# Patient Record
Sex: MALE | Race: WHITE | NOT HISPANIC OR LATINO
[De-identification: names, ages, dates, MRNs, and addresses within clinical notes are randomized per-mention and may not be internally consistent; named-entity substitution may affect disease eponyms.]

---

## 2017-03-07 ENCOUNTER — APPOINTMENT (OUTPATIENT)
Dept: HEART AND VASCULAR | Facility: CLINIC | Age: 72
End: 2017-03-07

## 2017-03-07 VITALS
HEIGHT: 71 IN | WEIGHT: 270 LBS | SYSTOLIC BLOOD PRESSURE: 128 MMHG | HEART RATE: 66 BPM | DIASTOLIC BLOOD PRESSURE: 73 MMHG | BODY MASS INDEX: 37.8 KG/M2

## 2017-03-07 DIAGNOSIS — Z87.442 PERSONAL HISTORY OF URINARY CALCULI: ICD-10-CM

## 2017-03-07 DIAGNOSIS — Z84.1 FAMILY HISTORY OF DISORDERS OF KIDNEY AND URETER: ICD-10-CM

## 2017-04-18 ENCOUNTER — APPOINTMENT (OUTPATIENT)
Dept: HEART AND VASCULAR | Facility: CLINIC | Age: 72
End: 2017-04-18

## 2017-04-18 VITALS
BODY MASS INDEX: 37.8 KG/M2 | WEIGHT: 270 LBS | SYSTOLIC BLOOD PRESSURE: 110 MMHG | HEART RATE: 65 BPM | DIASTOLIC BLOOD PRESSURE: 65 MMHG | HEIGHT: 71 IN

## 2017-05-01 ENCOUNTER — APPOINTMENT (OUTPATIENT)
Dept: PULMONOLOGY | Facility: CLINIC | Age: 72
End: 2017-05-01

## 2017-05-01 VITALS
SYSTOLIC BLOOD PRESSURE: 115 MMHG | DIASTOLIC BLOOD PRESSURE: 69 MMHG | BODY MASS INDEX: 37.8 KG/M2 | WEIGHT: 270 LBS | HEIGHT: 71 IN | OXYGEN SATURATION: 96 % | HEART RATE: 65 BPM

## 2017-05-16 ENCOUNTER — OUTPATIENT (OUTPATIENT)
Dept: OUTPATIENT SERVICES | Facility: HOSPITAL | Age: 72
LOS: 1 days | End: 2017-05-16
Payer: MEDICARE

## 2017-05-16 ENCOUNTER — APPOINTMENT (OUTPATIENT)
Dept: SLEEP CENTER | Facility: HOSPITAL | Age: 72
End: 2017-05-16

## 2017-05-16 DIAGNOSIS — G47.33 OBSTRUCTIVE SLEEP APNEA (ADULT) (PEDIATRIC): ICD-10-CM

## 2017-05-16 PROCEDURE — 95810 POLYSOM 6/> YRS 4/> PARAM: CPT | Mod: 26

## 2017-05-16 PROCEDURE — 95811 POLYSOM 6/>YRS CPAP 4/> PARM: CPT

## 2017-05-18 ENCOUNTER — FORM ENCOUNTER (OUTPATIENT)
Age: 72
End: 2017-05-18

## 2017-05-25 ENCOUNTER — INPATIENT (INPATIENT)
Facility: HOSPITAL | Age: 72
LOS: 0 days | Discharge: HOME | DRG: 274 | End: 2017-05-26
Attending: INTERNAL MEDICINE | Admitting: INTERNAL MEDICINE
Payer: COMMERCIAL

## 2017-05-25 VITALS
HEART RATE: 82 BPM | OXYGEN SATURATION: 99 % | SYSTOLIC BLOOD PRESSURE: 115 MMHG | DIASTOLIC BLOOD PRESSURE: 70 MMHG | RESPIRATION RATE: 16 BRPM

## 2017-05-25 DIAGNOSIS — I47.2 VENTRICULAR TACHYCARDIA: ICD-10-CM

## 2017-05-25 DIAGNOSIS — Z95.810 PRESENCE OF AUTOMATIC (IMPLANTABLE) CARDIAC DEFIBRILLATOR: Chronic | ICD-10-CM

## 2017-05-25 LAB
ANION GAP SERPL CALC-SCNC: 9 MMOL/L — SIGNIFICANT CHANGE UP (ref 5–17)
APTT BLD: 31.8 SEC — SIGNIFICANT CHANGE UP (ref 27.5–37.4)
BUN SERPL-MCNC: 18 MG/DL — SIGNIFICANT CHANGE UP (ref 7–23)
CALCIUM SERPL-MCNC: 9.2 MG/DL — SIGNIFICANT CHANGE UP (ref 8.4–10.5)
CHLORIDE SERPL-SCNC: 102 MMOL/L — SIGNIFICANT CHANGE UP (ref 96–108)
CO2 SERPL-SCNC: 28 MMOL/L — SIGNIFICANT CHANGE UP (ref 22–31)
CREAT SERPL-MCNC: 1 MG/DL — SIGNIFICANT CHANGE UP (ref 0.5–1.3)
GLUCOSE SERPL-MCNC: 110 MG/DL — HIGH (ref 70–99)
HCT VFR BLD CALC: 38.9 % — LOW (ref 39–50)
HGB BLD-MCNC: 13.1 G/DL — SIGNIFICANT CHANGE UP (ref 13–17)
INR BLD: 1.27 — HIGH (ref 0.88–1.16)
MCHC RBC-ENTMCNC: 30.5 PG — SIGNIFICANT CHANGE UP (ref 27–34)
MCHC RBC-ENTMCNC: 33.7 G/DL — SIGNIFICANT CHANGE UP (ref 32–36)
MCV RBC AUTO: 90.5 FL — SIGNIFICANT CHANGE UP (ref 80–100)
PLATELET # BLD AUTO: 212 K/UL — SIGNIFICANT CHANGE UP (ref 150–400)
POTASSIUM SERPL-MCNC: 4.1 MMOL/L — SIGNIFICANT CHANGE UP (ref 3.5–5.3)
POTASSIUM SERPL-SCNC: 4.1 MMOL/L — SIGNIFICANT CHANGE UP (ref 3.5–5.3)
PROTHROM AB SERPL-ACNC: 14.2 SEC — HIGH (ref 9.8–12.7)
RBC # BLD: 4.3 M/UL — SIGNIFICANT CHANGE UP (ref 4.2–5.8)
RBC # FLD: 14.1 % — SIGNIFICANT CHANGE UP (ref 10.3–16.9)
SODIUM SERPL-SCNC: 139 MMOL/L — SIGNIFICANT CHANGE UP (ref 135–145)
WBC # BLD: 12.1 K/UL — HIGH (ref 3.8–10.5)
WBC # FLD AUTO: 12.1 K/UL — HIGH (ref 3.8–10.5)

## 2017-05-25 PROCEDURE — 93654 COMPRE EP EVAL TX VT: CPT

## 2017-05-25 PROCEDURE — 93662 INTRACARDIAC ECG (ICE): CPT | Mod: 26

## 2017-05-25 RX ORDER — LISINOPRIL 2.5 MG/1
2.5 TABLET ORAL EVERY 24 HOURS
Qty: 0 | Refills: 0 | Status: DISCONTINUED | OUTPATIENT
Start: 2017-05-25 | End: 2017-05-26

## 2017-05-25 RX ORDER — CLOPIDOGREL BISULFATE 75 MG/1
75 TABLET, FILM COATED ORAL EVERY 24 HOURS
Qty: 0 | Refills: 0 | Status: DISCONTINUED | OUTPATIENT
Start: 2017-05-25 | End: 2017-05-26

## 2017-05-25 RX ORDER — ATORVASTATIN CALCIUM 80 MG/1
20 TABLET, FILM COATED ORAL EVERY 24 HOURS
Qty: 0 | Refills: 0 | Status: DISCONTINUED | OUTPATIENT
Start: 2017-05-25 | End: 2017-05-26

## 2017-05-25 RX ORDER — FINASTERIDE 5 MG/1
5 TABLET, FILM COATED ORAL DAILY
Qty: 0 | Refills: 0 | Status: DISCONTINUED | OUTPATIENT
Start: 2017-05-25 | End: 2017-05-26

## 2017-05-25 RX ORDER — APIXABAN 2.5 MG/1
5 TABLET, FILM COATED ORAL EVERY 12 HOURS
Qty: 0 | Refills: 0 | Status: DISCONTINUED | OUTPATIENT
Start: 2017-05-25 | End: 2017-05-26

## 2017-05-25 RX ORDER — CARVEDILOL PHOSPHATE 80 MG/1
25 CAPSULE, EXTENDED RELEASE ORAL EVERY 12 HOURS
Qty: 0 | Refills: 0 | Status: DISCONTINUED | OUTPATIENT
Start: 2017-05-25 | End: 2017-05-26

## 2017-05-25 RX ORDER — AMIODARONE HYDROCHLORIDE 400 MG/1
200 TABLET ORAL EVERY 24 HOURS
Qty: 0 | Refills: 0 | Status: DISCONTINUED | OUTPATIENT
Start: 2017-05-25 | End: 2017-05-26

## 2017-05-25 RX ORDER — FUROSEMIDE 40 MG
40 TABLET ORAL EVERY 24 HOURS
Qty: 0 | Refills: 0 | Status: DISCONTINUED | OUTPATIENT
Start: 2017-05-25 | End: 2017-05-26

## 2017-05-25 RX ORDER — POTASSIUM CHLORIDE 20 MEQ
10 PACKET (EA) ORAL DAILY
Qty: 0 | Refills: 0 | Status: DISCONTINUED | OUTPATIENT
Start: 2017-05-25 | End: 2017-05-26

## 2017-05-25 RX ADMIN — ATORVASTATIN CALCIUM 20 MILLIGRAM(S): 80 TABLET, FILM COATED ORAL at 21:30

## 2017-05-25 RX ADMIN — CARVEDILOL PHOSPHATE 25 MILLIGRAM(S): 80 CAPSULE, EXTENDED RELEASE ORAL at 18:38

## 2017-05-25 RX ADMIN — APIXABAN 5 MILLIGRAM(S): 2.5 TABLET, FILM COATED ORAL at 18:38

## 2017-05-25 RX ADMIN — AMIODARONE HYDROCHLORIDE 200 MILLIGRAM(S): 400 TABLET ORAL at 18:37

## 2017-05-25 NOTE — H&P ADULT - ASSESSMENT
70 y/o M with h/o paroxysmal atrial fibrillation, CAD s/p stents and ischemic cardiomyopathy with  ventricular tachycardia s/p ICD and VT ablation (2016), who has recurrent VT/shock despite being on amiodarone and now presents for elective re-do ablation.

## 2017-05-25 NOTE — H&P ADULT - PROBLEM SELECTOR PLAN 1
NPO and consented for ablation  ICD check reveals normal function will need to be turned off when pacer pads on

## 2017-05-25 NOTE — H&P ADULT - PMH
Coronary artery disease involving native coronary artery of native heart without angina pectoris    Ischemic cardiomyopathy    VT (ventricular tachycardia)

## 2017-05-25 NOTE — H&P ADULT - HISTORY OF PRESENT ILLNESS
Mr. Sauceda is a pleasant [id number] 72 y/o M with h/o paroxysmal atrial fibrillation, CAD s/p stents and ischemic cardiomyopathy with  ventricular tachycardia s/p ICD and VT ablation (2016), who has recurrent VT/shock despite being on amiodarone and now presents for elective re-do ablation.      Mr. Sauceda states that he had an ICD placed 7/2014.  EF from outside reports 30%.  He states he was admitted with a CHF exacerbation 7/2015. He underwent cardiac catheterization at [facility] 6/2016 s/p MATTY to LAD. Reports he had VT and was shocked by his ICD. He then underwent a VT ablation 9/2016 at [facility].  Amiodarone was started post-procedure for arrhythmia suppression.  Then 11/2016 he was readmitted to [facility] with palpitations and his heart rate was up to 170 bpm. He was given IV Amiodarone and cardioverted.  Amiodarone was stopped due to concern for long term side effects and Sotalol was started. Then 1/2017 remote monitoring showed he had recurrent VT terminated by ATP.  He was switched from sotalol to amiodarone.  He states that 4/21 he had recurrent shock and device interrogation shows multiple runs of VT terminated by ATP and ultimately a shock was delivered.  He has had no recurrent shocks since then.  No syncope, chest pain, decreased exercise tolerance orthopnea.  He is on Eliquis which he is compliant with.  He recently saw Dr. Arceo in the office and now presents for an elective EP study and ablation of his ventricular tachycardia.     OF NOTE: Records from [facility] reflect h/o EPS/VT ablation 9/2016. Decreased voltage suggestive of scar noted in the anterior portion of the LV close to the septum and right beneath LVOT. During LV mapping in superior-lateral portion of this scar VT initiated spontaneous with mapping. Several lesions were delivered which initiated VT every time and at times accelerated to drop BP. VT did not completely eliminate but slowed down after few lesions. Post ablation EPS with 3 EST revealed no inducible VT but did not test w/ Isuprel as BP dropped significantly every time fast VT initiated.      OF NOTE: ICD interrogated in holding area – print out in chart.  No events or shocks since 4/21.  All measured data within normal limits/stable

## 2017-05-26 VITALS — TEMPERATURE: 99 F

## 2017-05-26 PROBLEM — I47.2 VENTRICULAR TACHYCARDIA: Chronic | Status: ACTIVE | Noted: 2017-05-25

## 2017-05-26 PROBLEM — I25.5 ISCHEMIC CARDIOMYOPATHY: Chronic | Status: ACTIVE | Noted: 2017-05-25

## 2017-05-26 PROBLEM — I25.10 ATHEROSCLEROTIC HEART DISEASE OF NATIVE CORONARY ARTERY WITHOUT ANGINA PECTORIS: Chronic | Status: ACTIVE | Noted: 2017-05-25

## 2017-05-26 RX ADMIN — APIXABAN 5 MILLIGRAM(S): 2.5 TABLET, FILM COATED ORAL at 07:24

## 2017-05-26 RX ADMIN — CLOPIDOGREL BISULFATE 75 MILLIGRAM(S): 75 TABLET, FILM COATED ORAL at 07:24

## 2017-05-26 RX ADMIN — LISINOPRIL 2.5 MILLIGRAM(S): 2.5 TABLET ORAL at 10:12

## 2017-05-26 RX ADMIN — CARVEDILOL PHOSPHATE 25 MILLIGRAM(S): 80 CAPSULE, EXTENDED RELEASE ORAL at 07:25

## 2017-05-26 RX ADMIN — Medication 10 MILLIEQUIVALENT(S): at 10:12

## 2017-05-26 RX ADMIN — FINASTERIDE 5 MILLIGRAM(S): 5 TABLET, FILM COATED ORAL at 10:12

## 2017-05-26 RX ADMIN — Medication 40 MILLIGRAM(S): at 07:25

## 2017-05-26 NOTE — DISCHARGE NOTE ADULT - CARE PLAN
Principal Discharge DX:	VT (ventricular tachycardia)  Instructions for follow-up, activity and diet:	You underwent a successful v-tach ablation on 5/25/17. Due to the procedure, please avoid any strenuous activity for at least one week. Continue to take all your cardiac medications and follow up with Dr Arceo. Principal Discharge DX:	VT (ventricular tachycardia)  Goal:	follow up with Dr. Arceo on July 11 at 11 AM  Instructions for follow-up, activity and diet:	You underwent a successful v-tach ablation on 5/25/17. Due to the procedure, please avoid any strenuous activity for at least one week. Ok to shower tonight but no bathing or swimming for 1 week. No driving for 5 days.  Continue to take all your cardiac medications and follow up with Dr Arceo.

## 2017-05-26 NOTE — DISCHARGE NOTE ADULT - PLAN OF CARE
You underwent a successful v-tach ablation on 5/25/17. Due to the procedure, please avoid any strenuous activity for at least one week. Continue to take all your cardiac medications and follow up with Dr Arceo. follow up with Dr. Arceo on July 11 at 11 AM You underwent a successful v-tach ablation on 5/25/17. Due to the procedure, please avoid any strenuous activity for at least one week. Ok to shower tonight but no bathing or swimming for 1 week. No driving for 5 days.  Continue to take all your cardiac medications and follow up with Dr Arceo.

## 2017-05-26 NOTE — PROGRESS NOTE ADULT - ASSESSMENT
70 y/o M with h/o pAF, CAD, stent, ischemic cardiomyopathy (LVEF 30%),  with dual chamber ICD (ST jazmyne) and prior VT ablation at [facility] in 9/2016. He continues to have VT with ICD shocks despite being on Amiodarone. He is now s/p EPS and VT ablation on 5/25/17. Restarted on Eliquis post procedure.   - WIll increase pacing rate to 70 bpm to suppress ectopies.   - Continue Amiodarone 200 mg daily and Coreg.   - Continue Plavix given recent PCI to LAD in 6/2016.   - F/U with Dr. Arceo on July 11 at 11AM.   - D/C home today

## 2017-05-26 NOTE — DISCHARGE NOTE ADULT - HOSPITAL COURSE
Mr. Sauceda is a pleasant 70 y/o M with h/o paroxysmal atrial fibrillation, CAD s/p stents and ischemic cardiomyopathy with  ventricular tachycardia s/p ICD and VT ablation (2016), who has recurrent VT/shock despite being on amiodarone and now presents for elective re-do ablation.      Patient underwent a successful V-tach ablation and was monitored overnight on telemetry.  NO events noted on tele, vitals stable, groins without bleeding or hematoma. Eliquis was restarted post procedure and patient was discharged home the next day hemodynamically stable. Mr. Sauceda is a pleasant 72 y/o M with h/o paroxysmal atrial fibrillation, CAD s/p stents and ischemic cardiomyopathy with  ventricular tachycardia s/p ICD and VT ablation (2016), who has recurrent VT/shock despite being on amiodarone and now presents for elective re-do ablation.      Patient underwent a successful V-tach ablation and was monitored overnight on telemetry.  NO events noted on tele, vitals stable, groins without bleeding or hematoma. Eliquis was restarted post procedure and patient was discharged home the next day hemodynamically stable. His lower pacing rate from his ICD ([employer]) is increased from 65 bpm to 70 bpm to suppress ectopies. F/U with Dr. Arceo outpatient.

## 2017-05-26 NOTE — DISCHARGE NOTE ADULT - CARE PROVIDER_API CALL
Cornelius Arceo), Cardiac Electrophysiology; Cardiovascular Disease  [address] [place] [address]  Phone: [phone]  Fax: [phone]

## 2017-05-26 NOTE — DISCHARGE NOTE ADULT - PATIENT PORTAL LINK FT
“You can access the [facility] Patient Portal, offered by [facility], by registering with the following website: [email or web address]”

## 2017-05-26 NOTE — DISCHARGE NOTE ADULT - MEDICATION SUMMARY - MEDICATIONS TO TAKE
I will START or STAY ON the medications listed below when I get home from the hospital:    finasteride 5 mg oral tablet  -- 1 tab(s) by mouth once a day  -- Indication: For BPH    lisinopril 2.5 mg oral tablet  -- 1 tab(s) by mouth once a day  -- Indication: For Cardiomyopathy    amiodarone 200 mg oral tablet  -- 1 tab(s) by mouth once a day  -- Indication: For VT    Eliquis 5 mg oral tablet  -- 1 tab(s) by mouth 2 times a day  -- Indication: For AFib (paroxysmal)    Lipitor 20 mg oral tablet  -- 1 tab(s) by mouth once a day  -- Indication: For Cholesterol    Plavix 75 mg oral tablet  -- 1 tab(s) by mouth once a day  -- Indication: For Coronary artery disease / stent    Coreg 25 mg oral tablet  -- 1 tab(s) by mouth 2 times a day  -- Indication: For Cardiomyopathy     Lasix 40 mg oral tablet  -- 1 tab(s) by mouth once a day  -- Indication: For water pill     potassium chloride 10 mEq oral capsule, extended release  -- 1 cap(s) by mouth once a day  -- Indication: For potassium supplement while on water pill     glucosamine 500 mg oral capsule  -- 1 cap(s) by mouth once a day  -- Indication: For Supplement

## 2017-05-26 NOTE — PROGRESS NOTE ADULT - SUBJECTIVE AND OBJECTIVE BOX
EPS Progress Note    S: No complaints today. s/p VT ablation yesterday. No chest pain, SOB, dizziness.    O: T(C): 36.8, Max: 36.8 (05-25 @ 16:00)  HR: 66 (64 - 80)  BP: 104/60 (92/56 - 125/88)  RR: 16 (16 - 20)  SpO2: 94% (92% - 97%)    TELE: sinus rhythm, PVCs.  HR 60    PHYSICAL  General:  NAD        Chest:  CTA B/L, no w/r/r  Cardiac:  RRR, + s1/s2. ICD on left side  Abdomen:  +BS , soft ND/NT  Extremities: Bilateral groin wound stable without hematoma    LABS:                        13.1   12.1  )-----------( 212      ( 25 May 2017 08:00 )             38.9     05-25    139  |  102  |  18  ----------------------------<  110<H>  4.1   |  28  |  1.00    Ca    9.2      25 May 2017 08:00      PT/INR - ( 25 May 2017 08:00 )   PT: 14.2 sec;   INR: 1.27          PTT - ( 25 May 2017 08:00 )  PTT:31.8 sec    MEDICATIONS:  finasteride 5milliGRAM(s) Oral daily  lisinopril 2.5milliGRAM(s) Oral every 24 hours  amiodarone    Tablet 200milliGRAM(s) Oral every 24 hours  apixaban 5milliGRAM(s) Oral every 12 hours  atorvastatin 20milliGRAM(s) Oral every 24 hours  clopidogrel Tablet 75milliGRAM(s) Oral every 24 hours  carvedilol 25milliGRAM(s) Oral every 12 hours  furosemide    Tablet 40milliGRAM(s) Oral every 24 hours  potassium chloride    Tablet ER 10milliEquivalent(s) Oral daily

## 2017-05-31 DIAGNOSIS — I47.2 VENTRICULAR TACHYCARDIA: ICD-10-CM

## 2017-05-31 DIAGNOSIS — I25.10 ATHEROSCLEROTIC HEART DISEASE OF NATIVE CORONARY ARTERY WITHOUT ANGINA PECTORIS: ICD-10-CM

## 2017-05-31 DIAGNOSIS — Z95.5 PRESENCE OF CORONARY ANGIOPLASTY IMPLANT AND GRAFT: ICD-10-CM

## 2017-05-31 DIAGNOSIS — Z79.01 LONG TERM (CURRENT) USE OF ANTICOAGULANTS: ICD-10-CM

## 2017-05-31 DIAGNOSIS — I25.5 ISCHEMIC CARDIOMYOPATHY: ICD-10-CM

## 2017-05-31 DIAGNOSIS — I48.0 PAROXYSMAL ATRIAL FIBRILLATION: ICD-10-CM

## 2017-05-31 DIAGNOSIS — Z95.810 PRESENCE OF AUTOMATIC (IMPLANTABLE) CARDIAC DEFIBRILLATOR: ICD-10-CM

## 2017-05-31 PROCEDURE — 85027 COMPLETE CBC AUTOMATED: CPT

## 2017-05-31 PROCEDURE — C1732: CPT

## 2017-05-31 PROCEDURE — C1894: CPT

## 2017-05-31 PROCEDURE — 36415 COLL VENOUS BLD VENIPUNCTURE: CPT

## 2017-05-31 PROCEDURE — 85730 THROMBOPLASTIN TIME PARTIAL: CPT

## 2017-05-31 PROCEDURE — 85610 PROTHROMBIN TIME: CPT

## 2017-05-31 PROCEDURE — 86900 BLOOD TYPING SEROLOGIC ABO: CPT

## 2017-05-31 PROCEDURE — C1759: CPT

## 2017-05-31 PROCEDURE — 86850 RBC ANTIBODY SCREEN: CPT

## 2017-05-31 PROCEDURE — C1730: CPT

## 2017-05-31 PROCEDURE — 86901 BLOOD TYPING SEROLOGIC RH(D): CPT

## 2017-05-31 PROCEDURE — 80048 BASIC METABOLIC PNL TOTAL CA: CPT

## 2017-06-05 DIAGNOSIS — I42.8 OTHER CARDIOMYOPATHIES: ICD-10-CM

## 2017-06-19 ENCOUNTER — APPOINTMENT (OUTPATIENT)
Dept: PULMONOLOGY | Facility: CLINIC | Age: 72
End: 2017-06-19

## 2017-06-19 VITALS
HEART RATE: 78 BPM | TEMPERATURE: 97.9 F | OXYGEN SATURATION: 96 % | BODY MASS INDEX: 37.52 KG/M2 | DIASTOLIC BLOOD PRESSURE: 60 MMHG | HEIGHT: 71 IN | SYSTOLIC BLOOD PRESSURE: 90 MMHG | WEIGHT: 268 LBS

## 2017-06-20 ENCOUNTER — APPOINTMENT (OUTPATIENT)
Dept: HEART AND VASCULAR | Facility: CLINIC | Age: 72
End: 2017-06-20

## 2017-07-11 ENCOUNTER — APPOINTMENT (OUTPATIENT)
Dept: HEART AND VASCULAR | Facility: CLINIC | Age: 72
End: 2017-07-11

## 2017-07-11 VITALS
HEART RATE: 73 BPM | HEIGHT: 71 IN | BODY MASS INDEX: 37.1 KG/M2 | WEIGHT: 265 LBS | DIASTOLIC BLOOD PRESSURE: 61 MMHG | SYSTOLIC BLOOD PRESSURE: 126 MMHG

## 2017-07-11 RX ORDER — CLOPIDOGREL 75 MG/1
75 TABLET, FILM COATED ORAL
Refills: 0 | Status: DISCONTINUED | COMMUNITY
End: 2017-07-11

## 2017-08-21 ENCOUNTER — APPOINTMENT (OUTPATIENT)
Dept: PULMONOLOGY | Facility: CLINIC | Age: 72
End: 2017-08-21
Payer: MEDICARE

## 2017-08-21 VITALS
BODY MASS INDEX: 36.26 KG/M2 | WEIGHT: 260 LBS | OXYGEN SATURATION: 98 % | SYSTOLIC BLOOD PRESSURE: 122 MMHG | DIASTOLIC BLOOD PRESSURE: 74 MMHG | HEART RATE: 75 BPM

## 2017-08-21 PROCEDURE — 99214 OFFICE O/P EST MOD 30 MIN: CPT

## 2017-10-03 ENCOUNTER — INPATIENT (INPATIENT)
Facility: HOSPITAL | Age: 72
LOS: 5 days | Discharge: HOME | DRG: 274 | End: 2017-10-09
Attending: INTERNAL MEDICINE | Admitting: INTERNAL MEDICINE
Payer: MEDICARE

## 2017-10-03 ENCOUNTER — APPOINTMENT (OUTPATIENT)
Dept: HEART AND VASCULAR | Facility: CLINIC | Age: 72
End: 2017-10-03
Payer: MEDICARE

## 2017-10-03 VITALS
HEART RATE: 85 BPM | SYSTOLIC BLOOD PRESSURE: 116 MMHG | TEMPERATURE: 98 F | DIASTOLIC BLOOD PRESSURE: 78 MMHG | RESPIRATION RATE: 17 BRPM | OXYGEN SATURATION: 100 %

## 2017-10-03 VITALS — DIASTOLIC BLOOD PRESSURE: 65 MMHG | SYSTOLIC BLOOD PRESSURE: 105 MMHG

## 2017-10-03 DIAGNOSIS — I82.409 ACUTE EMBOLISM AND THROMBOSIS OF UNSPECIFIED DEEP VEINS OF UNSPECIFIED LOWER EXTREMITY: ICD-10-CM

## 2017-10-03 DIAGNOSIS — Z95.810 PRESENCE OF AUTOMATIC (IMPLANTABLE) CARDIAC DEFIBRILLATOR: Chronic | ICD-10-CM

## 2017-10-03 DIAGNOSIS — I50.22 CHRONIC SYSTOLIC (CONGESTIVE) HEART FAILURE: ICD-10-CM

## 2017-10-03 DIAGNOSIS — I47.2 VENTRICULAR TACHYCARDIA: ICD-10-CM

## 2017-10-03 DIAGNOSIS — N40.0 BENIGN PROSTATIC HYPERPLASIA WITHOUT LOWER URINARY TRACT SYMPTOMS: ICD-10-CM

## 2017-10-03 DIAGNOSIS — I25.10 ATHEROSCLEROTIC HEART DISEASE OF NATIVE CORONARY ARTERY WITHOUT ANGINA PECTORIS: ICD-10-CM

## 2017-10-03 DIAGNOSIS — R63.8 OTHER SYMPTOMS AND SIGNS CONCERNING FOOD AND FLUID INTAKE: ICD-10-CM

## 2017-10-03 DIAGNOSIS — R73.03 PREDIABETES: ICD-10-CM

## 2017-10-03 DIAGNOSIS — G47.33 OBSTRUCTIVE SLEEP APNEA (ADULT) (PEDIATRIC): ICD-10-CM

## 2017-10-03 DIAGNOSIS — Z90.49 ACQUIRED ABSENCE OF OTHER SPECIFIED PARTS OF DIGESTIVE TRACT: Chronic | ICD-10-CM

## 2017-10-03 DIAGNOSIS — Z98.890 OTHER SPECIFIED POSTPROCEDURAL STATES: Chronic | ICD-10-CM

## 2017-10-03 LAB — MAGNESIUM SERPL-MCNC: 2 MG/DL — SIGNIFICANT CHANGE UP (ref 1.6–2.6)

## 2017-10-03 PROCEDURE — 99291 CRITICAL CARE FIRST HOUR: CPT | Mod: 25

## 2017-10-03 PROCEDURE — 71010: CPT | Mod: 26

## 2017-10-03 PROCEDURE — 93010 ELECTROCARDIOGRAM REPORT: CPT

## 2017-10-03 PROCEDURE — 99215 OFFICE O/P EST HI 40 MIN: CPT

## 2017-10-03 PROCEDURE — 93283 PRGRMG EVAL IMPLANTABLE DFB: CPT

## 2017-10-03 PROCEDURE — 99291 CRITICAL CARE FIRST HOUR: CPT

## 2017-10-03 RX ORDER — AMIODARONE HYDROCHLORIDE 400 MG/1
1 TABLET ORAL
Qty: 900 | Refills: 0 | Status: DISCONTINUED | OUTPATIENT
Start: 2017-10-03 | End: 2017-10-04

## 2017-10-03 RX ORDER — APIXABAN 2.5 MG/1
5 TABLET, FILM COATED ORAL EVERY 12 HOURS
Qty: 0 | Refills: 0 | Status: DISCONTINUED | OUTPATIENT
Start: 2017-10-04 | End: 2017-10-04

## 2017-10-03 RX ORDER — CLOPIDOGREL BISULFATE 75 MG/1
75 TABLET, FILM COATED ORAL DAILY
Qty: 0 | Refills: 0 | Status: DISCONTINUED | OUTPATIENT
Start: 2017-10-04 | End: 2017-10-04

## 2017-10-03 RX ORDER — ATORVASTATIN CALCIUM 80 MG/1
20 TABLET, FILM COATED ORAL AT BEDTIME
Qty: 0 | Refills: 0 | Status: DISCONTINUED | OUTPATIENT
Start: 2017-10-03 | End: 2017-10-05

## 2017-10-03 RX ORDER — CARVEDILOL PHOSPHATE 80 MG/1
25 CAPSULE, EXTENDED RELEASE ORAL EVERY 12 HOURS
Qty: 0 | Refills: 0 | Status: DISCONTINUED | OUTPATIENT
Start: 2017-10-04 | End: 2017-10-05

## 2017-10-03 RX ORDER — INFLUENZA VIRUS VACCINE 15; 15; 15; 15 UG/.5ML; UG/.5ML; UG/.5ML; UG/.5ML
0.5 SUSPENSION INTRAMUSCULAR ONCE
Qty: 0 | Refills: 0 | Status: COMPLETED | OUTPATIENT
Start: 2017-10-03 | End: 2017-10-03

## 2017-10-03 RX ORDER — AMIODARONE HYDROCHLORIDE 200 MG/1
200 TABLET ORAL
Qty: 90 | Refills: 2 | Status: DISCONTINUED | COMMUNITY
End: 2017-10-03

## 2017-10-03 RX ORDER — AMPICILLIN 500 MG/1
500 CAPSULE ORAL
Qty: 112 | Refills: 0 | Status: COMPLETED | COMMUNITY
Start: 2017-08-21

## 2017-10-03 RX ORDER — FINASTERIDE 5 MG/1
5 TABLET, FILM COATED ORAL AT BEDTIME
Qty: 0 | Refills: 0 | Status: DISCONTINUED | OUTPATIENT
Start: 2017-10-03 | End: 2017-10-09

## 2017-10-03 RX ORDER — AMIODARONE HYDROCHLORIDE 400 MG/1
150 TABLET ORAL ONCE
Qty: 0 | Refills: 0 | Status: COMPLETED | OUTPATIENT
Start: 2017-10-03 | End: 2017-10-03

## 2017-10-03 RX ORDER — APIXABAN 2.5 MG/1
5 TABLET, FILM COATED ORAL ONCE
Qty: 0 | Refills: 0 | Status: COMPLETED | OUTPATIENT
Start: 2017-10-03 | End: 2017-10-03

## 2017-10-03 RX ORDER — LISINOPRIL 2.5 MG/1
2.5 TABLET ORAL DAILY
Qty: 0 | Refills: 0 | Status: DISCONTINUED | OUTPATIENT
Start: 2017-10-04 | End: 2017-10-09

## 2017-10-03 RX ORDER — TAMSULOSIN HYDROCHLORIDE 0.4 MG/1
0.4 CAPSULE ORAL AT BEDTIME
Qty: 0 | Refills: 0 | Status: DISCONTINUED | OUTPATIENT
Start: 2017-10-03 | End: 2017-10-09

## 2017-10-03 RX ORDER — CARVEDILOL PHOSPHATE 80 MG/1
25 CAPSULE, EXTENDED RELEASE ORAL ONCE
Qty: 0 | Refills: 0 | Status: COMPLETED | OUTPATIENT
Start: 2017-10-03 | End: 2017-10-03

## 2017-10-03 RX ADMIN — AMIODARONE HYDROCHLORIDE 600 MILLIGRAM(S): 400 TABLET ORAL at 19:54

## 2017-10-03 RX ADMIN — CARVEDILOL PHOSPHATE 25 MILLIGRAM(S): 80 CAPSULE, EXTENDED RELEASE ORAL at 21:16

## 2017-10-03 RX ADMIN — APIXABAN 5 MILLIGRAM(S): 2.5 TABLET, FILM COATED ORAL at 21:16

## 2017-10-03 RX ADMIN — AMIODARONE HYDROCHLORIDE 600 MILLIGRAM(S): 400 TABLET ORAL at 17:00

## 2017-10-03 RX ADMIN — AMIODARONE HYDROCHLORIDE 33.33 MG/MIN: 400 TABLET ORAL at 21:08

## 2017-10-03 RX ADMIN — FINASTERIDE 5 MILLIGRAM(S): 5 TABLET, FILM COATED ORAL at 21:16

## 2017-10-03 RX ADMIN — TAMSULOSIN HYDROCHLORIDE 0.4 MILLIGRAM(S): 0.4 CAPSULE ORAL at 21:16

## 2017-10-03 RX ADMIN — ATORVASTATIN CALCIUM 20 MILLIGRAM(S): 80 TABLET, FILM COATED ORAL at 21:16

## 2017-10-03 NOTE — ED ADULT NURSE NOTE - OBJECTIVE STATEMENT
Patient to ED alert and orientedx3 complaining of sudden episode of lightheadedness today. As per patient, "I took the train and headed to my cardiologist office, , and he told me that my EKG was abnormal and he told me to come to the ER." EKG completed, patient placed on continuous cardiac monitoring with multiple rounds of vtach noted. Patient reports weakness during these episodes. Patient presents with pacemaker/defibrillator to left anterior chest wall, denies firing off of the device. Breath sounds clear, s1/s2 heart sounds heard.

## 2017-10-03 NOTE — ED ADULT NURSE NOTE - PMH
AICD (automatic cardioverter/defibrillator) present    Atrial fibrillation    Coronary artery disease involving native coronary artery of native heart without angina pectoris    Ischemic cardiomyopathy    VT (ventricular tachycardia)

## 2017-10-03 NOTE — H&P ADULT - ASSESSMENT
73yo M with pmh of Paroxysmal Afib, Recurrent VT,  ICD, DVT on Eliquis CHF with EF<35%, DANI on CPAP, Pre DM, BPH, OA, HTN, recurrent UTI presenting with lightheadedness and burning sensation in chest radiating to his neck and head for 3 days.

## 2017-10-03 NOTE — H&P ADULT - ATTENDING COMMENTS
See CCU resident admission note for additional details    Mr. Sauceda is a 73yo M with Paroxysmal Afib, Chronic Systolic Heart Failure with Reduced LVEF and Recurrent VT s/p ICD, DVT on Eliquis , DANI on CPAP, Pre Diabetes, and Essential HTN, who presents with recurrent VT below therapeutic threshold of ICD.     Currently, patient asx. No cardiopulmonary complaints.   Vitals reviewed 10/4  Exam notable for elderly WM sitting up in bed, flat neck veins, RRR, no MGR detected, bibasilar dry crackles, overly nourished abdomen, no fluid wave, bilateral compression hose in place, 1+ pretibial edema, A&Ox3  Labs reviewed 10/4  EKG 10/4:  80bpm  CXR 10/4: mild vasc congestion    -TTE ordered  -Cont amio gtt   -Potential VT ablation 10/5  -Cont cardiac meds: Coreg, Lisinopril, ASA, Statin  -Cont Apixaban for A/C    MD Carlito  CCU Attending

## 2017-10-03 NOTE — H&P ADULT - PROBLEM SELECTOR PLAN 6
- Pt on CPAP at home however refusing CPAP inpatient   -Will monitor respiratory status - Pt on CPAP at home however refusing CPAP inpatient. c/w Nasal Cannula  -Will monitor respiratory status

## 2017-10-03 NOTE — ED ADULT TRIAGE NOTE - CHIEF COMPLAINT QUOTE
Sent in by from cardiologist office (Dr. Pepe) for abnormal ekg.  c/o dizziness.  Denies chest pain, palpitation, sob, falls.

## 2017-10-03 NOTE — CHART NOTE - NSCHARTNOTEFT_GEN_A_CORE
PGY-2 EVENT NOTE:    Patient says he uses CPAP @ home nightly for DANI. Offered him CPAP qHS during his stay here in the hospital however patient refused. Will start supplemental O2 via NC for now at night however ideally he should be on the CPAP like he is at home. Continuing to monitor.

## 2017-10-03 NOTE — ED PROVIDER NOTE - OBJECTIVE STATEMENT
72 M hx of CAD AICD VT sent from Dr. Car for eval of VT intermittent x past 3 days.  Pt reports intermittent lightheadedness but no cp or shock.  Pt currently asymptomatic.  Was on amio in past but dc.  Dr. Rivera rec admit and restart IV amio, ep study.

## 2017-10-03 NOTE — ED PROVIDER NOTE - CRITICAL CARE PROVIDED
documentation/interpretation of diagnostic studies/additional history taking/direct patient care (not related to procedure)

## 2017-10-03 NOTE — H&P ADULT - HISTORY OF PRESENT ILLNESS
71yo M with pmh of Paroxysmal Afib, Recurrent VT,  ICD, DVT on Eliquis CHF with EF<35%, DANI on CPAP, Pre DM, BPH, OA, HTN, recurrent UTI presenting with lightheadedness and burning sensation in chest radiating to his neck and head for 3 days. No  chest pain, SOB, diaphoresis, n/v.    Past history- Pt  mentions history of Arrhythmia whole life however last year during hospitalization for bladder stone, he was noted to be have VT and was transferred [facility] for PPM placement. In July 2016, after an abnormal stress test, Pt was stented (unknown artery). In September 2016, pt underwent ablation. In April 2017, Pt noted getting shocked by ICD, was admitted where he was given amio drip and Cardioverted. In May 2017, Pt underwent second ablation.     In ER, Pt was given 150 mg Amio bolus 71yo M with pmh of Paroxysmal Afib, Recurrent VT,  ICD, DVT on Eliquis CHF with EF<35%, DANI on CPAP, Pre DM, BPH, OA, HTN, recurrent UTI presenting with lightheadedness and burning sensation in chest radiating to his neck and head for 3 days. No  chest pain, SOB, diaphoresis, n/v.    Past Cardiac  history- Pt  mentions history of Arrhythmia whole life however last year during hospitalization for bladder stone, he was noted to be have VT and was transferred [facility] for ICD placement. In July 2016, after an abnormal stress test, Pt was stented (unknown artery). In September 2016, pt underwent ablation. In April 2017, Pt noted getting shocked by ICD, was admitted where he was given amio drip and Cardioverted. In May 2017, Pt underwent second ablation.     In ER, Pt was given 150 mg Amiodarone bolus for non-sustained Vtach and transferred to CCU for Amiodarone drip and possible ablation. 71yo M with pmh of Paroxysmal Afib, Recurrent VT,  ICD, DVT on Eliquis CHF with EF<35%, DANI on CPAP, Pre DM, BPH, OA, HTN, recurrent UTI presenting with lightheadedness and burning sensation in chest radiating to his neck and head for 3 days. No  chest pain, SOB, diaphoresis, n/v.    Past Cardiac  history- Pt  mentions history of Arrhythmia whole life however last year during hospitalization for bladder stone, he was noted to be have VT and was transferred [facility] for ICD placement. In July 2016, after an abnormal stress test, Pt was stented (unknown artery). In September 2016, pt underwent ablation. In April 2017, Pt noted getting shocked by ICD, was admitted where he was given amio drip and Cardioverted. In May 2017, Pt underwent second ablation.     In ER, Pt was given 150 mg Amiodarone bolus for stable Vtach and transferred to CCU for Amiodarone drip and possible ablation.

## 2017-10-03 NOTE — H&P ADULT - PROBLEM SELECTOR PLAN 4
- Hx of CHF with reduced EF <35% per patient   - f/u Official echo   -Continue with home - Hx of CHF with reduced EF <35% per patient   - f/u Official echo   -Continue with home Carvedilol 25 BID and Lisinopril 2.5 qdaily

## 2017-10-03 NOTE — H&P ADULT - NSHPLABSRESULTS_GEN_ALL_CORE
.  LABS:                         13.2   8.9   )-----------( 235      ( 03 Oct 2017 16:42 )             41.3     10-03    139  |  101  |  13  ----------------------------<  92  4.0   |  25  |  0.88    Ca    9.0      03 Oct 2017 16:42  Mg     2.0     10-03    TPro  7.6  /  Alb  4.3  /  TBili  0.7  /  DBili  x   /  AST  30  /  ALT  26  /  AlkPhos  103  10-03    PT/INR - ( 03 Oct 2017 16:42 )   PT: 14.6 sec;   INR: 1.31          PTT - ( 03 Oct 2017 16:42 )  PTT:32.6 sec    CARDIAC MARKERS ( 03 Oct 2017 16:42 )  x     / <0.01 ng/mL / 148 U/L / x     / 3.4 ng/mL            RADIOLOGY, EKG & ADDITIONAL TESTS: Reviewed.

## 2017-10-03 NOTE — H&P ADULT - PROBLEM SELECTOR PLAN 1
- Likely scar induced from Previous MI, s/p 2 ablations last in April 2017 and ICD placement on opt Amiodarone  - Given Amio bolus 1250mg x2  - Started on Amio drip 1mg/min for 6 hours. Will switch to 0.5 mg/min for 18 hours.   - Will likely undego Ablation on Thursday   - Tele monitoring - Likely scar induced from Previous MI, s/p 2 ablations last in April 2017 and ICD placement on opt Amiodarone  - Given Amio bolus 150mg x2  - Started on Amio drip 1mg/min for 6 hours. Will switch to 0.5 mg/min for 18 hours.   - Will likely undego Ablation on Thursday   - Tele monitoring

## 2017-10-03 NOTE — H&P ADULT - NSHPPHYSICALEXAM_GEN_ALL_CORE
.  VITAL SIGNS:  T(C): 36.5 (10-03-17 @ 17:12), Max: 36.6 (10-03-17 @ 16:07)  T(F): 97.7 (10-03-17 @ 17:12), Max: 97.8 (10-03-17 @ 16:07)  HR: 80 (10-03-17 @ 20:00) (80 - 85)  BP: 152/88 (10-03-17 @ 20:00) (116/78 - 153/88)  BP(mean): 118 (10-03-17 @ 20:00) (108 - 118)  RR: 32 (10-03-17 @ 20:00) (17 - 179)  SpO2: 95% (10-03-17 @ 20:00) (94% - 100%)  Wt(kg): --    PHYSICAL EXAM:    Constitutional: WDWN resting comfortably in bed; NAD  Head: NC/AT  Eyes: PERRL, EOMI, clear conjunctiva  ENT: no nasal discharge; uvula midline, no oropharyngeal erythema or exudates;  Neck: supple; no JVD or thyromegaly  Respiratory: CTA B/L; no W/R/R, no retractions  Cardiac: +S1/S2; RRR; no M/R/G;  Gastrointestinal: Surgical scars. soft, NT/ND; no rebound or guarding; +BSx4  Extremities: Varicose veins b/l. Compression stockings on. No edema noted .  Vascular: 2+ radial, femoral, DP/PT pulses B/L  Lymphatic: no submandibular or cervical LAD  Neurologic: AAOx3; CNII-XII grossly intact; no focal deficits

## 2017-10-03 NOTE — H&P ADULT - PROBLEM SELECTOR PLAN 2
- Hx of CAD and Stent placement 2016 with reduced EF <35% per patient   - Continue Plavix 75 and Atorvastatin 20   - f/u Echo

## 2017-10-04 DIAGNOSIS — I47.2 VENTRICULAR TACHYCARDIA: ICD-10-CM

## 2017-10-04 LAB
ANION GAP SERPL CALC-SCNC: 13 MMOL/L — SIGNIFICANT CHANGE UP (ref 5–17)
BUN SERPL-MCNC: 14 MG/DL — SIGNIFICANT CHANGE UP (ref 7–23)
CALCIUM SERPL-MCNC: 9.1 MG/DL — SIGNIFICANT CHANGE UP (ref 8.4–10.5)
CHLORIDE SERPL-SCNC: 101 MMOL/L — SIGNIFICANT CHANGE UP (ref 96–108)
CHOLEST SERPL-MCNC: 131 MG/DL — SIGNIFICANT CHANGE UP (ref 10–199)
CO2 SERPL-SCNC: 25 MMOL/L — SIGNIFICANT CHANGE UP (ref 22–31)
CREAT SERPL-MCNC: 0.86 MG/DL — SIGNIFICANT CHANGE UP (ref 0.5–1.3)
GLUCOSE SERPL-MCNC: 99 MG/DL — SIGNIFICANT CHANGE UP (ref 70–99)
HBA1C BLD-MCNC: 5.7 % — HIGH (ref 4–5.6)
HCT VFR BLD CALC: 36.8 % — LOW (ref 39–50)
HDLC SERPL-MCNC: 36 MG/DL — LOW (ref 40–125)
HGB BLD-MCNC: 12.4 G/DL — LOW (ref 13–17)
LIPID PNL WITH DIRECT LDL SERPL: 55 MG/DL — SIGNIFICANT CHANGE UP
MAGNESIUM SERPL-MCNC: 2.2 MG/DL — SIGNIFICANT CHANGE UP (ref 1.6–2.6)
MCHC RBC-ENTMCNC: 30.2 PG — SIGNIFICANT CHANGE UP (ref 27–34)
MCHC RBC-ENTMCNC: 33.7 G/DL — SIGNIFICANT CHANGE UP (ref 32–36)
MCV RBC AUTO: 89.5 FL — SIGNIFICANT CHANGE UP (ref 80–100)
PLATELET # BLD AUTO: 181 K/UL — SIGNIFICANT CHANGE UP (ref 150–400)
POTASSIUM SERPL-MCNC: 3.4 MMOL/L — LOW (ref 3.5–5.3)
POTASSIUM SERPL-SCNC: 3.4 MMOL/L — LOW (ref 3.5–5.3)
RBC # BLD: 4.11 M/UL — LOW (ref 4.2–5.8)
RBC # FLD: 14.2 % — SIGNIFICANT CHANGE UP (ref 10.3–16.9)
SODIUM SERPL-SCNC: 139 MMOL/L — SIGNIFICANT CHANGE UP (ref 135–145)
TOTAL CHOLESTEROL/HDL RATIO MEASUREMENT: 3.6 RATIO — SIGNIFICANT CHANGE UP (ref 3.4–9.6)
TRIGL SERPL-MCNC: 202 MG/DL — HIGH (ref 10–149)
WBC # BLD: 7.2 K/UL — SIGNIFICANT CHANGE UP (ref 3.8–10.5)
WBC # FLD AUTO: 7.2 K/UL — SIGNIFICANT CHANGE UP (ref 3.8–10.5)

## 2017-10-04 PROCEDURE — 99223 1ST HOSP IP/OBS HIGH 75: CPT

## 2017-10-04 PROCEDURE — 93306 TTE W/DOPPLER COMPLETE: CPT | Mod: 26

## 2017-10-04 PROCEDURE — 71010: CPT | Mod: 26

## 2017-10-04 PROCEDURE — 99291 CRITICAL CARE FIRST HOUR: CPT

## 2017-10-04 PROCEDURE — 93010 ELECTROCARDIOGRAM REPORT: CPT

## 2017-10-04 RX ORDER — AMIODARONE HYDROCHLORIDE 400 MG/1
0.5 TABLET ORAL
Qty: 900 | Refills: 0 | Status: DISCONTINUED | OUTPATIENT
Start: 2017-10-04 | End: 2017-10-05

## 2017-10-04 RX ORDER — ASPIRIN/CALCIUM CARB/MAGNESIUM 324 MG
81 TABLET ORAL DAILY
Qty: 0 | Refills: 0 | Status: DISCONTINUED | OUTPATIENT
Start: 2017-10-04 | End: 2017-10-09

## 2017-10-04 RX ORDER — POTASSIUM CHLORIDE 20 MEQ
40 PACKET (EA) ORAL EVERY 4 HOURS
Qty: 0 | Refills: 0 | Status: COMPLETED | OUTPATIENT
Start: 2017-10-04 | End: 2017-10-04

## 2017-10-04 RX ADMIN — ATORVASTATIN CALCIUM 20 MILLIGRAM(S): 80 TABLET, FILM COATED ORAL at 21:50

## 2017-10-04 RX ADMIN — FINASTERIDE 5 MILLIGRAM(S): 5 TABLET, FILM COATED ORAL at 21:50

## 2017-10-04 RX ADMIN — AMIODARONE HYDROCHLORIDE 16.67 MG/MIN: 400 TABLET ORAL at 03:25

## 2017-10-04 RX ADMIN — Medication 81 MILLIGRAM(S): at 21:51

## 2017-10-04 RX ADMIN — CARVEDILOL PHOSPHATE 25 MILLIGRAM(S): 80 CAPSULE, EXTENDED RELEASE ORAL at 11:52

## 2017-10-04 RX ADMIN — TAMSULOSIN HYDROCHLORIDE 0.4 MILLIGRAM(S): 0.4 CAPSULE ORAL at 21:50

## 2017-10-04 RX ADMIN — AMIODARONE HYDROCHLORIDE 16.67 MG/MIN: 400 TABLET ORAL at 20:30

## 2017-10-04 RX ADMIN — APIXABAN 5 MILLIGRAM(S): 2.5 TABLET, FILM COATED ORAL at 11:53

## 2017-10-04 RX ADMIN — Medication 40 MILLIEQUIVALENT(S): at 06:35

## 2017-10-04 RX ADMIN — LISINOPRIL 2.5 MILLIGRAM(S): 2.5 TABLET ORAL at 11:54

## 2017-10-04 RX ADMIN — Medication 40 MILLIEQUIVALENT(S): at 11:53

## 2017-10-04 RX ADMIN — CARVEDILOL PHOSPHATE 25 MILLIGRAM(S): 80 CAPSULE, EXTENDED RELEASE ORAL at 21:50

## 2017-10-04 NOTE — PROGRESS NOTE ADULT - SUBJECTIVE AND OBJECTIVE BOX
INTERVAL HPI/OVERNIGHT EVENTS:    OVERNIGHT: No overnight events.  SUBJECTIVE: Patient seen and examined at bedside.     ROS:  CV: Denies chest pain  Resp: Denies SOB  GI: Denies abdominal pain, constipation, diarrhea, nausea, vomiting  : Denies dysuria  ID: Denies fevers, chills  MSK: Denies joint pain     OBJECTIVE:    VITAL SIGNS:  ICU Vital Signs Last 24 Hrs  T(C): 36.7 (04 Oct 2017 05:30), Max: 37.2 (03 Oct 2017 21:18)  T(F): 98.1 (04 Oct 2017 05:30), Max: 98.9 (03 Oct 2017 21:18)  HR: 80 (04 Oct 2017 05:00) (78 - 85)  BP: 147/90 (04 Oct 2017 05:00) (107/67 - 153/88)  BP(mean): 108 (04 Oct 2017 05:00) (80 - 118)  ABP: --  ABP(mean): --  RR: 15 (04 Oct 2017 05:00) (14 - 179)  SpO2: 95% (04 Oct 2017 05:00) (92% - 100%)        10-03 @ 07:01  -  10-04 @ 05:50  --------------------------------------------------------  IN: 813.2 mL / OUT: 0 mL / NET: 813.2 mL      CAPILLARY BLOOD GLUCOSE          PHYSICAL EXAM:    General: NAD, comfortable  HEENT: NCAT, PERRL, clear conjunctiva, no scleral icterus  Neck: supple, no JVD  Respiratory: CTA b/l, no wheezing, rhonchi, rales  Cardiovascular: RRR, normal S1S2, no M/R/G  Abdomen: soft, NT/ND, bowel sounds in all four quadrants, no palpable masses  Extremities: WWP, no clubbing, cyanosis, or edema  Neuro:     MEDICATIONS:  MEDICATIONS  (STANDING):  amiodarone Infusion 0.5 mG/Min (16.667 mL/Hr) IV Continuous <Continuous>  apixaban 5 milliGRAM(s) Oral every 12 hours  atorvastatin 20 milliGRAM(s) Oral at bedtime  carvedilol 25 milliGRAM(s) Oral every 12 hours  clopidogrel Tablet 75 milliGRAM(s) Oral daily  finasteride 5 milliGRAM(s) Oral at bedtime  influenza   Vaccine 0.5 milliLiter(s) IntraMuscular once  lisinopril 2.5 milliGRAM(s) Oral daily  tamsulosin 0.4 milliGRAM(s) Oral at bedtime    MEDICATIONS  (PRN):      ALLERGIES:  Allergies    No Known Allergies    Intolerances        LABS:                        13.2   8.9   )-----------( 235      ( 03 Oct 2017 16:42 )             41.3     10-03    139  |  101  |  13  ----------------------------<  92  4.0   |  25  |  0.88    Ca    9.0      03 Oct 2017 16:42  Mg     2.0     10-03    TPro  7.6  /  Alb  4.3  /  TBili  0.7  /  DBili  x   /  AST  30  /  ALT  26  /  AlkPhos  103  10-03    PT/INR - ( 03 Oct 2017 16:42 )   PT: 14.6 sec;   INR: 1.31          PTT - ( 03 Oct 2017 16:42 )  PTT:32.6 sec      RADIOLOGY & ADDITIONAL TESTS: Reviewed. INTERVAL HPI/OVERNIGHT EVENTS:    OVERNIGHT: NSVT, 30-37 beats at most, refused BiPAP, sating to 87% and put on NC, comes up to 95%  SUBJECTIVE: Patient seen and examined at bedside. No complaints. Has not experienced similar light headedness as before.     ROS:  CV: Denies chest pain  Resp: Denies SOB  GI: Denies abdominal pain, constipation, diarrhea, nausea, vomiting  : Denies dysuria  ID: Denies fevers, chills  MSK: Denies joint pain     OBJECTIVE:    VITAL SIGNS:  ICU Vital Signs Last 24 Hrs  T(C): 36.7 (04 Oct 2017 05:30), Max: 37.2 (03 Oct 2017 21:18)  T(F): 98.1 (04 Oct 2017 05:30), Max: 98.9 (03 Oct 2017 21:18)  HR: 80 (04 Oct 2017 05:00) (78 - 85)  BP: 147/90 (04 Oct 2017 05:00) (107/67 - 153/88)  BP(mean): 108 (04 Oct 2017 05:00) (80 - 118)  ABP: --  ABP(mean): --  RR: 15 (04 Oct 2017 05:00) (14 - 179)  SpO2: 95% (04 Oct 2017 05:00) (92% - 100%)        10-03 @ 07:01  -  10-04 @ 05:50  --------------------------------------------------------  IN: 813.2 mL / OUT: 0 mL / NET: 813.2 mL      CAPILLARY BLOOD GLUCOSE          PHYSICAL EXAM:    General: NAD, comfortable  HEENT: NCAT, PERRL, clear conjunctiva, no scleral icterus  Neck: supple, no JVD  Respiratory: trace crackles b/l  Cardiovascular: irregular, normal S1S2, no M/R/G  Abdomen: soft, NT/ND, bowel sounds in all four quadrants, no palpable masses  Extremities: WWP, no clubbing, cyanosis, 1+ non-pitting edema   Neuro: grossly intact, A&Ox3    MEDICATIONS:  MEDICATIONS  (STANDING):  amiodarone Infusion 0.5 mG/Min (16.667 mL/Hr) IV Continuous <Continuous>  apixaban 5 milliGRAM(s) Oral every 12 hours  atorvastatin 20 milliGRAM(s) Oral at bedtime  carvedilol 25 milliGRAM(s) Oral every 12 hours  clopidogrel Tablet 75 milliGRAM(s) Oral daily  finasteride 5 milliGRAM(s) Oral at bedtime  influenza   Vaccine 0.5 milliLiter(s) IntraMuscular once  lisinopril 2.5 milliGRAM(s) Oral daily  tamsulosin 0.4 milliGRAM(s) Oral at bedtime    MEDICATIONS  (PRN):      ALLERGIES:  Allergies    No Known Allergies    Intolerances        LABS:                        13.2   8.9   )-----------( 235      ( 03 Oct 2017 16:42 )             41.3     10-03    139  |  101  |  13  ----------------------------<  92  4.0   |  25  |  0.88    Ca    9.0      03 Oct 2017 16:42  Mg     2.0     10-03    TPro  7.6  /  Alb  4.3  /  TBili  0.7  /  DBili  x   /  AST  30  /  ALT  26  /  AlkPhos  103  10-03    PT/INR - ( 03 Oct 2017 16:42 )   PT: 14.6 sec;   INR: 1.31          PTT - ( 03 Oct 2017 16:42 )  PTT:32.6 sec      RADIOLOGY & ADDITIONAL TESTS: Reviewed.    cxr: Left chest wall cardiac conduction device again present. Linear   atelectasis left base. No pleural effusion. Cardiomediastinal silhouette   suboptimally evaluated in this projection.    ecg: ventricular paced at 80    echo: EF 30-35%, apical wall akinesis mile aortic and mitral valve regurg, trace tricuspid regurd

## 2017-10-04 NOTE — PROGRESS NOTE ADULT - ATTENDING COMMENTS
Mr. Sauceda is a 73yo M with Paroxysmal Afib, Chronic Systolic Heart Failure with Reduced LVEF and Recurrent VT s/p ICD, DVT on Eliquis , DANI on CPAP, Pre Diabetes, and Essential HTN, who presents with recurrent VT below therapeutic threshold of ICD.     Currently, patient asx. No cardiopulmonary complaints.   Vitals reviewed 10/4  Exam notable for elderly WM sitting up in bed, flat neck veins, RRR, no MGR detected, bibasilar dry crackles, overly nourished abdomen, no fluid wave, bilateral compression hose in place, 1+ pretibial edema, A&Ox3  Labs reviewed 10/4  EKG 10/4:  80bpm  CXR 10/4: mild vasc congestion    -TTE ordered  -Cont amio gtt   -Potential VT ablation 10/5  -Cont cardiac meds: Coreg, Lisinopril, ASA, Statin  -Cont Apixaban for A/C    MD Carlito  CCU Attending

## 2017-10-04 NOTE — PROGRESS NOTE ADULT - PROBLEM SELECTOR PLAN 2
- Hx of CAD and Stent placement 2016 with reduced EF <35% per patient   - Continue Plavix 75 and Atorvastatin 20   - f/u Echo - Hx of CAD and Stent placement 2016 with reduced EF <35% per patient   -echo 10/4 confirms EF of 30-35%, apical wall akenisis, mild mitral and aortic valve regurg  -d/c plavix, stopped 1 year after stent in July 2016, only taking baby aspirin  -continue home baby aspirin   - Continue Atorvastatin 20

## 2017-10-04 NOTE — CONSULT NOTE ADULT - PROBLEM SELECTOR RECOMMENDATION 9
Likely scar induced from Previous MI, s/p 2 ablations last in May 2017. Pt has an     The patient  LRL reprogrammed to 80 bpm for arrhythmia suppression. VT1 detection zone lowered to 105 bpm with ATP therapy, no shock.      - Given Amio bolus 150mg x2  - Started on Amio drip 1mg/min for 6 hours. Will switch to 0.5 mg/min for 18 hours.   - Will likely undego Ablation on Thursday   - Tele monitoring.   Agree with amiodarone load. - Likely scar induced from Previous MI, s/p 2 ablations last in May 2017. Pt is s/p SJM ICD in 7/2014.      - The patient LRL reprogrammed to 80 bpm for arrhythmia suppression. VT1 detection zone lowered to 105 bpm with ATP therapy, no shock.    - The patient will undergo RFA for VT with possible epicardial access tomorrow by Dr. Neal. The procedure along with risks, benefits and alternatives were explained to the patient. The patient agreed to the procedure and the consent is signed and in the chart.      - Agree with IV amiodarone. Please HOLD AMIODARONE STARTING AT 5 AM. This will allow better induction of VT during study.      - Please hold Eliquis dose tonight and tomorrow morning.     - The patient will be first case tomorrow am. Please call us with any further questions: [phone]. - Likely scar induced from Previous MI, s/p 2 ablations last in May 2017. Pt is s/p SJM ICD in 7/2014.      - The patient LRL reprogrammed to 80 bpm for arrhythmia suppression. VT1 detection zone lowered to 105 bpm with ATP therapy, no shock.    - The patient will undergo RFA for VT with possible epicardial access tomorrow by Dr. Neal. The procedure along with risks, benefits and alternatives were explained to the patient. The patient agreed to the procedure and the consent is signed and in the chart.      - Agree with IV amiodarone. Please HOLD AMIODARONE STARTING AT 4 AM. This will allow better induction of VT during study.      - Please hold Eliquis dose tonight and tomorrow morning.     - Please keep the patient NPO after midnight.  - The patient will be first case tomorrow am. Please call us with any further questions: [phone].

## 2017-10-04 NOTE — PROGRESS NOTE ADULT - PROBLEM SELECTOR PLAN 4
- Hx of CHF with reduced EF <35% per patient   - f/u Official echo   -Continue with home Carvedilol 25 BID and Lisinopril 2.5 qdaily - Hx of CHF with reduced EF 30-35%  -Continue with home Carvedilol 25 BID and Lisinopril 2.5 qdaily

## 2017-10-04 NOTE — CONSULT NOTE ADULT - ASSESSMENT
[id number] 72 y/o M with reported h/o paroxysmal atrial fibrillation, CAD s/p MATTY 6/2016, VT s/p ICD placement 7/2014, S/P VT ablation at [facility] 9/2016 and recurrent VT despite AAD therapy (Sotalol) 1/2017. Arrhythmia was well suppressed on Amiodarone. S/P VT ablation 5/25/17 at [facility]. Off AMIO since 9/11/17. Now with recurrent slow VT (~ 110 bpm) with LB morphology.    [id number] [id number] [id number]

## 2017-10-04 NOTE — PROGRESS NOTE ADULT - ASSESSMENT
71yo M with pmh of Paroxysmal Afib, Recurrent VT,  ICD, DVT on Eliquis CHF with EF<35%, DANI on CPAP, Pre DM, BPH, OA, HTN, recurrent UTI presenting with lightheadedness and burning sensation in chest radiating to his neck and head for 3 days. 71yo M with pmh of Paroxysmal Afib, Recurrent VT,  ICD, DVT on Eliquis CHF with EF<35%, DANI on CPAP, Pre DM, BPH, OA, HTN, recurrent UTI presenting with lightheadedness and burning sensation in chest radiating to his neck and head for 3 days, admitted for NSVT.

## 2017-10-04 NOTE — PROGRESS NOTE ADULT - PROBLEM SELECTOR PLAN 8
F: No IVF. Amio drip  E: Maintain K>4, Mg>2  N: Dash diet    Full code   CCU F: No IVF. Amio drip  E: Maintain K>4, Mg>2  N: Dash diet  DVT PPX: anticoagulated with eliquis   Full code   CCU

## 2017-10-04 NOTE — PROGRESS NOTE ADULT - PROBLEM SELECTOR PLAN 1
- Likely scar induced from Previous MI, s/p 2 ablations last in April 2017 and ICD placement on opt Amiodarone  - Given Amio bolus 150mg x2  - Started on Amio drip 1mg/min for 6 hours. Will switch to 0.5 mg/min for 18 hours.   - Will likely undego Ablation on Thursday   - Tele monitoring - Likely scar induced from Previous MI, s/p 2 ablations last in April 2017 and ICD placement on opt Amiodarone  - s/p Amio bolus 150mg x2  - Started on Amio drip 1mg/min for 6 hours. Will switch to 0.5 mg/min for 18 hours, will d/c at 3AM, and dose PO afterwards   - Will likely undego Ablation on Thursday 10/5  - Tele monitoring  -f/u EP recs

## 2017-10-04 NOTE — CONSULT NOTE ADULT - SUBJECTIVE AND OBJECTIVE BOX
HISTORY OF PRESENT ILLNESS: [id number] Mr. Sauceda is a 71 y/o M with h/o paroxysmal atrial fibrillation, on eliquis, recurrent ventricular tachycardia, DVT, CHF with EF<35%, CAD s/p MATTY to LAD, DANI on CPAP, Pre-DM, BPH, OA, HTN, recurrent UTI  who presented to the office yesterday for an acute visit with complaint of intermittent dizziness over the last few days. Amiodarone was titrated down starting 7/2017, now off completely since 9/11/17. He denies change in exertional tolerance, SOB, presyncope/syncope.    His history is not entirely clear, but is as follows by his report and patient discharge information from multiple hospitalizations. He underwent surgery for bladder stones 7/2014; post op course significant for NSVT. Work-up thereafter included a stress test followed by an EP study at [facility]. He ultimately had an ICD placed 7/2014. Reports he was admitted with a CHF exacerbation 7/2015. He underwent cardiac catheterization at [facility] 6/2016 s/p MATTY to LAD. Reports he had VT and had ICD shock, followed by VT ablation 9/2016 at [facility]. Amiodarone was started post-procedure for arrhythmia suppression. He had palpitations and was re-admitted to [facility] on 11/2016 with heart rate up to 170 bpm. He was given IV Amiodarone followed by cardioversion. Amiodarone was stopped due to concern for long term side effects and Sotalol was started. VT treated with ATP was recognized on remote monitoring 1/2017; Sotalol was discontinued and Amiodarone with load restarted. He was told he would likely need another ablation. Case was discussed with Dr. Diaz at [place] and the decision was made to D/C ASA, continue Plavix and Eliquis until one year post stent.     Records from [facility] reflect h/o EPS/VT ablation 9/2016. Decreased voltage suggestive of scar noted in the anterior portion of the LV close to the septum and right beneath LVOT. During LV mapping in superior-lateral portion of this scar VT initiated spontaneous with mapping. Several lesions were delivered which initiated VT every time and at times accelerated to drop BP. VT did not completely eliminate but slowed down after few lesions. Post ablation EPS with 3 EST revealed no inducible VT but did not test w/ Isuprel as BP dropped significantly every time fast VT initiated.    S/P VT ablation 5/25/17 @ [facility]; clinical VT originating from the left aortic valve at the commissure with the right cusp. No ventricular arrhythmias inducible at the end of the case. Absence of endocardial scar suggesting nonischemic origin of his cardiomyopathy.     The patient was referred from the office for admission for further monitoring, improved medical management and possible repeat repeat RFA. He is now in sinus rhythm               PAST MEDICAL & SURGICAL HISTORY:  Atrial fibrillation  AICD (automatic cardioverter/defibrillator) present  Ischemic cardiomyopathy  VT (ventricular tachycardia)  Coronary artery disease involving native coronary artery of native heart without angina pectoris  H/O ureter repair  S/P cholecystectomy  S/P ICD (internal cardiac defibrillator) procedure      Allergies  No Known Allergies      MEDICATIONS:  amiodarone Infusion 0.5 mG/Min IV Continuous <Continuous>  carvedilol 25 milliGRAM(s) Oral every 12 hours  lisinopril 2.5 milliGRAM(s) Oral daily  tamsulosin 0.4 milliGRAM(s) Oral at bedtime  atorvastatin 20 milliGRAM(s) Oral at bedtime  finasteride 5 milliGRAM(s) Oral at bedtime  apixaban 5 milliGRAM(s) Oral every 12 hours  aspirin  chewable 81 milliGRAM(s) Oral daily  influenza   Vaccine 0.5 milliLiter(s) IntraMuscular once    FAMILY HISTORY:  No pertinent family history      SOCIAL HISTORY:    [X] Non-smoker  [X] No Alcohol use    REVIEW OF SYSTEMS:    CONSTITUTIONAL: No fever, weight loss, or fatigue  EYES: No eye pain, visual disturbances, or discharge  ENMT:  No difficulty hearing, tinnitus, vertigo; No sinus or throat pain  NECK: No pain or stiffness  BREASTS: No pain, masses, or nipple discharge  RESPIRATORY: No cough, wheezing, chills or hemoptysis; No Shortness of Breath  CARDIOVASCULAR: No chest pain, palpitations, dizziness, or leg swelling  GASTROINTESTINAL: No abdominal or epigastric pain. No nausea, vomiting, or hematemesis; No diarrhea or constipation. No melena or hematochezia.  GENITOURINARY: No dysuria, frequency, hematuria, or incontinence  NEUROLOGICAL: No headaches, memory loss, loss of strength, numbness, or tremors  SKIN: No itching, burning, rashes, or lesions   LYMPH Nodes: No enlarged glands  ENDOCRINE: No heat or cold intolerance; No hair loss  MUSCULOSKELETAL: No joint pain or swelling; No muscle, back, or extremity pain  PSYCHIATRIC: No depression, anxiety, mood swings, or difficulty sleeping  HEME/LYMPH: No easy bruising, or bleeding gums  ALLERY AND IMMUNOLOGIC: No hives or eczema	    [ ] All others negative	  [ ] Unable to obtain    PHYSICAL EXAM:  T(C): 37.2 (10-04-17 @ 17:00), Max: 37.3 (10-04-17 @ 12:57)  HR: 80 (10-04-17 @ 17:00) (78 - 82)  BP: 140/78 (10-04-17 @ 17:00) (107/67 - 153/91)  RR: 38 (10-04-17 @ 17:00) (14 - 179)  SpO2: 93% (10-04-17 @ 17:00) (92% - 100%)  Wt(kg): --  I&O's Summary    03 Oct 2017 07:01  -  04 Oct 2017 07:00  --------------------------------------------------------  IN: 1326.5 mL / OUT: 300 mL / NET: 1026.5 mL    04 Oct 2017 07:01  -  04 Oct 2017 17:39  --------------------------------------------------------  IN: 1501.9 mL / OUT: 350 mL / NET: 1151.9 mL        TELEMETRY: 	    ECG:     Appearance: Normal	  HEENT:   Normal oral mucosa, PERRL, EOMI	  Cardiovascular: Normal S1 S2, No JVD, No murmurs, No edema  Respiratory: Lungs clear to auscultation	  Gastrointestinal:  Soft, Non-tender, + BS	  Neurologic: A&O x 3, Non-focal  Extremities: Normal range of motion, No clubbing, cyanosis or edema  Vascular: Peripheral pulses palpable 2+ bilaterally    	  LABS:	 	    CARDIAC MARKERS:                                  12.4   7.2   )-----------( 181      ( 04 Oct 2017 05:09 )             36.8     10-04    139  |  101  |  14  ----------------------------<  99  3.4<L>   |  25  |  0.86    Ca    9.1      04 Oct 2017 05:11  Mg     2.2     10-04    TPro  7.6  /  Alb  4.3  /  TBili  0.7  /  DBili  x   /  AST  30  /  ALT  26  /  AlkPhos  103  10-03    proBNP:   Lipid Profile:   HgA1c: Hemoglobin A1C, Whole Blood: 5.7 % (10-04 @ 05:09)    TSH:     ASSESSMENT/PLAN: HISTORY OF PRESENT ILLNESS: [id number] Mr. Sauceda is a 71 y/o M with h/o paroxysmal atrial fibrillation, on eliquis, recurrent ventricular tachycardia, DVT, CHF with EF 30-35%, CAD s/p MATTY to LAD, DANI on CPAP, Pre-DM, BPH, OA, HTN, recurrent UTI  who presented to the office yesterday for an acute visit with complaint of intermittent dizziness over the last few days. Amiodarone was titrated down starting 7/2017, now off completely since 9/11/17. He denies change in exertional tolerance, SOB, presyncope/syncope.    His history is not entirely clear, but is as follows by his report and patient discharge information from multiple hospitalizations. He underwent surgery for bladder stones 7/2014; post op course significant for NSVT. Work-up thereafter included a stress test followed by an EP study at [facility]. He ultimately had an ICD placed 7/2014. Reports he was admitted with a CHF exacerbation 7/2015. He underwent cardiac catheterization at [facility] 6/2016 s/p MATTY to LAD. Reports he had VT and had ICD shock, followed by VT ablation 9/2016 at [facility]. Amiodarone was started post-procedure for arrhythmia suppression. He had palpitations and was re-admitted to [facility] on 11/2016 with heart rate up to 170 bpm. He was given IV Amiodarone followed by cardioversion. Amiodarone was stopped due to concern for long term side effects and Sotalol was started. VT treated with ATP was recognized on remote monitoring 1/2017; Sotalol was discontinued and Amiodarone with load restarted. He was told he would likely need another ablation. Case was discussed with Dr. Diaz at [place] and the decision was made to D/C ASA, continue Plavix and Eliquis until one year post stent.     Records from [facility] reflect h/o EPS/VT ablation 9/2016. Decreased voltage suggestive of scar noted in the anterior portion of the LV close to the septum and right beneath LVOT. During LV mapping in superior-lateral portion of this scar VT initiated spontaneous with mapping. Several lesions were delivered which initiated VT every time and at times accelerated to drop BP. VT did not completely eliminate but slowed down after few lesions. Post ablation EPS with 3 EST revealed no inducible VT but did not test w/ Isuprel as BP dropped significantly every time fast VT initiated.    S/P VT ablation 5/25/17 @ [facility]; clinical VT originating from the left aortic valve at the commissure with the right cusp. No ventricular arrhythmias inducible at the end of the case. Absence of endocardial scar suggesting nonischemic origin of his cardiomyopathy.     The patient was referred from the office for admission for further monitoring, improved medical management and possible repeat repeat RFA.               PAST MEDICAL & SURGICAL HISTORY:  Atrial fibrillation  AICD (automatic cardioverter/defibrillator) present  Ischemic cardiomyopathy  VT (ventricular tachycardia)  Coronary artery disease involving native coronary artery of native heart without angina pectoris  H/O ureter repair  S/P cholecystectomy  S/P ICD (internal cardiac defibrillator) procedure      Allergies  No Known Allergies      MEDICATIONS:  amiodarone Infusion 0.5 mG/Min IV Continuous <Continuous>  carvedilol 25 milliGRAM(s) Oral every 12 hours  lisinopril 2.5 milliGRAM(s) Oral daily  tamsulosin 0.4 milliGRAM(s) Oral at bedtime  atorvastatin 20 milliGRAM(s) Oral at bedtime  finasteride 5 milliGRAM(s) Oral at bedtime  apixaban 5 milliGRAM(s) Oral every 12 hours  aspirin  chewable 81 milliGRAM(s) Oral daily  influenza   Vaccine 0.5 milliLiter(s) IntraMuscular once    FAMILY HISTORY:  No pertinent family history      SOCIAL HISTORY:    [X] Non-smoker  [X] No Alcohol use    REVIEW OF SYSTEMS:    CONSTITUTIONAL: No fever, weight loss, or fatigue  EYES: No eye pain, visual disturbances, or discharge  ENMT:  No difficulty hearing, tinnitus, vertigo; No sinus or throat pain  NECK: No pain or stiffness  BREASTS: No pain, masses, or nipple discharge  RESPIRATORY: No cough, wheezing, chills or hemoptysis; No Shortness of Breath  CARDIOVASCULAR: No chest pain, palpitations, dizziness, or leg swelling  GASTROINTESTINAL: No abdominal or epigastric pain. No nausea, vomiting, or hematemesis; No diarrhea or constipation. No melena or hematochezia.  GENITOURINARY: No dysuria, frequency, hematuria, or incontinence  NEUROLOGICAL: No headaches, memory loss, loss of strength, numbness, or tremors  SKIN: No itching, burning, rashes, or lesions   LYMPH Nodes: No enlarged glands  ENDOCRINE: No heat or cold intolerance; No hair loss  MUSCULOSKELETAL: No joint pain or swelling; No muscle, back, or extremity pain  PSYCHIATRIC: No depression, anxiety, mood swings, or difficulty sleeping  HEME/LYMPH: No easy bruising, or bleeding gums  ALLERY AND IMMUNOLOGIC: No hives or eczema	    [X] All others negative	  [ ] Unable to obtain    PHYSICAL EXAM:  T(C): 37.2 (10-04-17 @ 17:00), Max: 37.3 (10-04-17 @ 12:57)  HR: 80 (10-04-17 @ 17:00) (78 - 82)  BP: 140/78 (10-04-17 @ 17:00) (107/67 - 153/91)  RR: 38 (10-04-17 @ 17:00) (14 - 179)  SpO2: 93% (10-04-17 @ 17:00) (92% - 100%)    I&O's Summary    03 Oct 2017 07:01  -  04 Oct 2017 07:00  --------------------------------------------------------  IN: 1326.5 mL / OUT: 300 mL / NET: 1026.5 mL    04 Oct 2017 07:01  -  04 Oct 2017 17:39  --------------------------------------------------------  IN: 1501.9 mL / OUT: 350 mL / NET: 1151.9 mL        TELEMETRY: SR in 80s, with intermittent V pacing at 80 and intermittent accelerated ventricular escape in 90s/100s	    ECG: Sinus rhythm at 80 with 1st degree AVB (AJ ~ 300 ms)     Appearance: Normal	  HEENT:   Normal oral mucosa, PERRL, EOMI	  Cardiovascular: Normal S1 S2, No JVD, No murmurs, No edema  Respiratory: Lungs clear to auscultation	  Gastrointestinal:  Soft, Non-tender, + BS	  Neurologic: A&O x 3, Non-focal  Extremities: Normal range of motion, No clubbing, cyanosis or edema  Vascular: Peripheral pulses palpable 2+ bilaterally    LABS:	 	                          12.4   7.2   )-----------( 181      ( 04 Oct 2017 05:09 )             36.8     10-04    139  |  101  |  14  ----------------------------<  99  3.4<L>   |  25  |  0.86    Ca    9.1      04 Oct 2017 05:11  Mg     2.2     10-04    TPro  7.6  /  Alb  4.3  /  TBili  0.7  /  DBili  x   /  AST  30  /  ALT  26  /  AlkPhos  103  10-03    HgA1c: Hemoglobin A1C, Whole Blood: 5.7 % (10-04 @ 05:09)    CARDIAC MARKERS ( 03 Oct 2017 16:42 )  x     / <0.01 ng/mL / 148 U/L / x     / 3.4 ng/mL      < from: Echocardiogram (10.04.17 @ 11:58) >  There is mild concentric left ventricular hypertrophy.There is apical akinesis. There is apical septal wall akinesis. There is apical inferior   wall akinesis. The left ventricular ejection fraction is estimated to be 30-35%. The mitral inflow pattern is consistent with impaired left ventricular   relaxation with mildly elevated left atrial pressure (8-14mmHg).  The left atrial volume index is 30 cc/m2 (normal <34cc/m2)The left atrial size is normal. Right   atrial size is normal. There is a pacemaker wire in the right heart. The right ventricle is normal in size and function. Structurally normal aortic  valve. There is mild aortic regurgitation. Structurally normal mitral valve. There is mild mitral regurgitation. Structurally normal tricuspid  valve. There is trace tricuspid regurgitation. The pulmonary artery systolic pressure is estimated to be 18 mmHg. Structurally normal pulmonic   valve. There is moderate to severe pulmonic regurgitation. Mild aortic root dilatation. The aortic root measures 4.1 cm at sinuses (normal less than 4 cm for men, less than 3.6 cm for women).  The inferior vena cava was not well visualized. There is no pericardial effusion. HISTORY OF PRESENT ILLNESS: [id number] Mr. Sauceda is a 73 y/o M with h/o paroxysmal atrial fibrillation, on eliquis, recurrent ventricular tachycardia, DVT, CHF with EF 30-35%, CAD s/p MATTY to LAD, DANI on CPAP, Pre-DM, BPH, OA, HTN, recurrent UTI  who presented to the office yesterday for an acute visit with complaint of intermittent dizziness over the last few days. Amiodarone was titrated down starting 7/2017, now off completely since 9/11/17. He denies change in exertional tolerance, SOB, presyncope/syncope.    His history is not entirely clear, but is as follows by his report and patient discharge information from multiple hospitalizations. He underwent surgery for bladder stones 7/2014; post op course significant for NSVT. Work-up thereafter included a stress test followed by an EP study at [facility]. He ultimately had an ICD placed 7/2014. Reports he was admitted with a CHF exacerbation 7/2015. He underwent cardiac catheterization at [facility] 6/2016 s/p MATTY to LAD. Reports he had VT and had ICD shock, followed by VT ablation 9/2016 at [facility]. Amiodarone was started post-procedure for arrhythmia suppression. He had palpitations and was re-admitted to [facility] on 11/2016 with heart rate up to 170 bpm. He was given IV Amiodarone followed by cardioversion. Amiodarone was stopped due to concern for long term side effects and Sotalol was started. VT treated with ATP was recognized on remote monitoring 1/2017; Sotalol was discontinued and Amiodarone with load restarted. He was told he would likely need another ablation. Case was discussed with Dr. Diaz at [place] and the decision was made to D/C ASA, continue Plavix and Eliquis until one year post stent.     Records from [facility] reflect h/o EPS/VT ablation 9/2016. Decreased voltage suggestive of scar noted in the anterior portion of the LV close to the septum and right beneath LVOT. During LV mapping in superior-lateral portion of this scar VT initiated spontaneous with mapping. Several lesions were delivered which initiated VT every time and at times accelerated to drop BP. VT did not completely eliminate but slowed down after few lesions. Post ablation EPS with 3 EST revealed no inducible VT but did not test w/ Isuprel as BP dropped significantly every time fast VT initiated.    S/P VT ablation 5/25/17 @ [facility]; clinical VT originating from the left aortic valve at the commissure with the right cusp. No ventricular arrhythmias inducible at the end of the case. Absence of endocardial scar suggesting nonischemic origin of his cardiomyopathy.     The patient was referred from the office for admission for further monitoring, improved medical management and possible repeat RFA.               PAST MEDICAL & SURGICAL HISTORY:  Atrial fibrillation  AICD (automatic cardioverter/defibrillator) present  Ischemic cardiomyopathy  VT (ventricular tachycardia)  Coronary artery disease involving native coronary artery of native heart without angina pectoris  H/O ureter repair  S/P cholecystectomy  S/P ICD (internal cardiac defibrillator) procedure      Allergies  No Known Allergies      MEDICATIONS:  amiodarone Infusion 0.5 mG/Min IV Continuous <Continuous>  carvedilol 25 milliGRAM(s) Oral every 12 hours  lisinopril 2.5 milliGRAM(s) Oral daily  tamsulosin 0.4 milliGRAM(s) Oral at bedtime  atorvastatin 20 milliGRAM(s) Oral at bedtime  finasteride 5 milliGRAM(s) Oral at bedtime  apixaban 5 milliGRAM(s) Oral every 12 hours  aspirin  chewable 81 milliGRAM(s) Oral daily  influenza   Vaccine 0.5 milliLiter(s) IntraMuscular once    FAMILY HISTORY:  No pertinent family history      SOCIAL HISTORY:    [X] Non-smoker  [X] No Alcohol use    REVIEW OF SYSTEMS:    CONSTITUTIONAL: No fever, weight loss, or fatigue  EYES: No eye pain, visual disturbances, or discharge  ENMT:  No difficulty hearing, tinnitus, vertigo; No sinus or throat pain  NECK: No pain or stiffness  BREASTS: No pain, masses, or nipple discharge  RESPIRATORY: No cough, wheezing, chills or hemoptysis; No Shortness of Breath  CARDIOVASCULAR: No chest pain, palpitations, dizziness, or leg swelling  GASTROINTESTINAL: No abdominal or epigastric pain. No nausea, vomiting, or hematemesis; No diarrhea or constipation. No melena or hematochezia.  GENITOURINARY: No dysuria, frequency, hematuria, or incontinence  NEUROLOGICAL: No headaches, memory loss, loss of strength, numbness, or tremors  SKIN: No itching, burning, rashes, or lesions   LYMPH Nodes: No enlarged glands  ENDOCRINE: No heat or cold intolerance; No hair loss  MUSCULOSKELETAL: No joint pain or swelling; No muscle, back, or extremity pain  PSYCHIATRIC: No depression, anxiety, mood swings, or difficulty sleeping  HEME/LYMPH: No easy bruising, or bleeding gums  ALLERY AND IMMUNOLOGIC: No hives or eczema	    [X] All others negative	  [ ] Unable to obtain    PHYSICAL EXAM:  T(C): 37.2 (10-04-17 @ 17:00), Max: 37.3 (10-04-17 @ 12:57)  HR: 80 (10-04-17 @ 17:00) (78 - 82)  BP: 140/78 (10-04-17 @ 17:00) (107/67 - 153/91)  RR: 38 (10-04-17 @ 17:00) (14 - 179)  SpO2: 93% (10-04-17 @ 17:00) (92% - 100%)    I&O's Summary    03 Oct 2017 07:01  -  04 Oct 2017 07:00  --------------------------------------------------------  IN: 1326.5 mL / OUT: 300 mL / NET: 1026.5 mL    04 Oct 2017 07:01  -  04 Oct 2017 17:39  --------------------------------------------------------  IN: 1501.9 mL / OUT: 350 mL / NET: 1151.9 mL        TELEMETRY: SR in 80s, with intermittent V pacing at 80 and intermittent accelerated ventricular escape in 90s/100s	    ECG: Sinus rhythm at 80 with 1st degree AVB (AJ ~ 300 ms)     Appearance: Normal	  HEENT:   Normal oral mucosa, PERRL, EOMI	  Cardiovascular: Normal S1 S2, No JVD, No murmurs, No edema  Respiratory: Lungs clear to auscultation	  Gastrointestinal:  Soft, Non-tender, + BS	  Neurologic: A&O x 3, Non-focal  Extremities: Normal range of motion, No clubbing, cyanosis or edema  Vascular: Peripheral pulses palpable 2+ bilaterally    LABS:	 	                          12.4   7.2   )-----------( 181      ( 04 Oct 2017 05:09 )             36.8     10-04    139  |  101  |  14  ----------------------------<  99  3.4<L>   |  25  |  0.86    Ca    9.1      04 Oct 2017 05:11  Mg     2.2     10-04    TPro  7.6  /  Alb  4.3  /  TBili  0.7  /  DBili  x   /  AST  30  /  ALT  26  /  AlkPhos  103  10-03    HgA1c: Hemoglobin A1C, Whole Blood: 5.7 % (10-04 @ 05:09)    CARDIAC MARKERS ( 03 Oct 2017 16:42 )  x     / <0.01 ng/mL / 148 U/L / x     / 3.4 ng/mL      < from: Echocardiogram (10.04.17 @ 11:58) >  There is mild concentric left ventricular hypertrophy.There is apical akinesis. There is apical septal wall akinesis. There is apical inferior   wall akinesis. The left ventricular ejection fraction is estimated to be 30-35%. The mitral inflow pattern is consistent with impaired left ventricular   relaxation with mildly elevated left atrial pressure (8-14mmHg).  The left atrial volume index is 30 cc/m2 (normal <34cc/m2)The left atrial size is normal. Right   atrial size is normal. There is a pacemaker wire in the right heart. The right ventricle is normal in size and function. Structurally normal aortic  valve. There is mild aortic regurgitation. Structurally normal mitral valve. There is mild mitral regurgitation. Structurally normal tricuspid  valve. There is trace tricuspid regurgitation. The pulmonary artery systolic pressure is estimated to be 18 mmHg. Structurally normal pulmonic   valve. There is moderate to severe pulmonic regurgitation. Mild aortic root dilatation. The aortic root measures 4.1 cm at sinuses (normal less than 4 cm for men, less than 3.6 cm for women).  The inferior vena cava was not well visualized. There is no pericardial effusion.

## 2017-10-05 LAB
ALBUMIN SERPL ELPH-MCNC: 3.5 G/DL — SIGNIFICANT CHANGE UP (ref 3.3–5)
ALP SERPL-CCNC: 90 U/L — SIGNIFICANT CHANGE UP (ref 40–120)
ALT FLD-CCNC: 20 U/L — SIGNIFICANT CHANGE UP (ref 10–45)
ANION GAP SERPL CALC-SCNC: 12 MMOL/L — SIGNIFICANT CHANGE UP (ref 5–17)
AST SERPL-CCNC: 21 U/L — SIGNIFICANT CHANGE UP (ref 10–40)
BILIRUB SERPL-MCNC: 0.6 MG/DL — SIGNIFICANT CHANGE UP (ref 0.2–1.2)
BUN SERPL-MCNC: 13 MG/DL — SIGNIFICANT CHANGE UP (ref 7–23)
CALCIUM SERPL-MCNC: 8.8 MG/DL — SIGNIFICANT CHANGE UP (ref 8.4–10.5)
CHLORIDE SERPL-SCNC: 102 MMOL/L — SIGNIFICANT CHANGE UP (ref 96–108)
CO2 SERPL-SCNC: 24 MMOL/L — SIGNIFICANT CHANGE UP (ref 22–31)
CREAT SERPL-MCNC: 0.82 MG/DL — SIGNIFICANT CHANGE UP (ref 0.5–1.3)
GLUCOSE SERPL-MCNC: 98 MG/DL — SIGNIFICANT CHANGE UP (ref 70–99)
HCT VFR BLD CALC: 37.7 % — LOW (ref 39–50)
HGB BLD-MCNC: 12.8 G/DL — LOW (ref 13–17)
MAGNESIUM SERPL-MCNC: 1.8 MG/DL — SIGNIFICANT CHANGE UP (ref 1.6–2.6)
MCHC RBC-ENTMCNC: 30 PG — SIGNIFICANT CHANGE UP (ref 27–34)
MCHC RBC-ENTMCNC: 34 G/DL — SIGNIFICANT CHANGE UP (ref 32–36)
MCV RBC AUTO: 88.5 FL — SIGNIFICANT CHANGE UP (ref 80–100)
PLATELET # BLD AUTO: 179 K/UL — SIGNIFICANT CHANGE UP (ref 150–400)
POTASSIUM SERPL-MCNC: 3.8 MMOL/L — SIGNIFICANT CHANGE UP (ref 3.5–5.3)
POTASSIUM SERPL-SCNC: 3.8 MMOL/L — SIGNIFICANT CHANGE UP (ref 3.5–5.3)
PROT SERPL-MCNC: 6.4 G/DL — SIGNIFICANT CHANGE UP (ref 6–8.3)
RBC # BLD: 4.26 M/UL — SIGNIFICANT CHANGE UP (ref 4.2–5.8)
RBC # FLD: 14 % — SIGNIFICANT CHANGE UP (ref 10.3–16.9)
SODIUM SERPL-SCNC: 138 MMOL/L — SIGNIFICANT CHANGE UP (ref 135–145)
WBC # BLD: 9.8 K/UL — SIGNIFICANT CHANGE UP (ref 3.8–10.5)
WBC # FLD AUTO: 9.8 K/UL — SIGNIFICANT CHANGE UP (ref 3.8–10.5)

## 2017-10-05 PROCEDURE — 93654 COMPRE EP EVAL TX VT: CPT

## 2017-10-05 PROCEDURE — 93662 INTRACARDIAC ECG (ICE): CPT | Mod: 26

## 2017-10-05 PROCEDURE — 93623 PRGRMD STIMJ&PACG IV RX NFS: CPT | Mod: 26

## 2017-10-05 PROCEDURE — 93621 COMP EP EVL L PAC&REC C SINS: CPT | Mod: 26

## 2017-10-05 PROCEDURE — 99291 CRITICAL CARE FIRST HOUR: CPT

## 2017-10-05 PROCEDURE — 93010 ELECTROCARDIOGRAM REPORT: CPT

## 2017-10-05 RX ORDER — METOPROLOL TARTRATE 50 MG
50 TABLET ORAL
Qty: 0 | Refills: 0 | Status: DISCONTINUED | OUTPATIENT
Start: 2017-10-05 | End: 2017-10-09

## 2017-10-05 RX ORDER — ATORVASTATIN CALCIUM 80 MG/1
40 TABLET, FILM COATED ORAL AT BEDTIME
Qty: 0 | Refills: 0 | Status: DISCONTINUED | OUTPATIENT
Start: 2017-10-05 | End: 2017-10-09

## 2017-10-05 RX ORDER — MAGNESIUM SULFATE 500 MG/ML
1 VIAL (ML) INJECTION ONCE
Qty: 0 | Refills: 0 | Status: COMPLETED | OUTPATIENT
Start: 2017-10-05 | End: 2017-10-05

## 2017-10-05 RX ORDER — SOTALOL HCL 120 MG
120 TABLET ORAL
Qty: 0 | Refills: 0 | Status: DISCONTINUED | OUTPATIENT
Start: 2017-10-05 | End: 2017-10-09

## 2017-10-05 RX ORDER — APIXABAN 2.5 MG/1
5 TABLET, FILM COATED ORAL EVERY 12 HOURS
Qty: 0 | Refills: 0 | Status: DISCONTINUED | OUTPATIENT
Start: 2017-10-05 | End: 2017-10-09

## 2017-10-05 RX ORDER — POTASSIUM CHLORIDE 20 MEQ
20 PACKET (EA) ORAL ONCE
Qty: 0 | Refills: 0 | Status: COMPLETED | OUTPATIENT
Start: 2017-10-05 | End: 2017-10-05

## 2017-10-05 RX ADMIN — TAMSULOSIN HYDROCHLORIDE 0.4 MILLIGRAM(S): 0.4 CAPSULE ORAL at 22:43

## 2017-10-05 RX ADMIN — APIXABAN 5 MILLIGRAM(S): 2.5 TABLET, FILM COATED ORAL at 18:37

## 2017-10-05 RX ADMIN — ATORVASTATIN CALCIUM 40 MILLIGRAM(S): 80 TABLET, FILM COATED ORAL at 22:43

## 2017-10-05 RX ADMIN — FINASTERIDE 5 MILLIGRAM(S): 5 TABLET, FILM COATED ORAL at 22:43

## 2017-10-05 RX ADMIN — Medication 100 GRAM(S): at 06:31

## 2017-10-05 RX ADMIN — Medication 50 MILLIGRAM(S): at 18:37

## 2017-10-05 RX ADMIN — Medication 120 MILLIGRAM(S): at 17:21

## 2017-10-05 RX ADMIN — Medication 20 MILLIEQUIVALENT(S): at 06:31

## 2017-10-05 NOTE — PROGRESS NOTE ADULT - PROBLEM SELECTOR PLAN 7
- Per patient, A1c of 6.2 as in November 2016  -F/u A1c  -Dash diet - Per patient, A1c of 6.2 as in November 2016  -A1C 5.7 this admission  -Dash diet

## 2017-10-05 NOTE — PROGRESS NOTE ADULT - PROBLEM SELECTOR PLAN 2
- Hx of CAD and Stent placement 2016 with reduced EF <35% per patient   -echo 10/4 confirms EF of 30-35%, apical wall akenisis, mild mitral and aortic valve regurg  -d/c plavix, stopped 1 year after stent in July 2016, only taking baby aspirin  -continue home baby aspirin   - Continue Atorvastatin 20 - Hx of CAD and Stent placement 2016 with reduced EF <35% per patient   -echo 10/4 confirms EF of 30-35%, apical wall akenisis, mild mitral and aortic valve regurg  -d/c plavix, stopped 1 year after stent in July 2016, only taking baby aspirin  -continue home baby aspirin   - Continue Atorvastatin 40

## 2017-10-05 NOTE — PROGRESS NOTE ADULT - ASSESSMENT
71yo M with pmh of Paroxysmal Afib, Recurrent VT,  ICD, DVT on Eliquis CHF with EF<35%, DANI on CPAP, Pre DM, BPH, OA, HTN, recurrent UTI presenting with lightheadedness and burning sensation in chest radiating to his neck and head for 3 days, admitted for NSVT.

## 2017-10-05 NOTE — PROGRESS NOTE ADULT - PROBLEM SELECTOR PLAN 1
- Likely scar induced from Previous MI, s/p 2 ablations last in April 2017 and ICD placement on opt Amiodarone  - s/p Amio bolus 150mg x2, amio drip that stopped at 4AM per electrophysiology so easier to induce arrythmia during ablation this morning   - Will undergo RFA ablation this morning   -The patient Lower rate limit reprogrammed to 80 bpm for arrhythmia suppression. VT1 detection zone lowered to 105 bpm with ATP therapy, no shock.    - Tele monitoring  -f/u EP recs

## 2017-10-05 NOTE — PROGRESS NOTE ADULT - PROBLEM SELECTOR PLAN 3
-Hx of DVT in R leg 4 years ago on home Eliquis  - hold Eliquis 5mg BID for procedure, restart after procedure

## 2017-10-05 NOTE — PROGRESS NOTE ADULT - ATTENDING COMMENTS
73yo M with Paroxysmal Afib, Chronic Systolic Heart Failure with Reduced LVEF and Recurrent VT s/p ICD, DVT on Eliquis , DANI on CPAP, Pre Diabetes, and Essential HTN, who presents with recurrent VT below therapeutic threshold of ICD.     Overnight, pt with recurrent NSVT. Amiodarone gtt held at 04:00 in anticipation for EPS and ablation today.   Vitals reviewed 10/5  Labs reviewed 10/5  TTE 10/4: LVEF 30-35%, Septal, inferior and apical akinesis, 1+ AI, 1+ MR    -EPS and ablation planned for today   -Cont cardiac meds: Coreg, Lisinopril, ASA, Atorva 40  -Holding A/C in anticipation for procedure    MD Carlito  CCU Attending.

## 2017-10-06 LAB
ALBUMIN SERPL ELPH-MCNC: 3.2 G/DL — LOW (ref 3.3–5)
ALP SERPL-CCNC: 89 U/L — SIGNIFICANT CHANGE UP (ref 40–120)
ALT FLD-CCNC: 19 U/L — SIGNIFICANT CHANGE UP (ref 10–45)
ANION GAP SERPL CALC-SCNC: 12 MMOL/L — SIGNIFICANT CHANGE UP (ref 5–17)
AST SERPL-CCNC: 25 U/L — SIGNIFICANT CHANGE UP (ref 10–40)
BILIRUB SERPL-MCNC: 0.5 MG/DL — SIGNIFICANT CHANGE UP (ref 0.2–1.2)
BUN SERPL-MCNC: 12 MG/DL — SIGNIFICANT CHANGE UP (ref 7–23)
CALCIUM SERPL-MCNC: 8.5 MG/DL — SIGNIFICANT CHANGE UP (ref 8.4–10.5)
CHLORIDE SERPL-SCNC: 101 MMOL/L — SIGNIFICANT CHANGE UP (ref 96–108)
CO2 SERPL-SCNC: 24 MMOL/L — SIGNIFICANT CHANGE UP (ref 22–31)
CREAT SERPL-MCNC: 0.9 MG/DL — SIGNIFICANT CHANGE UP (ref 0.5–1.3)
GLUCOSE SERPL-MCNC: 94 MG/DL — SIGNIFICANT CHANGE UP (ref 70–99)
HCT VFR BLD CALC: 37.9 % — LOW (ref 39–50)
HGB BLD-MCNC: 12.5 G/DL — LOW (ref 13–17)
MAGNESIUM SERPL-MCNC: 2 MG/DL — SIGNIFICANT CHANGE UP (ref 1.6–2.6)
MCHC RBC-ENTMCNC: 29.7 PG — SIGNIFICANT CHANGE UP (ref 27–34)
MCHC RBC-ENTMCNC: 33 G/DL — SIGNIFICANT CHANGE UP (ref 32–36)
MCV RBC AUTO: 90 FL — SIGNIFICANT CHANGE UP (ref 80–100)
PLATELET # BLD AUTO: 179 K/UL — SIGNIFICANT CHANGE UP (ref 150–400)
POTASSIUM SERPL-MCNC: 3.9 MMOL/L — SIGNIFICANT CHANGE UP (ref 3.5–5.3)
POTASSIUM SERPL-SCNC: 3.9 MMOL/L — SIGNIFICANT CHANGE UP (ref 3.5–5.3)
PROT SERPL-MCNC: 6.4 G/DL — SIGNIFICANT CHANGE UP (ref 6–8.3)
RBC # BLD: 4.21 M/UL — SIGNIFICANT CHANGE UP (ref 4.2–5.8)
RBC # FLD: 14.2 % — SIGNIFICANT CHANGE UP (ref 10.3–16.9)
SODIUM SERPL-SCNC: 137 MMOL/L — SIGNIFICANT CHANGE UP (ref 135–145)
WBC # BLD: 10.7 K/UL — HIGH (ref 3.8–10.5)
WBC # FLD AUTO: 10.7 K/UL — HIGH (ref 3.8–10.5)

## 2017-10-06 PROCEDURE — 99232 SBSQ HOSP IP/OBS MODERATE 35: CPT

## 2017-10-06 PROCEDURE — 99291 CRITICAL CARE FIRST HOUR: CPT

## 2017-10-06 PROCEDURE — 93010 ELECTROCARDIOGRAM REPORT: CPT

## 2017-10-06 PROCEDURE — 71010: CPT | Mod: 26

## 2017-10-06 RX ORDER — FUROSEMIDE 40 MG
40 TABLET ORAL DAILY
Qty: 0 | Refills: 0 | Status: DISCONTINUED | OUTPATIENT
Start: 2017-10-06 | End: 2017-10-06

## 2017-10-06 RX ORDER — FUROSEMIDE 40 MG
20 TABLET ORAL DAILY
Qty: 0 | Refills: 0 | Status: DISCONTINUED | OUTPATIENT
Start: 2017-10-06 | End: 2017-10-08

## 2017-10-06 RX ORDER — POTASSIUM CHLORIDE 20 MEQ
20 PACKET (EA) ORAL ONCE
Qty: 0 | Refills: 0 | Status: COMPLETED | OUTPATIENT
Start: 2017-10-06 | End: 2017-10-06

## 2017-10-06 RX ADMIN — Medication 50 MILLIGRAM(S): at 05:24

## 2017-10-06 RX ADMIN — APIXABAN 5 MILLIGRAM(S): 2.5 TABLET, FILM COATED ORAL at 18:47

## 2017-10-06 RX ADMIN — Medication 120 MILLIGRAM(S): at 18:49

## 2017-10-06 RX ADMIN — FINASTERIDE 5 MILLIGRAM(S): 5 TABLET, FILM COATED ORAL at 21:08

## 2017-10-06 RX ADMIN — LISINOPRIL 2.5 MILLIGRAM(S): 2.5 TABLET ORAL at 10:41

## 2017-10-06 RX ADMIN — APIXABAN 5 MILLIGRAM(S): 2.5 TABLET, FILM COATED ORAL at 05:24

## 2017-10-06 RX ADMIN — Medication 50 MILLIGRAM(S): at 18:47

## 2017-10-06 RX ADMIN — Medication 20 MILLIGRAM(S): at 10:41

## 2017-10-06 RX ADMIN — Medication 120 MILLIGRAM(S): at 05:24

## 2017-10-06 RX ADMIN — Medication 20 MILLIEQUIVALENT(S): at 10:41

## 2017-10-06 RX ADMIN — TAMSULOSIN HYDROCHLORIDE 0.4 MILLIGRAM(S): 0.4 CAPSULE ORAL at 21:08

## 2017-10-06 RX ADMIN — Medication 81 MILLIGRAM(S): at 12:24

## 2017-10-06 RX ADMIN — ATORVASTATIN CALCIUM 40 MILLIGRAM(S): 80 TABLET, FILM COATED ORAL at 21:08

## 2017-10-06 NOTE — PROGRESS NOTE ADULT - PROBLEM SELECTOR PLAN 2
- Hx of CAD and Stent placement 2016 with reduced EF <35% per patient   -echo 10/4 confirms EF of 30-35%, apical wall akenisis, mild mitral and aortic valve regurg  -d/c plavix, stopped 1 year after stent in July 2016, only taking baby aspirin  -continue home baby aspirin   - Continue Atorvastatin 40 - Hx of CAD and Stent placement 2016 with reduced EF <35% per patient   -echo 10/4 confirms EF of 30-35%, apical wall akenisis, mild mitral and aortic valve regurg  -d/c plavix, stopped 1 year after stent in July 2016, only taking baby aspirin, home cardiologists office called (Dr. Ordoñez) to inform of changes  -continue home baby aspirin   - Continue Atorvastatin 40

## 2017-10-06 NOTE — PROGRESS NOTE ADULT - SUBJECTIVE AND OBJECTIVE BOX
EPS Progress Note    S:     T(C): 36.9 (10-06-17 @ 13:21), Max: 37.7 (10-06-17 @ 05:31)  HR: 80 (10-06-17 @ 13:00) (80 - 90)  BP: 118/76 (10-06-17 @ 13:00) (106/64 - 141/89)  RR: 28 (10-06-17 @ 13:00) (7 - 44)  SpO2: 93% (10-06-17 @ 13:00) (91% - 97%)     Telemetry: Sinus rhythm            General:  No acute distress      Chest:  Chest is clear to auscultation bilaterally without wheezes, crackles, or rhonchi  Cardiac:  Regular rate and rhythm.  No murmur, rubs, or gallops heard.  Abdomen:  Soft without rebound or guarding.  Bowel sounds are presnt in all 4 quadrants.  No hepatosplenomegaly  Extremities:  No lower extremity edema is present.  No cyanosis or clubbing       MEDICATIONS  (STANDING):  apixaban 5 milliGRAM(s) Oral every 12 hours  aspirin  chewable 81 milliGRAM(s) Oral daily  atorvastatin 40 milliGRAM(s) Oral at bedtime  finasteride 5 milliGRAM(s) Oral at bedtime  furosemide    Tablet 20 milliGRAM(s) Oral daily  influenza   Vaccine 0.5 milliLiter(s) IntraMuscular once  lisinopril 2.5 milliGRAM(s) Oral daily  metoprolol succinate ER 50 milliGRAM(s) Oral two times a day  sotalol 120 milliGRAM(s) Oral two times a day  tamsulosin 0.4 milliGRAM(s) Oral at bedtime    LABS:                        12.5   10.7  )-----------( 179      ( 06 Oct 2017 05:40 )             37.9     10-06    137  |  101  |  12  ----------------------------<  94  3.9   |  24  |  0.90    Ca    8.5      06 Oct 2017 05:54  Mg     2.0     10-06    TPro  6.4  /  Alb  3.2<L>  /  TBili  0.5  /  DBili  x   /  AST  25  /  ALT  19  /  AlkPhos  89  10-06        Assessment/Plan: EPS Progress Note    S: The patient was examined at the bedside. The results of his VT ablation were explained. He is resting comfortably in bed. He denies c/p, groin pain, cough, sore throat, dyspnea, palpitations and syncope. He c/o minor pain to the left hand. It was explained to the patient that the pain is due to his positioning on the table during the procedure. He otherwise feels well.     T(C): 36.9 (10-06-17 @ 13:21), Max: 37.7 (10-06-17 @ 05:31)  HR: 80 (10-06-17 @ 13:00) (80 - 90)  BP: 118/76 (10-06-17 @ 13:00) (106/64 - 141/89)  RR: 28 (10-06-17 @ 13:00) (7 - 44)  SpO2: 93% (10-06-17 @ 13:00) (91% - 97%)     Telemetry: Sinus rhythm in 90s with intermittent V-pacing, some tachycardia, 8 beats of NSVT           General:  No acute distress      Chest:  Chest is clear to auscultation bilaterally without wheezes, crackles, or rhonchi  Cardiac:  Regular rate and rhythm.  No murmur, rubs, or gallops heard.  Abdomen:  Soft without rebound or guarding.  Bowel sounds are presnt in all 4 quadrants.  No hepatosplenomegaly  Extremities:  No lower extremity edema is present.  No cyanosis or clubbing   Skin: Femoral venous access sites healing well, no bleeding, or hematoma, femoral and pedal pulses 3+ b/l    MEDICATIONS  (STANDING):  apixaban 5 milliGRAM(s) Oral every 12 hours  aspirin  chewable 81 milliGRAM(s) Oral daily  atorvastatin 40 milliGRAM(s) Oral at bedtime  finasteride 5 milliGRAM(s) Oral at bedtime  furosemide    Tablet 20 milliGRAM(s) Oral daily  influenza   Vaccine 0.5 milliLiter(s) IntraMuscular once  lisinopril 2.5 milliGRAM(s) Oral daily  metoprolol succinate ER 50 milliGRAM(s) Oral two times a day  sotalol 120 milliGRAM(s) Oral two times a day  tamsulosin 0.4 milliGRAM(s) Oral at bedtime    LABS:                        12.5   10.7  )-----------( 179      ( 06 Oct 2017 05:40 )             37.9     10-06    137  |  101  |  12  ----------------------------<  94  3.9   |  24  |  0.90    Ca    8.5      06 Oct 2017 05:54  Mg     2.0     10-06    TPro  6.4  /  Alb  3.2<L>  /  TBili  0.5  /  DBili  x   /  AST  25  /  ALT  19  /  AlkPhos  89  10-06

## 2017-10-06 NOTE — PROGRESS NOTE ADULT - PROBLEM SELECTOR PLAN 8
Access: peripheral only   F: No IVF.   E: Maintain K>4, Mg>2  N: Dash diet  DVT PPX: restart eliquis after procedure   Full code   CCU Access: peripheral only   F: No IVF.   E: Maintain K>4, Mg>2  N: Dash diet  DVT PPX: restart eliquis after procedure   Full code   Dispo: 5 lach or 5 uris

## 2017-10-06 NOTE — PROGRESS NOTE ADULT - PROBLEM SELECTOR PLAN 3
-Hx of DVT in R leg 4 years ago on home Eliquis  - hold Eliquis 5mg BID for procedure, restart after procedure -Hx of DVT in R leg 4 years ago on home Eliquis  -Eliquis 5mg BID

## 2017-10-06 NOTE — PROGRESS NOTE ADULT - ATTENDING COMMENTS
See CCU resident note for additional details  73yo M with Paroxysmal Afib, Chronic Systolic Heart Failure with Reduced LVEF and Recurrent VT s/p ICD, DVT on Eliquis , DANI on CPAP, Pre Diabetes, and Essential HTN, who presents with recurrent VT below therapeutic threshold of ICD.     Interval events: pt underwent EPS with VT ablation 10/5 with multiple lesions delivered. Transitioned from amiodarone to sotalol and from coreg to metoprolol. This AM, pt reports back discomfort and hand discomfort.   Vitals reviewed 10/6  Exam notable for elderly male sitting up in bed in NAD, flat neck veins, RRR, no LUIS MIGUEL, A&Ox3, no neurologic deficits  Labs reviewed 10/6  CXR 10/6: decreased vascular congestion, PM/ICD in place  TTE 10/4: LVEF 30-35%, Septal, inferior and apical akinesis, 1+ AI, 1+ MR    -Lasix 20mg po (half home dose)  -Sotolol 120 BID with Metop 50 BID  -Cont ASA and Atorva 40 for CAD  -Resume hold Apixaban  -Lisinopril 2.5 daily  -Plan for non invasive EPS study Monday 10/9 at bedside via AICD  Patient medically appropriate for transfer to stepdown    MD Carlito  CCU Attending See CCU resident note for additional details  73yo M with Paroxysmal Afib, Chronic Systolic Heart Failure with Reduced LVEF and Recurrent VT s/p ICD, DVT on Eliquis , DANI on CPAP, Pre Diabetes, and Essential HTN, who presents with recurrent VT below therapeutic threshold of ICD.     Interval events: pt underwent EPS with VT ablation 10/5 with multiple lesions delivered. Transitioned from amiodarone to sotalol and from coreg to metoprolol. This AM, pt reports back discomfort and hand discomfort.   Vitals reviewed 10/6  Exam notable for elderly male sitting up in bed in NAD, flat neck veins, RRR, no LUIS MIGUEL, A&Ox3, no neurologic deficits  Labs reviewed 10/6  CXR 10/6: decreased vascular congestion, PM/ICD in place  TTE 10/4: LVEF 30-35%, Septal, inferior and apical akinesis, 1+ AI, 1+ MR    -Lasix 20mg po (half home dose)  -Sotolol 120 BID with Metop 50 BID  -Cont ASA and Atorva 40 for CAD  -Resume Apixaban  -Lisinopril 2.5 daily  -Plan for non invasive EPS study Monday 10/9 at bedside via AICD  Patient medically appropriate for transfer to stepdown    MD Carlito  CCU Attending

## 2017-10-06 NOTE — PROVIDER CONTACT NOTE (OTHER) - ACTION/TREATMENT ORDERED:
No new orders at this time. At bedside evaluating patient
No new orders at this time. Continue Amiodarone drip.
No new orders at this time. Continue on Amiodarone 1mg IV
No new orders at this time. Continue on Amiodarone 1mg IV.
No new orders at this time. Continue on Amiodarone drip
No new orders at this time. Continue to monitor.
No new orders at this time. Continue to monitor.
dayo patient.

## 2017-10-06 NOTE — PROVIDER CONTACT NOTE (OTHER) - ASSESSMENT
Pt c/o of pain in left pinky and wrist. left posterior hip pain. Dr. Suero asessed pt. neuro exam done. Pt denies numbness and tingling. No facial droop, RICHARDSON. hand grasp  left side slightly weaker.

## 2017-10-06 NOTE — PROGRESS NOTE ADULT - ASSESSMENT
[id number] 72 y/o M with reported h/o paroxysmal atrial fibrillation, CAD s/p MATTY 6/2016, VT s/p ICD placement 7/2014, S/P VT ablation at [facility], [place] 9/2016 and recurrent VT despite AAD therapy (Sotalol) 1/2017. Arrhythmia was well suppressed on Amiodarone. S/P VT ablation 5/25/17 at [facility]. Off AMIO since 9/11/17. Now with recurrent slow VT (~ 110 bpm) with LB morphology. The patient is now s/p VT ablation. He is recovering well. He had 8 beats of NSVT and some tachy episodes, possibly from PMT, but is otherwise in SR. His groin incision sites are healing well with no bleeding or pain. He denies c/p and chest discomfort.

## 2017-10-06 NOTE — PROGRESS NOTE ADULT - PROBLEM SELECTOR PLAN 4
- Hx of CHF with reduced EF 30-35%  -Continue with home Carvedilol 25 BID and Lisinopril 2.5 qdaily - Hx of CHF with reduced EF 30-35%  -Continue with home Carvedilol 25 BID and Lisinopril 2.5 qdaily  -home lasix is 40mg, continue 20mg PO lasix because NPO yesterday

## 2017-10-06 NOTE — PROGRESS NOTE ADULT - PROBLEM SELECTOR PLAN 1
NIPS on Monday. We would like the patient to stay admitted over the weekend for telemetry monitoring. We will do a NIPS on Monday.      As previously discussed, we would like to re-trial sotalol 120 mg BID now that he is post-ablation as amiodarone is not an ideal long term solution.     Please continue Eliquis twice daily.     Please continue Toprolol-XL 50 mg twice daily instead of Coreg.

## 2017-10-06 NOTE — PROGRESS NOTE ADULT - ASSESSMENT
71yo M with pmh of Paroxysmal Afib, Recurrent VT,  ICD, DVT on Eliquis CHF with EF<35%, DANI on CPAP, Pre DM, BPH, OA, HTN, recurrent UTI presenting with lightheadedness and burning sensation in chest radiating to his neck and head for 3 days, admitted for NSVT. 71yo M with pmh of Paroxysmal Afib, Recurrent VT,  ICD, DVT on Eliquis CHF with EF<35%, DANI on CPAP, Pre DM, BPH, OA, HTN, recurrent UTI presenting with lightheadedness and burning sensation in chest radiating to his neck and head for 3 days, admitted for NSVT s/p 3rd ablation.

## 2017-10-07 LAB
ANION GAP SERPL CALC-SCNC: 12 MMOL/L — SIGNIFICANT CHANGE UP (ref 5–17)
BUN SERPL-MCNC: 13 MG/DL — SIGNIFICANT CHANGE UP (ref 7–23)
CALCIUM SERPL-MCNC: 8.8 MG/DL — SIGNIFICANT CHANGE UP (ref 8.4–10.5)
CHLORIDE SERPL-SCNC: 98 MMOL/L — SIGNIFICANT CHANGE UP (ref 96–108)
CO2 SERPL-SCNC: 26 MMOL/L — SIGNIFICANT CHANGE UP (ref 22–31)
CREAT SERPL-MCNC: 0.92 MG/DL — SIGNIFICANT CHANGE UP (ref 0.5–1.3)
GLUCOSE SERPL-MCNC: 93 MG/DL — SIGNIFICANT CHANGE UP (ref 70–99)
HCT VFR BLD CALC: 36.1 % — LOW (ref 39–50)
HGB BLD-MCNC: 11.7 G/DL — LOW (ref 13–17)
MAGNESIUM SERPL-MCNC: 2 MG/DL — SIGNIFICANT CHANGE UP (ref 1.6–2.6)
MCHC RBC-ENTMCNC: 29 PG — SIGNIFICANT CHANGE UP (ref 27–34)
MCHC RBC-ENTMCNC: 32.4 G/DL — SIGNIFICANT CHANGE UP (ref 32–36)
MCV RBC AUTO: 89.6 FL — SIGNIFICANT CHANGE UP (ref 80–100)
PLATELET # BLD AUTO: 169 K/UL — SIGNIFICANT CHANGE UP (ref 150–400)
POTASSIUM SERPL-MCNC: 3.8 MMOL/L — SIGNIFICANT CHANGE UP (ref 3.5–5.3)
POTASSIUM SERPL-SCNC: 3.8 MMOL/L — SIGNIFICANT CHANGE UP (ref 3.5–5.3)
RBC # BLD: 4.03 M/UL — LOW (ref 4.2–5.8)
RBC # FLD: 14.1 % — SIGNIFICANT CHANGE UP (ref 10.3–16.9)
SODIUM SERPL-SCNC: 136 MMOL/L — SIGNIFICANT CHANGE UP (ref 135–145)
WBC # BLD: 11.2 K/UL — HIGH (ref 3.8–10.5)
WBC # FLD AUTO: 11.2 K/UL — HIGH (ref 3.8–10.5)

## 2017-10-07 RX ORDER — DOCUSATE SODIUM 100 MG
100 CAPSULE ORAL
Qty: 0 | Refills: 0 | Status: DISCONTINUED | OUTPATIENT
Start: 2017-10-07 | End: 2017-10-09

## 2017-10-07 RX ORDER — SENNA PLUS 8.6 MG/1
2 TABLET ORAL AT BEDTIME
Qty: 0 | Refills: 0 | Status: DISCONTINUED | OUTPATIENT
Start: 2017-10-07 | End: 2017-10-09

## 2017-10-07 RX ADMIN — Medication 50 MILLIGRAM(S): at 06:05

## 2017-10-07 RX ADMIN — Medication 20 MILLIGRAM(S): at 06:05

## 2017-10-07 RX ADMIN — LISINOPRIL 2.5 MILLIGRAM(S): 2.5 TABLET ORAL at 11:39

## 2017-10-07 RX ADMIN — Medication 120 MILLIGRAM(S): at 06:05

## 2017-10-07 RX ADMIN — APIXABAN 5 MILLIGRAM(S): 2.5 TABLET, FILM COATED ORAL at 17:48

## 2017-10-07 RX ADMIN — TAMSULOSIN HYDROCHLORIDE 0.4 MILLIGRAM(S): 0.4 CAPSULE ORAL at 21:31

## 2017-10-07 RX ADMIN — Medication 81 MILLIGRAM(S): at 11:39

## 2017-10-07 RX ADMIN — APIXABAN 5 MILLIGRAM(S): 2.5 TABLET, FILM COATED ORAL at 06:06

## 2017-10-07 RX ADMIN — ATORVASTATIN CALCIUM 40 MILLIGRAM(S): 80 TABLET, FILM COATED ORAL at 21:31

## 2017-10-07 RX ADMIN — Medication 50 MILLIGRAM(S): at 17:48

## 2017-10-07 RX ADMIN — FINASTERIDE 5 MILLIGRAM(S): 5 TABLET, FILM COATED ORAL at 21:31

## 2017-10-07 RX ADMIN — Medication 120 MILLIGRAM(S): at 17:48

## 2017-10-07 NOTE — PROGRESS NOTE ADULT - ASSESSMENT
72M PMH pAfib (on Eliquis), Recurrent VT s/p ICD and previous ablation, DVT, CHF EF<35%, DANI on CPAP, Pre DM, BPH, OA, HTN, recurrent UTI presenting with lightheadedness and burning sensation in chest radiating to his neck and head for 3 days, admitted for NSVT s/p 3rd ablation now stepped down to 5LA for continued monitoring pending repeat EP study

## 2017-10-07 NOTE — PROGRESS NOTE ADULT - PROBLEM SELECTOR PLAN 2
- Hx of CAD and Stent placement 2016 with reduced EF <35% per patient   -echo 10/4 confirms EF of 30-35%, apical wall akenisis, mild mitral and aortic valve regurg  -d/c plavix, stopped 1 year after stent in July 2016, only taking baby aspirin, home cardiologists office called (Dr. Ordoñez) to inform of changes  -continue home baby aspirin   - Continue Atorvastatin 40

## 2017-10-07 NOTE — PROGRESS NOTE ADULT - PROBLEM SELECTOR PLAN 1
- Likely scar induced from Previous MI, s/p 2 ablations last in April 2017 and ICD placement on opt Amiodarone  s/p 3rd ablation (RFA) 10/5.   -Lower rate limit reprogrammed to 80 bpm for arrhythmia suppression. VT1 detection zone lowered to 105 bpm with ATP therapy, no shock.    -Tele monitoring  -metoprolol 50 ER BID  -Sotalol 120mg BID  -EP testing of pacemaker planned for Monday  -f/u EP recs

## 2017-10-07 NOTE — PROGRESS NOTE ADULT - SUBJECTIVE AND OBJECTIVE BOX
TRANSFER ACCEPTANCE NOTE:  Hospital Course: TRANSFER ACCEPTANCE NOTE:  Hospital Course:  72M PMH pAfib (on eliquis), recurrent VTach s/p ICD placement 7/2014, S/P VT ablation at [facility] 9/2016 and recurrent VT despite AAD therapy (Sotalol) 1/2017, DVT, CHF with EF 30-35%, CAD s/p MATTY to LAD, DANI on CPAP, Pre-DM, BPH, OA, HTN, recurrent UTI  who was sent to ED after OP visit for light headedness and burning chest pain found to have stable V-tach. Bolused 150mg Amio for stable Vtach, then another 150mg bolus as it was uncertain if pt got full 1st bolus. Admitted to CCU for VTach at which time an Amio gtt was started for loading, completed on 10/5 early AM. Amio d/c-ed as per EP in order to induce arrhythmia for ablation. Ablation on 10/6 (now s/p 3 ablations). Lesion is near LAD on pericardium and difficult to access. Started on Sotolol 120mg BID and metoprolol 50mg BID (2/2 adrenaline stimulation induced arrythmia). Patient stable for step down to 5LA and now pending EP testing on Monday.    INTERVAL HPI/OVERNIGHT EVENTS:  pt w/ no complaints at bedside    VITAL SIGNS:  T(F): 99 (10-06-17 @ 22:04)  HR: 80 (10-07-17 @ 01:20)  BP: 128/84 (10-07-17 @ 01:20)  RR: 22 (10-07-17 @ 01:20)  SpO2: 94% (10-07-17 @ 01:20)  Wt(kg): --    PHYSICAL EXAM:    Constitutional: WDWN, NAD, woken from sleep  ENMT: MMM, no JVD  Respiratory: CTA b/l; desatting on 2L NC to 90 while sleeping  Cardiovascular: RRR, S1/S2, no M/R/G  Gastrointestinal: BS present, soft, NTND  Extremities: WWP, no LE edema b/l    MEDICATIONS  (STANDING):  apixaban 5 milliGRAM(s) Oral every 12 hours  aspirin  chewable 81 milliGRAM(s) Oral daily  atorvastatin 40 milliGRAM(s) Oral at bedtime  finasteride 5 milliGRAM(s) Oral at bedtime  furosemide    Tablet 20 milliGRAM(s) Oral daily  influenza   Vaccine 0.5 milliLiter(s) IntraMuscular once  lisinopril 2.5 milliGRAM(s) Oral daily  metoprolol succinate ER 50 milliGRAM(s) Oral two times a day  sotalol 120 milliGRAM(s) Oral two times a day  tamsulosin 0.4 milliGRAM(s) Oral at bedtime    MEDICATIONS  (PRN):      Allergies    No Known Allergies    Intolerances        LABS:                        12.5   10.7  )-----------( 179      ( 06 Oct 2017 05:40 )             37.9     10-06    137  |  101  |  12  ----------------------------<  94  3.9   |  24  |  0.90    Ca    8.5      06 Oct 2017 05:54  Mg     2.0     10-06    TPro  6.4  /  Alb  3.2<L>  /  TBili  0.5  /  DBili  x   /  AST  25  /  ALT  19  /  AlkPhos  89  10-06          RADIOLOGY & ADDITIONAL TESTS:

## 2017-10-07 NOTE — PROGRESS NOTE ADULT - PROBLEM SELECTOR PLAN 8
Access: peripheral only   F: No IVF.   E: Maintain K>4, Mg>2  N: Dash diet  DVT PPX: restart eliquis after procedure   Full code   Dispo: 5 lach pending EP study

## 2017-10-07 NOTE — PROGRESS NOTE ADULT - PROBLEM SELECTOR PLAN 4
- Hx of CHF with reduced EF 30-35%  -Continue with home Carvedilol 25 BID and Lisinopril 2.5 qdaily  -home lasix is 40mg, continue 20mg PO lasix because NPO yesterday

## 2017-10-07 NOTE — PROGRESS NOTE ADULT - PROBLEM SELECTOR PLAN 2
- Hx of CAD and Stent placement 2016 with reduced EF <35% per patient   -echo 10/4 confirms EF of 30-35%, apical wall akenisis, mild mitral and aortic valve regurg  -d/c plavix, stopped 1 year after stent in July 2016, only taking baby aspirin, home cardiologists office called (Dr. Ordoñez) to inform of changes  -continue home baby aspirin   - Continue Atorvastatin 40 -Hx of CAD and Stent placement 2016 with reduced EF <35% per patient   -echo 10/4 confirms EF of 30-35%, apical wall akenisis, mild mitral and aortic valve regurg  -d/c plavix, stopped 1 year after stent in July 2016, only taking baby aspirin, home cardiologists office called (Dr. Ordoñez) to inform of changes  -continue home baby aspirin   - Continue Atorvastatin 40

## 2017-10-07 NOTE — PROGRESS NOTE ADULT - SUBJECTIVE AND OBJECTIVE BOX
INTERVAL HPI/OVERNIGHT EVENTS:    ROS  CV: Denies chest pain, palpitations  RESP: Denies SOB  GI: Denies abdominal pain, constipation, diarrhea, nausea, vomiting  : Denies dysuria, hematuria, flank or back pain  ID: Denies fevers, chills  MSK: Denies joint pain   DERM: Denies any rashes, bruising, pruritis  ENDO: Denies heat or cold intolerances, changes in weight, thinning of hair  PSYCH: Denies any mood changes    VITAL SIGNS:  ICU Vital Signs Last 24 Hrs  T(C): 37.3 (07 Oct 2017 05:15), Max: 37.3 (06 Oct 2017 09:50)  T(F): 99.2 (07 Oct 2017 05:15), Max: 99.2 (06 Oct 2017 09:50)  HR: 80 (07 Oct 2017 05:35) (80 - 90)  BP: 126/68 (07 Oct 2017 05:35) (109/68 - 144/88)  BP(mean): 90 (07 Oct 2017 05:35) (79 - 116)  ABP: --  ABP(mean): --  RR: 29 (07 Oct 2017 05:35) (22 - 44)  SpO2: 97% (07 Oct 2017 05:35) (91% - 97%)    PHYSICAL EXAM:    Constitutional: WDWN, NAD, resting comfortably   Head: NC/AT  Eyes: PERRL, EOMI, anicteric sclera, no mucosal pallor  ENT: no nasal discharge; uvula midline, no oropharyngeal erythema or exudates; MMM  Neck: supple; no JVD or thyromegaly, no lymphadenopathy  Respiratory: CTA B/L; no W/R/R, no retractions  Cardiac: +S1/S2; RRR; no M/R/G; PMI non-displaced  Gastrointestinal: abdomen soft, NT/ND; no rebound or guarding; +BSx4  Back: spine midline, no bony tenderness or step-offs; no CVAT B/L  Extremities: warm; no calf tenderness, no pedal edema, no cyanosis, no clubbing  Musculoskeletal: NROM x4; no joint swelling, tenderness or erythema  Vascular: 2+ radial, femoral; DP/PT pulses B/L  Dermatologic: no rash, no petechia   Lymphatic: no submandibular or cervical LAD  Neurologic: AAOx3; CNII-XII grossly intact; sensory symmetrically intact in B/L LE   Psychiatric: pleasant and conversive; affect and characteristics of appearance, verbalizations, behaviors are appropriate      MEDICATIONS  (STANDING):  apixaban 5 milliGRAM(s) Oral every 12 hours  aspirin  chewable 81 milliGRAM(s) Oral daily  atorvastatin 40 milliGRAM(s) Oral at bedtime  finasteride 5 milliGRAM(s) Oral at bedtime  furosemide    Tablet 20 milliGRAM(s) Oral daily  influenza   Vaccine 0.5 milliLiter(s) IntraMuscular once  lisinopril 2.5 milliGRAM(s) Oral daily  metoprolol succinate ER 50 milliGRAM(s) Oral two times a day  sotalol 120 milliGRAM(s) Oral two times a day  tamsulosin 0.4 milliGRAM(s) Oral at bedtime    MEDICATIONS  (PRN):      Allergies    No Known Allergies    Intolerances        LABS:                        12.5   10.7  )-----------( 179      ( 06 Oct 2017 05:40 )             37.9     10-06    137  |  101  |  12  ----------------------------<  94  3.9   |  24  |  0.90    Ca    8.5      06 Oct 2017 05:54  Mg     2.0     10-06    TPro  6.4  /  Alb  3.2<L>  /  TBili  0.5  /  DBili  x   /  AST  25  /  ALT  19  /  AlkPhos  89  10-06          RADIOLOGY & ADDITIONAL TESTS: INTERVAL HPI/OVERNIGHT EVENTS: Pt dessated to 90% on NC     ROS  CV: Denies chest pain, palpitations  RESP: Denies SOB  GI: Denies abdominal pain, constipation, diarrhea, nausea, vomiting  : Denies dysuria, hematuria, flank or back pain  ID: Denies fevers, chills  MSK: Denies joint pain   DERM: Denies any rashes, bruising, pruritis  ENDO: Denies heat or cold intolerances, changes in weight, thinning of hair  PSYCH: Denies any mood changes    VITAL SIGNS:  ICU Vital Signs Last 24 Hrs  T(C): 37.3 (07 Oct 2017 05:15), Max: 37.3 (06 Oct 2017 09:50)  T(F): 99.2 (07 Oct 2017 05:15), Max: 99.2 (06 Oct 2017 09:50)  HR: 80 (07 Oct 2017 05:35) (80 - 90)  BP: 126/68 (07 Oct 2017 05:35) (109/68 - 144/88)  BP(mean): 90 (07 Oct 2017 05:35) (79 - 116)  ABP: --  ABP(mean): --  RR: 29 (07 Oct 2017 05:35) (22 - 44)  SpO2: 97% (07 Oct 2017 05:35) (91% - 97%)    PHYSICAL EXAM:    Constitutional: WDWN, NAD, resting comfortably   Head: NC/AT  Eyes: PERRL, EOMI, anicteric sclera, no mucosal pallor  ENT: no nasal discharge; uvula midline, no oropharyngeal erythema or exudates; MMM  Neck: supple; no JVD or thyromegaly, no lymphadenopathy  Respiratory: CTA B/L; no W/R/R, no retractions  Cardiac: +S1/S2; RRR; no M/R/G; PMI non-displaced  Gastrointestinal: abdomen soft, NT/ND; no rebound or guarding; +BSx4  Back: spine midline, no bony tenderness or step-offs; no CVAT B/L  Extremities: warm; no calf tenderness, no pedal edema, no cyanosis, no clubbing  Musculoskeletal: NROM x4; no joint swelling, tenderness or erythema  Vascular: 2+ radial, femoral; DP/PT pulses B/L  Dermatologic: no rash, no petechia   Lymphatic: no submandibular or cervical LAD  Neurologic: AAOx3; CNII-XII grossly intact; sensory symmetrically intact in B/L LE   Psychiatric: pleasant and conversive; affect and characteristics of appearance, verbalizations, behaviors are appropriate      MEDICATIONS  (STANDING):  apixaban 5 milliGRAM(s) Oral every 12 hours  aspirin  chewable 81 milliGRAM(s) Oral daily  atorvastatin 40 milliGRAM(s) Oral at bedtime  finasteride 5 milliGRAM(s) Oral at bedtime  furosemide    Tablet 20 milliGRAM(s) Oral daily  influenza   Vaccine 0.5 milliLiter(s) IntraMuscular once  lisinopril 2.5 milliGRAM(s) Oral daily  metoprolol succinate ER 50 milliGRAM(s) Oral two times a day  sotalol 120 milliGRAM(s) Oral two times a day  tamsulosin 0.4 milliGRAM(s) Oral at bedtime    MEDICATIONS  (PRN):      Allergies    No Known Allergies    Intolerances        LABS:                        12.5   10.7  )-----------( 179      ( 06 Oct 2017 05:40 )             37.9     10-06    137  |  101  |  12  ----------------------------<  94  3.9   |  24  |  0.90    Ca    8.5      06 Oct 2017 05:54  Mg     2.0     10-06    TPro  6.4  /  Alb  3.2<L>  /  TBili  0.5  /  DBili  x   /  AST  25  /  ALT  19  /  AlkPhos  89  10-06          RADIOLOGY & ADDITIONAL TESTS: INTERVAL HPI/OVERNIGHT EVENTS: Pt dessated to 90% on 2L NC, improved to 95% on NC. Pt refusing BIPAP and stats that he has a more comfortable machine at home.     ROS  CV: Denies chest pain, palpitations  RESP: Denies SOB  GI: Denies abdominal pain, constipation, diarrhea, nausea, vomiting  : Denies dysuria, hematuria, flank or back pain  ID: Denies fevers, chills  MSK: Denies joint pain   DERM: Denies any rashes, bruising, pruritis  ENDO: Denies heat or cold intolerances, changes in weight, thinning of hair  PSYCH: Denies any mood changes    VITAL SIGNS:  ICU Vital Signs Last 24 Hrs  T(C): 37.3 (07 Oct 2017 05:15), Max: 37.3 (06 Oct 2017 09:50)  T(F): 99.2 (07 Oct 2017 05:15), Max: 99.2 (06 Oct 2017 09:50)  HR: 80 (07 Oct 2017 05:35) (80 - 90)  BP: 126/68 (07 Oct 2017 05:35) (109/68 - 144/88)  BP(mean): 90 (07 Oct 2017 05:35) (79 - 116)  ABP: --  ABP(mean): --  RR: 29 (07 Oct 2017 05:35) (22 - 44)  SpO2: 97% (07 Oct 2017 05:35) (91% - 97%)    PHYSICAL EXAM:    Constitutional: WDWN, NAD, resting comfortably   Head: NC/AT  Eyes: PERRL, EOMI, anicteric sclera, no mucosal pallor  ENT: no nasal discharge; uvula midline, no oropharyngeal erythema or exudates; MMM  Neck: supple; no JVD or thyromegaly, no lymphadenopathy  Respiratory: CTA B/L; no W/R/R, no retractions  Cardiac: +S1/S2; RRR; no M/R/G; PMI non-displaced  Gastrointestinal: abdomen soft, NT/ND; no rebound or guarding; +BSx4  Back: spine midline, no bony tenderness or step-offs; no CVAT B/L  Extremities: warm; no calf tenderness, no pedal edema, no cyanosis, no clubbing  Musculoskeletal: NROM x4; no joint swelling, tenderness or erythema  Vascular: 2+ radial, femoral; DP/PT pulses B/L  Dermatologic: no rash, no petechia   Lymphatic: no submandibular or cervical LAD  Neurologic: AAOx3; CNII-XII grossly intact; sensory symmetrically intact in B/L LE   Psychiatric: pleasant and conversive; affect and characteristics of appearance, verbalizations, behaviors are appropriate      MEDICATIONS  (STANDING):  apixaban 5 milliGRAM(s) Oral every 12 hours  aspirin  chewable 81 milliGRAM(s) Oral daily  atorvastatin 40 milliGRAM(s) Oral at bedtime  finasteride 5 milliGRAM(s) Oral at bedtime  furosemide    Tablet 20 milliGRAM(s) Oral daily  influenza   Vaccine 0.5 milliLiter(s) IntraMuscular once  lisinopril 2.5 milliGRAM(s) Oral daily  metoprolol succinate ER 50 milliGRAM(s) Oral two times a day  sotalol 120 milliGRAM(s) Oral two times a day  tamsulosin 0.4 milliGRAM(s) Oral at bedtime    MEDICATIONS  (PRN):      Allergies    No Known Allergies    Intolerances        LABS:                                   11.7   11.2  )-----------( 169      ( 07 Oct 2017 06:37 )             36.1                12.5   10.7  )-----------( 179      ( 06 Oct 2017 05:40 )             37.9     10-07    136  |  98  |  13  ----------------------------<  93  3.8   |  26  |  0.92    Ca    8.8      07 Oct 2017 06:37  Mg     2.0     10-07    TPro  6.4  /  Alb  3.2<L>  /  TBili  0.5  /  DBili  x   /  AST  25  /  ALT  19  /  AlkPhos  89  10-06    10-06    137  |  101  |  12  ----------------------------<  94  3.9   |  24  |  0.90    Ca    8.5      06 Oct 2017 05:54  Mg     2.0     10-06    TPro  6.4  /  Alb  3.2<L>  /  TBili  0.5  /  DBili  x   /  AST  25  /  ALT  19  /  AlkPhos  89  10-06      RADIOLOGY & ADDITIONAL TESTS:

## 2017-10-07 NOTE — PROGRESS NOTE ADULT - ASSESSMENT
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 Bolused Amio for stable Vtach. Admitted to CCU for VTach, started on  Amio gtt w later d/c-ed per EP in order to induce arrhythmia for ablation. Now s/p Ablation on 10/6. Lesion is near LAD on pericardium and difficult to access. Started on Sotolol 120mg BID and metoprolol 50mg BID (2/2 adrenaline stimulation induced arrythmia). Patient was step down to 5LA and now pending EP testing on Monday.

## 2017-10-07 NOTE — PROGRESS NOTE ADULT - PROBLEM SELECTOR PLAN 4
- Hx of CHF with reduced EF 30-35%  -Continue with home Carvedilol 25 BID and Lisinopril 2.5 qdaily  -home lasix is 40mg, continue 20mg PO lasix because NPO yesterday - Hx of CHF with reduced EF 30-35%  -Continue with home Carvedilol 25 BID and Lisinopril 2.5 qdaily  -continue 20mg PO lasix (home lasix is 40mg), can titrate as needed. I/Os -360 - Hx of CHF with reduced EF 30-35%  -Pt looks euvolemic on exam, lungs clear, no pedal edema, adequate urine output   -Continue with home Carvedilol 25 BID and Lisinopril 2.5 qdaily  -continue 20mg PO lasix (home lasix is 40mg), can titrate as needed. I/Os -360

## 2017-10-08 DIAGNOSIS — Z29.9 ENCOUNTER FOR PROPHYLACTIC MEASURES, UNSPECIFIED: ICD-10-CM

## 2017-10-08 LAB
ANION GAP SERPL CALC-SCNC: 12 MMOL/L — SIGNIFICANT CHANGE UP (ref 5–17)
BUN SERPL-MCNC: 11 MG/DL — SIGNIFICANT CHANGE UP (ref 7–23)
CALCIUM SERPL-MCNC: 8.9 MG/DL — SIGNIFICANT CHANGE UP (ref 8.4–10.5)
CHLORIDE SERPL-SCNC: 101 MMOL/L — SIGNIFICANT CHANGE UP (ref 96–108)
CO2 SERPL-SCNC: 26 MMOL/L — SIGNIFICANT CHANGE UP (ref 22–31)
CREAT SERPL-MCNC: 0.86 MG/DL — SIGNIFICANT CHANGE UP (ref 0.5–1.3)
GLUCOSE SERPL-MCNC: 93 MG/DL — SIGNIFICANT CHANGE UP (ref 70–99)
HCT VFR BLD CALC: 36.1 % — LOW (ref 39–50)
HGB BLD-MCNC: 11.7 G/DL — LOW (ref 13–17)
MAGNESIUM SERPL-MCNC: 2 MG/DL — SIGNIFICANT CHANGE UP (ref 1.6–2.6)
MCHC RBC-ENTMCNC: 29.3 PG — SIGNIFICANT CHANGE UP (ref 27–34)
MCHC RBC-ENTMCNC: 32.4 G/DL — SIGNIFICANT CHANGE UP (ref 32–36)
MCV RBC AUTO: 90.5 FL — SIGNIFICANT CHANGE UP (ref 80–100)
PHOSPHATE SERPL-MCNC: 3.1 MG/DL — SIGNIFICANT CHANGE UP (ref 2.5–4.5)
PLATELET # BLD AUTO: 176 K/UL — SIGNIFICANT CHANGE UP (ref 150–400)
POTASSIUM SERPL-MCNC: 3.9 MMOL/L — SIGNIFICANT CHANGE UP (ref 3.5–5.3)
POTASSIUM SERPL-SCNC: 3.9 MMOL/L — SIGNIFICANT CHANGE UP (ref 3.5–5.3)
RBC # BLD: 3.99 M/UL — LOW (ref 4.2–5.8)
RBC # FLD: 13.9 % — SIGNIFICANT CHANGE UP (ref 10.3–16.9)
SODIUM SERPL-SCNC: 139 MMOL/L — SIGNIFICANT CHANGE UP (ref 135–145)
WBC # BLD: 10.2 K/UL — SIGNIFICANT CHANGE UP (ref 3.8–10.5)
WBC # FLD AUTO: 10.2 K/UL — SIGNIFICANT CHANGE UP (ref 3.8–10.5)

## 2017-10-08 PROCEDURE — 93010 ELECTROCARDIOGRAM REPORT: CPT

## 2017-10-08 PROCEDURE — 99233 SBSQ HOSP IP/OBS HIGH 50: CPT

## 2017-10-08 RX ORDER — FUROSEMIDE 40 MG
40 TABLET ORAL DAILY
Qty: 0 | Refills: 0 | Status: DISCONTINUED | OUTPATIENT
Start: 2017-10-08 | End: 2017-10-09

## 2017-10-08 RX ORDER — ACETAMINOPHEN 500 MG
650 TABLET ORAL EVERY 6 HOURS
Qty: 0 | Refills: 0 | Status: DISCONTINUED | OUTPATIENT
Start: 2017-10-08 | End: 2017-10-09

## 2017-10-08 RX ORDER — COLCHICINE 0.6 MG
1.2 TABLET ORAL ONCE
Qty: 0 | Refills: 0 | Status: COMPLETED | OUTPATIENT
Start: 2017-10-08 | End: 2017-10-08

## 2017-10-08 RX ADMIN — FINASTERIDE 5 MILLIGRAM(S): 5 TABLET, FILM COATED ORAL at 21:14

## 2017-10-08 RX ADMIN — Medication 120 MILLIGRAM(S): at 17:27

## 2017-10-08 RX ADMIN — APIXABAN 5 MILLIGRAM(S): 2.5 TABLET, FILM COATED ORAL at 06:19

## 2017-10-08 RX ADMIN — ATORVASTATIN CALCIUM 40 MILLIGRAM(S): 80 TABLET, FILM COATED ORAL at 21:14

## 2017-10-08 RX ADMIN — Medication 81 MILLIGRAM(S): at 10:15

## 2017-10-08 RX ADMIN — Medication 50 MILLIGRAM(S): at 06:19

## 2017-10-08 RX ADMIN — Medication 650 MILLIGRAM(S): at 17:28

## 2017-10-08 RX ADMIN — Medication 120 MILLIGRAM(S): at 06:18

## 2017-10-08 RX ADMIN — Medication 50 MILLIGRAM(S): at 17:28

## 2017-10-08 RX ADMIN — Medication 20 MILLIGRAM(S): at 06:20

## 2017-10-08 RX ADMIN — Medication 1.2 MILLIGRAM(S): at 21:14

## 2017-10-08 RX ADMIN — LISINOPRIL 2.5 MILLIGRAM(S): 2.5 TABLET ORAL at 10:15

## 2017-10-08 RX ADMIN — APIXABAN 5 MILLIGRAM(S): 2.5 TABLET, FILM COATED ORAL at 17:28

## 2017-10-08 RX ADMIN — Medication 100 MILLIGRAM(S): at 06:19

## 2017-10-08 RX ADMIN — Medication 40 MILLIGRAM(S): at 13:13

## 2017-10-08 RX ADMIN — Medication 650 MILLIGRAM(S): at 18:00

## 2017-10-08 RX ADMIN — TAMSULOSIN HYDROCHLORIDE 0.4 MILLIGRAM(S): 0.4 CAPSULE ORAL at 21:14

## 2017-10-08 NOTE — DIETITIAN INITIAL EVALUATION ADULT. - OTHER INFO
73 y/o male admitted for noted work-up of cardiac issues.Noted CHF.Does not follow cardiac diet consistently.

## 2017-10-08 NOTE — PROGRESS NOTE ADULT - SUBJECTIVE AND OBJECTIVE BOX
INTERVAL HISTORY/OVERNIGHT EVENTS:   No acute event overnight.       Review of system:  General: No malaise, fever, chills.  Eyes: No eye pain, visual disturbances, or discharge  ENMT:  No difficulty hearing, tinnitus, vertigo; No sinus or throat pain  Neck: No pain or stiffness  Respiratory: No cough, wheezing, chills or hemoptysis  Cardiovascular: No chest pain, palpitations, shortness of breath, dizziness or leg swelling  Gastrointestinal: No abdominal or epigastric pain. no nausea, vomiting or hematemesis;  no diarrhea or constipation. No melena or hematochezia.  Genitourinary: No dysuria, frequency, hematuria or incontinence    Current Medications:  MEDICATIONS  (STANDING):  apixaban 5 milliGRAM(s) Oral every 12 hours  aspirin  chewable 81 milliGRAM(s) Oral daily  atorvastatin 40 milliGRAM(s) Oral at bedtime  docusate sodium 100 milliGRAM(s) Oral two times a day  finasteride 5 milliGRAM(s) Oral at bedtime  furosemide    Tablet 20 milliGRAM(s) Oral daily  influenza   Vaccine 0.5 milliLiter(s) IntraMuscular once  lisinopril 2.5 milliGRAM(s) Oral daily  metoprolol succinate ER 50 milliGRAM(s) Oral two times a day  senna 2 Tablet(s) Oral at bedtime  sotalol 120 milliGRAM(s) Oral two times a day  tamsulosin 0.4 milliGRAM(s) Oral at bedtime    MEDICATIONS  (PRN):      Allergies:  Allergies    No Known Allergies    Intolerances          VITAL SIGNS:  ICU Vital Signs Last 24 Hrs  T(C): 36.7 (08 Oct 2017 05:27), Max: 36.8 (07 Oct 2017 21:25)  T(F): 98.1 (08 Oct 2017 05:27), Max: 98.3 (07 Oct 2017 21:25)  HR: 80 (08 Oct 2017 05:25) (80 - 82)  BP: 118/71 (08 Oct 2017 05:25) (100/69 - 126/76)  BP(mean): 84 (08 Oct 2017 05:25) (78 - 88)  ABP: --  ABP(mean): --  RR: 29 (08 Oct 2017 05:25) (16 - 29)  SpO2: 95% (08 Oct 2017 05:25) (93% - 97%)      10-07-17 @ 07:01  -  10-08-17 @ 07:00  --------------------------------------------------------  IN: 920 mL / OUT: 900 mL / NET: 20 mL      CAPILLARY BLOOD GLUCOSE          PHYSICAL EXAM   General: NAD, comfortable, AOx3  HEENT: NCAT, PERRL, clear conjunctiva, no scleral icterus  Neck: supple, no JVD  Respiratory: CTA b/l, no wheezing, rhonchi, rales  Cardiovascular: RRR, normal S1S2, no M/R/G  Abdomen: soft, NT/ND, bowel sounds normoactive throughout, no palpable masses  Extremities: WWP, no clubbing, cyanosis, or edema  Neuro -  - Mental Status:  Alert, awake, oriented to person, place, and time; Speech is fluent with intact naming, repetition, and comprehension  - Cranial Nerves II-XII:    II:  Visual fields are full to confrontation;  Pupils are equal, round, and reactive to light.  III, IV, VI:  Extraocular movements are intact without nystagmus.  V:  Facial sensation is intact in the V1-V3 distribution bilaterally.  VII:  Face is symmetric with normal eye closure and smile  VIII:  Hearing is intact to finger rub.  IX, X:  Uvula is midline and soft palate rises symmetrically  XI:  Head turning and shoulder shrug are intact.  XII:  Tongue protrudes in the midline.  - Motor:  Strength is 5/5 throughout.  There is no pronator drift.  Normal muscle bulk and tone throughout.  - Reflexes:  2+ and symmetric at the biceps, triceps, brachioradialis, knees, and ankles.  Plantar responses flexor.  - Sensory:  Intact to light touch and pin prick  - Coordination:  Finger-nose-finger and heel-knee-shin intact without dysmetria.  Rapid alternating hand movements intact.  - Gait:   Normal steps, base, arm swing, and turning.  Heel and toe walking are normal.  Tandem gait is normal.  Romberg testing is negative.    LABS                        11.7   10.2  )-----------( 176      ( 08 Oct 2017 0	6:46 )             36.1     10-08    139  |  101  |  11  ----------------------------<  93  3.9   |  26  |  0.86    Ca    8.9      08 Oct 2017 06:46  Phos  3.1     10-08  Mg     2.0     10-08              IMAGING  CXR -   EKG -       Case discussed with     and updated primary team. INTERVAL HISTORY/OVERNIGHT EVENTS:   No acute event overnight. Seen and examined at bedside. He reports feeling dizzy when walking to the bathroom and back. He also reported L ankle pain, has history of fluoroquinolone induced tendinitis      Review of system:  General: No malaise, fever, chills.  Eyes: No eye pain, visual disturbances, or discharge  ENMT:  No difficulty hearing, tinnitus, vertigo; No sinus or throat pain  Neck: No pain or stiffness  Respiratory: No cough, wheezing, chills or hemoptysis  Cardiovascular: (+) dizziness. No chest pain, palpitations, shortness of breath, or leg swelling  Gastrointestinal: No abdominal or epigastric pain. no nausea, vomiting or hematemesis;  no diarrhea or constipation. No melena or hematochezia.  Genitourinary: No dysuria, frequency, hematuria or incontinence  MSK:  No joint swelling or redness    Current Medications:  MEDICATIONS  (STANDING):  apixaban 5 milliGRAM(s) Oral every 12 hours  aspirin  chewable 81 milliGRAM(s) Oral daily  atorvastatin 40 milliGRAM(s) Oral at bedtime  docusate sodium 100 milliGRAM(s) Oral two times a day  finasteride 5 milliGRAM(s) Oral at bedtime  furosemide    Tablet 20 milliGRAM(s) Oral daily  influenza   Vaccine 0.5 milliLiter(s) IntraMuscular once  lisinopril 2.5 milliGRAM(s) Oral daily  metoprolol succinate ER 50 milliGRAM(s) Oral two times a day  senna 2 Tablet(s) Oral at bedtime  sotalol 120 milliGRAM(s) Oral two times a day  tamsulosin 0.4 milliGRAM(s) Oral at bedtime        Allergies:  No Known Allergies    VITAL SIGNS:  T(C): 36.7 (08 Oct 2017 05:27), Max: 36.8 (07 Oct 2017 21:25)  T(F): 98.1 (08 Oct 2017 05:27), Max: 98.3 (07 Oct 2017 21:25)  HR: 80 (08 Oct 2017 05:25) (80 - 82)  BP: 118/71 (08 Oct 2017 05:25) (100/69 - 126/76). BP at bedside while laying in bed: 113/71  BP(mean): 84 (08 Oct 2017 05:25) (78 - 88)  RR: 29 (08 Oct 2017 05:25) (16 - 29)  SpO2: 95% (08 Oct 2017 05:25) (93% - 97%) [facility]      10-07-17 @ 07:01  -  10-08-17 @ 07:00  --------------------------------------------------------  IN: 920 mL / OUT: 900 mL / NET: 20 mL      PHYSICAL EXAM   General: NAD, comfortable, AOx3  HEENT: NCAT, PERRL, clear conjunctiva, no scleral icterus  Neck: supple, no JVD  Respiratory: CTA b/l, no wheezing, rhonchi, rales  Cardiovascular: RRR with occasional PVCs, normal S1S2,. (+) diastolic murmur at left sternal border  Abdomen: soft, NT/ND, bowel sounds normoactive throughout, no palpable masses  Extremities: WWP, no clubbing, cyanosis, or edema. Tender at bilateral proximal achilles tendon. No joint swelling, erythema  Neuro -  - Mental Status:  Alert, awake, oriented to person, place, and time; Speech is fluent.  - Cranial Nerves II-XII grossly intact      LABS                        11.7   10.2  )-----------( 176      ( 08 Oct 2017 0	6:46 )             36.1     10-08    139  |  101  |  11  ----------------------------<  93  3.9   |  26  |  0.86    Ca    8.9      08 Oct 2017 06:46  Phos  3.1     10-08  Mg     2.0     10-08              IMAGING  EKG - Sinus rhythm at 83bpm. Left axis deviation

## 2017-10-08 NOTE — PROGRESS NOTE ADULT - PROBLEM SELECTOR PLAN 2
Ischemic cardiomyopathy with LVEF 30-35% with apical and apical septal akinesis, with mild concentric LVH. Currently, he is euvolemic on exam, lungs clear, no pedal edema, adequate urine output   - Continue with home Carvedilol 25 BID and Lisinopril 2.5 qdaily  - Increased Lasix to 40mg today which is home dose

## 2017-10-08 NOTE — DIETITIAN INITIAL EVALUATION ADULT. - ENERGY NEEDS
BMI:38.0,IBW:166lbs+/-10%,158% of IBW.Skin intact.No N/V/D .Complains of ankle pain.fluids per MD due to CHF.Eating 80% or better from trays.

## 2017-10-08 NOTE — PROGRESS NOTE ADULT - ASSESSMENT
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

## 2017-10-08 NOTE — PROGRESS NOTE ADULT - PROBLEM SELECTOR PLAN 1
Elderly man with ICM, h/o VTach s/p 2 ablations last in April 2017 and ICD placement on Amiodarone found to have stable VTach s/p 3rd ablation (RFA) 10/5. He is on Metoprolol and Sotalol, causing him dizziness from low BP  -Continue Toprol 50mg BID and Sotalol 120mg BID as per EP rec  - Will continue Eliquis 5mg q12  - Tele monitoring  - NPO after midnight for EP testing Monday

## 2017-10-08 NOTE — PROGRESS NOTE ADULT - PROBLEM SELECTOR PLAN 3
Hx of CAD and Stent placement 2016 with reduced EF <35% per patient. Echo 10/4 confirms EF of 30-35%, apical wall akinesis, mild mitral and aortic valve regurg. Plavix stopped 1 year after stent in July 2016, only taking baby aspirin, home cardiologists office called (Dr. Ordoñez) to inform of changes  - Continue ASA 81, Atorvastatin 40

## 2017-10-08 NOTE — DIETITIAN INITIAL EVALUATION ADULT. - NS AS NUTRI INTERV ED CONTENT
Purpose of the nutrition education/Nutrition relationship to health/disease/needs CHO control and fluid restriction added to diet order/Recommended modifications

## 2017-10-09 ENCOUNTER — TRANSCRIPTION ENCOUNTER (OUTPATIENT)
Age: 72
End: 2017-10-09

## 2017-10-09 VITALS — TEMPERATURE: 98 F

## 2017-10-09 LAB
ANION GAP SERPL CALC-SCNC: 14 MMOL/L — SIGNIFICANT CHANGE UP (ref 5–17)
APTT BLD: 30.1 SEC — SIGNIFICANT CHANGE UP (ref 27.5–37.4)
BASOPHILS NFR BLD AUTO: 0.2 % — SIGNIFICANT CHANGE UP (ref 0–2)
BLD GP AB SCN SERPL QL: NEGATIVE — SIGNIFICANT CHANGE UP
BUN SERPL-MCNC: 15 MG/DL — SIGNIFICANT CHANGE UP (ref 7–23)
CALCIUM SERPL-MCNC: 9.2 MG/DL — SIGNIFICANT CHANGE UP (ref 8.4–10.5)
CHLORIDE SERPL-SCNC: 99 MMOL/L — SIGNIFICANT CHANGE UP (ref 96–108)
CO2 SERPL-SCNC: 24 MMOL/L — SIGNIFICANT CHANGE UP (ref 22–31)
CREAT SERPL-MCNC: 0.84 MG/DL — SIGNIFICANT CHANGE UP (ref 0.5–1.3)
EOSINOPHIL NFR BLD AUTO: 1.1 % — SIGNIFICANT CHANGE UP (ref 0–6)
GLUCOSE SERPL-MCNC: 102 MG/DL — HIGH (ref 70–99)
HCT VFR BLD CALC: 37.8 % — LOW (ref 39–50)
HGB BLD-MCNC: 12.5 G/DL — LOW (ref 13–17)
INR BLD: 1.22 — HIGH (ref 0.88–1.16)
LYMPHOCYTES # BLD AUTO: 12.8 % — LOW (ref 13–44)
MAGNESIUM SERPL-MCNC: 2 MG/DL — SIGNIFICANT CHANGE UP (ref 1.6–2.6)
MCHC RBC-ENTMCNC: 29.8 PG — SIGNIFICANT CHANGE UP (ref 27–34)
MCHC RBC-ENTMCNC: 33.1 G/DL — SIGNIFICANT CHANGE UP (ref 32–36)
MCV RBC AUTO: 90 FL — SIGNIFICANT CHANGE UP (ref 80–100)
MONOCYTES NFR BLD AUTO: 11.9 % — SIGNIFICANT CHANGE UP (ref 2–14)
NEUTROPHILS NFR BLD AUTO: 74 % — SIGNIFICANT CHANGE UP (ref 43–77)
PLATELET # BLD AUTO: 223 K/UL — SIGNIFICANT CHANGE UP (ref 150–400)
POTASSIUM SERPL-MCNC: 3.7 MMOL/L — SIGNIFICANT CHANGE UP (ref 3.5–5.3)
POTASSIUM SERPL-SCNC: 3.7 MMOL/L — SIGNIFICANT CHANGE UP (ref 3.5–5.3)
PROTHROM AB SERPL-ACNC: 13.6 SEC — HIGH (ref 9.8–12.7)
RBC # BLD: 4.2 M/UL — SIGNIFICANT CHANGE UP (ref 4.2–5.8)
RBC # FLD: 14.1 % — SIGNIFICANT CHANGE UP (ref 10.3–16.9)
RH IG SCN BLD-IMP: POSITIVE — SIGNIFICANT CHANGE UP
SODIUM SERPL-SCNC: 137 MMOL/L — SIGNIFICANT CHANGE UP (ref 135–145)
WBC # BLD: 12.5 K/UL — HIGH (ref 3.8–10.5)
WBC # FLD AUTO: 12.5 K/UL — HIGH (ref 3.8–10.5)

## 2017-10-09 PROCEDURE — 99239 HOSP IP/OBS DSCHRG MGMT >30: CPT

## 2017-10-09 RX ORDER — CLOPIDOGREL BISULFATE 75 MG/1
1 TABLET, FILM COATED ORAL
Qty: 0 | Refills: 0 | COMMUNITY

## 2017-10-09 RX ORDER — SOTALOL HCL 120 MG
1 TABLET ORAL
Qty: 180 | Refills: 0 | OUTPATIENT
Start: 2017-10-09 | End: 2018-01-07

## 2017-10-09 RX ORDER — COLCHICINE 0.6 MG
1 TABLET ORAL
Qty: 3 | Refills: 0 | OUTPATIENT
Start: 2017-10-09 | End: 2017-10-12

## 2017-10-09 RX ORDER — COLCHICINE 0.6 MG
0.6 TABLET ORAL ONCE
Qty: 0 | Refills: 0 | Status: COMPLETED | OUTPATIENT
Start: 2017-10-09 | End: 2017-10-09

## 2017-10-09 RX ORDER — AMIODARONE HYDROCHLORIDE 400 MG/1
1 TABLET ORAL
Qty: 0 | Refills: 0 | COMMUNITY

## 2017-10-09 RX ORDER — FUROSEMIDE 40 MG
1 TABLET ORAL
Qty: 0 | Refills: 0 | COMMUNITY

## 2017-10-09 RX ORDER — METOPROLOL TARTRATE 50 MG
1 TABLET ORAL
Qty: 90 | Refills: 0 | OUTPATIENT
Start: 2017-10-09 | End: 2018-01-07

## 2017-10-09 RX ORDER — CARVEDILOL PHOSPHATE 80 MG/1
1 CAPSULE, EXTENDED RELEASE ORAL
Qty: 0 | Refills: 0 | COMMUNITY

## 2017-10-09 RX ORDER — ASPIRIN/CALCIUM CARB/MAGNESIUM 324 MG
1 TABLET ORAL
Qty: 0 | Refills: 0 | DISCHARGE
Start: 2017-10-09

## 2017-10-09 RX ADMIN — Medication 81 MILLIGRAM(S): at 10:20

## 2017-10-09 RX ADMIN — Medication 120 MILLIGRAM(S): at 05:57

## 2017-10-09 RX ADMIN — Medication 0.6 MILLIGRAM(S): at 14:12

## 2017-10-09 RX ADMIN — APIXABAN 5 MILLIGRAM(S): 2.5 TABLET, FILM COATED ORAL at 05:57

## 2017-10-09 RX ADMIN — LISINOPRIL 2.5 MILLIGRAM(S): 2.5 TABLET ORAL at 10:20

## 2017-10-09 RX ADMIN — Medication 40 MILLIGRAM(S): at 05:57

## 2017-10-09 RX ADMIN — Medication 0.6 MILLIGRAM(S): at 06:52

## 2017-10-09 RX ADMIN — Medication 50 MILLIGRAM(S): at 05:57

## 2017-10-09 NOTE — DISCHARGE NOTE ADULT - CARE PROVIDER_API CALL
Cornelius Arceo), Cardiac Electrophysiology; Cardiovascular Disease  [address] [place] [address]  Phone: [phone]  Fax: [phone] Cornelius Arceo), Cardiac Electrophysiology; Cardiovascular Disease  [address] [place] [address]  Phone: [phone]  Fax: [phone]    Pedro Neal), Cardiac Electrophysiology; Cardiology; Cardiovascular Disease  [address] [place] [address]  Phone: [phone]  Fax: [phone]

## 2017-10-09 NOTE — PROGRESS NOTE ADULT - PROBLEM SELECTOR PLAN 5
Pt on CPAP at home however refusing CPAP inpatient as it is not like his equipment, desats  to 90 while sleeping  - Encourage BIPAP use inpatient   - NC O2 to keep O2 sat >88
Continue home Finasteride 5mg and Flomax 0.4mg
Pt on CPAP at home however refusing CPAP inpatient as it is not like his equipment, desats  to 90 while sleeping  - Encourage BIPAP use inpatient   - NC O2 to keep O2 sat >88

## 2017-10-09 NOTE — DISCHARGE NOTE ADULT - MEDICATION SUMMARY - MEDICATIONS TO TAKE
I will START or STAY ON the medications listed below when I get home from the hospital:    finasteride 5 mg oral tablet  -- 1 tab(s) by mouth once a day  -- Indication: For BPH (benign prostatic hyperplasia)    [facility] Aspirin 81 mg oral tablet, chewable  -- 1 tab(s) by mouth once a day  -- Indication: For Coronary artery disease involving native coronary artery of native heart without angina pectoris    lisinopril 2.5 mg oral tablet  -- 1 tab(s) by mouth once a day  -- Indication: For Chronic systolic congestive heart failure    tamsulosin 0.4 mg oral capsule  -- 1 cap(s) by mouth once a day  -- Indication: For BPH (benign prostatic hyperplasia)    Betapace 120 mg oral tablet  -- 1 tab(s) by mouth 2 times a day  -- Indication: For VT (ventricular tachycardia)    Eliquis 5 mg oral tablet  -- 1 tab(s) by mouth 2 times a day  -- Indication: For Atrial fibrillation    Lipitor 20 mg oral tablet  -- 1 tab(s) by mouth once a day  -- Indication: For High cholesterol     Metoprolol Succinate ER 50 mg oral tablet, extended release  -- 1 tab(s) by mouth once a day at bedtime.  -- Indication: For VT (ventricular tachycardia)    potassium chloride 10 mEq oral capsule, extended release  -- 1 cap(s) by mouth once a day  -- Indication: For Low potassium    glucosamine 500 mg oral capsule  -- 1 cap(s) by mouth once a day  -- Indication: For gout

## 2017-10-09 NOTE — PROGRESS NOTE ADULT - PROBLEM SELECTOR PROBLEM 7
Nutrition, metabolism, and development symptoms
Pre-diabetes
Nutrition, metabolism, and development symptoms
Pre-diabetes

## 2017-10-09 NOTE — DISCHARGE NOTE ADULT - PLAN OF CARE
Maintain sinus rhythm Continue taking sotalol, which is an antiarrythmic. Also continue Please start on sotalol 120 mg twice daily. A prescription was sent to your pharmacy. Keep well hydrated while on this medication.    Please also start on metoprolol 50 mg once daily. Take this medicine at night to decrease symptoms of dizziness. We will see you in the office on Wednesday to re-evaluate your regimen. Maintain proper functioning of device Your device is functioning as programmed. Please keep all regularly scheduled appointments for device checks. Maintain normal sinus rhythm and avoid stroke Continue Eliquis 5 mg twice daily. Continue to monitor for signs of bleeding. Avoid urinary retention Continue taking finasteride and tamsulosin. Improve heart muscle function Continue taking lisinopril. Continue eating a low sodium diet and exercise regularly. Maintain patency of coronary arteries Continue taking aspirin 81 mg oral once daily. Restful sleep Continue using your CPAP machine while sleeping.

## 2017-10-09 NOTE — PROGRESS NOTE ADULT - PROBLEM SELECTOR PROBLEM 5
DANI (obstructive sleep apnea)
BPH (benign prostatic hyperplasia)
DANI (obstructive sleep apnea)

## 2017-10-09 NOTE — PROGRESS NOTE ADULT - PROBLEM SELECTOR PROBLEM 4
Pre-diabetes
Chronic systolic congestive heart failure
Pre-diabetes

## 2017-10-09 NOTE — DISCHARGE NOTE ADULT - CARE PROVIDERS DIRECT ADDRESSES
,[email or web address]

## 2017-10-09 NOTE — PROGRESS NOTE ADULT - PROBLEM SELECTOR PROBLEM 1
VT (ventricular tachycardia)
Ventricular tachycardia, non-sustained
VT (ventricular tachycardia)
Ventricular tachycardia, non-sustained
VT (ventricular tachycardia)

## 2017-10-09 NOTE — PROGRESS NOTE ADULT - PROBLEM SELECTOR PROBLEM 8
Prophylactic measure
Nutrition, metabolism, and development symptoms
Prophylactic measure

## 2017-10-09 NOTE — PROGRESS NOTE ADULT - PROBLEM SELECTOR PROBLEM 6
BPH (benign prostatic hyperplasia)
DANI (obstructive sleep apnea)
BPH (benign prostatic hyperplasia)
DANI (obstructive sleep apnea)

## 2017-10-09 NOTE — DISCHARGE NOTE ADULT - HOSPITAL COURSE
10/3/17:  Mr. Sauceda is a 73 y/o M with h/o paroxysmal atrial fibrillation, on eliquis, recurrent ventricular tachycardia, DVT, CHF with EF 30-35%, CAD s/p MATTY to LAD, DANI on CPAP, Pre-DM, BPH, OA, HTN, recurrent UTI  who presented to the office yesterday for an acute visit with complaint of intermittent dizziness over the last few days. Amiodarone was titrated down starting 7/2017, now off completely since 9/11/17. He denies change in exertional tolerance, SOB, presyncope/syncope.    The patient was referred from the office for admission for further monitoring, improved medical management and possible repeat RFA.     10/5/17: The patient underwent a VT RF ablation of his VT arrythmia substrate. He tolerated the procedure well and was placed on sotalol 120 mg twice daily, toprolol XL 50 mg twice daily and his Eliquis was restarted.     10/6/17: The patient recovered well post-op. His telemetry shows fewer episodes of NSVT with shorter duration.     10/9/17: The patient has frequent PVCs, occasionally in bigeminy. The ICD was checked and is functioning normally and shows no episodes of VT/VF. The patient, however is c/o dizziness upon standing. His VS were measured and reveal orthostasis. Lying down his BP is 137/58 and standing it is 90/63. He is likely dehydrated. His lasix dose has been stopped and his Toprolol XL has been reduced to once daily at bedtime. The patient 10/3/17:  Mr. Sauceda is a 71 y/o M with h/o paroxysmal atrial fibrillation, on eliquis, recurrent ventricular tachycardia, DVT, CHF with EF 30-35%, CAD s/p MATTY to LAD, DANI on CPAP, Pre-DM, BPH, OA, HTN, recurrent UTI  who presented to the office yesterday for an acute visit with complaint of intermittent dizziness over the last few days. Amiodarone was titrated down starting 7/2017, now off completely since 9/11/17. He denies change in exertional tolerance, SOB, presyncope/syncope.    The patient was referred from the office for admission for further monitoring, improved medical management and possible repeat RFA.     10/5/17: The patient underwent a VT RF ablation of his VT arrythmia substrate. He tolerated the procedure well and was placed on sotalol 120 mg twice daily, toprolol XL 50 mg twice daily and his Eliquis was restarted.     10/6/17: The patient recovered well post-op. His telemetry shows fewer episodes of NSVT with shorter duration.     10/9/17: The patient has frequent PVCs, occasionally in bigeminy. He also had one episode of 5 beat NSVT. The ICD was checked and is functioning normally and shows no episodes of VT/VF. See procedure note for full results. The patient, however is c/o dizziness upon standing. His VS were measured and reveal orthostasis. Lying down his BP is 137/58 and standing it is 90/63. He is likely dehydrated. His lasix dose has been stopped and his Toprolol XL has been reduced to once daily at bedtime. The patient is tolerating sotalol well. His QTc is 459 (when corrected for wide QRS) from his EKG on 10/8/17. This is stable when compared to his pre-sotalol EKG. He is also c/o pain to his R foot, specifically in the lateral foot and heel. It is not red or swollen when compared to the other foot, however the patient states that this feels like a typical gout flare-up. Colchicine was given with some relief. The patient has been able to ambulate down the hallway with the use of a walker.     Dispo: The patient is stable and is requesting to go home. He will f/u on Wednesday in the office with Dr. Neal.

## 2017-10-09 NOTE — PROGRESS NOTE ADULT - ASSESSMENT
72M with PMH  obesity, HTN, preDM2, HFrEF(LVEF 30-35%), CAD s/p MATTY to LAD,pAfib (on eliquis), recurrent VTach s/p ICD placement 7/2014, S/P VT ablation complicated by recurrent VT (1/2017),  BPH, OA, DVT presented with  light headedness and burning chest pain found to have stable V-tach s/p ablation on 10/6/17 awaiting NIPS anticipated today.

## 2017-10-09 NOTE — PROGRESS NOTE ADULT - SUBJECTIVE AND OBJECTIVE BOX
INTERVAL HPI/OVERNIGHT EVENTS:    ROS  CV: Denies chest pain, palpitations  RESP: Denies SOB  GI: Denies abdominal pain, constipation, diarrhea, nausea, vomiting  : Denies dysuria, hematuria, flank or back pain  ID: Denies fevers, chills  MSK: Denies joint pain   DERM: Denies any rashes, bruising, pruritis  NEURO: Denies any headache  PSYCH: Denies any mood changes    VITAL SIGNS:  ICU Vital Signs Last 24 Hrs  T(C): 37.2 (08 Oct 2017 21:52), Max: 37.3 (08 Oct 2017 14:00)  T(F): 99 (08 Oct 2017 21:52), Max: 99.1 (08 Oct 2017 14:00)  HR: 80 (09 Oct 2017 05:00) (80 - 82)  BP: 130/74 (09 Oct 2017 05:00) (97/64 - 139/77)  BP(mean): 102 (09 Oct 2017 05:00) (73 - 103)  ABP: --  ABP(mean): --  RR: 30 (09 Oct 2017 05:00) (18 - 33)  SpO2: 95% (09 Oct 2017 05:00) (93% - 96%)    PHYSICAL EXAM:    Constitutional: WDWN, NAD, resting comfortably   Head: NC/AT  Eyes: PERRL, EOMI, anicteric sclera, no mucosal pallor  ENT: no nasal discharge; uvula midline, no oropharyngeal erythema or exudates; MMM  Neck: supple; no JVD or thyromegaly, no lymphadenopathy  Respiratory: CTA B/L; no W/R/R, no retractions  Cardiac: +S1/S2; RRR; (+) diastolic murmur at left sternal border  Gastrointestinal: abdomen soft, NT/ND; no rebound or guarding; +BSx4  Back: spine midline, no bony tenderness or step-offs; no CVAT B/L  Extremities: warm; no calf tenderness, no pedal edema, no cyanosis, no clubbing  Musculoskeletal: NROM x4; no joint swelling, tenderness or erythema  Vascular: 2+ radial, femoral; DP/PT pulses B/L  Dermatologic: no rash, no petechia   Lymphatic: no submandibular or cervical LAD  Neurologic: AAOx3; CNII-XII grossly intact; sensory symmetrically intact in B/L LE   Psychiatric: pleasant and conversive; affect and characteristics of appearance, verbalizations, behaviors are appropriate    MEDICATIONS  (STANDING):  apixaban 5 milliGRAM(s) Oral every 12 hours  aspirin  chewable 81 milliGRAM(s) Oral daily  atorvastatin 40 milliGRAM(s) Oral at bedtime  docusate sodium 100 milliGRAM(s) Oral two times a day  finasteride 5 milliGRAM(s) Oral at bedtime  furosemide    Tablet 40 milliGRAM(s) Oral daily  influenza   Vaccine 0.5 milliLiter(s) IntraMuscular once  lisinopril 2.5 milliGRAM(s) Oral daily  metoprolol succinate ER 50 milliGRAM(s) Oral two times a day  senna 2 Tablet(s) Oral at bedtime  sotalol 120 milliGRAM(s) Oral two times a day  tamsulosin 0.4 milliGRAM(s) Oral at bedtime    MEDICATIONS  (PRN):  acetaminophen   Tablet. 650 milliGRAM(s) Oral every 6 hours PRN Mild Pain (1 - 3)      Allergies    No Known Allergies    Intolerances        LABS:                        11.7   10.2  )-----------( 176      ( 08 Oct 2017 06:46 )             36.1     10-08    139  |  101  |  11  ----------------------------<  93  3.9   |  26  |  0.86    Ca    8.9      08 Oct 2017 06:46  Phos  3.1     10-08  Mg     2.0     10-08            RADIOLOGY & ADDITIONAL TESTS:

## 2017-10-09 NOTE — PROGRESS NOTE ADULT - PROBLEM SELECTOR PROBLEM 3
Coronary artery disease involving native coronary artery of native heart without angina pectoris
DVT (deep venous thrombosis)
Coronary artery disease involving native coronary artery of native heart without angina pectoris
DVT (deep venous thrombosis)

## 2017-10-09 NOTE — PROGRESS NOTE ADULT - PROBLEM SELECTOR PROBLEM 2
Coronary artery disease involving native coronary artery of native heart without angina pectoris
Chronic systolic congestive heart failure
Coronary artery disease involving native coronary artery of native heart without angina pectoris
Chronic systolic congestive heart failure

## 2017-10-09 NOTE — DISCHARGE NOTE ADULT - CARE PLAN
Principal Discharge DX:	VT (ventricular tachycardia)  Secondary Diagnosis:	AICD (automatic cardioverter/defibrillator) present  Secondary Diagnosis:	Paroxysmal atrial fibrillation  Secondary Diagnosis:	Benign prostatic hyperplasia without lower urinary tract symptoms  Secondary Diagnosis:	Chronic systolic congestive heart failure  Secondary Diagnosis:	Coronary artery disease involving native coronary artery of native heart without angina pectoris  Secondary Diagnosis:	DANI (obstructive sleep apnea) Principal Discharge DX:	VT (ventricular tachycardia)  Goal:	Maintain sinus rhythm  Instructions for follow-up, activity and diet:	Continue taking sotalol, which is an antiarrythmic. Also continue  Secondary Diagnosis:	AICD (automatic cardioverter/defibrillator) present  Secondary Diagnosis:	Paroxysmal atrial fibrillation  Secondary Diagnosis:	Benign prostatic hyperplasia without lower urinary tract symptoms  Secondary Diagnosis:	Chronic systolic congestive heart failure  Secondary Diagnosis:	Coronary artery disease involving native coronary artery of native heart without angina pectoris  Secondary Diagnosis:	DANI (obstructive sleep apnea) Principal Discharge DX:	VT (ventricular tachycardia)  Goal:	Maintain sinus rhythm  Instructions for follow-up, activity and diet:	Please start on sotalol 120 mg twice daily. A prescription was sent to your pharmacy. Keep well hydrated while on this medication.    Please also start on metoprolol 50 mg once daily. Take this medicine at night to decrease symptoms of dizziness. We will see you in the office on Wednesday to re-evaluate your regimen.  Secondary Diagnosis:	AICD (automatic cardioverter/defibrillator) present  Goal:	Maintain proper functioning of device  Instructions for follow-up, activity and diet:	Your device is functioning as programmed. Please keep all regularly scheduled appointments for device checks.  Secondary Diagnosis:	Paroxysmal atrial fibrillation  Goal:	Maintain normal sinus rhythm and avoid stroke  Instructions for follow-up, activity and diet:	Continue Eliquis 5 mg twice daily. Continue to monitor for signs of bleeding.  Secondary Diagnosis:	Benign prostatic hyperplasia without lower urinary tract symptoms  Goal:	Avoid urinary retention  Instructions for follow-up, activity and diet:	Continue taking finasteride and tamsulosin.  Secondary Diagnosis:	Chronic systolic congestive heart failure  Goal:	Improve heart muscle function  Instructions for follow-up, activity and diet:	Continue taking lisinopril. Continue eating a low sodium diet and exercise regularly.  Secondary Diagnosis:	Coronary artery disease involving native coronary artery of native heart without angina pectoris  Goal:	Maintain patency of coronary arteries  Instructions for follow-up, activity and diet:	Continue taking aspirin 81 mg oral once daily.  Secondary Diagnosis:	DANI (obstructive sleep apnea)  Goal:	Restful sleep  Instructions for follow-up, activity and diet:	Continue using your CPAP machine while sleeping.

## 2017-10-09 NOTE — PROGRESS NOTE ADULT - PROBLEM SELECTOR PLAN 2
Ischemic cardiomyopathy with LVEF 30-35% with apical and apical septal akinesis, with mild concentric LVH. Currently, he is euvolemic on exam, lungs clear, no pedal edema, adequate urine output   - Continue with home Carvedilol 25 BID and Lisinopril 2.5 qdaily  - c/w home dose Lasix to 40mg

## 2017-10-09 NOTE — PROCEDURE NOTE - ADDITIONAL PROCEDURE DETAILS
The device is functioning as programmed. The patient has had no episodes of VT/VF since the episode log was cleared on October 5, 2017. The

## 2017-10-09 NOTE — DISCHARGE NOTE ADULT - PATIENT PORTAL LINK FT
“You can access the Follow[facility] Patient Portal, offered by [facility], by registering with the following website: [email or web address]”

## 2017-10-09 NOTE — PROGRESS NOTE ADULT - PROBLEM SELECTOR PLAN 6
Continue home Finasteride 5mg and Flomax 0.4mg
pt desatting to 90 while sleeping  -Pt on CPAP at home however refusing CPAP inpatient as it is not like his equipment  -NC O2 increased to 4L, satting in mid 90s while sleeping  -monitor respiratory status. Can put on high flow NC if needed
- Pt on CPAP at home however refusing CPAP inpatient. c/w Nasal Cannula  -Will monitor respiratory status
Continue home Finasteride 5mg and Flomax 0.4mg
pt desatting to 90 while sleeping  -Pt on CPAP at home however refusing CPAP inpatient as it is not like his equipment  -NC O2 increased to 4L, satting in mid 90s while sleeping  -monitor respiratory status

## 2017-10-09 NOTE — DISCHARGE NOTE ADULT - SECONDARY DIAGNOSIS.
DANI (obstructive sleep apnea) AICD (automatic cardioverter/defibrillator) present Paroxysmal atrial fibrillation Benign prostatic hyperplasia without lower urinary tract symptoms Chronic systolic congestive heart failure Coronary artery disease involving native coronary artery of native heart without angina pectoris

## 2017-10-09 NOTE — DISCHARGE NOTE ADULT - MEDICATION SUMMARY - MEDICATIONS TO STOP TAKING
I will STOP taking the medications listed below when I get home from the hospital:    amiodarone 200 mg oral tablet  -- 1 tab(s) by mouth once a day    Coreg 25 mg oral tablet  -- 1 tab(s) by mouth 2 times a day    Lasix 40 mg oral tablet  -- 1 tab(s) by mouth once a day    Plavix 75 mg oral tablet  -- 1 tab(s) by mouth once a day

## 2017-10-11 ENCOUNTER — APPOINTMENT (OUTPATIENT)
Dept: HEART AND VASCULAR | Facility: CLINIC | Age: 72
End: 2017-10-11
Payer: MEDICARE

## 2017-10-11 ENCOUNTER — MEDICATION RENEWAL (OUTPATIENT)
Age: 72
End: 2017-10-11

## 2017-10-11 VITALS — SYSTOLIC BLOOD PRESSURE: 107 MMHG | DIASTOLIC BLOOD PRESSURE: 61 MMHG | HEART RATE: 80 BPM | HEIGHT: 60 IN

## 2017-10-11 DIAGNOSIS — M10.9 GOUT, UNSPECIFIED: ICD-10-CM

## 2017-10-11 PROCEDURE — 93000 ELECTROCARDIOGRAM COMPLETE: CPT

## 2017-10-11 PROCEDURE — 99215 OFFICE O/P EST HI 40 MIN: CPT

## 2017-10-15 DIAGNOSIS — I47.2 VENTRICULAR TACHYCARDIA: ICD-10-CM

## 2017-10-15 DIAGNOSIS — I11.0 HYPERTENSIVE HEART DISEASE WITH HEART FAILURE: ICD-10-CM

## 2017-10-15 DIAGNOSIS — R73.03 PREDIABETES: ICD-10-CM

## 2017-10-15 DIAGNOSIS — Z95.5 PRESENCE OF CORONARY ANGIOPLASTY IMPLANT AND GRAFT: ICD-10-CM

## 2017-10-15 DIAGNOSIS — Z95.810 PRESENCE OF AUTOMATIC (IMPLANTABLE) CARDIAC DEFIBRILLATOR: ICD-10-CM

## 2017-10-15 DIAGNOSIS — I48.0 PAROXYSMAL ATRIAL FIBRILLATION: ICD-10-CM

## 2017-10-15 DIAGNOSIS — N40.0 BENIGN PROSTATIC HYPERPLASIA WITHOUT LOWER URINARY TRACT SYMPTOMS: ICD-10-CM

## 2017-10-15 DIAGNOSIS — G47.33 OBSTRUCTIVE SLEEP APNEA (ADULT) (PEDIATRIC): ICD-10-CM

## 2017-10-15 DIAGNOSIS — I25.10 ATHEROSCLEROTIC HEART DISEASE OF NATIVE CORONARY ARTERY WITHOUT ANGINA PECTORIS: ICD-10-CM

## 2017-10-15 DIAGNOSIS — Z79.01 LONG TERM (CURRENT) USE OF ANTICOAGULANTS: ICD-10-CM

## 2017-10-15 DIAGNOSIS — I50.22 CHRONIC SYSTOLIC (CONGESTIVE) HEART FAILURE: ICD-10-CM

## 2017-10-15 DIAGNOSIS — Z86.718 PERSONAL HISTORY OF OTHER VENOUS THROMBOSIS AND EMBOLISM: ICD-10-CM

## 2017-11-08 ENCOUNTER — APPOINTMENT (OUTPATIENT)
Dept: HEART AND VASCULAR | Facility: CLINIC | Age: 72
End: 2017-11-08

## 2017-11-11 ENCOUNTER — INPATIENT (INPATIENT)
Facility: HOSPITAL | Age: 72
LOS: 8 days | Discharge: HOME | DRG: 274 | End: 2017-11-20
Attending: INTERNAL MEDICINE | Admitting: INTERNAL MEDICINE
Payer: MEDICARE

## 2017-11-11 VITALS
DIASTOLIC BLOOD PRESSURE: 76 MMHG | HEART RATE: 68 BPM | SYSTOLIC BLOOD PRESSURE: 114 MMHG | HEIGHT: 70 IN | OXYGEN SATURATION: 95 % | WEIGHT: 247.8 LBS | RESPIRATION RATE: 27 BRPM | TEMPERATURE: 98 F

## 2017-11-11 DIAGNOSIS — I48.91 UNSPECIFIED ATRIAL FIBRILLATION: ICD-10-CM

## 2017-11-11 DIAGNOSIS — Z98.890 OTHER SPECIFIED POSTPROCEDURAL STATES: Chronic | ICD-10-CM

## 2017-11-11 DIAGNOSIS — Z29.9 ENCOUNTER FOR PROPHYLACTIC MEASURES, UNSPECIFIED: ICD-10-CM

## 2017-11-11 DIAGNOSIS — I47.2 VENTRICULAR TACHYCARDIA: ICD-10-CM

## 2017-11-11 DIAGNOSIS — M10.9 GOUT, UNSPECIFIED: ICD-10-CM

## 2017-11-11 DIAGNOSIS — I25.10 ATHEROSCLEROTIC HEART DISEASE OF NATIVE CORONARY ARTERY WITHOUT ANGINA PECTORIS: ICD-10-CM

## 2017-11-11 DIAGNOSIS — I25.5 ISCHEMIC CARDIOMYOPATHY: ICD-10-CM

## 2017-11-11 DIAGNOSIS — N40.0 BENIGN PROSTATIC HYPERPLASIA WITHOUT LOWER URINARY TRACT SYMPTOMS: ICD-10-CM

## 2017-11-11 DIAGNOSIS — Z90.49 ACQUIRED ABSENCE OF OTHER SPECIFIED PARTS OF DIGESTIVE TRACT: Chronic | ICD-10-CM

## 2017-11-11 DIAGNOSIS — Z95.810 PRESENCE OF AUTOMATIC (IMPLANTABLE) CARDIAC DEFIBRILLATOR: Chronic | ICD-10-CM

## 2017-11-11 LAB
ALBUMIN SERPL ELPH-MCNC: 3.5 G/DL — SIGNIFICANT CHANGE UP (ref 3.3–5)
ALP SERPL-CCNC: 99 U/L — SIGNIFICANT CHANGE UP (ref 40–120)
ALT FLD-CCNC: 15 U/L — SIGNIFICANT CHANGE UP (ref 10–45)
ANION GAP SERPL CALC-SCNC: 12 MMOL/L — SIGNIFICANT CHANGE UP (ref 5–17)
ANION GAP SERPL CALC-SCNC: 13 MMOL/L — SIGNIFICANT CHANGE UP (ref 5–17)
ANION GAP SERPL CALC-SCNC: 14 MMOL/L — SIGNIFICANT CHANGE UP (ref 5–17)
APTT BLD: 102.8 SEC — HIGH (ref 27.5–37.4)
APTT BLD: 31 SEC — SIGNIFICANT CHANGE UP (ref 27.5–37.4)
APTT BLD: 75.4 SEC — HIGH (ref 27.5–37.4)
AST SERPL-CCNC: 16 U/L — SIGNIFICANT CHANGE UP (ref 10–40)
BILIRUB SERPL-MCNC: 0.5 MG/DL — SIGNIFICANT CHANGE UP (ref 0.2–1.2)
BLD GP AB SCN SERPL QL: NEGATIVE — SIGNIFICANT CHANGE UP
BUN SERPL-MCNC: 14 MG/DL — SIGNIFICANT CHANGE UP (ref 7–23)
BUN SERPL-MCNC: 14 MG/DL — SIGNIFICANT CHANGE UP (ref 7–23)
BUN SERPL-MCNC: 16 MG/DL — SIGNIFICANT CHANGE UP (ref 7–23)
CALCIUM SERPL-MCNC: 8.9 MG/DL — SIGNIFICANT CHANGE UP (ref 8.4–10.5)
CALCIUM SERPL-MCNC: 8.9 MG/DL — SIGNIFICANT CHANGE UP (ref 8.4–10.5)
CALCIUM SERPL-MCNC: 9 MG/DL — SIGNIFICANT CHANGE UP (ref 8.4–10.5)
CHLORIDE SERPL-SCNC: 100 MMOL/L — SIGNIFICANT CHANGE UP (ref 96–108)
CHLORIDE SERPL-SCNC: 96 MMOL/L — SIGNIFICANT CHANGE UP (ref 96–108)
CHLORIDE SERPL-SCNC: 96 MMOL/L — SIGNIFICANT CHANGE UP (ref 96–108)
CO2 SERPL-SCNC: 23 MMOL/L — SIGNIFICANT CHANGE UP (ref 22–31)
CO2 SERPL-SCNC: 23 MMOL/L — SIGNIFICANT CHANGE UP (ref 22–31)
CO2 SERPL-SCNC: 24 MMOL/L — SIGNIFICANT CHANGE UP (ref 22–31)
CREAT SERPL-MCNC: 0.84 MG/DL — SIGNIFICANT CHANGE UP (ref 0.5–1.3)
CREAT SERPL-MCNC: 0.89 MG/DL — SIGNIFICANT CHANGE UP (ref 0.5–1.3)
CREAT SERPL-MCNC: 1.04 MG/DL — SIGNIFICANT CHANGE UP (ref 0.5–1.3)
GLUCOSE SERPL-MCNC: 113 MG/DL — HIGH (ref 70–99)
GLUCOSE SERPL-MCNC: 124 MG/DL — HIGH (ref 70–99)
GLUCOSE SERPL-MCNC: 190 MG/DL — HIGH (ref 70–99)
HCT VFR BLD CALC: 39 % — SIGNIFICANT CHANGE UP (ref 39–50)
HGB BLD-MCNC: 12.8 G/DL — LOW (ref 13–17)
INR BLD: 1.33 — HIGH (ref 0.88–1.16)
MAGNESIUM SERPL-MCNC: 2.2 MG/DL — SIGNIFICANT CHANGE UP (ref 1.6–2.6)
MAGNESIUM SERPL-MCNC: 2.6 MG/DL — SIGNIFICANT CHANGE UP (ref 1.6–2.6)
MCHC RBC-ENTMCNC: 29.4 PG — SIGNIFICANT CHANGE UP (ref 27–34)
MCHC RBC-ENTMCNC: 32.8 G/DL — SIGNIFICANT CHANGE UP (ref 32–36)
MCV RBC AUTO: 89.4 FL — SIGNIFICANT CHANGE UP (ref 80–100)
PLATELET # BLD AUTO: 206 K/UL — SIGNIFICANT CHANGE UP (ref 150–400)
POTASSIUM SERPL-MCNC: 3.8 MMOL/L — SIGNIFICANT CHANGE UP (ref 3.5–5.3)
POTASSIUM SERPL-MCNC: 4 MMOL/L — SIGNIFICANT CHANGE UP (ref 3.5–5.3)
POTASSIUM SERPL-MCNC: 4.2 MMOL/L — SIGNIFICANT CHANGE UP (ref 3.5–5.3)
POTASSIUM SERPL-SCNC: 3.8 MMOL/L — SIGNIFICANT CHANGE UP (ref 3.5–5.3)
POTASSIUM SERPL-SCNC: 4 MMOL/L — SIGNIFICANT CHANGE UP (ref 3.5–5.3)
POTASSIUM SERPL-SCNC: 4.2 MMOL/L — SIGNIFICANT CHANGE UP (ref 3.5–5.3)
PROT SERPL-MCNC: 6.7 G/DL — SIGNIFICANT CHANGE UP (ref 6–8.3)
PROTHROM AB SERPL-ACNC: 14.8 SEC — HIGH (ref 9.8–12.7)
RBC # BLD: 4.36 M/UL — SIGNIFICANT CHANGE UP (ref 4.2–5.8)
RBC # FLD: 14 % — SIGNIFICANT CHANGE UP (ref 10.3–16.9)
RH IG SCN BLD-IMP: POSITIVE — SIGNIFICANT CHANGE UP
SODIUM SERPL-SCNC: 131 MMOL/L — LOW (ref 135–145)
SODIUM SERPL-SCNC: 134 MMOL/L — LOW (ref 135–145)
SODIUM SERPL-SCNC: 136 MMOL/L — SIGNIFICANT CHANGE UP (ref 135–145)
TSH SERPL-MCNC: 2.49 UIU/ML — SIGNIFICANT CHANGE UP (ref 0.35–4.94)
WBC # BLD: 10 K/UL — SIGNIFICANT CHANGE UP (ref 3.8–10.5)
WBC # FLD AUTO: 10 K/UL — SIGNIFICANT CHANGE UP (ref 3.8–10.5)

## 2017-11-11 PROCEDURE — 93306 TTE W/DOPPLER COMPLETE: CPT | Mod: 26

## 2017-11-11 PROCEDURE — 71010: CPT | Mod: 26

## 2017-11-11 PROCEDURE — 99291 CRITICAL CARE FIRST HOUR: CPT

## 2017-11-11 PROCEDURE — 93010 ELECTROCARDIOGRAM REPORT: CPT

## 2017-11-11 RX ORDER — POTASSIUM CHLORIDE 20 MEQ
20 PACKET (EA) ORAL ONCE
Qty: 0 | Refills: 0 | Status: COMPLETED | OUTPATIENT
Start: 2017-11-11 | End: 2017-11-11

## 2017-11-11 RX ORDER — MAGNESIUM SULFATE 500 MG/ML
1 VIAL (ML) INJECTION ONCE
Qty: 0 | Refills: 0 | Status: COMPLETED | OUTPATIENT
Start: 2017-11-11 | End: 2017-11-11

## 2017-11-11 RX ORDER — POTASSIUM CHLORIDE 20 MEQ
40 PACKET (EA) ORAL ONCE
Qty: 0 | Refills: 0 | Status: COMPLETED | OUTPATIENT
Start: 2017-11-11 | End: 2017-11-11

## 2017-11-11 RX ORDER — ATORVASTATIN CALCIUM 80 MG/1
20 TABLET, FILM COATED ORAL AT BEDTIME
Qty: 0 | Refills: 0 | Status: DISCONTINUED | OUTPATIENT
Start: 2017-11-11 | End: 2017-11-17

## 2017-11-11 RX ORDER — HEPARIN SODIUM 5000 [USP'U]/ML
2000 INJECTION INTRAVENOUS; SUBCUTANEOUS
Qty: 25000 | Refills: 0 | Status: DISCONTINUED | OUTPATIENT
Start: 2017-11-11 | End: 2017-11-11

## 2017-11-11 RX ORDER — HEPARIN SODIUM 5000 [USP'U]/ML
1800 INJECTION INTRAVENOUS; SUBCUTANEOUS
Qty: 25000 | Refills: 0 | Status: DISCONTINUED | OUTPATIENT
Start: 2017-11-11 | End: 2017-11-12

## 2017-11-11 RX ORDER — ASPIRIN/CALCIUM CARB/MAGNESIUM 324 MG
81 TABLET ORAL DAILY
Qty: 0 | Refills: 0 | Status: DISCONTINUED | OUTPATIENT
Start: 2017-11-11 | End: 2017-11-20

## 2017-11-11 RX ORDER — COLCHICINE 0.6 MG
0.6 TABLET ORAL DAILY
Qty: 0 | Refills: 0 | Status: DISCONTINUED | OUTPATIENT
Start: 2017-11-11 | End: 2017-11-13

## 2017-11-11 RX ORDER — CARVEDILOL PHOSPHATE 80 MG/1
25 CAPSULE, EXTENDED RELEASE ORAL EVERY 12 HOURS
Qty: 0 | Refills: 0 | Status: DISCONTINUED | OUTPATIENT
Start: 2017-11-11 | End: 2017-11-16

## 2017-11-11 RX ORDER — ACETAMINOPHEN 500 MG
650 TABLET ORAL EVERY 6 HOURS
Qty: 0 | Refills: 0 | Status: DISCONTINUED | OUTPATIENT
Start: 2017-11-11 | End: 2017-11-16

## 2017-11-11 RX ORDER — TAMSULOSIN HYDROCHLORIDE 0.4 MG/1
0.4 CAPSULE ORAL AT BEDTIME
Qty: 0 | Refills: 0 | Status: DISCONTINUED | OUTPATIENT
Start: 2017-11-11 | End: 2017-11-20

## 2017-11-11 RX ORDER — QUINIDINE SULFATE 200 MG
324 TABLET ORAL EVERY 8 HOURS
Qty: 0 | Refills: 0 | Status: DISCONTINUED | OUTPATIENT
Start: 2017-11-11 | End: 2017-11-15

## 2017-11-11 RX ORDER — APIXABAN 2.5 MG/1
5 TABLET, FILM COATED ORAL EVERY 12 HOURS
Qty: 0 | Refills: 0 | Status: DISCONTINUED | OUTPATIENT
Start: 2017-11-11 | End: 2017-11-11

## 2017-11-11 RX ORDER — MAGNESIUM SULFATE 500 MG/ML
2 VIAL (ML) INJECTION ONCE
Qty: 0 | Refills: 0 | Status: COMPLETED | OUTPATIENT
Start: 2017-11-11 | End: 2017-11-11

## 2017-11-11 RX ORDER — FINASTERIDE 5 MG/1
5 TABLET, FILM COATED ORAL DAILY
Qty: 0 | Refills: 0 | Status: DISCONTINUED | OUTPATIENT
Start: 2017-11-11 | End: 2017-11-20

## 2017-11-11 RX ORDER — AMIODARONE HYDROCHLORIDE 400 MG/1
150 TABLET ORAL ONCE
Qty: 0 | Refills: 0 | Status: COMPLETED | OUTPATIENT
Start: 2017-11-11 | End: 2017-11-11

## 2017-11-11 RX ORDER — AMIODARONE HYDROCHLORIDE 400 MG/1
1 TABLET ORAL
Qty: 900 | Refills: 0 | Status: DISCONTINUED | OUTPATIENT
Start: 2017-11-11 | End: 2017-11-11

## 2017-11-11 RX ORDER — LISINOPRIL 2.5 MG/1
2.5 TABLET ORAL DAILY
Qty: 0 | Refills: 0 | Status: DISCONTINUED | OUTPATIENT
Start: 2017-11-11 | End: 2017-11-20

## 2017-11-11 RX ADMIN — HEPARIN SODIUM 18 UNIT(S)/HR: 5000 INJECTION INTRAVENOUS; SUBCUTANEOUS at 22:24

## 2017-11-11 RX ADMIN — Medication 324 MILLIGRAM(S): at 15:35

## 2017-11-11 RX ADMIN — Medication 0.6 MILLIGRAM(S): at 07:48

## 2017-11-11 RX ADMIN — Medication 650 MILLIGRAM(S): at 12:30

## 2017-11-11 RX ADMIN — HEPARIN SODIUM 20 UNIT(S)/HR: 5000 INJECTION INTRAVENOUS; SUBCUTANEOUS at 08:27

## 2017-11-11 RX ADMIN — CARVEDILOL PHOSPHATE 25 MILLIGRAM(S): 80 CAPSULE, EXTENDED RELEASE ORAL at 04:13

## 2017-11-11 RX ADMIN — AMIODARONE HYDROCHLORIDE 600 MILLIGRAM(S): 400 TABLET ORAL at 02:10

## 2017-11-11 RX ADMIN — Medication 20 MILLIEQUIVALENT(S): at 04:33

## 2017-11-11 RX ADMIN — Medication 324 MILLIGRAM(S): at 22:23

## 2017-11-11 RX ADMIN — TAMSULOSIN HYDROCHLORIDE 0.4 MILLIGRAM(S): 0.4 CAPSULE ORAL at 22:23

## 2017-11-11 RX ADMIN — ATORVASTATIN CALCIUM 20 MILLIGRAM(S): 80 TABLET, FILM COATED ORAL at 22:23

## 2017-11-11 RX ADMIN — Medication 20 MILLIEQUIVALENT(S): at 22:23

## 2017-11-11 RX ADMIN — Medication 650 MILLIGRAM(S): at 11:59

## 2017-11-11 RX ADMIN — Medication 40 MILLIEQUIVALENT(S): at 15:41

## 2017-11-11 RX ADMIN — Medication 20 MILLIEQUIVALENT(S): at 04:54

## 2017-11-11 RX ADMIN — Medication 50 GRAM(S): at 15:39

## 2017-11-11 RX ADMIN — FINASTERIDE 5 MILLIGRAM(S): 5 TABLET, FILM COATED ORAL at 11:59

## 2017-11-11 RX ADMIN — AMIODARONE HYDROCHLORIDE 33.33 MG/MIN: 400 TABLET ORAL at 05:54

## 2017-11-11 RX ADMIN — Medication 81 MILLIGRAM(S): at 11:59

## 2017-11-11 RX ADMIN — Medication 100 GRAM(S): at 05:11

## 2017-11-11 RX ADMIN — CARVEDILOL PHOSPHATE 25 MILLIGRAM(S): 80 CAPSULE, EXTENDED RELEASE ORAL at 18:23

## 2017-11-11 NOTE — H&P ADULT - NSHPLABSRESULTS_GEN_ALL_CORE
LABS:                        12.8   10.0  )-----------( 206      ( 11 Nov 2017 03:04 )             39.0     11-11    134<L>  |  96  |  16  ----------------------------<  113<H>  3.8   |  24  |  0.89    Ca    9.0      11 Nov 2017 03:04  Mg     2.2     11-11    TPro  6.7  /  Alb  3.5  /  TBili  0.5  /  DBili  x   /  AST  16  /  ALT  15  /  AlkPhos  99  11-11    PT/INR - ( 11 Nov 2017 03:04 )   PT: 14.8 sec;   INR: 1.33          PTT - ( 11 Nov 2017 03:04 )  PTT:31.0 sec      RADIOLOGY & ADDITIONAL TESTS:    CXR showing pulmonary congestion, no consolidation present    EKG NSR 70 and Vtach to 115s    ECHO EF of ~35% global hypokinesis, mild PI, mild AI

## 2017-11-11 NOTE — H&P ADULT - HISTORY OF PRESENT ILLNESS
73yo M with pmh of recurrent VT s/p 3 ablations (9/2016, 5/2017, 10/2017), CAD w MATTY to LAD (6/10/16), stent to pLAD (6/16/016), ICD, DVT on Eliquis CHF with EF<35%, DANI on CPAP, Pre DM, BPH, OA, HTN, recurrent UTI who is a transfer from [facility] for ventricular tachycardia. Patient initially presented to [facility] with lightheadedness, dizziness, and burning sensation in chest radiating to his neck and head. No  chest pain, SOB, diaphoresis, n/v.    Past Cardiac  history- Pt  mentions history of Arrhythmia whole life however last year during hospitalization for bladder stone, he was noted to be have VT and was transferred [facility] for ICD placement. In July 2016, after an abnormal stress test, Pt was stented (unknown artery). In September 2016, pt underwent ablation. In April 2017, Pt noted getting shocked by ICD, was admitted where he was given amio drip and Cardioverted. In May 2017, Pt underwent second ablation. 73yo M with pmh of recurrent VT s/p 3 ablations (9/2016, 5/2017, 10/2017), CAD w MATTY to LAD (6/10/16), stent to pLAD (6/16/016), ICD, DVT on Eliquis CHF with EF<35%, DANI on CPAP, Pre DM, BPH, OA, HTN, recurrent UTI who is a transfer from [facility] for ventricular tachycardia. Patient initially presented to [facility] on 11/7/2017 with lightheadedness, dizziness, and burning sensation in chest radiating to his neck and head. Patient experiences 2-4 episodes of lightheadedness every morning which resolves by the afternoon. On 11/6 patient had persistent symptoms throughout the day which caused him to go to the ED. Patient had no chest pain, SOB, diaphoresis, n/v, or orthopnea. Patient has a baseline reduced exercise tolerance. Patient denies any recent illnesses, sick contacts, and travel.    At [facility], in ED patient had recurrent runs of VT, was given asa 324, amiodarone 150mg, and sotalol 120mg. He continued to have brief runs of VT and was started on amiodarone drip. AICD had 92 episodes of VT with no shocks delivered. Patient was admitted to CCU, his AICD was adjusted and and amoiodarone drip was stopped. Placed on carvedilol and sotalol with decrease in VTs. Patient was stepped down. Pt on 11/9 had recurrent NSVT that broke with therapeutic ATP but then had sustained VT at HR 150s that failed ATPx3 and resulted in a therapeutic ICD shock x1 to NSR. AIDC was interrogated and was amio loaded, he continued to have NSVT episodes. Amio drip was started overnight and stepped back up to CCU. Patient was transferred to [facility] for possible ablation with Dr. Neal, who had performed his previous ablations.    Past Cardiac  history- Pt  mentions history of Arrhythmia whole life however last year during hospitalization for bladder stone, he was noted to be have VT and was transferred [facility] for ICD placement. In July 2016, after an abnormal stress test, Pt was stented (unknown artery). In September 2016, pt underwent ablation. In April 2017, Pt noted getting shocked by ICD, was admitted where he was given amio drip and Cardioverted. In May 2017, Pt underwent second ablation. 73yo M with pmh of recurrent VT s/p 3 ablations (9/2016, 5/2017, 10/2017), CAD w MATTY to LAD (6/10/16), stent to pLAD (6/16/016), ICD, DVT on Eliquis CHF with EF<35%, DANI on CPAP, Pre DM, BPH, OA, HTN, recurrent UTI who is a transfer from [facility] for ventricular tachycardia. Patient initially presented to [facility] on 11/7/2017 with lightheadedness, dizziness, and burning sensation in chest radiating to his neck and head. Patient experiences 2-4 episodes of lightheadedness every morning which resolves by the afternoon. On 11/6 patient had persistent symptoms throughout the day which caused him to go to the ED. Patient had no chest pain, SOB, diaphoresis, n/v, or orthopnea. Patient has a baseline reduced exercise tolerance. Patient denies any recent illnesses, sick contacts, and travel.    At [facility], in ED patient had recurrent runs of VT, was given asa 324, amiodarone 150mg, and sotalol 120mg. He continued to have brief runs of VT and was started on amiodarone drip. AICD had 92 episodes of VT with no shocks delivered. Patient was admitted to CCU, his AICD was adjusted and and amoiodarone drip was stopped. Placed on carvedilol and sotalol with decrease in VTs. Patient was stepped down. Pt on 11/9 had recurrent NSVT that broke with therapeutic ATP but then had sustained VT at HR 150s that failed ATPx3 and resulted in a therapeutic ICD shock x1 to NSR. AIDC was interrogated and was amio loaded, he continued to have NSVT episodes. Amio drip was started overnight and stepped back up to CCU. Patient was transferred to [facility] for possible ablation with Dr. Neal, who had performed his previous ablations.    Past Cardiac history- Pt  mentions history of Arrhythmia whole life however last year during hospitalization for bladder stone, he was noted to be have VT and was transferred [facility] for ICD placement. In July 2016, after an abnormal stress test, Pt was stented (MATTY to LAD (6/10/16). In September 2016, pt underwent ablation. In April 2017, Pt noted getting shocked by ICD, was admitted where he was given amio drip and Cardioverted. In May 2017, Pt underwent second ablation.

## 2017-11-11 NOTE — H&P ADULT - PROBLEM SELECTOR PLAN 3
Patient with known afib on eliquis 5mg BID. Patient currently alternating between NSR and Vtach.  -start heparin gtt until plan with EP discussed concerning ablation, follow up PTTs

## 2017-11-11 NOTE — H&P ADULT - ASSESSMENT
73yo M with pmh of recurrent VT s/p 3 ablations (9/2016, 5/2017, 10/2017), CAD w MATTY to LAD (6/10/16), stent to pLAD (6/16/016), ICD, DVT on Eliquis CHF with EF<35%, DANI on CPAP, Pre DM, BPH, OA, HTN, recurrent UTI who is a transfer from [facility] for ventricular tachycardia.

## 2017-11-11 NOTE — H&P ADULT - ATTENDING COMMENTS
Please see housestaff note for full details.  I have reviewed the case, examined the patient and agree with plan.  72 M mult comorbidities CAD PCI LAD 2016 CHF LVEF 25% VT ablation last in 10/2017  with recurrent VT and ICD shocks.  Patient transferred from [facility] for further care.  On admission, patient switched back to amio IV. Still having runs of NSVT.  Will have EP f/u.  For possible ablation on Monday.  Hold eliquis for now.

## 2017-11-11 NOTE — H&P ADULT - NSHPSOCIALHISTORY_GEN_ALL_CORE
No smoking history, social alcohol drinker, no illicit drug use. Patient lives alone in [place] with no home health aid. Patient is able to take care of his ADLs and IADLs.

## 2017-11-11 NOTE — H&P ADULT - PROBLEM SELECTOR PLAN 1
Patient transferred here from [facility] for VT storm. Patient with history of VT s/p 3 ablations last done in 10/2017 at [facility]. Patient received amiodarone, was placed on an amiodarone drip, then DC'd. Patient was shocked 3 times. Patient's ICD is set to ATP 6 times before shocking.  -s/p 150 mg amio  -c/w amio 1mg/min  -c/w carvedilol 25 mg bid  -discuss with Dr. Leon about ablation and inquire about another cardiac catheretization senior care of vtach is ischemia  -patient with pacer pads on  -goal K 4.5 and Mag 2.5

## 2017-11-11 NOTE — H&P ADULT - PROBLEM SELECTOR PLAN 2
Patient with previous ECHO showing EF of 30-35% mild concentric left ventricular hypertrophy apical akinesis apical septal wall akinesis, apical inferior   wall akinesis. Patient denies SOB, orthopnea, leg swelling. On home toprol 25 qD  -repeat ECHO  -consider repeat cath as above  -start lisinopril 2.5mg

## 2017-11-11 NOTE — CHART NOTE - NSCHARTNOTEFT_GEN_A_CORE
Patient uses CPAP at night for his DANI; as in previous hospitalizations, patient refuses to use CPAP/Bipap at [facility]; instead have placed patient on Nasal Cannula supplemental oxygen which he has agreed to. Respiratory status stable with 02 sat >94%. Will continue to monitor.

## 2017-11-11 NOTE — H&P ADULT - PROBLEM SELECTOR PLAN 5
Patient with BPH, has straining in AM which resolves throughout the day  -continue home tamsulosin 0.4mg qHS and finasteride 5mg daily.

## 2017-11-11 NOTE — H&P ADULT - PROBLEM SELECTOR PLAN 4
Patient with h/o CAD s/p stent in 2016 to LAD. Recurrent VT storm may be secondary to ischemic insult. Negative tropes at [facility]  -consider PCI to evaluate ischemia, discuss with EP  -continue atorvastatin 20mg qHs  -continue aspirin 81

## 2017-11-11 NOTE — H&P ADULT - PROBLEM SELECTOR PLAN 6
Patient with known previous treatment of gout with colchicine. Patient complaining of pain in the right ankle.  -continue colchicine 0.6 daily

## 2017-11-11 NOTE — H&P ADULT - NSHPREVIEWOFSYSTEMS_GEN_ALL_CORE
CONSTITUTIONAL:  No weight loss, fever, chills, weakness or fatigue.  HEENT:  Eyes:  No visual loss, blurred vision, double vision or yellow sclerae. Ears, Nose, Throat:  No hearing loss, sneezing, congestion, runny nose or sore throat.  SKIN:  No rash or itching.  CARDIOVASCULAR:  No chest pain, chest pressure or chest discomfort. No palpitations or edema.  RESPIRATORY:  No shortness of breath, cough or sputum.  GASTROINTESTINAL:  No anorexia, nausea, vomiting or diarrhea. No abdominal pain or blood.  GENITOURINARY:  Straining to urinate in AM which improves as the day progresses  NEUROLOGICAL:  patient experiences left arm tingling dependent on head position.  MUSCULOSKELETAL:  Patient with occasional bouts of right ankle pain, none curently  HEMATOLOGIC:  No anemia, bleeding or bruising.  LYMPHATICS:  No enlarged nodes. No history of splenectomy.  PSYCHIATRIC:  No history of depression or anxiety.  ENDOCRINOLOGIC:  No reports of sweating, cold or heat intolerance. No polyuria or polydipsia.  ALLERGIES:  No history of asthma, hives, eczema or rhinitis.

## 2017-11-11 NOTE — H&P ADULT - NSHPPHYSICALEXAM_GEN_ALL_CORE
VITAL SIGNS:  T(F): 98.9 (11-11-17 @ 06:24)  HR: 68 (11-11-17 @ 07:00)  BP: 93/62 (11-11-17 @ 07:00)  RR: 20 (11-11-17 @ 07:00)  SpO2: 95% (11-11-17 @ 07:00)  Wt(kg): --    PHYSICAL EXAM:    Constitutional: WDWN, NAD, obese  Eyes: PERRL, EOMI, sclera non-icteric  Mouth: mucous membranes moist  Neck: supple, trachea midline, no masses, no JVD  Respiratory: CTA b/l, good air entry b/l, no wheezing, no rhonchi, no rales, without accessory muscle use and no intercostal retractions  Cardiovascular: distant heart sounds, RRR, normal S1S2, no M/R/G  Gastrointestinal: soft, NTND, 2 abdominal scars, no masses palpable, BS normal  Extremities: Warm, well perfused, pulses equal bilateral upper and lower extremities, no edema, no clubbing  Neurological: AAOx3, CN Grossly intact  Skin: Normal temperature, warm, dry

## 2017-11-12 LAB
ANION GAP SERPL CALC-SCNC: 13 MMOL/L — SIGNIFICANT CHANGE UP (ref 5–17)
ANION GAP SERPL CALC-SCNC: 13 MMOL/L — SIGNIFICANT CHANGE UP (ref 5–17)
APTT BLD: 63.7 SEC — HIGH (ref 27.5–37.4)
APTT BLD: 92.7 SEC — HIGH (ref 27.5–37.4)
APTT BLD: 99 SEC — HIGH (ref 27.5–37.4)
BUN SERPL-MCNC: 13 MG/DL — SIGNIFICANT CHANGE UP (ref 7–23)
BUN SERPL-MCNC: 16 MG/DL — SIGNIFICANT CHANGE UP (ref 7–23)
CALCIUM SERPL-MCNC: 8.7 MG/DL — SIGNIFICANT CHANGE UP (ref 8.4–10.5)
CALCIUM SERPL-MCNC: 9 MG/DL — SIGNIFICANT CHANGE UP (ref 8.4–10.5)
CHLORIDE SERPL-SCNC: 100 MMOL/L — SIGNIFICANT CHANGE UP (ref 96–108)
CHLORIDE SERPL-SCNC: 97 MMOL/L — SIGNIFICANT CHANGE UP (ref 96–108)
CO2 SERPL-SCNC: 23 MMOL/L — SIGNIFICANT CHANGE UP (ref 22–31)
CO2 SERPL-SCNC: 24 MMOL/L — SIGNIFICANT CHANGE UP (ref 22–31)
CREAT SERPL-MCNC: 0.89 MG/DL — SIGNIFICANT CHANGE UP (ref 0.5–1.3)
CREAT SERPL-MCNC: 1.08 MG/DL — SIGNIFICANT CHANGE UP (ref 0.5–1.3)
GLUCOSE SERPL-MCNC: 106 MG/DL — HIGH (ref 70–99)
GLUCOSE SERPL-MCNC: 138 MG/DL — HIGH (ref 70–99)
HCT VFR BLD CALC: 37.5 % — LOW (ref 39–50)
HGB BLD-MCNC: 12.2 G/DL — LOW (ref 13–17)
MAGNESIUM SERPL-MCNC: 2.1 MG/DL — SIGNIFICANT CHANGE UP (ref 1.6–2.6)
MAGNESIUM SERPL-MCNC: 2.4 MG/DL — SIGNIFICANT CHANGE UP (ref 1.6–2.6)
MCHC RBC-ENTMCNC: 29.4 PG — SIGNIFICANT CHANGE UP (ref 27–34)
MCHC RBC-ENTMCNC: 32.5 G/DL — SIGNIFICANT CHANGE UP (ref 32–36)
MCV RBC AUTO: 90.4 FL — SIGNIFICANT CHANGE UP (ref 80–100)
PLATELET # BLD AUTO: 194 K/UL — SIGNIFICANT CHANGE UP (ref 150–400)
POTASSIUM SERPL-MCNC: 4.2 MMOL/L — SIGNIFICANT CHANGE UP (ref 3.5–5.3)
POTASSIUM SERPL-MCNC: 4.2 MMOL/L — SIGNIFICANT CHANGE UP (ref 3.5–5.3)
POTASSIUM SERPL-SCNC: 4.2 MMOL/L — SIGNIFICANT CHANGE UP (ref 3.5–5.3)
POTASSIUM SERPL-SCNC: 4.2 MMOL/L — SIGNIFICANT CHANGE UP (ref 3.5–5.3)
RBC # BLD: 4.15 M/UL — LOW (ref 4.2–5.8)
RBC # FLD: 14.5 % — SIGNIFICANT CHANGE UP (ref 10.3–16.9)
SODIUM SERPL-SCNC: 134 MMOL/L — LOW (ref 135–145)
SODIUM SERPL-SCNC: 136 MMOL/L — SIGNIFICANT CHANGE UP (ref 135–145)
WBC # BLD: 9.6 K/UL — SIGNIFICANT CHANGE UP (ref 3.8–10.5)
WBC # FLD AUTO: 9.6 K/UL — SIGNIFICANT CHANGE UP (ref 3.8–10.5)

## 2017-11-12 PROCEDURE — 93010 ELECTROCARDIOGRAM REPORT: CPT

## 2017-11-12 PROCEDURE — 99291 CRITICAL CARE FIRST HOUR: CPT

## 2017-11-12 PROCEDURE — 71010: CPT | Mod: 26

## 2017-11-12 RX ORDER — POTASSIUM CHLORIDE 20 MEQ
20 PACKET (EA) ORAL ONCE
Qty: 0 | Refills: 0 | Status: COMPLETED | OUTPATIENT
Start: 2017-11-12 | End: 2017-11-12

## 2017-11-12 RX ORDER — HEPARIN SODIUM 5000 [USP'U]/ML
1500 INJECTION INTRAVENOUS; SUBCUTANEOUS
Qty: 25000 | Refills: 0 | Status: DISCONTINUED | OUTPATIENT
Start: 2017-11-12 | End: 2017-11-12

## 2017-11-12 RX ORDER — MAGNESIUM SULFATE 500 MG/ML
1 VIAL (ML) INJECTION ONCE
Qty: 0 | Refills: 0 | Status: COMPLETED | OUTPATIENT
Start: 2017-11-12 | End: 2017-11-12

## 2017-11-12 RX ORDER — FUROSEMIDE 40 MG
40 TABLET ORAL ONCE
Qty: 0 | Refills: 0 | Status: DISCONTINUED | OUTPATIENT
Start: 2017-11-12 | End: 2017-11-12

## 2017-11-12 RX ORDER — HEPARIN SODIUM 5000 [USP'U]/ML
1400 INJECTION INTRAVENOUS; SUBCUTANEOUS
Qty: 25000 | Refills: 0 | Status: DISCONTINUED | OUTPATIENT
Start: 2017-11-12 | End: 2017-11-13

## 2017-11-12 RX ORDER — FUROSEMIDE 40 MG
40 TABLET ORAL ONCE
Qty: 0 | Refills: 0 | Status: COMPLETED | OUTPATIENT
Start: 2017-11-12 | End: 2017-11-12

## 2017-11-12 RX ORDER — HEPARIN SODIUM 5000 [USP'U]/ML
1700 INJECTION INTRAVENOUS; SUBCUTANEOUS
Qty: 25000 | Refills: 0 | Status: DISCONTINUED | OUTPATIENT
Start: 2017-11-12 | End: 2017-11-12

## 2017-11-12 RX ADMIN — Medication 0.6 MILLIGRAM(S): at 09:40

## 2017-11-12 RX ADMIN — Medication 20 MILLIEQUIVALENT(S): at 06:10

## 2017-11-12 RX ADMIN — HEPARIN SODIUM 14 UNIT(S)/HR: 5000 INJECTION INTRAVENOUS; SUBCUTANEOUS at 22:36

## 2017-11-12 RX ADMIN — TAMSULOSIN HYDROCHLORIDE 0.4 MILLIGRAM(S): 0.4 CAPSULE ORAL at 22:35

## 2017-11-12 RX ADMIN — FINASTERIDE 5 MILLIGRAM(S): 5 TABLET, FILM COATED ORAL at 09:40

## 2017-11-12 RX ADMIN — Medication 81 MILLIGRAM(S): at 09:40

## 2017-11-12 RX ADMIN — Medication 324 MILLIGRAM(S): at 06:10

## 2017-11-12 RX ADMIN — CARVEDILOL PHOSPHATE 25 MILLIGRAM(S): 80 CAPSULE, EXTENDED RELEASE ORAL at 18:15

## 2017-11-12 RX ADMIN — HEPARIN SODIUM 14 UNIT(S)/HR: 5000 INJECTION INTRAVENOUS; SUBCUTANEOUS at 15:30

## 2017-11-12 RX ADMIN — HEPARIN SODIUM 1500 UNIT(S)/HR: 5000 INJECTION INTRAVENOUS; SUBCUTANEOUS at 04:50

## 2017-11-12 RX ADMIN — ATORVASTATIN CALCIUM 20 MILLIGRAM(S): 80 TABLET, FILM COATED ORAL at 22:35

## 2017-11-12 RX ADMIN — CARVEDILOL PHOSPHATE 25 MILLIGRAM(S): 80 CAPSULE, EXTENDED RELEASE ORAL at 06:10

## 2017-11-12 RX ADMIN — Medication 324 MILLIGRAM(S): at 14:15

## 2017-11-12 RX ADMIN — Medication 324 MILLIGRAM(S): at 22:35

## 2017-11-12 RX ADMIN — LISINOPRIL 2.5 MILLIGRAM(S): 2.5 TABLET ORAL at 06:10

## 2017-11-12 RX ADMIN — Medication 100 GRAM(S): at 22:35

## 2017-11-12 RX ADMIN — Medication 40 MILLIGRAM(S): at 10:36

## 2017-11-12 RX ADMIN — Medication 20 MILLIEQUIVALENT(S): at 22:35

## 2017-11-12 NOTE — PROGRESS NOTE ADULT - PROBLEM SELECTOR PLAN 4
Patient with h/o CAD s/p stent in 2016 to LAD. Recurrent VT storm may be secondary to ischemic insult. Negative tropes at [facility]  -consider PCI to evaluate ischemia, discuss with EP  -continue atorvastatin 20mg qHs  -continue aspirin 81 Patient with h/o CAD s/p stent in 2016 to LAD. Recurrent VT storm may be secondary to ischemic insult. Negative tropes at [facility]  -consider PCI to evaluate ischemia, discuss with EP  -continue atorvastatin 20mg qHs  -continue aspirin 81  -c/w coreg  -hep gtt

## 2017-11-12 NOTE — PROGRESS NOTE ADULT - PROBLEM SELECTOR PLAN 1
Patient transferred here from [facility] for VT storm. Patient with history of VT s/p 3 ablations last done in 10/2017 at [facility]. Patient received amiodarone, was placed on an amiodarone drip, then DC'd. Patient was shocked 3 times. Patient's ICD is set to ATP 6 times before shocking.  -s/p 150 mg amio  -c/w amio 1mg/min  -c/w carvedilol 25 mg bid  -discuss with Dr. Leon about ablation and inquire about another cardiac catheretization [facility] of vtach is ischemia  -patient with pacer pads on  -goal K 4.5 and Mag 2.5 Patient transferred here from [facility] for VT storm. Patient with history of VT s/p 3 ablations last done in 10/2017 at [facility]. Patient received amiodarone, was placed on an amiodarone drip, then DC'd. Patient was shocked 3 times. Patient's ICD is set to ATP 6 times before shocking.  - c/w quinidine 324mg q8 (pt did not respond to amio)  - c/w carvedilol 25 mg bid  -discuss with Dr. Leon about ablation and inquire about another cardiac catheretization custodial of vtach is ischemia  -patient with pacer pads on  -goal K 4.5 and Mag 2.5  -NPO @ [facility] for ablation tomorrow with EP  -serial EKGs

## 2017-11-12 NOTE — PROGRESS NOTE ADULT - ASSESSMENT
71yo M with pmh of recurrent VT s/p 3 ablations (9/2016, 5/2017, 10/2017), CAD w MATTY to LAD (6/10/16), stent to pLAD (6/16/016), ICD, DVT on Eliquis CHF with EF<35%, DANI on CPAP, Pre DM, BPH, OA, HTN, recurrent UTI who is a transfer from [facility] for ventricular tachycardia.

## 2017-11-12 NOTE — PROGRESS NOTE ADULT - PROBLEM SELECTOR PLAN 2
Patient with previous ECHO showing EF of 30-35% mild concentric left ventricular hypertrophy apical akinesis apical septal wall akinesis, apical inferior   wall akinesis. Patient denies SOB, orthopnea, leg swelling. On home toprol 25 qD  -repeat ECHO  -consider repeat cath as above  -start lisinopril 2.5mg Patient with previous ECHO showing EF of 30-35% mild concentric left ventricular hypertrophy apical akinesis apical septal wall akinesis, apical inferior   wall akinesis.   -repeat ECHO showed EF 35-40% with inferior and apical hypokinesis  -consider [facility] for ischemic w/u  -c/w lisinopril 2.5mg  -c/w coreg 25 BID  -increased congestion on CXR this AM, will give 40 IVP now and possibly in evening with reeval of fluid status

## 2017-11-12 NOTE — PROGRESS NOTE ADULT - PROBLEM SELECTOR PLAN 7
F - No IVF  E - replete lytes, K>4.5 Mag>2.5  N - DASH diet     Full Code    Admit to CCU F - No IVF  E - replete lytes, K>4.5 Mag>2.5  N - DASH diet     DVT ppx: on hep gtt  Code: Full Code  Dispo: CCU

## 2017-11-12 NOTE — PROGRESS NOTE ADULT - SUBJECTIVE AND OBJECTIVE BOX
INTERVAL HPI/OVERNIGHT EVENTS: , decreased to 18 at 9:30. Decreased heparin to 17. At 4:30 patient had couple of runs 7 beats of vtach. Patient otherwise asymptomatic.    SUBJECTIVE: Patient seen and examined at bedside.    OBJECTIVE:    VITAL SIGNS:  ICU Vital Signs Last 24 Hrs  T(C): 36.8 (12 Nov 2017 04:58), Max: 37.6 (11 Nov 2017 09:58)  T(F): 98.2 (12 Nov 2017 04:58), Max: 99.7 (11 Nov 2017 09:58)  HR: 68 (12 Nov 2017 07:00) (68 - 74)  BP: 107/65 (12 Nov 2017 07:00) (101/57 - 141/82)  BP(mean): 81 (12 Nov 2017 07:00) (70 - 102)  ABP: --  ABP(mean): --  RR: 19 (12 Nov 2017 07:00) (12 - 28)  SpO2: 96% (12 Nov 2017 07:00) (93% - 97%)        11-11 @ 07:01  -  11-12 @ 07:00  --------------------------------------------------------  IN: 717.7 mL / OUT: 1350 mL / NET: -632.3 mL      CAPILLARY BLOOD GLUCOSE          PHYSICAL EXAM:    General: WDWN ; NAD  HEENT: NC/AT; PERRL, anicteric sclera  Neck: supple, no JVD  Respiratory: CTA B/L; no W/R/R  Cardiovascular: +S1/S2; RRR; no M/R/G  Gastrointestinal: soft, NT/ND; +BS x4  Extremities: WWP; 2+ peripheral pulses B/L; no LE edema  Skin: normal color and turgor; no rash  Neurological:     MEDICATIONS:  MEDICATIONS  (STANDING):  aspirin enteric coated 81 milliGRAM(s) Oral daily  atorvastatin 20 milliGRAM(s) Oral at bedtime  carvedilol 25 milliGRAM(s) Oral every 12 hours  colchicine 0.6 milliGRAM(s) Oral daily  finasteride 5 milliGRAM(s) Oral daily  heparin  Infusion. 1500 Unit(s)/Hr (15 mL/Hr) IV Continuous <Continuous>  lisinopril 2.5 milliGRAM(s) Oral daily  quiNIDine gluconate 324 milliGRAM(s) Oral every 8 hours  tamsulosin 0.4 milliGRAM(s) Oral at bedtime    MEDICATIONS  (PRN):  acetaminophen   Tablet. 650 milliGRAM(s) Oral every 6 hours PRN Moderate Pain (4 - 6)      ALLERGIES:  Allergies    No Known Allergies    Intolerances        LABS:                        12.2   9.6   )-----------( 194      ( 12 Nov 2017 04:00 )             37.5     11-12    136  |  100  |  13  ----------------------------<  106<H>  4.2   |  23  |  0.89    Ca    8.7      12 Nov 2017 04:00  Mg     2.4     11-12    TPro  6.7  /  Alb  3.5  /  TBili  0.5  /  DBili  x   /  AST  16  /  ALT  15  /  AlkPhos  99  11-11    LIVER FUNCTIONS - ( 11 Nov 2017 03:04 )  Alb: 3.5 g/dL / Pro: 6.7 g/dL / ALK PHOS: 99 U/L / ALT: 15 U/L / AST: 16 U/L / GGT: x           PT/INR - ( 11 Nov 2017 03:04 )   PT: 14.8 sec;   INR: 1.33          PTT - ( 12 Nov 2017 04:00 )  PTT:99.0 sec          RADIOLOGY & ADDITIONAL TESTS: Reviewed. INTERVAL HPI/OVERNIGHT EVENTS: , decreased to 18 at 9:30. Decreased heparin to 17. At 4:30 patient had couple of runs 7 beats of vtach. Patient otherwise asymptomatic.    SUBJECTIVE: Patient seen and examined at bedside. NAD. No respiratory distress. Resting comfortably in bed. No acute complaints. Denies HA, dizziness, CP, palpitations, n/v, SOB, abdominal pain. No other complaints.    OBJECTIVE:    VITAL SIGNS:  ICU Vital Signs Last 24 Hrs  T(C): 36.8 (12 Nov 2017 04:58), Max: 37.6 (11 Nov 2017 09:58)  T(F): 98.2 (12 Nov 2017 04:58), Max: 99.7 (11 Nov 2017 09:58)  HR: 68 (12 Nov 2017 07:00) (68 - 74)  BP: 107/65 (12 Nov 2017 07:00) (101/57 - 141/82)  BP(mean): 81 (12 Nov 2017 07:00) (70 - 102)  ABP: --  ABP(mean): --  RR: 19 (12 Nov 2017 07:00) (12 - 28)  SpO2: 96% (12 Nov 2017 07:00) (93% - 97%)        11-11 @ 07:01  -  11-12 @ 07:00  --------------------------------------------------------  IN: 717.7 mL / OUT: 1350 mL / NET: -632.3 mL      CAPILLARY BLOOD GLUCOSE          PHYSICAL EXAM:    General: WDWN; NAD,   HEENT: NC/AT; PERRL, anicteric sclera  Neck: supple, no JVD  Respiratory: CTA B/L; no W/R/R, limited exam given body habitus  Cardiovascular: +S1/S2; RRR; no M/R/G, distant heart sounds given body habitus  Gastrointestinal: soft, NT/ND; +BS x4  Extremities: WWP; 2+ peripheral pulses B/L; no LE edema  Skin: normal color and turgor; no rash  Neurological: no focal deficits, AAOx3    MEDICATIONS:  MEDICATIONS  (STANDING):  aspirin enteric coated 81 milliGRAM(s) Oral daily  atorvastatin 20 milliGRAM(s) Oral at bedtime  carvedilol 25 milliGRAM(s) Oral every 12 hours  colchicine 0.6 milliGRAM(s) Oral daily  finasteride 5 milliGRAM(s) Oral daily  heparin  Infusion. 1500 Unit(s)/Hr (15 mL/Hr) IV Continuous <Continuous>  lisinopril 2.5 milliGRAM(s) Oral daily  quiNIDine gluconate 324 milliGRAM(s) Oral every 8 hours  tamsulosin 0.4 milliGRAM(s) Oral at bedtime    MEDICATIONS  (PRN):  acetaminophen   Tablet. 650 milliGRAM(s) Oral every 6 hours PRN Moderate Pain (4 - 6)      ALLERGIES:  Allergies    No Known Allergies    Intolerances        LABS:                        12.2   9.6   )-----------( 194      ( 12 Nov 2017 04:00 )             37.5     11-12    136  |  100  |  13  ----------------------------<  106<H>  4.2   |  23  |  0.89    Ca    8.7      12 Nov 2017 04:00  Mg     2.4     11-12    TPro  6.7  /  Alb  3.5  /  TBili  0.5  /  DBili  x   /  AST  16  /  ALT  15  /  AlkPhos  99  11-11    LIVER FUNCTIONS - ( 11 Nov 2017 03:04 )  Alb: 3.5 g/dL / Pro: 6.7 g/dL / ALK PHOS: 99 U/L / ALT: 15 U/L / AST: 16 U/L / GGT: x           PT/INR - ( 11 Nov 2017 03:04 )   PT: 14.8 sec;   INR: 1.33          PTT - ( 12 Nov 2017 04:00 )  PTT:99.0 sec          RADIOLOGY & ADDITIONAL TESTS: Reviewed.

## 2017-11-12 NOTE — PROGRESS NOTE ADULT - PROBLEM SELECTOR PLAN 3
Patient with known afib on eliquis 5mg BID. Patient currently alternating between NSR and Vtach.  -start heparin gtt until plan with EP discussed concerning ablation, follow up PTTs Patient with known afib on eliquis 5mg BID. Patient currently alternating between NSR and Vtach.  -c/w heparin gtt  -for EP ablation tomorrow  -NPO at [facility]

## 2017-11-13 LAB
ANION GAP SERPL CALC-SCNC: 13 MMOL/L — SIGNIFICANT CHANGE UP (ref 5–17)
APTT BLD: 71.2 SEC — HIGH (ref 27.5–37.4)
APTT BLD: 77.3 SEC — HIGH (ref 27.5–37.4)
APTT BLD: 81.1 SEC — HIGH (ref 27.5–37.4)
BUN SERPL-MCNC: 18 MG/DL — SIGNIFICANT CHANGE UP (ref 7–23)
CALCIUM SERPL-MCNC: 9.1 MG/DL — SIGNIFICANT CHANGE UP (ref 8.4–10.5)
CHLORIDE SERPL-SCNC: 98 MMOL/L — SIGNIFICANT CHANGE UP (ref 96–108)
CO2 SERPL-SCNC: 25 MMOL/L — SIGNIFICANT CHANGE UP (ref 22–31)
CREAT SERPL-MCNC: 0.99 MG/DL — SIGNIFICANT CHANGE UP (ref 0.5–1.3)
GLUCOSE SERPL-MCNC: 99 MG/DL — SIGNIFICANT CHANGE UP (ref 70–99)
HCT VFR BLD CALC: 38.7 % — LOW (ref 39–50)
HCT VFR BLD CALC: 39.2 % — SIGNIFICANT CHANGE UP (ref 39–50)
HGB BLD-MCNC: 12.5 G/DL — LOW (ref 13–17)
HGB BLD-MCNC: 12.6 G/DL — LOW (ref 13–17)
MAGNESIUM SERPL-MCNC: 2.3 MG/DL — SIGNIFICANT CHANGE UP (ref 1.6–2.6)
MCHC RBC-ENTMCNC: 29.2 PG — SIGNIFICANT CHANGE UP (ref 27–34)
MCHC RBC-ENTMCNC: 29.6 PG — SIGNIFICANT CHANGE UP (ref 27–34)
MCHC RBC-ENTMCNC: 32.1 G/DL — SIGNIFICANT CHANGE UP (ref 32–36)
MCHC RBC-ENTMCNC: 32.3 G/DL — SIGNIFICANT CHANGE UP (ref 32–36)
MCV RBC AUTO: 91 FL — SIGNIFICANT CHANGE UP (ref 80–100)
MCV RBC AUTO: 91.5 FL — SIGNIFICANT CHANGE UP (ref 80–100)
PLATELET # BLD AUTO: 196 K/UL — SIGNIFICANT CHANGE UP (ref 150–400)
PLATELET # BLD AUTO: 216 K/UL — SIGNIFICANT CHANGE UP (ref 150–400)
POTASSIUM SERPL-MCNC: 4.1 MMOL/L — SIGNIFICANT CHANGE UP (ref 3.5–5.3)
POTASSIUM SERPL-SCNC: 4.1 MMOL/L — SIGNIFICANT CHANGE UP (ref 3.5–5.3)
RBC # BLD: 4.23 M/UL — SIGNIFICANT CHANGE UP (ref 4.2–5.8)
RBC # BLD: 4.31 M/UL — SIGNIFICANT CHANGE UP (ref 4.2–5.8)
RBC # FLD: 14.2 % — SIGNIFICANT CHANGE UP (ref 10.3–16.9)
RBC # FLD: 14.4 % — SIGNIFICANT CHANGE UP (ref 10.3–16.9)
SODIUM SERPL-SCNC: 136 MMOL/L — SIGNIFICANT CHANGE UP (ref 135–145)
WBC # BLD: 10.3 K/UL — SIGNIFICANT CHANGE UP (ref 3.8–10.5)
WBC # BLD: 9.2 K/UL — SIGNIFICANT CHANGE UP (ref 3.8–10.5)
WBC # FLD AUTO: 10.3 K/UL — SIGNIFICANT CHANGE UP (ref 3.8–10.5)
WBC # FLD AUTO: 9.2 K/UL — SIGNIFICANT CHANGE UP (ref 3.8–10.5)

## 2017-11-13 PROCEDURE — 99291 CRITICAL CARE FIRST HOUR: CPT

## 2017-11-13 PROCEDURE — 93010 ELECTROCARDIOGRAM REPORT: CPT | Mod: 77

## 2017-11-13 PROCEDURE — 99233 SBSQ HOSP IP/OBS HIGH 50: CPT

## 2017-11-13 PROCEDURE — 71010: CPT | Mod: 26

## 2017-11-13 PROCEDURE — 93010 ELECTROCARDIOGRAM REPORT: CPT

## 2017-11-13 RX ORDER — HEPARIN SODIUM 5000 [USP'U]/ML
1500 INJECTION INTRAVENOUS; SUBCUTANEOUS
Qty: 25000 | Refills: 0 | Status: DISCONTINUED | OUTPATIENT
Start: 2017-11-13 | End: 2017-11-13

## 2017-11-13 RX ORDER — POTASSIUM CHLORIDE 20 MEQ
40 PACKET (EA) ORAL ONCE
Qty: 0 | Refills: 0 | Status: COMPLETED | OUTPATIENT
Start: 2017-11-13 | End: 2017-11-13

## 2017-11-13 RX ORDER — MAGNESIUM SULFATE 500 MG/ML
1 VIAL (ML) INJECTION ONCE
Qty: 0 | Refills: 0 | Status: COMPLETED | OUTPATIENT
Start: 2017-11-13 | End: 2017-11-13

## 2017-11-13 RX ORDER — HEPARIN SODIUM 5000 [USP'U]/ML
1500 INJECTION INTRAVENOUS; SUBCUTANEOUS
Qty: 25000 | Refills: 0 | Status: DISCONTINUED | OUTPATIENT
Start: 2017-11-13 | End: 2017-11-16

## 2017-11-13 RX ORDER — COLCHICINE 0.6 MG
0.6 TABLET ORAL EVERY 12 HOURS
Qty: 0 | Refills: 0 | Status: DISCONTINUED | OUTPATIENT
Start: 2017-11-13 | End: 2017-11-15

## 2017-11-13 RX ADMIN — CARVEDILOL PHOSPHATE 25 MILLIGRAM(S): 80 CAPSULE, EXTENDED RELEASE ORAL at 07:01

## 2017-11-13 RX ADMIN — TAMSULOSIN HYDROCHLORIDE 0.4 MILLIGRAM(S): 0.4 CAPSULE ORAL at 22:24

## 2017-11-13 RX ADMIN — Medication 0.6 MILLIGRAM(S): at 22:25

## 2017-11-13 RX ADMIN — Medication 324 MILLIGRAM(S): at 07:01

## 2017-11-13 RX ADMIN — Medication 0.6 MILLIGRAM(S): at 11:49

## 2017-11-13 RX ADMIN — LISINOPRIL 2.5 MILLIGRAM(S): 2.5 TABLET ORAL at 07:01

## 2017-11-13 RX ADMIN — Medication 81 MILLIGRAM(S): at 11:49

## 2017-11-13 RX ADMIN — CARVEDILOL PHOSPHATE 25 MILLIGRAM(S): 80 CAPSULE, EXTENDED RELEASE ORAL at 18:56

## 2017-11-13 RX ADMIN — FINASTERIDE 5 MILLIGRAM(S): 5 TABLET, FILM COATED ORAL at 11:49

## 2017-11-13 RX ADMIN — ATORVASTATIN CALCIUM 20 MILLIGRAM(S): 80 TABLET, FILM COATED ORAL at 22:25

## 2017-11-13 RX ADMIN — Medication 100 GRAM(S): at 09:28

## 2017-11-13 RX ADMIN — Medication 40 MILLIEQUIVALENT(S): at 10:37

## 2017-11-13 RX ADMIN — Medication 324 MILLIGRAM(S): at 22:25

## 2017-11-13 RX ADMIN — Medication 324 MILLIGRAM(S): at 15:15

## 2017-11-13 RX ADMIN — HEPARIN SODIUM 15 UNIT(S)/HR: 5000 INJECTION INTRAVENOUS; SUBCUTANEOUS at 19:30

## 2017-11-13 NOTE — PROGRESS NOTE ADULT - PROBLEM SELECTOR PLAN 6
- Patient with known previous treatment of gout with colchicine. Patient complaining of pain in the right achilles tendon  - continue colchicine 0.6 daily - Patient with known previous treatment of gout with colchicine. Patient complaining of pain in the right achilles tendon which he states is recurrent and improving on colchicine.   - continue colchicine 0.6 daily - Patient with known previous treatment of gout with colchicine. Patient complaining of pain in the right achilles tendon which he states is recurrent and improving on colchicine.   - continue colchicine 0.6 BID

## 2017-11-13 NOTE — CONSULT NOTE ADULT - SUBJECTIVE AND OBJECTIVE BOX
HISTORY OF PRESENT ILLNESS: Mr. Sauceda is a 73yo M with pmh of recurrent VT s/p 3 ablations (9/2016, 5/2017, 10/2017), CAD w MATTY to LAD (6/10/16), stent to pLAD (6/16/016), ICD, DVT on Eliquis CHF with EF<35%, DANI on CPAP, Pre DM, BPH, OA, HTN, recurrent UTI who is a transfer from [facility] for ventricular tachycardia. Patient initially presented to [facility] on 11/7/2017 with lightheadedness, dizziness, and burning sensation in chest radiating to his neck and head. Patient experiences 2-4 episodes of lightheadedness every morning which resolves by the afternoon. On 11/6 patient had persistent symptoms throughout the day which caused him to go to the ED. Patient had no chest pain, SOB, diaphoresis, n/v, or orthopnea. Patient has a baseline reduced exercise tolerance. Patient denies any recent illnesses, sick contacts, and travel.    At [facility], in ED patient had recurrent runs of VT, was given asa 324, amiodarone 150mg, and sotalol 120mg. He continued to have brief runs of VT and was started on amiodarone drip. AICD had 92 episodes of VT with no shocks delivered. Patient was admitted to CCU, his AICD was adjusted and amoiodarone drip was stopped. Placed on carvedilol and sotalol with decrease in VT. Patient was stepped down. Pt on 11/9 had recurrent NSVT that broke with therapeutic ATP but then had sustained VT at HR 150s that failed ATPx3 and resulted in a therapeutic ICD shock x1 to NSR. ICD was interrogated and the patient was amio loaded, he continued to have NSVT episodes. Amio drip was started overnight and stepped back up to CCU. Patient was then transferred to [facility] for possible ablation with Dr. Neal, who had performed his previous ablations.    Past Cardiac history - Pt  mentions history of Arrhythmia whole life however last year during hospitalization for bladder stone, he was noted to be have VT and was transferred [facility] for ICD placement. In July 2016, after an abnormal stress test, pt was stented (MATTY to LAD (6/10/16). In September 2016, pt underwent ablation. In April 2017, Pt noted getting shocked by ICD, was admitted where he was given amio drip and Cardioverted. In May 2017, Pt underwent second ablation.     PAST MEDICAL & SURGICAL HISTORY:  Atrial fibrillation  AICD (automatic cardioverter/defibrillator) present  Ischemic cardiomyopathy  VT (ventricular tachycardia)  Coronary artery disease involving native coronary artery of native heart without angina pectoris  H/O ureter repair  S/P cholecystectomy  S/P ICD (internal cardiac defibrillator) procedure    Allergies  No Known Allergies    MEDICATIONS:  carvedilol 25 milliGRAM(s) Oral every 12 hours  lisinopril 2.5 milliGRAM(s) Oral daily  quiNIDine gluconate 324 milliGRAM(s) Oral every 8 hours  tamsulosin 0.4 milliGRAM(s) Oral at bedtime  acetaminophen   Tablet. 650 milliGRAM(s) Oral every 6 hours PRN  atorvastatin 20 milliGRAM(s) Oral at bedtime  colchicine 0.6 milliGRAM(s) Oral every 12 hours  finasteride 5 milliGRAM(s) Oral daily  aspirin enteric coated 81 milliGRAM(s) Oral daily  heparin  Infusion 1500 Unit(s)/Hr IV Continuous <Continuous>      FAMILY HISTORY:  No pertinent family history      SOCIAL HISTORY:    [ ] Non-smoker  [ ] Smoker  [ ] Alcohol        REVIEW OF SYSTEMS:    CONSTITUTIONAL: No fever, weight loss, or fatigue  EYES: No eye pain, visual disturbances, or discharge  ENMT:  No difficulty hearing, tinnitus, vertigo; No sinus or throat pain  NECK: No pain or stiffness  BREASTS: No pain, masses, or nipple discharge  RESPIRATORY: No cough, wheezing, chills or hemoptysis; No Shortness of Breath  CARDIOVASCULAR: No chest pain, palpitations, dizziness, or leg swelling  GASTROINTESTINAL: No abdominal or epigastric pain. No nausea, vomiting, or hematemesis; No diarrhea or constipation. No melena or hematochezia.  GENITOURINARY: No dysuria, frequency, hematuria, or incontinence  NEUROLOGICAL: No headaches, memory loss, loss of strength, numbness, or tremors  SKIN: No itching, burning, rashes, or lesions     General: A/ox 3, No acute Distress  Neck: Supple, NO JVD  Cardiac: S1 S2, No M/R/G  Pulmonary: CTAB, Breathing unlabored, No Rhonchi/Rales/Wheezing  Abdomen: Soft, Non -tender, +BS x 4 quads  Extremities: No Rashes, No edema  Neuro: A/o x 3, No focal deficits    LYMPH Nodes: No enlarged glands  ENDOCRINE: No heat or cold intolerance; No hair loss  MUSCULOSKELETAL: No joint pain or swelling; No muscle, back, or extremity pain  PSYCHIATRIC: No depression, anxiety, mood swings, or difficulty sleeping  HEME/LYMPH: No easy bruising, or bleeding gums  ALLERY AND IMMUNOLOGIC: No hives or eczema	    [ ] All others negative	  [ ] Unable to obtain    PHYSICAL EXAM:  T(C): 36.8 (11-13-17 @ 12:00), Max: 37.3 (11-13-17 @ 09:00)  HR: 68 (11-13-17 @ 14:00) (68 - 68)  BP: 130/79 (11-13-17 @ 14:00) (106/62 - 130/79)  RR: 25 (11-13-17 @ 14:00) (8 - 35)  SpO2: 96% (11-13-17 @ 14:00) (91% - 96%)  Wt(kg): --  I&O's Summary    12 Nov 2017 07:01  -  13 Nov 2017 07:00  --------------------------------------------------------  IN: 898.5 mL / OUT: 2475 mL / NET: -1576.5 mL    13 Nov 2017 07:01  -  13 Nov 2017 14:35  --------------------------------------------------------  IN: 555 mL / OUT: 0 mL / NET: 555 mL    TELEMETRY: 	    ECG:         	  LABS:	 	    CARDIAC MARKERS:                                  12.6   10.3  )-----------( 216      ( 13 Nov 2017 07:22 )             39.2     11-13    136  |  98  |  18  ----------------------------<  99  4.1   |  25  |  0.99    Ca    9.1      13 Nov 2017 07:22  Mg     2.3     11-13      proBNP:   Lipid Profile:   HgA1c:   TSH:     ASSESSMENT/PLAN: 	        EP has been consulted for further evaluation. HISTORY OF PRESENT ILLNESS: Mr. Sauceda is a 73yo M with pmh of recurrent VT s/p 3 ablations (9/2016, 5/2017, 10/2017), CAD w MATTY to LAD (6/10/16), stent to pLAD (6/16/016), ICD, DVT on Eliquis CHF with EF<35%, DANI on CPAP, Pre DM, BPH, OA, HTN, recurrent UTI who is a transfer from [facility] for ventricular tachycardia. Patient initially presented to [facility] on 11/7/2017 with lightheadedness, dizziness, and burning sensation in chest radiating to his neck and head. Patient experiences 2-4 episodes of lightheadedness every morning which resolves by the afternoon. On 11/6 patient had persistent symptoms throughout the day which caused him to go to the ED. Patient had no chest pain, SOB, diaphoresis, n/v, or orthopnea. Patient has a baseline reduced exercise tolerance. Patient denies any recent illnesses, sick contacts, and travel.    At [facility], in ED patient had recurrent runs of VT, was given asa 324, amiodarone 150mg, and sotalol 120mg. He continued to have brief runs of VT and was started on amiodarone drip. AICD had 92 episodes of VT with no shocks delivered. Patient was admitted to CCU, his AICD was adjusted and amoiodarone drip was stopped. Placed on carvedilol and sotalol with decrease in VT. Patient was stepped down. Pt on 11/9 had recurrent NSVT that broke with therapeutic ATP but then had sustained VT at HR 150s that failed ATPx3 and resulted in a therapeutic ICD shock x1 to NSR. ICD was interrogated and the patient was amio loaded, he continued to have NSVT episodes. Amio drip was started overnight and stepped back up to CCU. Patient was then transferred to [facility] for possible ablation with Dr. Neal, who had performed his previous ablations.    Past Cardiac history - Pt  mentions history of Arrhythmia whole life however last year during hospitalization for bladder stone, he was noted to be have VT and was transferred [facility] for ICD placement. In July 2016, after an abnormal stress test, pt was stented (MATTY to LAD (6/10/16). In September 2016, pt underwent ablation. In April 2017, Pt noted getting shocked by ICD, was admitted where he was given amio drip and Cardioverted. In May 2017, Pt underwent second ablation.     EP has been consulted for further evaluation.       PAST MEDICAL & SURGICAL HISTORY:  Atrial fibrillation  AICD (automatic cardioverter/defibrillator) present  Ischemic cardiomyopathy  VT (ventricular tachycardia)  Coronary artery disease involving native coronary artery of native heart without angina pectoris  H/O ureter repair  S/P cholecystectomy  S/P ICD (internal cardiac defibrillator) procedure    Allergies  No Known Allergies    MEDICATIONS:  carvedilol 25 milliGRAM(s) Oral every 12 hours  lisinopril 2.5 milliGRAM(s) Oral daily  quiNIDine gluconate 324 milliGRAM(s) Oral every 8 hours  tamsulosin 0.4 milliGRAM(s) Oral at bedtime  acetaminophen   Tablet. 650 milliGRAM(s) Oral every 6 hours PRN  atorvastatin 20 milliGRAM(s) Oral at bedtime  colchicine 0.6 milliGRAM(s) Oral every 12 hours  finasteride 5 milliGRAM(s) Oral daily  aspirin enteric coated 81 milliGRAM(s) Oral daily  heparin  Infusion 1500 Unit(s)/Hr IV Continuous <Continuous>      FAMILY HISTORY:  No pertinent family history      SOCIAL HISTORY:    [X] Non-smoker  [ ] Smoker  [X] Social Alcohol use       REVIEW OF SYSTEMS:    CONSTITUTIONAL: No fever, weight loss, or fatigue  EYES: No eye pain, visual disturbances, or discharge  ENMT:  No difficulty hearing, tinnitus, vertigo; No sinus or throat pain  NECK: No pain or stiffness  RESPIRATORY: No cough, wheezing, chills or hemoptysis; No Shortness of Breath  CARDIOVASCULAR: No chest pain, palpitations, dizziness, or leg swelling  GASTROINTESTINAL: No abdominal or epigastric pain. No nausea, vomiting, or hematemesis; No diarrhea or constipation. No melena or hematochezia.  GENITOURINARY: No dysuria, frequency, hematuria, or incontinence  NEUROLOGICAL: No headaches, memory loss, loss of strength, numbness, or tremors  SKIN: No itching, burning, rashes, or lesions   LYMPH Nodes: No enlarged glands  ENDOCRINE: No heat or cold intolerance; No hair loss  MUSCULOSKELETAL: No joint pain or swelling; No muscle, back, or extremity pain  PSYCHIATRIC: No depression, anxiety, mood swings, or difficulty sleeping  HEME/LYMPH: No easy bruising, or bleeding gums  ALLERY AND IMMUNOLOGIC: No hives or eczema	    [X] All others negative	  [ ] Unable to obtain    PHYSICAL EXAM:  T(C): 36.8 (11-13-17 @ 12:00), Max: 37.3 (11-13-17 @ 09:00)  HR: 68 (11-13-17 @ 14:00) (68 - 68)  BP: 130/79 (11-13-17 @ 14:00) (106/62 - 130/79)  RR: 25 (11-13-17 @ 14:00) (8 - 35)  SpO2: 96% (11-13-17 @ 14:00) (91% - 96%)      I&O's Summary    12 Nov 2017 07:01  -  13 Nov 2017 07:00  --------------------------------------------------------  IN: 898.5 mL / OUT: 2475 mL / NET: -1576.5 mL    13 Nov 2017 07:01  -  13 Nov 2017 14:35  --------------------------------------------------------  IN: 555 mL / OUT: 0 mL / NET: 555 mL    TELEMETRY: Sinus rhythm with A-pacing at 70, no VT today 	    ECG: Sinus rhythm with A-pacing at 70    LABS:	 	                        12.6   10.3  )-----------( 216      ( 13 Nov 2017 07:22 )             39.2     11-13    136  |  98  |  18  ----------------------------<  99  4.1   |  25  |  0.99    Ca    9.1      13 Nov 2017 07:22  Mg     2.3     11-13      Echo: < from: Echocardiogram (11.11.17 @ 09:36) >  There is mild concentric left ventricular hypertrophy. Abnormal (paradoxical) septal motion consistent with abnormal conduction. There is basal inferior   wall hypokinesis. There is mid inferior wall hypokinesis. There is apical septal wall hypokinesis. There is apical hypokinesis. The left ventricular ejection   fraction is mild to moderately reduced. The left ventricular ejection fraction is estimated to be 35-40%. The left atrium is dilated. The mitral inflow   pattern is consistent with impaired left ventricular relaxation with mildly elevated left atrial pressure (8-14mmHg). Right atrial size is normal. The right   ventricle is normal in size and function. There is a pacemaker wire in the right heart. There is mild to moderate aortic regurgitation. No hemodynamically  significant valvular aortic stenosis. There is trace to mild mitral regurgitation. There is mild tricuspid regurgitation. There is mild   pulmonary hypertension. The pulmonary artery systolic pressure is estimated to be 41 mmHg. There is moderate to severe pulmonic regurgitation. The aortic root is dilated. The aortic root measures 4.2 cm at sinuses (normal less than 4 cm for men, less than 3.6 cm for women).  Normal size aortic arch with no hemodynamic evidence for coarctation. The IVC is dilated (>2.1 cm) with an abnormal inspiratory collapse (<50%) consistent with elevated right atrial   pressure. There is no pericardial effusion.

## 2017-11-13 NOTE — PROGRESS NOTE ADULT - PROBLEM SELECTOR PLAN 7
F - No IVF  E - replete lytes, K>4.5 Mag>2.5  N - DASH diet     DVT ppx: on hep gtt    Code: Full Code  Dispo: CCU

## 2017-11-13 NOTE — PROGRESS NOTE ADULT - ASSESSMENT
73yo M with pmh of recurrent VT s/p 3 ablations (9/2016, 5/2017, 10/2017), CAD w MATTY to LAD (6/10/16), stent to pLAD (6/16/016), ICD, DVT on Eliquis CHF with EF<35%, DANI on CPAP, Pre DM, BPH, OA, HTN, recurrent UTI who is a transfer from [facility] for ventricular tachycardia.

## 2017-11-13 NOTE — PROGRESS NOTE ADULT - SUBJECTIVE AND OBJECTIVE BOX
PGY1 PROGRESS NOTE:    OVERNIGHT EVENTS:    SUBJECTIVE / INTERVAL HPI: Patient seen and examined at bedside. Patient c/o R heel "achilles tendon" pain for which he attributes to gout and states that colchicine helps. Patient denies trauma to the foot or heel. Admits to previous episode of achilles pain in the past. Otherwise denies palpitations, shocks, dizziness, lightheadedness, chest pain, SOB, fevers, chills, abdominal pain, n/v/d/c.    VITAL SIGNS:  Vital Signs Last 24 Hrs  T(C): 36.7 (13 Nov 2017 05:02), Max: 37.2 (12 Nov 2017 13:50)  T(F): 98.1 (13 Nov 2017 05:02), Max: 98.9 (12 Nov 2017 13:50)  HR: 68 (13 Nov 2017 05:00) (68 - 68)  BP: 123/75 (13 Nov 2017 05:00) (86/58 - 125/77)  BP(mean): 103 (13 Nov 2017 05:00) (67 - 103)  RR: 33 (13 Nov 2017 05:00) (8 - 33)  SpO2: 96% (13 Nov 2017 05:00) (91% - 96%)    PHYSICAL EXAM:  General: obese, resting comfortable in bed, NAD  HEENT: NC/AT; PERRL, anicteric sclera; MMM  Neck: supple, no JVD  Cardiovascular: +S1/S2, RRR, no MRG  Respiratory: CTA B/L; no W/R/R  Gastrointestinal: obese, soft, NT/ND; +BSx4  Extremities: WWP; thick LE 2/2 body habitus, trace b/l LE edema, no clubbing or cyanosis; TTP along R achilles tendon, no bony tenderness or pinpoint tenderness  Vascular: 2+ radial, DP/PT pulses B/L  Neurological: AAOx3; no focal deficits    MEDICATIONS:  MEDICATIONS  (STANDING):  aspirin enteric coated 81 milliGRAM(s) Oral daily  atorvastatin 20 milliGRAM(s) Oral at bedtime  carvedilol 25 milliGRAM(s) Oral every 12 hours  colchicine 0.6 milliGRAM(s) Oral daily  finasteride 5 milliGRAM(s) Oral daily  heparin  Infusion 1500 Unit(s)/Hr (15 mL/Hr) IV Continuous <Continuous>  lisinopril 2.5 milliGRAM(s) Oral daily  quiNIDine gluconate 324 milliGRAM(s) Oral every 8 hours  tamsulosin 0.4 milliGRAM(s) Oral at bedtime    MEDICATIONS  (PRN):  acetaminophen   Tablet. 650 milliGRAM(s) Oral every 6 hours PRN Moderate Pain (4 - 6)      ALLERGIES:  No Known Allergies      LABS:                        12.5   9.2   )-----------( 196      ( 13 Nov 2017 00:35 )             38.7     11-12    134<L>  |  97  |  16  ----------------------------<  138<H>  4.2   |  24  |  1.08    Ca    9.0      12 Nov 2017 19:47  Mg     2.1     11-12      PTT - ( 13 Nov 2017 00:36 )  PTT:35.7 sec      11-11-17 @ 07:01  -  11-12-17 @ 07:00  --------------------------------------------------------  IN: 717.7 mL / OUT: 1350 mL / NET: -632.3 mL    11-12-17 @ 07:01  -  11-13-17 @ 06:52  --------------------------------------------------------  IN: 868.5 mL / OUT: 2475 mL / NET: -1606.5 mL        RADIOLOGY & ADDITIONAL TESTS: Reviewed.    ECHO: There is mild concentric LVH. Abnormal (paradoxical) septal motion consistent with abnormal conduction. There is basal inferior wall hypokinesis. There is mid inferior wall hypokinesis. There is apical septal wall hypokinesis. There is apical hypokinesis. The left ventricular ejection fraction is mild to moderately reduced. The left ventricular ejection fraction is estimated to be 35-40%The left atrium is dilated. The mitral inflow pattern is consistent with impaired left ventricular relaxation with mildly elevated left atrial pressure (8-14mmHg).  Right atrial size is normal. The right ventricle is normal in size and function. There is a pacemaker wire in the right heart. There is mild to moderate aortic regurgitation. No hemodynamically significant valvular aortic stenosis. There is trace to mild mitral regurgitation. There is mild tricuspid regurgitation. There is mild pulmonary hypertension. The pulmonary artery systolic pressure is estimated to be 41 mmHg. There is moderate to severe pulmonic regurgitation. The aortic root is dilated. The aortic root measures 4.2 cm at sinuses (normal less than 4 cm for men, less than 3.6 cm for women).  Normal size aortic arch with no hemodynamic evidence for coarctation. The IVC is dilated (>2.1 cm) with an abnormal inspiratory collapse (<50%) consistent with elevated right atrial pressure. There is no pericardial effusion. PGY1 PROGRESS NOTE:    OVERNIGHT EVENTS: This morning EKG with QTc 470 and . Heparin gtt adjusted according to PTT.     SUBJECTIVE / INTERVAL HPI: Patient seen and examined at bedside. Patient c/o R heel "achilles tendon" pain for which he attributes to gout and states that colchicine helps. Patient denies trauma to the foot or heel. Admits to previous episode of achilles pain in the past. Otherwise denies palpitations, shocks, dizziness, lightheadedness, chest pain, SOB, fevers, chills, abdominal pain, n/v/d/c.    VITAL SIGNS:  Vital Signs Last 24 Hrs  T(C): 36.7 (13 Nov 2017 05:02), Max: 37.2 (12 Nov 2017 13:50)  T(F): 98.1 (13 Nov 2017 05:02), Max: 98.9 (12 Nov 2017 13:50)  HR: 68 (13 Nov 2017 05:00) (68 - 68)  BP: 123/75 (13 Nov 2017 05:00) (86/58 - 125/77)  BP(mean): 103 (13 Nov 2017 05:00) (67 - 103)  RR: 33 (13 Nov 2017 05:00) (8 - 33)  SpO2: 96% (13 Nov 2017 05:00) (91% - 96%)    PHYSICAL EXAM:  General: obese, resting comfortable in bed, NAD  HEENT: NC/AT; PERRL, anicteric sclera; MMM  Neck: supple, no JVD  Cardiovascular: +S1/S2, RRR, no MRG  Respiratory: CTA B/L; no W/R/R  Gastrointestinal: obese, soft, NT/ND; +BSx4  Extremities: WWP; thick LE 2/2 body habitus, trace b/l LE edema, no clubbing or cyanosis; TTP along R achilles tendon, no bony tenderness or pinpoint tenderness  Vascular: 2+ radial, DP/PT pulses B/L  Neurological: AAOx3; no focal deficits    MEDICATIONS:  MEDICATIONS  (STANDING):  aspirin enteric coated 81 milliGRAM(s) Oral daily  atorvastatin 20 milliGRAM(s) Oral at bedtime  carvedilol 25 milliGRAM(s) Oral every 12 hours  colchicine 0.6 milliGRAM(s) Oral daily  finasteride 5 milliGRAM(s) Oral daily  heparin  Infusion 1500 Unit(s)/Hr (15 mL/Hr) IV Continuous <Continuous>  lisinopril 2.5 milliGRAM(s) Oral daily  quiNIDine gluconate 324 milliGRAM(s) Oral every 8 hours  tamsulosin 0.4 milliGRAM(s) Oral at bedtime    MEDICATIONS  (PRN):  acetaminophen   Tablet. 650 milliGRAM(s) Oral every 6 hours PRN Moderate Pain (4 - 6)      ALLERGIES:  No Known Allergies      LABS:                        12.5   9.2   )-----------( 196      ( 13 Nov 2017 00:35 )             38.7     11-12    134<L>  |  97  |  16  ----------------------------<  138<H>  4.2   |  24  |  1.08    Ca    9.0      12 Nov 2017 19:47  Mg     2.1     11-12      PTT - ( 13 Nov 2017 00:36 )  PTT:35.7 sec      11-11-17 @ 07:01  -  11-12-17 @ 07:00  --------------------------------------------------------  IN: 717.7 mL / OUT: 1350 mL / NET: -632.3 mL    11-12-17 @ 07:01  -  11-13-17 @ 06:52  --------------------------------------------------------  IN: 868.5 mL / OUT: 2475 mL / NET: -1606.5 mL        RADIOLOGY & ADDITIONAL TESTS: Reviewed.    ECHO: There is mild concentric LVH. Abnormal (paradoxical) septal motion consistent with abnormal conduction. There is basal inferior wall hypokinesis. There is mid inferior wall hypokinesis. There is apical septal wall hypokinesis. There is apical hypokinesis. The left ventricular ejection fraction is mild to moderately reduced. The left ventricular ejection fraction is estimated to be 35-40%The left atrium is dilated. The mitral inflow pattern is consistent with impaired left ventricular relaxation with mildly elevated left atrial pressure (8-14mmHg).  Right atrial size is normal. The right ventricle is normal in size and function. There is a pacemaker wire in the right heart. There is mild to moderate aortic regurgitation. No hemodynamically significant valvular aortic stenosis. There is trace to mild mitral regurgitation. There is mild tricuspid regurgitation. There is mild pulmonary hypertension. The pulmonary artery systolic pressure is estimated to be 41 mmHg. There is moderate to severe pulmonic regurgitation. The aortic root is dilated. The aortic root measures 4.2 cm at sinuses (normal less than 4 cm for men, less than 3.6 cm for women).  Normal size aortic arch with no hemodynamic evidence for coarctation. The IVC is dilated (>2.1 cm) with an abnormal inspiratory collapse (<50%) consistent with elevated right atrial pressure. There is no pericardial effusion. PGY1 PROGRESS NOTE:    HOSPITAL COURSE:   72yM with PMH of recurrent VT s/p 3 ablations (9/2016, 5/2017, 10/2017) and AICD, CAD w MATTY to LAD (6/10/16) and pLAD (6/16/016), HFrEF (EF <35%), DVT on Eliquis, DANI on CPAP, pre-DM, BPH, OA, HTN, recurrent UTI who is a transfer from [facility] for VT.  Patient initially presented to [facility] on 11/7/2017 with lightheadedness, dizziness, and burning sensation in chest radiating to his neck and head. At [facility] ED, patient had recurrent runs of VT, was given ASA 324mg, Amiodarone 150mg, and Sotalol 120mg. He continued to have brief runs of VT and was started on amiodarone drip. AICD had 92 episodes of VT with ATP with no shocks delivered Patient was admitted to CCU, his AICD was adjusted and and Amiodarone drip was stopped. Placed on carvedilol and sotalol with decrease in VTs. Patient was stepped down. Pt on 11/9 had recurrent NSVT that broke with therapeutic ATP but then had sustained VT at HR 150s that failed ATPx3 and resulted in a therapeutic ICD shock x1 to NSR. AIDC was interrogated and was amio loaded, he continued to have NSVT episodes. Amio drip was started overnight and stepped back up to CCU. Patient was transferred to [facility] for possible ablation with Dr. Neal, who had performed his previous ablations.           OVERNIGHT EVENTS: This morning EKG with QTc 470 and . Heparin gtt adjusted according to PTT.     SUBJECTIVE / INTERVAL HPI: Patient seen and examined at bedside. Patient c/o R heel "achilles tendon" pain for which he attributes to gout and states that colchicine helps. Patient denies trauma to the foot or heel. Admits to previous episode of achilles pain in the past. Otherwise denies palpitations, shocks, dizziness, lightheadedness, chest pain, SOB, fevers, chills, abdominal pain, n/v/d/c.    VITAL SIGNS:  Vital Signs Last 24 Hrs  T(C): 36.7 (13 Nov 2017 05:02), Max: 37.2 (12 Nov 2017 13:50)  T(F): 98.1 (13 Nov 2017 05:02), Max: 98.9 (12 Nov 2017 13:50)  HR: 68 (13 Nov 2017 05:00) (68 - 68)  BP: 123/75 (13 Nov 2017 05:00) (86/58 - 125/77)  BP(mean): 103 (13 Nov 2017 05:00) (67 - 103)  RR: 33 (13 Nov 2017 05:00) (8 - 33)  SpO2: 96% (13 Nov 2017 05:00) (91% - 96%)    PHYSICAL EXAM:  General: obese, resting comfortable in bed, NAD  HEENT: NC/AT; PERRL, anicteric sclera; MMM  Neck: supple, no JVD  Cardiovascular: +S1/S2, RRR, no MRG  Respiratory: CTA B/L; no W/R/R  Gastrointestinal: obese, soft, NT/ND; +BSx4  Extremities: WWP; thick LE 2/2 body habitus, trace b/l LE edema, no clubbing or cyanosis; TTP along R achilles tendon, no bony tenderness or pinpoint tenderness  Vascular: 2+ radial, DP/PT pulses B/L  Neurological: AAOx3; no focal deficits    MEDICATIONS:  MEDICATIONS  (STANDING):  aspirin enteric coated 81 milliGRAM(s) Oral daily  atorvastatin 20 milliGRAM(s) Oral at bedtime  carvedilol 25 milliGRAM(s) Oral every 12 hours  colchicine 0.6 milliGRAM(s) Oral daily  finasteride 5 milliGRAM(s) Oral daily  heparin  Infusion 1500 Unit(s)/Hr (15 mL/Hr) IV Continuous <Continuous>  lisinopril 2.5 milliGRAM(s) Oral daily  quiNIDine gluconate 324 milliGRAM(s) Oral every 8 hours  tamsulosin 0.4 milliGRAM(s) Oral at bedtime    MEDICATIONS  (PRN):  acetaminophen   Tablet. 650 milliGRAM(s) Oral every 6 hours PRN Moderate Pain (4 - 6)      ALLERGIES:  No Known Allergies      LABS:                        12.5   9.2   )-----------( 196      ( 13 Nov 2017 00:35 )             38.7     11-12    134<L>  |  97  |  16  ----------------------------<  138<H>  4.2   |  24  |  1.08    Ca    9.0      12 Nov 2017 19:47  Mg     2.1     11-12      PTT - ( 13 Nov 2017 00:36 )  PTT:35.7 sec      11-11-17 @ 07:01  -  11-12-17 @ 07:00  --------------------------------------------------------  IN: 717.7 mL / OUT: 1350 mL / NET: -632.3 mL    11-12-17 @ 07:01  -  11-13-17 @ 06:52  --------------------------------------------------------  IN: 868.5 mL / OUT: 2475 mL / NET: -1606.5 mL        RADIOLOGY & ADDITIONAL TESTS: Reviewed.    ECHO: There is mild concentric LVH. Abnormal (paradoxical) septal motion consistent with abnormal conduction. There is basal inferior wall hypokinesis. There is mid inferior wall hypokinesis. There is apical septal wall hypokinesis. There is apical hypokinesis. The left ventricular ejection fraction is mild to moderately reduced. The left ventricular ejection fraction is estimated to be 35-40%The left atrium is dilated. The mitral inflow pattern is consistent with impaired left ventricular relaxation with mildly elevated left atrial pressure (8-14mmHg).  Right atrial size is normal. The right ventricle is normal in size and function. There is a pacemaker wire in the right heart. There is mild to moderate aortic regurgitation. No hemodynamically significant valvular aortic stenosis. There is trace to mild mitral regurgitation. There is mild tricuspid regurgitation. There is mild pulmonary hypertension. The pulmonary artery systolic pressure is estimated to be 41 mmHg. There is moderate to severe pulmonic regurgitation. The aortic root is dilated. The aortic root measures 4.2 cm at sinuses (normal less than 4 cm for men, less than 3.6 cm for women).  Normal size aortic arch with no hemodynamic evidence for coarctation. The IVC is dilated (>2.1 cm) with an abnormal inspiratory collapse (<50%) consistent with elevated right atrial pressure. There is no pericardial effusion. PGY1 PROGRESS NOTE:    HOSPITAL COURSE:   72yM with PMH of recurrent VT s/p 3 ablations (9/2016, 5/2017, 10/2017) and AICD, CAD w MATTY to LAD (6/10/16) and pLAD (6/16/016), HFrEF (EF <35%), DVT on Eliquis, DANI on CPAP, pre-DM, BPH, OA, HTN, recurrent UTI who is a transfer from [facility] for VT.  Patient initially presented to [facility] on 11/7/2017 with lightheadedness, dizziness, and burning sensation in chest radiating to his neck and head. At [facility] ED, patient had recurrent runs of VT, was given ASA 324mg, Amiodarone 150mg, and Sotalol 120mg. He continued to have brief runs of VT and was started on amiodarone drip eventually transitioned to carvedilol and sotalol with decrease in VTs.  AICD had 92 episodes of VT with ATP but with no shocks delivered. On 11/9 had recurrent NSVT that broke with therapeutic ATP but then had sustained VT at HR 150s that failed ATPx3 and resulted in a therapeutic ICD shock x1 to NSR. AIDC was interrogated and was amio loaded, he continued to have NSVT episodes. Amio drip was started overnight and stepped back up to CCU. Patient was transferred to [facility] for possible ablation with Dr. Neal, who had performed his previous ablations.           OVERNIGHT EVENTS: This morning EKG with QTc 470 and . Heparin gtt adjusted according to PTT.     SUBJECTIVE / INTERVAL HPI: Patient seen and examined at bedside. Patient c/o R heel "achilles tendon" pain for which he attributes to gout and states that colchicine helps. Patient denies trauma to the foot or heel. Admits to previous episode of achilles pain in the past. Otherwise denies palpitations, shocks, dizziness, lightheadedness, chest pain, SOB, fevers, chills, abdominal pain, n/v/d/c.    VITAL SIGNS:  Vital Signs Last 24 Hrs  T(C): 36.7 (13 Nov 2017 05:02), Max: 37.2 (12 Nov 2017 13:50)  T(F): 98.1 (13 Nov 2017 05:02), Max: 98.9 (12 Nov 2017 13:50)  HR: 68 (13 Nov 2017 05:00) (68 - 68)  BP: 123/75 (13 Nov 2017 05:00) (86/58 - 125/77)  BP(mean): 103 (13 Nov 2017 05:00) (67 - 103)  RR: 33 (13 Nov 2017 05:00) (8 - 33)  SpO2: 96% (13 Nov 2017 05:00) (91% - 96%)    PHYSICAL EXAM:  General: obese, resting comfortable in bed, NAD  HEENT: NC/AT; PERRL, anicteric sclera; MMM  Neck: supple, no JVD  Cardiovascular: +S1/S2, RRR, no MRG  Respiratory: CTA B/L; no W/R/R  Gastrointestinal: obese, soft, NT/ND; +BSx4  Extremities: WWP; thick LE 2/2 body habitus, trace b/l LE edema, no clubbing or cyanosis; TTP along R achilles tendon, no bony tenderness or pinpoint tenderness  Vascular: 2+ radial, DP/PT pulses B/L  Neurological: AAOx3; no focal deficits    MEDICATIONS:  MEDICATIONS  (STANDING):  aspirin enteric coated 81 milliGRAM(s) Oral daily  atorvastatin 20 milliGRAM(s) Oral at bedtime  carvedilol 25 milliGRAM(s) Oral every 12 hours  colchicine 0.6 milliGRAM(s) Oral daily  finasteride 5 milliGRAM(s) Oral daily  heparin  Infusion 1500 Unit(s)/Hr (15 mL/Hr) IV Continuous <Continuous>  lisinopril 2.5 milliGRAM(s) Oral daily  quiNIDine gluconate 324 milliGRAM(s) Oral every 8 hours  tamsulosin 0.4 milliGRAM(s) Oral at bedtime    MEDICATIONS  (PRN):  acetaminophen   Tablet. 650 milliGRAM(s) Oral every 6 hours PRN Moderate Pain (4 - 6)      ALLERGIES:  No Known Allergies      LABS:                        12.5   9.2   )-----------( 196      ( 13 Nov 2017 00:35 )             38.7     11-12    134<L>  |  97  |  16  ----------------------------<  138<H>  4.2   |  24  |  1.08    Ca    9.0      12 Nov 2017 19:47  Mg     2.1     11-12      PTT - ( 13 Nov 2017 00:36 )  PTT:35.7 sec      11-11-17 @ 07:01  -  11-12-17 @ 07:00  --------------------------------------------------------  IN: 717.7 mL / OUT: 1350 mL / NET: -632.3 mL    11-12-17 @ 07:01  -  11-13-17 @ 06:52  --------------------------------------------------------  IN: 868.5 mL / OUT: 2475 mL / NET: -1606.5 mL        RADIOLOGY & ADDITIONAL TESTS: Reviewed.    ECHO: There is mild concentric LVH. Abnormal (paradoxical) septal motion consistent with abnormal conduction. There is basal inferior wall hypokinesis. There is mid inferior wall hypokinesis. There is apical septal wall hypokinesis. There is apical hypokinesis. The left ventricular ejection fraction is mild to moderately reduced. The left ventricular ejection fraction is estimated to be 35-40%The left atrium is dilated. The mitral inflow pattern is consistent with impaired left ventricular relaxation with mildly elevated left atrial pressure (8-14mmHg).  Right atrial size is normal. The right ventricle is normal in size and function. There is a pacemaker wire in the right heart. There is mild to moderate aortic regurgitation. No hemodynamically significant valvular aortic stenosis. There is trace to mild mitral regurgitation. There is mild tricuspid regurgitation. There is mild pulmonary hypertension. The pulmonary artery systolic pressure is estimated to be 41 mmHg. There is moderate to severe pulmonic regurgitation. The aortic root is dilated. The aortic root measures 4.2 cm at sinuses (normal less than 4 cm for men, less than 3.6 cm for women).  Normal size aortic arch with no hemodynamic evidence for coarctation. The IVC is dilated (>2.1 cm) with an abnormal inspiratory collapse (<50%) consistent with elevated right atrial pressure. There is no pericardial effusion. PGY1 PROGRESS NOTE:    HOSPITAL COURSE:   72yM with PMH of recurrent VT s/p 3 ablations (9/2016, 5/2017, 10/2017) and AICD, CAD w MATTY to LAD (6/10/16) and pLAD (6/16/016), HFrEF (EF <35%), DVT on Eliquis, DANI on CPAP, pre-DM, BPH, OA, HTN, recurrent UTI who is a transfer from [facility] for VT.  Patient initially presented to [facility] on 11/7/2017 with lightheadedness, dizziness, and burning sensation in chest radiating to his neck and head. At [facility] ED, patient had recurrent runs of VT, was given ASA 324mg, Amiodarone 150mg, and Sotalol 120mg. He continued to have brief runs of VT and was started on amiodarone drip eventually transitioned to carvedilol and sotalol.  AICD had 92 episodes of VT with ATP but with no shocks delivered. On 11/9 had recurrent NSVT that broke with therapeutic ATP but then had sustained VT at HR 150s that failed ATPx3 and resulted in a therapeutic ICD shock x1 to NSR. Patient Amiodarone loaded and started on Amio gtt. Patient was transferred to [facility] for possible ablation with Dr. Neal. Patient was brought directly to [facility] CCU. Tele showing NSR with frequent VT episodes.  Patient started on Amio gtt and continued on Carvedilol 25mg BID, Lisinopril 2.5mg, Coreg 25 BID, ASA 81mg and Atorvastatin 20mg qhs. Also started on heparin gtt rather than home Eliquis for possible EP procedure. Patient was seen by EP and Amio gtt transitioned to Quinidine with improvement in number of VT episodes. No plan for ablation at this moment, pending success of medical management. Patient continued on Heparin gtt for now. Patient HDS and ready for stepdown to tele unit, pending ablation with EP.       OVERNIGHT EVENTS: This morning EKG with QTc 470 and . Heparin gtt adjusted according to PTT.     SUBJECTIVE / INTERVAL HPI: Patient seen and examined at bedside. Patient c/o R heel "achilles tendon" pain for which he attributes to gout and states that colchicine helps. Patient denies trauma to the foot or heel. Admits to previous episode of achilles pain in the past. Otherwise denies palpitations, shocks, dizziness, lightheadedness, chest pain, SOB, fevers, chills, abdominal pain, n/v/d/c.    VITAL SIGNS:  Vital Signs Last 24 Hrs  T(C): 36.7 (13 Nov 2017 05:02), Max: 37.2 (12 Nov 2017 13:50)  T(F): 98.1 (13 Nov 2017 05:02), Max: 98.9 (12 Nov 2017 13:50)  HR: 68 (13 Nov 2017 05:00) (68 - 68)  BP: 123/75 (13 Nov 2017 05:00) (86/58 - 125/77)  BP(mean): 103 (13 Nov 2017 05:00) (67 - 103)  RR: 33 (13 Nov 2017 05:00) (8 - 33)  SpO2: 96% (13 Nov 2017 05:00) (91% - 96%)    PHYSICAL EXAM:  General: obese, resting comfortable in bed, NAD  HEENT: NC/AT; PERRL, anicteric sclera; MMM  Neck: supple, no JVD  Cardiovascular: +S1/S2, RRR, no MRG  Respiratory: CTA B/L; no W/R/R  Gastrointestinal: obese, soft, NT/ND; +BSx4  Extremities: WWP; thick LE 2/2 body habitus, trace b/l LE edema, no clubbing or cyanosis; TTP along R achilles tendon, no bony tenderness or pinpoint tenderness  Vascular: 2+ radial, DP/PT pulses B/L  Neurological: AAOx3; no focal deficits    MEDICATIONS:  MEDICATIONS  (STANDING):  aspirin enteric coated 81 milliGRAM(s) Oral daily  atorvastatin 20 milliGRAM(s) Oral at bedtime  carvedilol 25 milliGRAM(s) Oral every 12 hours  colchicine 0.6 milliGRAM(s) Oral daily  finasteride 5 milliGRAM(s) Oral daily  heparin  Infusion 1500 Unit(s)/Hr (15 mL/Hr) IV Continuous <Continuous>  lisinopril 2.5 milliGRAM(s) Oral daily  quiNIDine gluconate 324 milliGRAM(s) Oral every 8 hours  tamsulosin 0.4 milliGRAM(s) Oral at bedtime    MEDICATIONS  (PRN):  acetaminophen   Tablet. 650 milliGRAM(s) Oral every 6 hours PRN Moderate Pain (4 - 6)      ALLERGIES:  No Known Allergies      LABS:                        12.5   9.2   )-----------( 196      ( 13 Nov 2017 00:35 )             38.7     11-12    134<L>  |  97  |  16  ----------------------------<  138<H>  4.2   |  24  |  1.08    Ca    9.0      12 Nov 2017 19:47  Mg     2.1     11-12      PTT - ( 13 Nov 2017 00:36 )  PTT:35.7 sec      11-11-17 @ 07:01  -  11-12-17 @ 07:00  --------------------------------------------------------  IN: 717.7 mL / OUT: 1350 mL / NET: -632.3 mL    11-12-17 @ 07:01  -  11-13-17 @ 06:52  --------------------------------------------------------  IN: 868.5 mL / OUT: 2475 mL / NET: -1606.5 mL        RADIOLOGY & ADDITIONAL TESTS: Reviewed.    ECHO: There is mild concentric LVH. Abnormal (paradoxical) septal motion consistent with abnormal conduction. There is basal inferior wall hypokinesis. There is mid inferior wall hypokinesis. There is apical septal wall hypokinesis. There is apical hypokinesis. The left ventricular ejection fraction is mild to moderately reduced. The left ventricular ejection fraction is estimated to be 35-40%The left atrium is dilated. The mitral inflow pattern is consistent with impaired left ventricular relaxation with mildly elevated left atrial pressure (8-14mmHg).  Right atrial size is normal. The right ventricle is normal in size and function. There is a pacemaker wire in the right heart. There is mild to moderate aortic regurgitation. No hemodynamically significant valvular aortic stenosis. There is trace to mild mitral regurgitation. There is mild tricuspid regurgitation. There is mild pulmonary hypertension. The pulmonary artery systolic pressure is estimated to be 41 mmHg. There is moderate to severe pulmonic regurgitation. The aortic root is dilated. The aortic root measures 4.2 cm at sinuses (normal less than 4 cm for men, less than 3.6 cm for women).  Normal size aortic arch with no hemodynamic evidence for coarctation. The IVC is dilated (>2.1 cm) with an abnormal inspiratory collapse (<50%) consistent with elevated right atrial pressure. There is no pericardial effusion.

## 2017-11-13 NOTE — PROGRESS NOTE ADULT - PROBLEM SELECTOR PLAN 5
- Patient with BPH, has straining in AM which resolves throughout the day  - continue home tamsulosin 0.4mg qHS and finasteride 5mg daily

## 2017-11-13 NOTE — PROGRESS NOTE ADULT - PROBLEM SELECTOR PLAN 3
- Patient with known AF on Eliquis 5mg BID  - Patient currently alternating between NSR and VT.  - c/w heparin gtt  - for EP ablation today - Patient with known AF on Eliquis 5mg BID  - Patient currently alternating between NSR and VT  - c/w heparin gtt; adjust according to PTT  - for EP ablation today - Patient with known AF on Eliquis 5mg BID  - Patient currently alternating between NSR and VT  - c/w heparin gtt; adjust according to PTT

## 2017-11-13 NOTE — CONSULT NOTE ADULT - ASSESSMENT
General: No acute Distress  Neck: Supple, NO JVD  Cardiac: S1 S2, No M/R/G  Pulmonary: CTAB, Breathing unlabored, No Rhonchi/Rales/Wheezing  Abdomen: Soft, Non -tender, +BS x 4 quads  Extremities: No Rashes, No edema  Neuro: A/o x 3, No focal deficits Mr. Sauceda is a 73yo M with pmh of recurrent VT s/p 3 ablations (9/2016, 5/2017, 10/2017), CAD w MATTY to LAD (6/10/16), stent to pLAD (6/16/016), ICD, DVT on Eliquis CHF with EF<35%, DANI on CPAP, Pre DM, BPH, OA, HTN, recurrent UTI who is a transfer from [facility] for ventricular tachycardia.

## 2017-11-13 NOTE — PROGRESS NOTE ADULT - PROBLEM SELECTOR PLAN 4
- Patient with h/o CAD s/p stent in 2016 to LAD. Recurrent VT storm may be secondary to ischemic insult. Negative tropes at [facility]  - consider PCI to evaluate ischemia, discuss with EP  - continue ASA 81mg daily, Coreg 25mg BID, Lisinopril 2.5mg daily and Atorvastatin 20mg qHs  - daily EKGs - Patient with h/o CAD s/p stent in 2016 to LAD. Recurrent VT storm may be secondary to ischemic insult. Negative tropes at [facility].  - consider PCI to evaluate ischemia, discuss with EP  - continue ASA 81mg daily, Coreg 25mg BID, Lisinopril 2.5mg daily and Atorvastatin 20mg qHs  - daily EKGs

## 2017-11-13 NOTE — PROGRESS NOTE ADULT - PROBLEM SELECTOR PLAN 1
- Patient transferred here from [facility] for VT storm. Patient with history of VT s/p 3 ablations last done in 10/2017 at [facility]. Patient received amiodarone, was placed on an amiodarone drip, then DC'd. Patient was shocked 3 times. Patient's ICD is set to ATP 6 times before shocking.  - plan for ablation this morning with EP  - c/w quinidine 324mg q8 (pt did not respond to amiodarone)  - c/w carvedilol 25 mg bid  - patient with pacer pads on  - replete lytes aggressively, goal K 4.5 and Mag 2.5  - serial EKGs - Patient transferred here from [facility] for VT storm. Patient with history of VT s/p 3 ablations last done in 10/2017 at [facility]. Patient received amiodarone, was placed on an amiodarone drip, which was then dc'd given poor response and switched to Quinidine. Patient was shocked 3 times. Patient's ICD is set to ATP 6 times before shocking.  - plan for ablation this morning with EP  - c/w quinidine 324mg q8 (pt did not respond to amiodarone)  - c/w carvedilol 25 mg BID  - patient with pacer pads on  - replete lytes aggressively, goal K 4.5 and Mag 2.5  - serial EKGs - Patient transferred here from [facility] for VT storm. Patient with history of VT s/p 3 ablations last done in 10/2017 at [facility]. Patient received amiodarone, was placed on an amiodarone drip, which was then dc'd given poor response and switched to Quinidine. Patient was shocked 3 times. Patient's ICD is set to ATP 6 times before shocking.  - c/w quinidine 324mg q8 (pt did not respond to amiodarone)  - c/w carvedilol 25 mg BID  - f/u EP about possible ablation in the future  - patient with pacer pads on  - replete lytes aggressively, goal K 4.5 and Mag 2.5  - serial EKGs

## 2017-11-13 NOTE — CONSULT NOTE ADULT - PROBLEM SELECTOR RECOMMENDATION 9
Patient transferred here from [facility] for VT storm. Patient with history of VT s/p 3 ablations last done in 10/2017 at [facility]. Patient received amiodarone, was placed on an amiodarone drip, then DC'd. Patient was shocked 3 times. Patient's ICD is set to 6 bursts of ATP 6 before shocking. Since arrival, the patient has had multiple to   - Continue Quinidine 324 milliGRAM every 8 hours. Dose will be reevaluated tomorrow.   -       -goal K 4.5 and Mag 2.5. Patient transferred here from [facility] for VT storm. Patient with history of VT s/p 3 ablations last done in 10/2017 at [facility]. Patient received amiodarone, was placed on an amiodarone drip, then DC'd. Patient was shocked 3 times. Patient's ICD is set to 6 bursts of ATP 6 before shocking. Since arrival, the patient has had multiple episodes of NSVT requiring ATP. Upon arrival, he was placed on quinidine and since yesterday, the patient has had no repeat episodes of NSVT.    - Continue Quinidine 324 milliGRAM every 8 hours. Dose will be reevaluated tomorrow.   - Continue carvedilol.  - We will continue to monitor the patient and his response to quinidine will guide whether or not he will have a procedure on this admission.         -goal K 4.5 and Mag 2.5. Patient transferred here from [facility] for VT storm. Patient with history of VT s/p 3 ablations last done in 10/2017 at [facility]. Patient received amiodarone, was placed on an amiodarone drip, then DC'd. Patient was shocked 3 times. Patient's ICD is set to 6 bursts of ATP before shocking. Since arrival, the patient has had multiple episodes of NSVT requiring ATP. Upon arrival, he was placed on quinidine and since yesterday, the patient has had no repeat episodes of NSVT.    - Continue Quinidine 324 milliGRAM every 8 hours. Dose will be reevaluated tomorrow.   - Continue carvedilol.  - We will continue to monitor the patient and his response to quinidine will guide whether or not he will have a procedure on this admission. Patient transferred here from [facility] for VT storm. Patient with history of VT s/p 3 ablations last done in 10/2017 at [facility]. Patient received amiodarone, was placed on an amiodarone drip, then DC'd. Patient was shocked 3 times. Patient's ICD is set to 6 bursts of ATP before shocking. Since arrival, the patient has had multiple episodes of NSVT requiring ATP. Upon arrival, he was placed on quinidine and since yesterday, the patient has had no repeat episodes of NSVT.    - Continue Quinidine 324 milliGRAM every 8 hours. Dose will be reevaluated tomorrow.   - Monitor for Qt prolongation and avoid Qt prolonging agents.  - Continue carvedilol.  - We will continue to monitor the patient and his response to quinidine will guide whether or not he will have a procedure on this admission.

## 2017-11-13 NOTE — PROGRESS NOTE ADULT - PROBLEM SELECTOR PLAN 2
- Patient with previous ECHO showing EF of 30-35% mild concentric left ventricular hypertrophy apical akinesis apical septal wall akinesis, apical inferior wall akinesis.   - repeat ECHO showed EF 35-40% with inferior and apical hypokinesis  - consider [facility] for ischemic w/u as cause of VTs  - c/w lisinopril 2.5mg and Coreg 25 BID  - Lasix IVP PRN for fluid overload, assess fluid status   - strict I&Os, fluid restriction, daily weights - Patient with previous ECHO showing EF of 30-35% mild concentric left ventricular hypertrophy apical akinesis apical septal wall akinesis, apical inferior wall akinesis.   - repeat ECHO showed EF 35-40% with inferior and apical hypokinesis  - consider [facility] for ischemic w/u as cause of VTs  - c/w Lisinopril 2.5mg, Coreg 25 BID, ASA and Atorvastatin   - Lasix IVP PRN for fluid overload, assess fluid status   - strict I&Os, fluid restriction, daily weights

## 2017-11-14 LAB
ANION GAP SERPL CALC-SCNC: 13 MMOL/L — SIGNIFICANT CHANGE UP (ref 5–17)
ANION GAP SERPL CALC-SCNC: 13 MMOL/L — SIGNIFICANT CHANGE UP (ref 5–17)
APTT BLD: 73 SEC — HIGH (ref 27.5–37.4)
APTT BLD: 76.9 SEC — HIGH (ref 27.5–37.4)
APTT BLD: 77 SEC — HIGH (ref 27.5–37.4)
BUN SERPL-MCNC: 14 MG/DL — SIGNIFICANT CHANGE UP (ref 7–23)
BUN SERPL-MCNC: 14 MG/DL — SIGNIFICANT CHANGE UP (ref 7–23)
CALCIUM SERPL-MCNC: 8.8 MG/DL — SIGNIFICANT CHANGE UP (ref 8.4–10.5)
CALCIUM SERPL-MCNC: 9.1 MG/DL — SIGNIFICANT CHANGE UP (ref 8.4–10.5)
CHLORIDE SERPL-SCNC: 101 MMOL/L — SIGNIFICANT CHANGE UP (ref 96–108)
CHLORIDE SERPL-SCNC: 99 MMOL/L — SIGNIFICANT CHANGE UP (ref 96–108)
CO2 SERPL-SCNC: 24 MMOL/L — SIGNIFICANT CHANGE UP (ref 22–31)
CO2 SERPL-SCNC: 25 MMOL/L — SIGNIFICANT CHANGE UP (ref 22–31)
CREAT SERPL-MCNC: 0.89 MG/DL — SIGNIFICANT CHANGE UP (ref 0.5–1.3)
CREAT SERPL-MCNC: 0.91 MG/DL — SIGNIFICANT CHANGE UP (ref 0.5–1.3)
GLUCOSE SERPL-MCNC: 132 MG/DL — HIGH (ref 70–99)
GLUCOSE SERPL-MCNC: 91 MG/DL — SIGNIFICANT CHANGE UP (ref 70–99)
HCT VFR BLD CALC: 36.8 % — LOW (ref 39–50)
HGB BLD-MCNC: 12 G/DL — LOW (ref 13–17)
MAGNESIUM SERPL-MCNC: 2.2 MG/DL — SIGNIFICANT CHANGE UP (ref 1.6–2.6)
MAGNESIUM SERPL-MCNC: 2.3 MG/DL — SIGNIFICANT CHANGE UP (ref 1.6–2.6)
MCHC RBC-ENTMCNC: 29.6 PG — SIGNIFICANT CHANGE UP (ref 27–34)
MCHC RBC-ENTMCNC: 32.6 G/DL — SIGNIFICANT CHANGE UP (ref 32–36)
MCV RBC AUTO: 90.9 FL — SIGNIFICANT CHANGE UP (ref 80–100)
PLATELET # BLD AUTO: 197 K/UL — SIGNIFICANT CHANGE UP (ref 150–400)
POTASSIUM SERPL-MCNC: 4 MMOL/L — SIGNIFICANT CHANGE UP (ref 3.5–5.3)
POTASSIUM SERPL-MCNC: 4.2 MMOL/L — SIGNIFICANT CHANGE UP (ref 3.5–5.3)
POTASSIUM SERPL-SCNC: 4 MMOL/L — SIGNIFICANT CHANGE UP (ref 3.5–5.3)
POTASSIUM SERPL-SCNC: 4.2 MMOL/L — SIGNIFICANT CHANGE UP (ref 3.5–5.3)
RBC # BLD: 4.05 M/UL — LOW (ref 4.2–5.8)
RBC # FLD: 14.2 % — SIGNIFICANT CHANGE UP (ref 10.3–16.9)
SODIUM SERPL-SCNC: 136 MMOL/L — SIGNIFICANT CHANGE UP (ref 135–145)
SODIUM SERPL-SCNC: 139 MMOL/L — SIGNIFICANT CHANGE UP (ref 135–145)
WBC # BLD: 9 K/UL — SIGNIFICANT CHANGE UP (ref 3.8–10.5)
WBC # FLD AUTO: 9 K/UL — SIGNIFICANT CHANGE UP (ref 3.8–10.5)

## 2017-11-14 PROCEDURE — 93010 ELECTROCARDIOGRAM REPORT: CPT

## 2017-11-14 PROCEDURE — 99233 SBSQ HOSP IP/OBS HIGH 50: CPT

## 2017-11-14 PROCEDURE — 71010: CPT | Mod: 26

## 2017-11-14 PROCEDURE — 99224: CPT

## 2017-11-14 RX ORDER — FUROSEMIDE 40 MG
40 TABLET ORAL ONCE
Qty: 0 | Refills: 0 | Status: COMPLETED | OUTPATIENT
Start: 2017-11-14 | End: 2017-11-14

## 2017-11-14 RX ORDER — POTASSIUM CHLORIDE 20 MEQ
20 PACKET (EA) ORAL ONCE
Qty: 0 | Refills: 0 | Status: COMPLETED | OUTPATIENT
Start: 2017-11-14 | End: 2017-11-14

## 2017-11-14 RX ORDER — POTASSIUM CHLORIDE 20 MEQ
40 PACKET (EA) ORAL ONCE
Qty: 0 | Refills: 0 | Status: COMPLETED | OUTPATIENT
Start: 2017-11-14 | End: 2017-11-14

## 2017-11-14 RX ORDER — MAGNESIUM SULFATE 500 MG/ML
1 VIAL (ML) INJECTION ONCE
Qty: 0 | Refills: 0 | Status: COMPLETED | OUTPATIENT
Start: 2017-11-14 | End: 2017-11-14

## 2017-11-14 RX ADMIN — Medication 81 MILLIGRAM(S): at 11:54

## 2017-11-14 RX ADMIN — ATORVASTATIN CALCIUM 20 MILLIGRAM(S): 80 TABLET, FILM COATED ORAL at 21:38

## 2017-11-14 RX ADMIN — LISINOPRIL 2.5 MILLIGRAM(S): 2.5 TABLET ORAL at 06:24

## 2017-11-14 RX ADMIN — TAMSULOSIN HYDROCHLORIDE 0.4 MILLIGRAM(S): 0.4 CAPSULE ORAL at 21:38

## 2017-11-14 RX ADMIN — HEPARIN SODIUM 15 UNIT(S)/HR: 5000 INJECTION INTRAVENOUS; SUBCUTANEOUS at 10:18

## 2017-11-14 RX ADMIN — Medication 100 GRAM(S): at 05:07

## 2017-11-14 RX ADMIN — CARVEDILOL PHOSPHATE 25 MILLIGRAM(S): 80 CAPSULE, EXTENDED RELEASE ORAL at 17:26

## 2017-11-14 RX ADMIN — Medication 20 MILLIEQUIVALENT(S): at 05:07

## 2017-11-14 RX ADMIN — FINASTERIDE 5 MILLIGRAM(S): 5 TABLET, FILM COATED ORAL at 11:54

## 2017-11-14 RX ADMIN — Medication 324 MILLIGRAM(S): at 22:42

## 2017-11-14 RX ADMIN — Medication 324 MILLIGRAM(S): at 06:24

## 2017-11-14 RX ADMIN — Medication 100 GRAM(S): at 11:55

## 2017-11-14 RX ADMIN — CARVEDILOL PHOSPHATE 25 MILLIGRAM(S): 80 CAPSULE, EXTENDED RELEASE ORAL at 06:24

## 2017-11-14 RX ADMIN — Medication 0.6 MILLIGRAM(S): at 11:54

## 2017-11-14 RX ADMIN — Medication 324 MILLIGRAM(S): at 14:11

## 2017-11-14 RX ADMIN — Medication 0.6 MILLIGRAM(S): at 21:38

## 2017-11-14 RX ADMIN — Medication 40 MILLIGRAM(S): at 10:18

## 2017-11-14 RX ADMIN — Medication 40 MILLIEQUIVALENT(S): at 11:54

## 2017-11-14 NOTE — PROGRESS NOTE ADULT - PROBLEM SELECTOR PLAN 2
- Patient with previous ECHO showing EF of 30-35% mild concentric left ventricular hypertrophy apical akinesis apical septal wall akinesis, apical inferior wall akinesis.   - repeat ECHO showed EF 35-40% with inferior and apical hypokinesis  - consider [facility] for ischemic w/u as cause of VTs  - c/w Lisinopril 2.5mg, Coreg 25 BID, ASA and Atorvastatin   - Lasix IVP PRN for fluid overload, assess fluid status   - strict I&Os, fluid restriction, daily weights - Patient with previous ECHO showing EF of 30-35% mild concentric left ventricular hypertrophy apical akinesis apical septal wall akinesis, apical inferior wall akinesis.   - repeat ECHO showed EF 35-40% with inferior and apical hypokinesis  - consider [facility] for ischemic w/u as cause of VTs  - Lasix 40mg IVP once this am   - c/w Lisinopril 2.5mg, Coreg 25 BID, ASA and Atorvastatin   - Lasix IVP PRN for fluid overload, assess fluid status   - strict I&Os, fluid restriction, daily weights

## 2017-11-14 NOTE — PROGRESS NOTE ADULT - PROBLEM SELECTOR PLAN 6
Hx of gout. Patient has been on colchicine and currently stable.  -continue with colchicine 0.6 mg BID Hx of gout. Patient has been on colchicine and with episodes of R achilles tendon pain.  Currently stable.  -continue with colchicine 0.6 mg BID

## 2017-11-14 NOTE — PROGRESS NOTE ADULT - PROBLEM SELECTOR PLAN 3
Hx of afib on eliquis at home. Currently in NSR with multiple episodes of VT. On hep gtt for potential procedure. Therapeutic therefore qD PTT.  -f/u PTT in AM, c/w hep gtt

## 2017-11-14 NOTE — PROGRESS NOTE ADULT - ASSESSMENT
71yo M with pmh of recurrent VT s/p 3 ablations (9/2016, 5/2017, 10/2017), CAD w MATTY to LAD (6/10/16), stent to pLAD (6/16/016), ICD, DVT on Eliquis CHF with EF<35%, DANI on CPAP, Pre DM, BPH, OA, HTN, recurrent UTI who is a transfer from [facility] for ventricular tachycardia.

## 2017-11-14 NOTE — PROGRESS NOTE ADULT - PROBLEM SELECTOR PLAN 1
- Patient transferred here from [facility] for VT storm. Patient with history of VT s/p 3 ablations last done in 10/2017 at [facility]. Patient received amiodarone, was placed on an amiodarone drip, which was then dc'd given poor response and switched to Quinidine. Patient was shocked 3 times. Patient's ICD is set to ATP 6 times before shocking.  - c/w quinidine 324mg q8 (pt did not respond to amiodarone)  - c/w carvedilol 25 mg BID  - f/u EP about possible ablation in the future  - patient with pacer pads on  - replete lytes aggressively, goal K 4.5 and Mag 2.5  - serial EKGs - Patient transferred here from [facility] for VT storm. Patient with history of VT s/p 3 ablations last done in 10/2017 at [facility]. Patient received amiodarone, was placed on an amiodarone drip, which was then dc'd given poor response and switched to Quinidine. Patient was shocked 3 times. Patient's ICD is set to ATP 6 times before shocking.  - c/w quinidine 324mg q8 (pt did not respond to amiodarone)  - c/w carvedilol 25 mg BID  - plan for exercise stress test for tomorrow looking to induce PVCs and/or ischemia; NPO after midnight  - walk with patient to try to induce VTs  - f/u EP for possible ablation  - patient with pacer pads on  - replete lytes aggressively, goal K 4.5 and Mag 2.5  - serial EKGs

## 2017-11-14 NOTE — PROGRESS NOTE ADULT - PROBLEM SELECTOR PLAN 2
Past echo demonstrated EF 30-35% with LVH apical akinesis with apical septal wall akinesis and appical inferior wall akinesis. Repeat echo demonstrated EF 35-40% with inferior and apical hypokinesis.   -Plan for stress test in AM for ischemic work up.   -C/w lasix 40 IVP as needed. GIven 1x this AM. Lungs clear this evening.   - Strict I/O, daily weights with fluid restriction.

## 2017-11-14 NOTE — PROGRESS NOTE ADULT - PROBLEM SELECTOR PLAN 4
Hx of CAD with stenting in 2016 to LAD x2. Neg trops during admission to [facility]. Given recurrent VT patient to undergo ischemic work up.  -Plan for stress test in AM.   -C/w ASA, coreg 25mg BID, lisinopril 2.5mg qD, and atorvostatin 20mg qHS.

## 2017-11-14 NOTE — PROVIDER CONTACT NOTE (OTHER) - DATE AND TIME:
Pictures were placed in Pt's chart today for future reference.    
14-Nov-2017 19:00
12-Nov-2017 04:30

## 2017-11-14 NOTE — PROGRESS NOTE ADULT - PROBLEM SELECTOR PLAN 6
- Patient with known previous treatment of gout with colchicine. Patient complaining of pain in the right achilles tendon which he states is recurrent and improving on colchicine.   - continue colchicine 0.6 BID

## 2017-11-14 NOTE — PROGRESS NOTE ADULT - ASSESSMENT
71yo M with pmh of recurrent VT s/p 3 ablations (9/2016, 5/2017, 10/2017), CAD w MATTY to LAD (6/10/16), stent to pLAD (6/16/016), ICD, DVT on Eliquis CHF with EF<35%, DANI on CPAP, Pre DM, BPH, OA, HTN, recurrent UTI who is a transfer from [facility] for ventricular tachycardia. 73yo M with pmh of recurrent VT s/p 3x ablation (9/2016, 5/2017, 10/2017), CAD w MATTY to LAD and pLAD 2016, ICD, DVT on Eliquis, CHF with EF<35%, DANI on CPAP, Pre DM, BPH, OA, HTN, recurrent UTI transferred from [facility] to CCU for ventricular tachycardia for medical optimization and pending ablation. Patient transf

## 2017-11-14 NOTE — PROGRESS NOTE ADULT - SUBJECTIVE AND OBJECTIVE BOX
PGY1 PROGRESS NOTE:    HOSPITAL COURSE:   72yM with PMH of recurrent VT s/p 3 ablations (9/2016, 5/2017, 10/2017) and AICD, CAD w MATTY to LAD (6/10/16) and pLAD (6/16/016), HFrEF (EF <35%), DVT on Eliquis, DANI on CPAP, pre-DM, BPH, OA, HTN, recurrent UTI who is a transfer from [facility] for VT.  Patient initially presented to [facility] on 11/7/2017 with lightheadedness, dizziness, and burning sensation in chest radiating to his neck and head. At [facility] ED, patient had recurrent runs of VT, was given ASA 324mg, Amiodarone 150mg, and Sotalol 120mg. He continued to have brief runs of VT and was started on amiodarone drip eventually transitioned to carvedilol and sotalol.  AICD had 92 episodes of VT with ATP but with no shocks delivered. On 11/9 had recurrent NSVT that broke with therapeutic ATP but then had sustained VT at HR 150s that failed ATPx3 and resulted in a therapeutic ICD shock x1 to NSR. Patient Amiodarone loaded and started on Amio gtt. Patient was transferred to [facility] for possible ablation with Dr. Neal. Patient was brought directly to [facility] CCU. Tele showing NSR with frequent VT episodes.  Patient started on Amio gtt and continued on Carvedilol 25mg BID, Lisinopril 2.5mg, Coreg 25 BID, ASA 81mg and Atorvastatin 20mg qhs. Also started on heparin gtt rather than home Eliquis for possible EP procedure. Patient was seen by EP and Amio gtt transitioned to Quinidine with improvement in number of VT episodes. No plan for ablation at this moment, pending success of medical management. Patient continued on Heparin gtt for now. Patient HDS and ready for stepdown to tele unit, pending ablation with EP.     OVERNIGHT EVENTS: No acute overnight events.    SUBJECTIVE / INTERVAL HPI: Patient seen and examined at bedside. Without complaints. Denies palpitations, shocks, dizziness, lightheadedness, chest pain, SOB, fevers, chills, abdominal pain, n/v/d/c.    VITAL SIGNS:  Vital Signs Last 24 Hrs  T(C): 36.6 (14 Nov 2017 06:00), Max: 37.3 (13 Nov 2017 09:00)  T(F): 97.8 (14 Nov 2017 06:00), Max: 99.1 (13 Nov 2017 09:00)  HR: 68 (14 Nov 2017 06:00) (68 - 72)  BP: 109/71 (14 Nov 2017 06:00) (95/76 - 144/91)  BP(mean): 87 (14 Nov 2017 06:00) (82 - 114)  RR: 21 (14 Nov 2017 06:00) (16 - 35)  SpO2: 95% (14 Nov 2017 06:00) (92% - 97%)    PHYSICAL EXAM:  General: obese, resting comfortable in bed, NAD  HEENT: NC/AT; PERRL, anicteric sclera; MMM  Neck: supple, no JVD  Cardiovascular: +S1/S2, RRR, no MRG  Respiratory: CTA B/L; no W/R/R  Gastrointestinal: obese, soft, NT/ND; +BSx4  Extremities: WWP; thick LE 2/2 body habitus, trace b/l LE edema, no clubbing or cyanosis; TTP along R achilles tendon, no bony tenderness or pinpoint tenderness  Vascular: 2+ radial, DP/PT pulses B/L  Neurological: AAOx3; no focal deficits    MEDICATIONS:  MEDICATIONS  (STANDING):  aspirin enteric coated 81 milliGRAM(s) Oral daily  atorvastatin 20 milliGRAM(s) Oral at bedtime  carvedilol 25 milliGRAM(s) Oral every 12 hours  colchicine 0.6 milliGRAM(s) Oral every 12 hours  finasteride 5 milliGRAM(s) Oral daily  heparin  Infusion 1500 Unit(s)/Hr (15 mL/Hr) IV Continuous <Continuous>  lisinopril 2.5 milliGRAM(s) Oral daily  quiNIDine gluconate 324 milliGRAM(s) Oral every 8 hours  tamsulosin 0.4 milliGRAM(s) Oral at bedtime    MEDICATIONS  (PRN):  acetaminophen   Tablet. 650 milliGRAM(s) Oral every 6 hours PRN Moderate Pain (4 - 6)      ALLERGIES:  No Known Allergies      LABS:                                   12.0   9.0   )-----------( 197      ( 14 Nov 2017 04:03 )             36.8     11-14    139  |  101  |  14  ----------------------------<  91  4.2   |  25  |  0.91    Ca    8.8      14 Nov 2017 04:03  Mg     2.2     11-14 11-12-17 @ 07:01  -  11-13-17 @ 07:00  --------------------------------------------------------  IN: 898.5 mL / OUT: 2475 mL / NET: -1576.5 mL    11-13-17 @ 07:01  -  11-14-17 @ 06:26  --------------------------------------------------------  IN: 1260 mL / OUT: 575 mL / NET: 685 mL      RADIOLOGY & ADDITIONAL TESTS: Reviewed.    ECHO: There is mild concentric LVH. Abnormal (paradoxical) septal motion consistent with abnormal conduction. There is basal inferior wall hypokinesis. There is mid inferior wall hypokinesis. There is apical septal wall hypokinesis. There is apical hypokinesis. The left ventricular ejection fraction is mild to moderately reduced. The left ventricular ejection fraction is estimated to be 35-40%The left atrium is dilated. The mitral inflow pattern is consistent with impaired left ventricular relaxation with mildly elevated left atrial pressure (8-14mmHg).  Right atrial size is normal. The right ventricle is normal in size and function. There is a pacemaker wire in the right heart. There is mild to moderate aortic regurgitation. No hemodynamically significant valvular aortic stenosis. There is trace to mild mitral regurgitation. There is mild tricuspid regurgitation. There is mild pulmonary hypertension. The pulmonary artery systolic pressure is estimated to be 41 mmHg. There is moderate to severe pulmonic regurgitation. The aortic root is dilated. The aortic root measures 4.2 cm at sinuses (normal less than 4 cm for men, less than 3.6 cm for women).  Normal size aortic arch with no hemodynamic evidence for coarctation. The IVC is dilated (>2.1 cm) with an abnormal inspiratory collapse (<50%) consistent with elevated right atrial pressure. There is no pericardial effusion. PGY1 PROGRESS NOTE:    HOSPITAL COURSE:   72yM with PMH of recurrent VT s/p 3 ablations (9/2016, 5/2017, 10/2017) and AICD, CAD w MATTY to LAD (6/10/16) and pLAD (6/16/016), HFrEF (EF <35%), DVT on Eliquis, DANI on CPAP, pre-DM, BPH, OA, HTN, recurrent UTI who is a transfer from [facility] for VT.  Patient initially presented to [facility] on 11/7/2017 with lightheadedness, dizziness, and burning sensation in chest radiating to his neck and head. At [facility] ED, patient had recurrent runs of VT, was given ASA 324mg, Amiodarone 150mg, and Sotalol 120mg. He continued to have brief runs of VT and was started on amiodarone drip eventually transitioned to carvedilol and sotalol.  AICD had 92 episodes of VT with ATP but with no shocks delivered. On 11/9 had recurrent NSVT that broke with therapeutic ATP but then had sustained VT at HR 150s that failed ATPx3 and resulted in a therapeutic ICD shock x1 to NSR. Patient Amiodarone loaded and started on Amio gtt. Patient was transferred to [facility] for possible ablation with Dr. Neal. Patient was brought directly to [facility] CCU. Tele showing NSR with frequent VT episodes.  Patient started on Amio gtt and continued on Carvedilol 25mg BID, Lisinopril 2.5mg, Coreg 25 BID, ASA 81mg and Atorvastatin 20mg qhs. Also started on heparin gtt rather than home Eliquis for possible EP procedure. Patient was seen by EP and Amio gtt transitioned to Quinidine with improvement in number of VT episodes. No plan for ablation at this moment, pending success of medical management. Patient continued on Heparin gtt for now. Patient HDS and ready for stepdown to tele unit, pending ablation with EP.     OVERNIGHT EVENTS: 3beats of NSVT. No acute overnight events.    SUBJECTIVE / INTERVAL HPI: Patient seen and examined at bedside. Without complaints. Denies palpitations, shocks, dizziness, lightheadedness, chest pain, SOB, fevers, chills, abdominal pain, n/v/d/c.    VITAL SIGNS:  Vital Signs Last 24 Hrs  T(C): 36.6 (14 Nov 2017 06:00), Max: 37.3 (13 Nov 2017 09:00)  T(F): 97.8 (14 Nov 2017 06:00), Max: 99.1 (13 Nov 2017 09:00)  HR: 68 (14 Nov 2017 06:00) (68 - 72)  BP: 109/71 (14 Nov 2017 06:00) (95/76 - 144/91)  BP(mean): 87 (14 Nov 2017 06:00) (82 - 114)  RR: 21 (14 Nov 2017 06:00) (16 - 35)  SpO2: 95% (14 Nov 2017 06:00) (92% - 97%)    PHYSICAL EXAM:  General: obese, resting comfortable in bed, NAD  HEENT: NC/AT; PERRL, anicteric sclera; MMM  Neck: supple, no JVD  Cardiovascular: +S1/S2, RRR, no MRG  Respiratory: CTA B/L; no W/R/R  Gastrointestinal: obese, soft, NT/ND; +BSx4  Extremities: WWP; thick LE 2/2 body habitus, trace b/l LE edema, no clubbing or cyanosis; TTP along R achilles tendon, no bony tenderness or pinpoint tenderness  Vascular: 2+ radial, DP/PT pulses B/L  Neurological: AAOx3; no focal deficits    MEDICATIONS:  MEDICATIONS  (STANDING):  aspirin enteric coated 81 milliGRAM(s) Oral daily  atorvastatin 20 milliGRAM(s) Oral at bedtime  carvedilol 25 milliGRAM(s) Oral every 12 hours  colchicine 0.6 milliGRAM(s) Oral every 12 hours  finasteride 5 milliGRAM(s) Oral daily  heparin  Infusion 1500 Unit(s)/Hr (15 mL/Hr) IV Continuous <Continuous>  lisinopril 2.5 milliGRAM(s) Oral daily  quiNIDine gluconate 324 milliGRAM(s) Oral every 8 hours  tamsulosin 0.4 milliGRAM(s) Oral at bedtime    MEDICATIONS  (PRN):  acetaminophen   Tablet. 650 milliGRAM(s) Oral every 6 hours PRN Moderate Pain (4 - 6)      ALLERGIES:  No Known Allergies      LABS:                                   12.0   9.0   )-----------( 197      ( 14 Nov 2017 04:03 )             36.8     11-14    139  |  101  |  14  ----------------------------<  91  4.2   |  25  |  0.91    Ca    8.8      14 Nov 2017 04:03  Mg     2.2     11-14 11-12-17 @ 07:01  -  11-13-17 @ 07:00  --------------------------------------------------------  IN: 898.5 mL / OUT: 2475 mL / NET: -1576.5 mL    11-13-17 @ 07:01  - 11-14-17 @ 06:26  --------------------------------------------------------  IN: 1260 mL / OUT: 575 mL / NET: 685 mL      RADIOLOGY & ADDITIONAL TESTS: Reviewed.    ECHO: There is mild concentric LVH. Abnormal (paradoxical) septal motion consistent with abnormal conduction. There is basal inferior wall hypokinesis. There is mid inferior wall hypokinesis. There is apical septal wall hypokinesis. There is apical hypokinesis. The left ventricular ejection fraction is mild to moderately reduced. The left ventricular ejection fraction is estimated to be 35-40%The left atrium is dilated. The mitral inflow pattern is consistent with impaired left ventricular relaxation with mildly elevated left atrial pressure (8-14mmHg).  Right atrial size is normal. The right ventricle is normal in size and function. There is a pacemaker wire in the right heart. There is mild to moderate aortic regurgitation. No hemodynamically significant valvular aortic stenosis. There is trace to mild mitral regurgitation. There is mild tricuspid regurgitation. There is mild pulmonary hypertension. The pulmonary artery systolic pressure is estimated to be 41 mmHg. There is moderate to severe pulmonic regurgitation. The aortic root is dilated. The aortic root measures 4.2 cm at sinuses (normal less than 4 cm for men, less than 3.6 cm for women).  Normal size aortic arch with no hemodynamic evidence for coarctation. The IVC is dilated (>2.1 cm) with an abnormal inspiratory collapse (<50%) consistent with elevated right atrial pressure. There is no pericardial effusion. PGY1 PROGRESS NOTE:    HOSPITAL COURSE:   72yM with PMH of recurrent VT s/p 3 ablations (9/2016, 5/2017, 10/2017) and AICD, CAD w MATTY to LAD (6/10/16) and pLAD (6/16/016), HFrEF (EF <35%), DVT on Eliquis, DANI on CPAP, pre-DM, BPH, OA, HTN, recurrent UTI who is a transfer from [facility] for VT.  Patient initially presented to [facility] on 11/7/2017 with lightheadedness, dizziness, and burning sensation in chest radiating to his neck and head. At [facility] ED, patient had recurrent runs of VT, was given ASA 324mg, Amiodarone 150mg, and Sotalol 120mg. He continued to have brief runs of VT and was started on amiodarone drip eventually transitioned to carvedilol and sotalol.  AICD had 92 episodes of VT with ATP but with no shocks delivered. On 11/9 had recurrent NSVT that broke with therapeutic ATP but then had sustained VT at HR 150s that failed ATPx3 and resulted in a therapeutic ICD shock x1 to NSR. Patient Amiodarone loaded and started on Amio gtt. Patient was transferred to [facility] for possible ablation with Dr. Neal. Patient was brought directly to [facility] CCU. Tele showing NSR with frequent VT episodes.  Patient started on Amio gtt and continued on Carvedilol 25mg BID, Lisinopril 2.5mg, Coreg 25 BID, ASA 81mg and Atorvastatin 20mg qhs. Also started on heparin gtt rather than home Eliquis for possible EP procedure. Patient was seen by EP and Amio gtt transitioned to Quinidine with improvement in number of VT episodes. No plan for ablation at this moment, pending success of medical management. Patient continued on Heparin gtt for now. Patient HDS and ready for stepdown to tele unit. Plan for stress test tomorrow (11/15), NPO after midnight.     OVERNIGHT EVENTS: 3beats of NSVT. No acute overnight events.    SUBJECTIVE / INTERVAL HPI: Patient seen and examined at bedside. Without complaints. Denies palpitations, shocks, dizziness, lightheadedness, chest pain, SOB, fevers, chills, abdominal pain, n/v/d/c.    VITAL SIGNS:  Vital Signs Last 24 Hrs  T(C): 36.6 (14 Nov 2017 06:00), Max: 37.3 (13 Nov 2017 09:00)  T(F): 97.8 (14 Nov 2017 06:00), Max: 99.1 (13 Nov 2017 09:00)  HR: 68 (14 Nov 2017 06:00) (68 - 72)  BP: 109/71 (14 Nov 2017 06:00) (95/76 - 144/91)  BP(mean): 87 (14 Nov 2017 06:00) (82 - 114)  RR: 21 (14 Nov 2017 06:00) (16 - 35)  SpO2: 95% (14 Nov 2017 06:00) (92% - 97%)    PHYSICAL EXAM:  General: obese, resting comfortable in bed, NAD  HEENT: NC/AT; PERRL, anicteric sclera; MMM  Neck: supple, no JVD  Cardiovascular: +S1/S2, RRR, no MRG  Respiratory: CTA B/L; no W/R/R  Gastrointestinal: obese, soft, NT/ND; +BSx4  Extremities: WWP; thick LE 2/2 body habitus, trace b/l LE edema, no clubbing or cyanosis; TTP along R achilles tendon, no bony tenderness or pinpoint tenderness  Vascular: 2+ radial, DP/PT pulses B/L  Neurological: AAOx3; no focal deficits    MEDICATIONS:  MEDICATIONS  (STANDING):  aspirin enteric coated 81 milliGRAM(s) Oral daily  atorvastatin 20 milliGRAM(s) Oral at bedtime  carvedilol 25 milliGRAM(s) Oral every 12 hours  colchicine 0.6 milliGRAM(s) Oral every 12 hours  finasteride 5 milliGRAM(s) Oral daily  heparin  Infusion 1500 Unit(s)/Hr (15 mL/Hr) IV Continuous <Continuous>  lisinopril 2.5 milliGRAM(s) Oral daily  quiNIDine gluconate 324 milliGRAM(s) Oral every 8 hours  tamsulosin 0.4 milliGRAM(s) Oral at bedtime    MEDICATIONS  (PRN):  acetaminophen   Tablet. 650 milliGRAM(s) Oral every 6 hours PRN Moderate Pain (4 - 6)      ALLERGIES:  No Known Allergies      LABS:                                   12.0   9.0   )-----------( 197      ( 14 Nov 2017 04:03 )             36.8     11-14    139  |  101  |  14  ----------------------------<  91  4.2   |  25  |  0.91    Ca    8.8      14 Nov 2017 04:03  Mg     2.2     11-14 11-12-17 @ 07:01  -  11-13-17 @ 07:00  --------------------------------------------------------  IN: 898.5 mL / OUT: 2475 mL / NET: -1576.5 mL    11-13-17 @ 07:01  -  11-14-17 @ 06:26  --------------------------------------------------------  IN: 1260 mL / OUT: 575 mL / NET: 685 mL      RADIOLOGY & ADDITIONAL TESTS: Reviewed.    ECHO: There is mild concentric LVH. Abnormal (paradoxical) septal motion consistent with abnormal conduction. There is basal inferior wall hypokinesis. There is mid inferior wall hypokinesis. There is apical septal wall hypokinesis. There is apical hypokinesis. The left ventricular ejection fraction is mild to moderately reduced. The left ventricular ejection fraction is estimated to be 35-40%The left atrium is dilated. The mitral inflow pattern is consistent with impaired left ventricular relaxation with mildly elevated left atrial pressure (8-14mmHg).  Right atrial size is normal. The right ventricle is normal in size and function. There is a pacemaker wire in the right heart. There is mild to moderate aortic regurgitation. No hemodynamically significant valvular aortic stenosis. There is trace to mild mitral regurgitation. There is mild tricuspid regurgitation. There is mild pulmonary hypertension. The pulmonary artery systolic pressure is estimated to be 41 mmHg. There is moderate to severe pulmonic regurgitation. The aortic root is dilated. The aortic root measures 4.2 cm at sinuses (normal less than 4 cm for men, less than 3.6 cm for women).  Normal size aortic arch with no hemodynamic evidence for coarctation. The IVC is dilated (>2.1 cm) with an abnormal inspiratory collapse (<50%) consistent with elevated right atrial pressure. There is no pericardial effusion. PGY1 PROGRESS NOTE:    HOSPITAL COURSE:   72yM with PMH of recurrent VT s/p 3 ablations (9/2016, 5/2017, 10/2017) and AICD, CAD w MATTY to LAD (6/10/16) and pLAD (6/16/016), HFrEF (EF <35%), DVT on Eliquis, DANI on CPAP, pre-DM, BPH, OA, HTN, recurrent UTI who is a transfer from [facility] for VT.  Patient initially presented to [facility] on 11/7/2017 with lightheadedness, dizziness, and burning sensation in chest radiating to his neck and head. At [facility] ED, patient had recurrent runs of VT, was given ASA 324mg, Amiodarone 150mg, and Sotalol 120mg. He continued to have brief runs of VT and was started on amiodarone drip eventually transitioned to carvedilol and sotalol.  AICD had 92 episodes of VT with ATP but with no shocks delivered. On 11/9 had recurrent NSVT that broke with therapeutic ATP but then had sustained VT at HR 150s that failed ATPx3 and resulted in a therapeutic ICD shock x1 to NSR. Patient Amiodarone loaded and started on Amio gtt. Patient was transferred to [facility] for possible ablation with Dr. Neal. Patient was brought directly to [facility] CCU. Tele showing NSR with frequent VT episodes.  Patient started on Amio gtt and continued on Carvedilol 25mg BID, Lisinopril 2.5mg, Coreg 25 BID, ASA 81mg and Atorvastatin 20mg qhs. Also started on heparin gtt rather than home Eliquis for possible EP procedure. Patient was seen by EP and Amio gtt transitioned to Quinidine with improvement in number of VT episodes. No plan for ablation at this moment, pending success of medical management. Patient continued on Heparin gtt for now. Patient HDS and ready for stepdown to tele unit. Plan for stress test tomorrow (11/15), NPO after midnight. Call CCU fellow or consult fellow prior to stress test as patient high risk for VT.    OVERNIGHT EVENTS: 3beats of NSVT. No acute overnight events.    SUBJECTIVE / INTERVAL HPI: Patient seen and examined at bedside. Without complaints. Denies palpitations, shocks, dizziness, lightheadedness, chest pain, SOB, fevers, chills, abdominal pain, n/v/d/c.    VITAL SIGNS:  Vital Signs Last 24 Hrs  T(C): 36.6 (14 Nov 2017 06:00), Max: 37.3 (13 Nov 2017 09:00)  T(F): 97.8 (14 Nov 2017 06:00), Max: 99.1 (13 Nov 2017 09:00)  HR: 68 (14 Nov 2017 06:00) (68 - 72)  BP: 109/71 (14 Nov 2017 06:00) (95/76 - 144/91)  BP(mean): 87 (14 Nov 2017 06:00) (82 - 114)  RR: 21 (14 Nov 2017 06:00) (16 - 35)  SpO2: 95% (14 Nov 2017 06:00) (92% - 97%)    PHYSICAL EXAM:  General: obese, resting comfortable in bed, NAD  HEENT: NC/AT; PERRL, anicteric sclera; MMM  Neck: supple, no JVD  Cardiovascular: +S1/S2, RRR, no MRG  Respiratory: CTA B/L; no W/R/R  Gastrointestinal: obese, soft, NT/ND; +BSx4  Extremities: WWP; thick LE 2/2 body habitus, trace b/l LE edema, no clubbing or cyanosis; TTP along R achilles tendon, no bony tenderness or pinpoint tenderness  Vascular: 2+ radial, DP/PT pulses B/L  Neurological: AAOx3; no focal deficits    MEDICATIONS:  MEDICATIONS  (STANDING):  aspirin enteric coated 81 milliGRAM(s) Oral daily  atorvastatin 20 milliGRAM(s) Oral at bedtime  carvedilol 25 milliGRAM(s) Oral every 12 hours  colchicine 0.6 milliGRAM(s) Oral every 12 hours  finasteride 5 milliGRAM(s) Oral daily  heparin  Infusion 1500 Unit(s)/Hr (15 mL/Hr) IV Continuous <Continuous>  lisinopril 2.5 milliGRAM(s) Oral daily  quiNIDine gluconate 324 milliGRAM(s) Oral every 8 hours  tamsulosin 0.4 milliGRAM(s) Oral at bedtime    MEDICATIONS  (PRN):  acetaminophen   Tablet. 650 milliGRAM(s) Oral every 6 hours PRN Moderate Pain (4 - 6)      ALLERGIES:  No Known Allergies      LABS:                                   12.0   9.0   )-----------( 197      ( 14 Nov 2017 04:03 )             36.8     11-14    139  |  101  |  14  ----------------------------<  91  4.2   |  25  |  0.91    Ca    8.8      14 Nov 2017 04:03  Mg     2.2     11-14 11-12-17 @ 07:01  -  11-13-17 @ 07:00  --------------------------------------------------------  IN: 898.5 mL / OUT: 2475 mL / NET: -1576.5 mL    11-13-17 @ 07:01  -  11-14-17 @ 06:26  --------------------------------------------------------  IN: 1260 mL / OUT: 575 mL / NET: 685 mL      RADIOLOGY & ADDITIONAL TESTS: Reviewed.    ECHO: There is mild concentric LVH. Abnormal (paradoxical) septal motion consistent with abnormal conduction. There is basal inferior wall hypokinesis. There is mid inferior wall hypokinesis. There is apical septal wall hypokinesis. There is apical hypokinesis. The left ventricular ejection fraction is mild to moderately reduced. The left ventricular ejection fraction is estimated to be 35-40%The left atrium is dilated. The mitral inflow pattern is consistent with impaired left ventricular relaxation with mildly elevated left atrial pressure (8-14mmHg).  Right atrial size is normal. The right ventricle is normal in size and function. There is a pacemaker wire in the right heart. There is mild to moderate aortic regurgitation. No hemodynamically significant valvular aortic stenosis. There is trace to mild mitral regurgitation. There is mild tricuspid regurgitation. There is mild pulmonary hypertension. The pulmonary artery systolic pressure is estimated to be 41 mmHg. There is moderate to severe pulmonic regurgitation. The aortic root is dilated. The aortic root measures 4.2 cm at sinuses (normal less than 4 cm for men, less than 3.6 cm for women).  Normal size aortic arch with no hemodynamic evidence for coarctation. The IVC is dilated (>2.1 cm) with an abnormal inspiratory collapse (<50%) consistent with elevated right atrial pressure. There is no pericardial effusion.

## 2017-11-14 NOTE — PROGRESS NOTE ADULT - PROBLEM SELECTOR PLAN 1
Hx of VT s/p 3 x ablation Hx of VT s/p 3 x ablation. Patient initially admitted to [facility] for which patient had 92 episodes of VT and 1x shock. Patient transferred to [facility] CCU for medical management s/p amio gtt and s/p 3 x shock. Patient currently on quinidine 324mg q8h. ICD has been set to ATP 6 times before shocking.   -Continue with management with quinidine 324mg q8h, carvedilol 25mg PO BID  - Plan for ischemic work up. NPO aftermidnight for stress test.   -Patient was walked today for which did not induce VT.   - F/u EP recs for potential ablation, management.   -continue to replete lytes aggressively with K at 4.5 and Mg>2.5.   -F/u AM EKG.

## 2017-11-14 NOTE — PROGRESS NOTE ADULT - SUBJECTIVE AND OBJECTIVE BOX
EPS Progress Note    S: OOB ambulating, no c/o of palpitations, no chest pain     MEDICATIONS  (STANDING):  aspirin enteric coated 81 milliGRAM(s) Oral daily  atorvastatin 20 milliGRAM(s) Oral at bedtime  carvedilol 25 milliGRAM(s) Oral every 12 hours  colchicine 0.6 milliGRAM(s) Oral every 12 hours  finasteride 5 milliGRAM(s) Oral daily  heparin  Infusion 1500 Unit(s)/Hr (15 mL/Hr) IV Continuous <Continuous>  lisinopril 2.5 milliGRAM(s) Oral daily  quiNIDine gluconate 324 milliGRAM(s) Oral every 8 hours  tamsulosin 0.4 milliGRAM(s) Oral at bedtime      Telemetry: sinus rhythm           General:  NAD         Chest:  CTA B/L         Cardiac:  RRR      s1/s2     Abdomen:  +BS      Soft      Non-Tender      Non-Distended     Extremities: no edema      Labs:                                                               12.0   9.0   )-----------( 197      ( 14 Nov 2017 04:03 )             36.8     11-14    136  |  99  |  14  ----------------------------<  132<H>  4.0   |  24  |  0.89    Ca    9.1      14 Nov 2017 10:46  Mg     2.3     11-14      PTT - ( 14 Nov 2017 10:46 )  PTT:73.0 sec    Assessment/Plan:  71yo M with pmh of recurrent VT s/p 3 ablations (9/2016, 5/2017, 10/2017), CAD w MATTY to LAD (6/10/16), stent to pLAD (6/16/016), ICD, DVT on Eliquis CHF with EF<35%, DANI on CPAP, Pre DM, BPH, OA, HTN, recurrent UTI who is a transfer from [facility] for ventricular tachycardia.   Patient was started on Quinidine, no VT episodes on telemetry, plan for exercise stress test tomorrow for evaluate for arrythmia inducibility.

## 2017-11-14 NOTE — PROGRESS NOTE ADULT - SUBJECTIVE AND OBJECTIVE BOX
TRANSFER ACCEPTANCE  CCU to [address] [place] [address] Course  72yM with PMH significant for VT s/p 3 x ablation in past (9/2016, 5/2017, 10/2017) with AICD, CAD s/p MATTY to LAD and pLAD in 2016, HFrEF (EF <35%), Hx of DVT on Eliquis, DANI on CPAP, pre-DM, BPH, OA, HTN, recurrent UTI transferred from [facility] for VT.  Patient initially presented on 11/7/17 with complaints of lightheadedness, dizziness, and burning chest pain with radiation to his neck and head. Patient had recurrent runs of VT, was given ASA 324mg, Amiodarone 150mg, and Sotalol 120mg. He continued to have brief runs of VT and initiated on amiodarone drip with transition to carvedilol and sotalol.  Interrogation of ACID demonstrated 92 episodes of VT with ATP but with no shocks delivered. On 11/9 had recurrent NSVT that broke with therapeutic ATP but then had sustained VT at HR 150s that failed ATPx3 for which resulted in ICD shock and patient converted to NSR. Patient again amio loaded and started on amiodarone gtt. Patient was transferred to [facility] and admitted to CCU for cardiac telmetry monitoring in setting of VT for potential ablation with Dr. Neal. Telemetry monitoring during course demonstrated NSR with frequent runs of VT.  Patient medically managed with Amio gtt as well as Carvedilol 25mg BID, Lisinopril 2.5mg, Coreg 25 BID, ASA 81mg and Atorvastatin 20mg qhs. Patient switched from home eliquis to heparin gtt. Patient evaluated by EP for which recommended for transition from Amio  to Quinidine with improvement in number of VT episodes. Ablation pending optimization of medical managment. Patient determined stable for step down to [facility] for further cardiac telemetry monitoring. Patient is planned to undergo ischemic evaluation with stress test tomorrow and will be NPO after midnight. Plan to call the CCU fellow or consult fellow prior to stress test as patient high risk for VT.    Interval History: Patient reports being nervous about episodes of VT as he does not want to go home and be shocked again. Patient states usually he may get dizzy with VT and dyspnea. During exam patient had episode of 8 beats of VT for which patient reports being asymptomatic-talking comfortably during episode without respiratory distress or altered mental status.   ROS: Denies current chest pain, shortness of breath, headaches, dizziness, abdominal pain, nausea, vomiting.     OBJECTIVE  PHYSICAL EXAM:  T(C): 37.1 (11-14-17 @ 18:26), Max: 37.2 (11-14-17 @ 02:15)  HR: 68 (11-14-17 @ 18:00) (68 - 70)  BP: 120/80 (11-14-17 @ 18:00) (97/69 - 144/91)  RR: 20 (11-14-17 @ 18:00) (16 - 23)  SpO2: 95% (11-14-17 @ 18:00) (92% - 96%)  Wt(kg): --    I&O's Summary    13 Nov 2017 07:01  -  14 Nov 2017 07:00  --------------------------------------------------------  IN: 1275 mL / OUT: 575 mL / NET: 700 mL    14 Nov 2017 07:01  -  14 Nov 2017 18:48  --------------------------------------------------------  IN: 830 mL / OUT: 1100 mL / NET: -270 mL        Appearance: NAD. No respiratory distress. Speaking in full sentences.   HEENT:   Conjunctiva clear b/l. Moist oral mucosa.  Neck: No JVD noted.  Cardiovascular: RRR with no murmurs.  Respiratory: Lungs CTAB.   Gastrointestinal:  Soft, nontender. Non-distended. Non-rigid.	  Extremities: No edema b/l. No erythema b/l. Some varicose veins noted. LE WWP b/l.  Vascular: DP 2+ b/l.  Neurologic:  Alert and awake oriented x3. Moving all extremities. Following commands. Making eye contact.  	  LABS:                        12.0   9.0   )-----------( 197      ( 14 Nov 2017 04:03 )             36.8     11-14    136  |  99  |  14  ----------------------------<  132<H>  4.0   |  24  |  0.89    Ca    9.1      14 Nov 2017 10:46  Mg     2.3     11-14      PTT - ( 14 Nov 2017 10:46 )  PTT:73.0 sec      RADIOLOGY & ADDITIONAL TESTS:  Reviewed by myself and with medical team.    MEDICATIONS  (STANDING):  aspirin enteric coated 81 milliGRAM(s) Oral daily  atorvastatin 20 milliGRAM(s) Oral at bedtime  carvedilol 25 milliGRAM(s) Oral every 12 hours  colchicine 0.6 milliGRAM(s) Oral every 12 hours  finasteride 5 milliGRAM(s) Oral daily  heparin  Infusion 1500 Unit(s)/Hr (15 mL/Hr) IV Continuous <Continuous>  lisinopril 2.5 milliGRAM(s) Oral daily  quiNIDine gluconate 324 milliGRAM(s) Oral every 8 hours  tamsulosin 0.4 milliGRAM(s) Oral at bedtime    MEDICATIONS  (PRN):  acetaminophen   Tablet. 650 milliGRAM(s) Oral every 6 hours PRN Moderate Pain (4 - 6)      ASSESSMENT/PLAN:

## 2017-11-14 NOTE — PROGRESS NOTE ADULT - PROBLEM SELECTOR PLAN 3
- Patient with known AF on Eliquis 5mg BID  - Patient currently NSR  - c/w heparin gtt; adjust according to PTT

## 2017-11-14 NOTE — PROGRESS NOTE ADULT - PROBLEM SELECTOR PLAN 7
F - No IVF,  replete lytes with goals of K>4.5 Mag>2.5, DASH diet and NPO aftermidnight.    DVT ppx: on hep gtt    Code: Full Code  Dispo: Stepped down from CCU to [facility] for further cardiac telemetry monitoring for VT undergoing medical optimization pending ischemic work up and potential ablation.

## 2017-11-14 NOTE — DIETITIAN INITIAL EVALUATION ADULT. - OTHER INFO
71 y/o male admitted with vent.Tachycardia.Plan for stress test.Eating 100%.No N/V/D or pain.Skin intact.Inconsistent cardiac diet compliance noted.Noted obesity

## 2017-11-14 NOTE — PROGRESS NOTE ADULT - PROBLEM SELECTOR PLAN 4
- Patient with h/o CAD s/p stent in 2016 to LAD. Recurrent VT storm may be secondary to ischemic insult. Negative tropes at [facility].  - consider PCI to evaluate ischemia, discuss with EP  - continue ASA 81mg daily, Coreg 25mg BID, Lisinopril 2.5mg daily and Atorvastatin 20mg qHs  - daily EKGs

## 2017-11-15 DIAGNOSIS — I82.409 ACUTE EMBOLISM AND THROMBOSIS OF UNSPECIFIED DEEP VEINS OF UNSPECIFIED LOWER EXTREMITY: ICD-10-CM

## 2017-11-15 LAB
ANION GAP SERPL CALC-SCNC: 12 MMOL/L — SIGNIFICANT CHANGE UP (ref 5–17)
APTT BLD: 74.8 SEC — HIGH (ref 27.5–37.4)
BUN SERPL-MCNC: 18 MG/DL — SIGNIFICANT CHANGE UP (ref 7–23)
CALCIUM SERPL-MCNC: 9.1 MG/DL — SIGNIFICANT CHANGE UP (ref 8.4–10.5)
CHLORIDE SERPL-SCNC: 99 MMOL/L — SIGNIFICANT CHANGE UP (ref 96–108)
CO2 SERPL-SCNC: 25 MMOL/L — SIGNIFICANT CHANGE UP (ref 22–31)
CREAT SERPL-MCNC: 1.02 MG/DL — SIGNIFICANT CHANGE UP (ref 0.5–1.3)
GLUCOSE BLDC GLUCOMTR-MCNC: 89 MG/DL — SIGNIFICANT CHANGE UP (ref 70–99)
GLUCOSE SERPL-MCNC: 104 MG/DL — HIGH (ref 70–99)
HCT VFR BLD CALC: 38.9 % — LOW (ref 39–50)
HGB BLD-MCNC: 12.5 G/DL — LOW (ref 13–17)
MAGNESIUM SERPL-MCNC: 2.3 MG/DL — SIGNIFICANT CHANGE UP (ref 1.6–2.6)
MCHC RBC-ENTMCNC: 29.4 PG — SIGNIFICANT CHANGE UP (ref 27–34)
MCHC RBC-ENTMCNC: 32.1 G/DL — SIGNIFICANT CHANGE UP (ref 32–36)
MCV RBC AUTO: 91.5 FL — SIGNIFICANT CHANGE UP (ref 80–100)
PLATELET # BLD AUTO: 233 K/UL — SIGNIFICANT CHANGE UP (ref 150–400)
POTASSIUM SERPL-MCNC: 3.7 MMOL/L — SIGNIFICANT CHANGE UP (ref 3.5–5.3)
POTASSIUM SERPL-SCNC: 3.7 MMOL/L — SIGNIFICANT CHANGE UP (ref 3.5–5.3)
RBC # BLD: 4.25 M/UL — SIGNIFICANT CHANGE UP (ref 4.2–5.8)
RBC # FLD: 14.2 % — SIGNIFICANT CHANGE UP (ref 10.3–16.9)
SODIUM SERPL-SCNC: 136 MMOL/L — SIGNIFICANT CHANGE UP (ref 135–145)
TROPONIN T SERPL-MCNC: <0.01 NG/ML — SIGNIFICANT CHANGE UP (ref 0–0.01)
WBC # BLD: 8.7 K/UL — SIGNIFICANT CHANGE UP (ref 3.8–10.5)
WBC # FLD AUTO: 8.7 K/UL — SIGNIFICANT CHANGE UP (ref 3.8–10.5)

## 2017-11-15 PROCEDURE — 71010: CPT | Mod: 26

## 2017-11-15 PROCEDURE — 99233 SBSQ HOSP IP/OBS HIGH 50: CPT

## 2017-11-15 PROCEDURE — 71035: CPT | Mod: 26

## 2017-11-15 PROCEDURE — 93010 ELECTROCARDIOGRAM REPORT: CPT

## 2017-11-15 PROCEDURE — 93016 CV STRESS TEST SUPVJ ONLY: CPT

## 2017-11-15 PROCEDURE — 93018 CV STRESS TEST I&R ONLY: CPT

## 2017-11-15 RX ORDER — POTASSIUM CHLORIDE 20 MEQ
40 PACKET (EA) ORAL EVERY 4 HOURS
Qty: 0 | Refills: 0 | Status: COMPLETED | OUTPATIENT
Start: 2017-11-15 | End: 2017-11-15

## 2017-11-15 RX ADMIN — TAMSULOSIN HYDROCHLORIDE 0.4 MILLIGRAM(S): 0.4 CAPSULE ORAL at 21:25

## 2017-11-15 RX ADMIN — Medication 324 MILLIGRAM(S): at 15:49

## 2017-11-15 RX ADMIN — CARVEDILOL PHOSPHATE 25 MILLIGRAM(S): 80 CAPSULE, EXTENDED RELEASE ORAL at 06:04

## 2017-11-15 RX ADMIN — HEPARIN SODIUM 15 UNIT(S)/HR: 5000 INJECTION INTRAVENOUS; SUBCUTANEOUS at 03:44

## 2017-11-15 RX ADMIN — Medication 0.6 MILLIGRAM(S): at 10:23

## 2017-11-15 RX ADMIN — Medication 81 MILLIGRAM(S): at 10:24

## 2017-11-15 RX ADMIN — LISINOPRIL 2.5 MILLIGRAM(S): 2.5 TABLET ORAL at 06:04

## 2017-11-15 RX ADMIN — FINASTERIDE 5 MILLIGRAM(S): 5 TABLET, FILM COATED ORAL at 10:28

## 2017-11-15 RX ADMIN — Medication 40 MILLIEQUIVALENT(S): at 18:44

## 2017-11-15 RX ADMIN — ATORVASTATIN CALCIUM 20 MILLIGRAM(S): 80 TABLET, FILM COATED ORAL at 21:25

## 2017-11-15 RX ADMIN — Medication 324 MILLIGRAM(S): at 06:04

## 2017-11-15 RX ADMIN — Medication 40 MILLIEQUIVALENT(S): at 15:48

## 2017-11-15 RX ADMIN — CARVEDILOL PHOSPHATE 25 MILLIGRAM(S): 80 CAPSULE, EXTENDED RELEASE ORAL at 18:44

## 2017-11-15 NOTE — PROGRESS NOTE ADULT - PROBLEM SELECTOR PLAN 2
Past echo demonstrated EF 30-35% with LVH apical akinesis with apical septal wall akinesis and appical inferior wall akinesis. Repeat echo demonstrated EF 35-40% with inferior and apical hypokinesis.   -Plan for stress test in AM for ischemic work up.   -C/w lasix 40 IVP as needed. GIven 1x this AM. Lungs clear this evening.   - Strict I/O, daily weights with fluid restriction. Hx CAD s/p MATTY to pLAD 6/2016 at [facility] 2/2 abn stress test. Past echo demonstrated EF 30-35% with LVH apical akinesis with apical septal wall akinesis and apical inferior wall akinesis.   -Repeat echo 11/11 demonstrated EF 35-40% with basal/mid inferior wall, apical septal, and apical hypokinesis. Diastolic dysfunction, mild-mod AI, mod-severe pulm regurg  -C/w lasix 40 IVP as needed. GIven 1x yesterday. Pt appears euvolemic on exam today.  -C/w ASA 81, Lipitor 20mg qd, Coreg 25mg BID, Lisinopril 2.5mg qd  - Strict I/O, daily weights with fluid restriction. Hx CAD s/p MATTY to 99% mLAD 6/2016 at [facility] 2/2 abn stress test. Hx chronic systolic heart failure. Past echo demonstrated EF 30-35% with LVH apical akinesis with apical septal wall akinesis and apical inferior wall akinesis.   -Repeat echo 11/11 demonstrated EF 35-40% with basal/mid inferior wall, apical septal, and apical hypokinesis. Diastolic dysfunction, mild-mod AI, mod-severe pulm regurg  -C/w lasix 40 IVP as needed. GIven 1x yesterday. Pt appears euvolemic on exam today, does not appear to be in acute CHF exacerbation.  -C/w ASA 81, Lipitor 20mg qd, Coreg 25mg BID, Lisinopril 2.5mg qd  - Strict I/O, daily weights with fluid restriction.

## 2017-11-15 NOTE — PROGRESS NOTE ADULT - PROBLEM SELECTOR PLAN 7
F - No IVF,  replete lytes with goals of K>4.5 Mag>2.5, DASH diet and NPO aftermidnight.    DVT ppx: on hep gtt    Code: Full Code  Dispo: Stepped down from CCU to [facility] for further cardiac telemetry monitoring for VT undergoing medical optimization pending ischemic work up and potential ablation. F - No IVF,  replete lytes with goals of K>4.5 Mag>2.5, DASH diet and NPO after midnight.    DVT ppx: on hep gtt    Code: Full Code  Dispo: Stepped down from CCU to [facility] for further cardiac telemetry monitoring for VT undergoing medical optimization pending ischemic work up and potential ablation. F: No IVF, replete lytes with goals of K>4.5 Mag>2.5,   N: DASH/TLC diet and NPO after midnight  C: FULL CODE  P: DVT ppx: on hep gtt    Dispo: remain on tele pending VT ablation and clinical progress

## 2017-11-15 NOTE — PROGRESS NOTE ADULT - PROBLEM SELECTOR PLAN 1
Hx of VT s/p 3 x ablation. Patient initially admitted to [facility] for which patient had 92 episodes of VT and 1x shock. Patient transferred to [facility] CCU for medical management s/p amio gtt and s/p 3 x shock. Patient currently on quinidine 324mg q8h. ICD has been set to ATP 6 times before shocking.   -Continue with management with quinidine 324mg q8h, carvedilol 25mg PO BID  - Plan for ischemic work up. NPO aftermidnight for stress test.   -Patient was walked today for which did not induce VT.   - F/u EP recs for potential ablation, management.   -continue to replete lytes aggressively with K at 4.5 and Mg>2.5.   -F/u AM EKG. Hx of VT s/p 3 x ablation. Patient initially admitted to [facility] for which patient had 92 episodes of VT and 1x shock. Patient transferred to [facility] CCU for medical management s/p amio gtt and s/p 3 x shock. Patient currently on quinidine 324mg q8h. ICD has been set to ATP 6 times before shocking.   -Continue with management with quinidine 324mg q8h, carvedilol 25mg PO BID  - Pending exercise EKG stress test today to evaluate for arrhythmia inducibility and possible map trigger site of VT for possible ablation.  -Patient was ambulated in CCU w/o episodes of VT.   - F/u EP recs for potential ablation, management.   -continue to replete lytes aggressively with K at 4.5 and Mg>2.5.   -F/u AM EKG, monitor QTc Hx of VT s/p 3 x ablation. Patient initially admitted to [facility] for which patient had 92 episodes of VT and 1x shock. Patient transferred to [facility] CCU for medical management s/p amio gtt and s/p 3 x shock. Patient currently on quinidine 324mg q8h. ICD has been set to ATP 6 times before shocking.   -Continue with management with quinidine 324mg q8h, carvedilol 25mg PO BID  - Exercise EKG stress test performed to evaluate for arrhythmia inducibility. VT induced w/ HR of 102 developed 1:50 minutes into exercise and was terminated by overdrive pacing from ICD. During early recovery a brief run of ventricular bigeminy was noted.   -Patient was ambulated in CCU w/o episodes of VT.   - F/u EP recs for epicardial VT ablation tomorrow  -continue to replete lytes aggressively with K at 4.5 and Mg>2.5.   -F/u AM EKG, QTc prolonged to 511ms. D/c Quinidine today per EP recs.

## 2017-11-15 NOTE — PROGRESS NOTE ADULT - ASSESSMENT
71yo M with pmh of recurrent VT s/p 3x ablation (9/2016, 5/2017, 10/2017), CAD w MATTY to LAD and pLAD 2016, ICD, DVT on Eliquis, CHF with EF<35%, DANI on CPAP, Pre DM, BPH, OA, HTN, recurrent UTI transferred from [facility] to CCU for ventricular tachycardia for medical optimization and pending ablation. Patient transf 71yo M with pmh of recurrent VT s/p 3x ablation (9/2016, 5/2017, 10/2017), CAD w MATTY to LAD and pLAD 2016, ICD, DVT on Eliquis, CHF with EF<35%, DANI on CPAP, Pre DM, BPH, OA, HTN, recurrent UTI transferred from [facility] to CCU for ventricular tachycardia for medical optimization and pending ablation, [facility]. 73yo M with pmh of recurrent VT s/p 3x ablation (9/2016, 5/2017, 10/2017), CAD w MATTY to LAD and pLAD 2016, ICD, DVT on Eliquis, chronic systolic CHF with EF<35%, DANI on CPAP, Pre DM, BPH, OA, HTN, recurrent UTI transferred from [facility] to CCU for ventricular tachycardia for medical optimization and pending ablation, stepped down to tele [facility] for further management.

## 2017-11-15 NOTE — PROGRESS NOTE ADULT - SUBJECTIVE AND OBJECTIVE BOX
EPS Progress Note    S: inn bed, nad ,was c/o dizziness during stress test.     MEDICATIONS  (STANDING):  aspirin enteric coated 81 milliGRAM(s) Oral daily  atorvastatin 20 milliGRAM(s) Oral at bedtime  carvedilol 25 milliGRAM(s) Oral every 12 hours  finasteride 5 milliGRAM(s) Oral daily  heparin  Infusion 1500 Unit(s)/Hr (15 mL/Hr) IV Continuous <Continuous>  lisinopril 2.5 milliGRAM(s) Oral daily  potassium chloride    Tablet ER 40 milliEquivalent(s) Oral every 4 hours  quiNIDine gluconate 324 milliGRAM(s) Oral every 8 hours  tamsulosin 0.4 milliGRAM(s) Oral at bedtime      Telemetry: SR, episode of VT, treated with ATP           General:  NAD         Chest:  CTA B/L         Cardiac:  RRR      s1/s2       Abdomen:  +BS      Soft      Non-Tender      Non-Distended       Extremities:  no edema       Labs:                                                               12.5   8.7   )-----------( 233      ( 15 Nov 2017 06:43 )             38.9     11-15    136  |  99  |  18  ----------------------------<  104<H>  3.7   |  25  |  1.02    Ca    9.1      15 Nov 2017 06:43  Mg     2.3     11-15      PTT - ( 15 Nov 2017 06:43 )  PTT:74.8 sec    Assessment/Plan:  73yo M with pmh of recurrent VT s/p 3 ablations (9/2016, 5/2017, 10/2017), CAD w MATTY to LAD (6/10/16), stent to pLAD (6/16/016), ICD, DVT on Eliquis CHF with EF<35%, DANI on CPAP, Pre DM, BPH, OA, HTN, recurrent UTI who is a transfer from [facility] for ventricular tachycardia.   Patient has exercise stress test, was inducible for VT after 2 minuets of exercise,  terminated with ATP, stress test was stopped.  Will plan for VT epicardial ablation tomorrow, stop quinidine, hold heparin in am, keep NPO after MN.

## 2017-11-15 NOTE — PROGRESS NOTE ADULT - PROBLEM SELECTOR PLAN 6
Hx of gout. Patient has been on colchicine and with episodes of R achilles tendon pain.  Currently stable.  -continue with colchicine 0.6 mg BID Hx of gout. Patient has been on colchicine last month and with episodes of R achilles tendon pain.  Currently stable no further episodes.  -D/c colchicine

## 2017-11-15 NOTE — PROGRESS NOTE ADULT - PROBLEM SELECTOR PROBLEM 4
Coronary artery disease involving native coronary artery of native heart without angina pectoris DVT (deep venous thrombosis)

## 2017-11-15 NOTE — PROGRESS NOTE ADULT - SUBJECTIVE AND OBJECTIVE BOX
CARDIOLOGY NP PROGRESS NOTE    Subjective: Pt seen and examined at bedside. Denies chest pain, sob, lightheadedness or dizziness.    Overnight Events: VT episode w/ ATP converting to SR 70s    TELEMETRY: SR 60s, episode of NSVT 12beats overnight, and sustained VT ~40sec w/ ATP performed converting to SR 70s    EKG:       VITAL SIGNS:  T(C): 36.3 (11-15-17 @ 06:13), Max: 37.3 (11-14-17 @ 22:00)  HR: 69 (11-15-17 @ 09:36) (68 - 69)  BP: 132/79 (11-15-17 @ 09:36) (97/69 - 135/78)  RR: 16 (11-15-17 @ 09:36) (16 - 22)  SpO2: 95% (11-15-17 @ 09:36) (92% - 96%)  Wt(kg): --    I&O's Summary    14 Nov 2017 07:01  -  15 Nov 2017 07:00  --------------------------------------------------------  IN: 1010 mL / OUT: 1700 mL / NET: -690 mL    15 Nov 2017 07:01  -  15 Nov 2017 11:17  --------------------------------------------------------  IN: 30 mL / OUT: 0 mL / NET: 30 mL          PHYSICAL EXAM:    General: A/ox 3, No acute Distress  Neck: Supple, NO JVD  Cardiac: S1 S2, No M/R/G  Pulmonary: CTA samreen, Breathing unlabored on RA, No Rhonchi/Rales/Wheezing  Abdomen: Soft, Non -tender, +BS x 4 quads  Extremities: No Rashes, No LE edema  Neuro: A/o x 3, No focal deficits          LABS:                          12.5   8.7   )-----------( 233      ( 15 Nov 2017 06:43 )             38.9                              11-15    136  |  99  |  18  ----------------------------<  104<H>  3.7   |  25  |  1.02    Ca    9.1      15 Nov 2017 06:43  Mg     2.3     11-15      PTT - ( 15 Nov 2017 06:43 )  PTT:74.8 sec  CAPILLARY BLOOD GLUCOSE        CARDIAC MARKERS ( 15 Nov 2017 06:43 )  x     / <0.01 ng/mL / x     / x     / x                No Known Allergies    MEDICATIONS  (STANDING):  aspirin enteric coated 81 milliGRAM(s) Oral daily  atorvastatin 20 milliGRAM(s) Oral at bedtime  carvedilol 25 milliGRAM(s) Oral every 12 hours  colchicine 0.6 milliGRAM(s) Oral every 12 hours  finasteride 5 milliGRAM(s) Oral daily  heparin  Infusion 1500 Unit(s)/Hr (15 mL/Hr) IV Continuous <Continuous>  lisinopril 2.5 milliGRAM(s) Oral daily  quiNIDine gluconate 324 milliGRAM(s) Oral every 8 hours  tamsulosin 0.4 milliGRAM(s) Oral at bedtime    MEDICATIONS  (PRN):  acetaminophen   Tablet. 650 milliGRAM(s) Oral every 6 hours PRN Moderate Pain (4 - 6)        DIAGNOSTIC TESTS: CARDIOLOGY NP PROGRESS NOTE    Subjective: Pt seen and examined at bedside. Denies chest pain, sob, lightheadedness or dizziness.    Overnight Events: VT episode w/ ATP converting to SR 70s    TELEMETRY: SR 60s, episode of NSVT 12beats overnight, and sustained VT ~40sec w/ ATP performed converting to SR 70s    EKG:       VITAL SIGNS:  T(C): 36.3 (11-15-17 @ 06:13), Max: 37.3 (11-14-17 @ 22:00)  HR: 69 (11-15-17 @ 09:36) (68 - 69)  BP: 132/79 (11-15-17 @ 09:36) (97/69 - 135/78)  RR: 16 (11-15-17 @ 09:36) (16 - 22)  SpO2: 95% (11-15-17 @ 09:36) (92% - 96%)  Wt(kg): --    I&O's Summary    14 Nov 2017 07:01  -  15 Nov 2017 07:00  --------------------------------------------------------  IN: 1010 mL / OUT: 1700 mL / NET: -690 mL    15 Nov 2017 07:01  -  15 Nov 2017 11:17  --------------------------------------------------------  IN: 30 mL / OUT: 0 mL / NET: 30 mL          PHYSICAL EXAM:    General: A/ox 3, No acute Distress  Neck: Supple, NO JVD  Cardiac: S1 S2, No M/R/G, old ICD site healed   Pulmonary: CTA samreen, Breathing unlabored on RA, No Rhonchi/Rales/Wheezing  Abdomen: Soft, Non -tender, +BS x 4 quads  Extremities: No Rashes, No LE edema  Neuro: A/o x 3, No focal deficits          LABS:                          12.5   8.7   )-----------( 233      ( 15 Nov 2017 06:43 )             38.9                              11-15    136  |  99  |  18  ----------------------------<  104<H>  3.7   |  25  |  1.02    Ca    9.1      15 Nov 2017 06:43  Mg     2.3     11-15      PTT - ( 15 Nov 2017 06:43 )  PTT:74.8 sec  CAPILLARY BLOOD GLUCOSE        CARDIAC MARKERS ( 15 Nov 2017 06:43 )  x     / <0.01 ng/mL / x     / x     / x                No Known Allergies    MEDICATIONS  (STANDING):  aspirin enteric coated 81 milliGRAM(s) Oral daily  atorvastatin 20 milliGRAM(s) Oral at bedtime  carvedilol 25 milliGRAM(s) Oral every 12 hours  colchicine 0.6 milliGRAM(s) Oral every 12 hours  finasteride 5 milliGRAM(s) Oral daily  heparin  Infusion 1500 Unit(s)/Hr (15 mL/Hr) IV Continuous <Continuous>  lisinopril 2.5 milliGRAM(s) Oral daily  quiNIDine gluconate 324 milliGRAM(s) Oral every 8 hours  tamsulosin 0.4 milliGRAM(s) Oral at bedtime    MEDICATIONS  (PRN):  acetaminophen   Tablet. 650 milliGRAM(s) Oral every 6 hours PRN Moderate Pain (4 - 6)        DIAGNOSTIC TESTS: CARDIOLOGY NP PROGRESS NOTE    Subjective: Pt seen and examined at bedside. Denies chest pain, sob, lightheadedness or dizziness.    Overnight Events: VT episode w/ ATP converting to SR 70s    TELEMETRY: SR 60s, episode of NSVT 12beats overnight, and sustained VT ~40sec w/ ATP performed converting to SR 70s    EKG: SR 70s w/ PVCs, no acute ischemic changes      VITAL SIGNS:  T(C): 36.3 (11-15-17 @ 06:13), Max: 37.3 (11-14-17 @ 22:00)  HR: 69 (11-15-17 @ 09:36) (68 - 69)  BP: 132/79 (11-15-17 @ 09:36) (97/69 - 135/78)  RR: 16 (11-15-17 @ 09:36) (16 - 22)  SpO2: 95% (11-15-17 @ 09:36) (92% - 96%)  Wt(kg): --    I&O's Summary    14 Nov 2017 07:01  -  15 Nov 2017 07:00  --------------------------------------------------------  IN: 1010 mL / OUT: 1700 mL / NET: -690 mL    15 Nov 2017 07:01  -  15 Nov 2017 11:17  --------------------------------------------------------  IN: 30 mL / OUT: 0 mL / NET: 30 mL          PHYSICAL EXAM:    General: A/ox 3, No acute Distress  Neck: Supple, NO JVD  Cardiac: S1 S2, No M/R/G, old ICD site healed   Pulmonary: CTA samreen, Breathing unlabored on RA, No Rhonchi/Rales/Wheezing  Abdomen: Soft, Non -tender, +BS x 4 quads  Extremities: No Rashes, No LE edema  Neuro: A/o x 3, No focal deficits          LABS:                          12.5   8.7   )-----------( 233      ( 15 Nov 2017 06:43 )             38.9                              11-15    136  |  99  |  18  ----------------------------<  104<H>  3.7   |  25  |  1.02    Ca    9.1      15 Nov 2017 06:43  Mg     2.3     11-15      PTT - ( 15 Nov 2017 06:43 )  PTT:74.8 sec  CAPILLARY BLOOD GLUCOSE        CARDIAC MARKERS ( 15 Nov 2017 06:43 )  x     / <0.01 ng/mL / x     / x     / x                No Known Allergies    MEDICATIONS  (STANDING):  aspirin enteric coated 81 milliGRAM(s) Oral daily  atorvastatin 20 milliGRAM(s) Oral at bedtime  carvedilol 25 milliGRAM(s) Oral every 12 hours  colchicine 0.6 milliGRAM(s) Oral every 12 hours  finasteride 5 milliGRAM(s) Oral daily  heparin  Infusion 1500 Unit(s)/Hr (15 mL/Hr) IV Continuous <Continuous>  lisinopril 2.5 milliGRAM(s) Oral daily  quiNIDine gluconate 324 milliGRAM(s) Oral every 8 hours  tamsulosin 0.4 milliGRAM(s) Oral at bedtime    MEDICATIONS  (PRN):  acetaminophen   Tablet. 650 milliGRAM(s) Oral every 6 hours PRN Moderate Pain (4 - 6)        DIAGNOSTIC TESTS:      EKG Stress Test (11.15.17 @ 14:39):  Arrhythmias:  Ventricular tachycardia at a rate of 102 developed 1:50   minutes into exercise and was terminated by overdrive pacing. During   early recovery a brief run of ventricular bigeminy was noted.                 Impression:  Normal, submaximal exercise treadmill stress test for   ischemia. Arrhythmias as noted above.

## 2017-11-15 NOTE — PROGRESS NOTE ADULT - PROBLEM SELECTOR PLAN 4
Hx of CAD with stenting in 2016 to LAD x2. Neg trops during admission to [facility]. Given recurrent VT patient to undergo ischemic work up.  -Plan for stress test in AM.   -C/w ASA, coreg 25mg BID, lisinopril 2.5mg qD, and atorvostatin 20mg qHS. 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 Hypercoagulable w/u pursued by hematologist previously.   -Eliquis on hold  -C/w heparin drip pending EP ablation tomorrow. To d/c at 9am tomorrow prior to procedure.

## 2017-11-15 NOTE — PROGRESS NOTE ADULT - PROBLEM SELECTOR PLAN 3
Hx of afib on eliquis at home. Currently in NSR with multiple episodes of VT. On hep gtt for potential procedure. Therapeutic therefore qD PTT.  -f/u PTT in AM, c/w hep gtt Hx of CAD with stenting in 2016 to LAD x2. Neg trops during admission to [facility]. Given recurrent VT patient to undergo ischemic work up.  -Plan for EKG stress test today.   -C/w ASA, coreg 25mg BID, lisinopril 2.5mg qd, and atorvostatin 20mg qHS.

## 2017-11-15 NOTE — PROGRESS NOTE ADULT - PROBLEM SELECTOR PROBLEM 3
Atrial fibrillation Coronary artery disease involving native coronary artery of native heart without angina pectoris

## 2017-11-16 DIAGNOSIS — I31.3 PERICARDIAL EFFUSION (NONINFLAMMATORY): ICD-10-CM

## 2017-11-16 LAB
ANION GAP SERPL CALC-SCNC: 12 MMOL/L — SIGNIFICANT CHANGE UP (ref 5–17)
ANION GAP SERPL CALC-SCNC: 12 MMOL/L — SIGNIFICANT CHANGE UP (ref 5–17)
APTT BLD: 80.1 SEC — HIGH (ref 27.5–37.4)
APTT BLD: 86.8 SEC — HIGH (ref 27.5–37.4)
BUN SERPL-MCNC: 13 MG/DL — SIGNIFICANT CHANGE UP (ref 7–23)
BUN SERPL-MCNC: 15 MG/DL — SIGNIFICANT CHANGE UP (ref 7–23)
CALCIUM SERPL-MCNC: 8.6 MG/DL — SIGNIFICANT CHANGE UP (ref 8.4–10.5)
CALCIUM SERPL-MCNC: 9.2 MG/DL — SIGNIFICANT CHANGE UP (ref 8.4–10.5)
CHLORIDE SERPL-SCNC: 102 MMOL/L — SIGNIFICANT CHANGE UP (ref 96–108)
CHLORIDE SERPL-SCNC: 104 MMOL/L — SIGNIFICANT CHANGE UP (ref 96–108)
CO2 SERPL-SCNC: 22 MMOL/L — SIGNIFICANT CHANGE UP (ref 22–31)
CO2 SERPL-SCNC: 24 MMOL/L — SIGNIFICANT CHANGE UP (ref 22–31)
CREAT SERPL-MCNC: 0.85 MG/DL — SIGNIFICANT CHANGE UP (ref 0.5–1.3)
CREAT SERPL-MCNC: 1.09 MG/DL — SIGNIFICANT CHANGE UP (ref 0.5–1.3)
GLUCOSE SERPL-MCNC: 90 MG/DL — SIGNIFICANT CHANGE UP (ref 70–99)
GLUCOSE SERPL-MCNC: 96 MG/DL — SIGNIFICANT CHANGE UP (ref 70–99)
HCT VFR BLD CALC: 38.2 % — LOW (ref 39–50)
HCT VFR BLD CALC: 39.4 % — SIGNIFICANT CHANGE UP (ref 39–50)
HGB BLD-MCNC: 12 G/DL — LOW (ref 13–17)
HGB BLD-MCNC: 12.7 G/DL — LOW (ref 13–17)
INR BLD: 1.14 — SIGNIFICANT CHANGE UP (ref 0.88–1.16)
MAGNESIUM SERPL-MCNC: 1.9 MG/DL — SIGNIFICANT CHANGE UP (ref 1.6–2.6)
MAGNESIUM SERPL-MCNC: 2.1 MG/DL — SIGNIFICANT CHANGE UP (ref 1.6–2.6)
MCHC RBC-ENTMCNC: 28.7 PG — SIGNIFICANT CHANGE UP (ref 27–34)
MCHC RBC-ENTMCNC: 29.5 PG — SIGNIFICANT CHANGE UP (ref 27–34)
MCHC RBC-ENTMCNC: 31.4 G/DL — LOW (ref 32–36)
MCHC RBC-ENTMCNC: 32.2 G/DL — SIGNIFICANT CHANGE UP (ref 32–36)
MCV RBC AUTO: 91.4 FL — SIGNIFICANT CHANGE UP (ref 80–100)
MCV RBC AUTO: 91.6 FL — SIGNIFICANT CHANGE UP (ref 80–100)
PLATELET # BLD AUTO: 214 K/UL — SIGNIFICANT CHANGE UP (ref 150–400)
PLATELET # BLD AUTO: 224 K/UL — SIGNIFICANT CHANGE UP (ref 150–400)
POTASSIUM SERPL-MCNC: 4.4 MMOL/L — SIGNIFICANT CHANGE UP (ref 3.5–5.3)
POTASSIUM SERPL-MCNC: 4.7 MMOL/L — SIGNIFICANT CHANGE UP (ref 3.5–5.3)
POTASSIUM SERPL-SCNC: 4.4 MMOL/L — SIGNIFICANT CHANGE UP (ref 3.5–5.3)
POTASSIUM SERPL-SCNC: 4.7 MMOL/L — SIGNIFICANT CHANGE UP (ref 3.5–5.3)
PROTHROM AB SERPL-ACNC: 12.7 SEC — SIGNIFICANT CHANGE UP (ref 9.8–12.7)
RBC # BLD: 4.18 M/UL — LOW (ref 4.2–5.8)
RBC # BLD: 4.3 M/UL — SIGNIFICANT CHANGE UP (ref 4.2–5.8)
RBC # FLD: 14 % — SIGNIFICANT CHANGE UP (ref 10.3–16.9)
RBC # FLD: 14 % — SIGNIFICANT CHANGE UP (ref 10.3–16.9)
SODIUM SERPL-SCNC: 136 MMOL/L — SIGNIFICANT CHANGE UP (ref 135–145)
SODIUM SERPL-SCNC: 140 MMOL/L — SIGNIFICANT CHANGE UP (ref 135–145)
WBC # BLD: 7.3 K/UL — SIGNIFICANT CHANGE UP (ref 3.8–10.5)
WBC # BLD: 8.5 K/UL — SIGNIFICANT CHANGE UP (ref 3.8–10.5)
WBC # FLD AUTO: 7.3 K/UL — SIGNIFICANT CHANGE UP (ref 3.8–10.5)
WBC # FLD AUTO: 8.5 K/UL — SIGNIFICANT CHANGE UP (ref 3.8–10.5)

## 2017-11-16 PROCEDURE — 99233 SBSQ HOSP IP/OBS HIGH 50: CPT

## 2017-11-16 PROCEDURE — 93662 INTRACARDIAC ECG (ICE): CPT | Mod: 26

## 2017-11-16 PROCEDURE — 93010 ELECTROCARDIOGRAM REPORT: CPT

## 2017-11-16 PROCEDURE — 99291 CRITICAL CARE FIRST HOUR: CPT

## 2017-11-16 PROCEDURE — 93623 PRGRMD STIMJ&PACG IV RX NFS: CPT | Mod: 26

## 2017-11-16 PROCEDURE — 93621 COMP EP EVL L PAC&REC C SINS: CPT | Mod: 26

## 2017-11-16 PROCEDURE — 71010: CPT | Mod: 26

## 2017-11-16 PROCEDURE — 93654 COMPRE EP EVAL TX VT: CPT

## 2017-11-16 RX ORDER — MAGNESIUM SULFATE 500 MG/ML
1 VIAL (ML) INJECTION ONCE
Qty: 0 | Refills: 0 | Status: COMPLETED | OUTPATIENT
Start: 2017-11-16 | End: 2017-11-16

## 2017-11-16 RX ORDER — COLCHICINE 0.6 MG
0.6 TABLET ORAL
Qty: 0 | Refills: 0 | Status: DISCONTINUED | OUTPATIENT
Start: 2017-11-16 | End: 2017-11-20

## 2017-11-16 RX ORDER — CARVEDILOL PHOSPHATE 80 MG/1
25 CAPSULE, EXTENDED RELEASE ORAL EVERY 12 HOURS
Qty: 0 | Refills: 0 | Status: DISCONTINUED | OUTPATIENT
Start: 2017-11-16 | End: 2017-11-20

## 2017-11-16 RX ORDER — HEPARIN SODIUM 5000 [USP'U]/ML
2000 INJECTION INTRAVENOUS; SUBCUTANEOUS
Qty: 25000 | Refills: 0 | Status: DISCONTINUED | OUTPATIENT
Start: 2017-11-16 | End: 2017-11-17

## 2017-11-16 RX ADMIN — HEPARIN SODIUM 15 UNIT(S)/HR: 5000 INJECTION INTRAVENOUS; SUBCUTANEOUS at 23:02

## 2017-11-16 RX ADMIN — TAMSULOSIN HYDROCHLORIDE 0.4 MILLIGRAM(S): 0.4 CAPSULE ORAL at 22:05

## 2017-11-16 RX ADMIN — CARVEDILOL PHOSPHATE 25 MILLIGRAM(S): 80 CAPSULE, EXTENDED RELEASE ORAL at 18:26

## 2017-11-16 RX ADMIN — ATORVASTATIN CALCIUM 20 MILLIGRAM(S): 80 TABLET, FILM COATED ORAL at 22:05

## 2017-11-16 RX ADMIN — LISINOPRIL 2.5 MILLIGRAM(S): 2.5 TABLET ORAL at 06:41

## 2017-11-16 RX ADMIN — Medication 100 GRAM(S): at 21:29

## 2017-11-16 RX ADMIN — CARVEDILOL PHOSPHATE 25 MILLIGRAM(S): 80 CAPSULE, EXTENDED RELEASE ORAL at 06:41

## 2017-11-16 NOTE — PROGRESS NOTE ADULT - PROBLEM SELECTOR PLAN 7
F: No IVF, replete lytes with goals of K>4.5 Mag>2.5,   N: DASH/TLC diet and NPO after midnight  C: FULL CODE  P: DVT ppx: on hep gtt    Dispo: remain on tele pending VT ablation and clinical progress F: No IVF, replete lytes with goals of K>4.5 Mag>2.5,   N: DASH/TLC diet and NPO after midnight  C: FULL CODE  P: DVT ppx: on hep gtt    Dispo: Transferred to CCU for further monitoring post VT ablation

## 2017-11-16 NOTE — PROGRESS NOTE ADULT - PROBLEM SELECTOR PLAN 7
Mild-moderate pericardial effusion present on imaging during ablation today.   Bedside echo shows..  - lasix

## 2017-11-16 NOTE — PROGRESS NOTE ADULT - PROBLEM SELECTOR PLAN 1
Hx of VT s/p 4 x ablation  - s/p ablation today, will follow up stat EKG, CXR, CBC, BMP, Mg, PT/PTT and INR pending transfer  - no further need for quinidine for pt (was on 324mg q8h) or antiarrythmics  - c/w carvedilol 25mg PO BID  - Exercise EKG stress test 11/15 performed to evaluate for arrhythmia inducibility. VT induced w/ HR of 102 developed 1:50 minutes into exercise and was terminated by overdrive pacing from ICD. During early recovery a brief run of ventricular bigeminy was noted.   -Patient was ambulated in CCU w/o episodes of VT.   -continue to replete lytes aggressively with K at 4.5 and Mg>2.5.   -F/u AM EKG, today QTc prolonged to 511ms.

## 2017-11-16 NOTE — PROGRESS NOTE ADULT - SUBJECTIVE AND OBJECTIVE BOX
PGY1 Transfer acceptance note     HOSPITAL COURSE:   72yM with PMH of recurrent VT s/p 3 ablations (9/2016, 5/2017, 10/2017) and AICD, CAD w MATTY to LAD (6/10/16) and pLAD (6/16/016), HFrEF (EF <35%), DVT on Eliquis, DANI on CPAP, pre-DM, BPH, OA, HTN, recurrent UTI who is a transfer from [facility] for VT.  At [facility] ED, patient had recurrent runs of VT, was given ASA 324mg, Amiodarone 150mg, and Sotalol 120mg. He continued to have brief runs of VT and was started on amiodarone drip eventually transitioned to carvedilol and sotalol.  AICD had 92 episodes of VT with ATP but with no shocks delivered. On 11/9 had recurrent NSVT that broke with therapeutic ATP but then had sustained VT at HR 150s that failed ATPx3 and resulted in a therapeutic ICD shock x1 to NSR. Patient Amiodarone loaded and started on Amio gtt. Patient was transferred to [facility] for possible ablation with Dr. Neal. Patient was brought directly to [facility] CCU. Tele showing NSR with frequent VT episodes.  Patient started on Amio gtt and continued on Carvedilol 25mg BID, Lisinopril 2.5mg, Coreg 25 BID, ASA 81mg and Atorvastatin 20mg qhs. Also started on heparin gtt rather than home Eliquis for possible EP procedure. Patient was seen by EP and Amio gtt transitioned to Quinidine with improvement in number of VT episodes. Received stress test yesterday, during the stress test had sustained episode of VT and received ATP by ICD, converted to Bigeminy then SR, reports felt lightheaded during episode. QTc 511ms, Quinidine d/c'ed. Today, the patient underwent epicardial VT ablation and is transferred to CCU for further monitoring.  Of note: Cath report obtained from [facility] (7.2016): MATTY mLAD 99%, EF 30%, severe single vessel dz of mLAD.     SUBJECTIVE: Patient seen and examined at bedside.    OBJECTIVE:    VITAL SIGNS:  ICU Vital Signs Last 24 Hrs  T(C): 36.4 (16 Nov 2017 10:00), Max: 36.8 (15 Nov 2017 21:27)  T(F): 97.5 (16 Nov 2017 10:00), Max: 98.2 (15 Nov 2017 21:27)  HR: 68 (16 Nov 2017 08:35) (68 - 69)  BP: 98/78 (16 Nov 2017 08:35) (98/78 - 139/84)  BP(mean): 85 (16 Nov 2017 08:35) (80 - 107)  ABP: --  ABP(mean): --  RR: 17 (16 Nov 2017 08:35) (16 - 17)  SpO2: 97% (16 Nov 2017 08:35) (94% - 98%)        11-15 @ 07:01  -  11-16 @ 07:00  --------------------------------------------------------  IN: 1100 mL / OUT: 1530 mL / NET: -430 mL    11-16 @ 07:01  -  11-16 @ 16:35  --------------------------------------------------------  IN: 492 mL / OUT: 0 mL / NET: 492 mL      CAPILLARY BLOOD GLUCOSE      POCT Blood Glucose.: 89 mg/dL (15 Nov 2017 16:16)      PHYSICAL EXAM:    General: WDWN ; NAD  HEENT: NC/AT; PERRL, anicteric sclera  Neck: supple, no JVD  Respiratory: CTA B/L; no W/R/R  Cardiovascular: +S1/S2; RRR; no M/R/G  Gastrointestinal: soft, NT/ND; +BS x4  Extremities: WWP; 2+ peripheral pulses B/L; no LE edema  Skin: normal color and turgor; no rash  Neurological:     MEDICATIONS:  MEDICATIONS  (STANDING):  aspirin enteric coated 81 milliGRAM(s) Oral daily  atorvastatin 20 milliGRAM(s) Oral at bedtime  carvedilol 25 milliGRAM(s) Oral every 12 hours  finasteride 5 milliGRAM(s) Oral daily  lisinopril 2.5 milliGRAM(s) Oral daily  tamsulosin 0.4 milliGRAM(s) Oral at bedtime    MEDICATIONS  (PRN):  acetaminophen   Tablet. 650 milliGRAM(s) Oral every 6 hours PRN Moderate Pain (4 - 6)      ALLERGIES:  Allergies    No Known Allergies    Intolerances        LABS:                        12.7   7.3   )-----------( 214      ( 16 Nov 2017 07:04 )             39.4     11-16    140  |  104  |  15  ----------------------------<  96  4.4   |  24  |  1.09    Ca    9.2      16 Nov 2017 07:04  Mg     2.1     11-16        PTT - ( 16 Nov 2017 07:04 )  PTT:80.1 sec    CARDIAC MARKERS ( 15 Nov 2017 06:43 )  x     / <0.01 ng/mL / x     / x     / x            RADIOLOGY & ADDITIONAL TESTS: Reviewed.    < from: EKG Stress Test (11.15.17 @ 14:39) >  Arrhythmias:  Ventricular tachycardia at a rate of 102 developed 1:50   minutes into exercise and was terminated by overdrive pacing. During   early recovery a brief run of ventricular bigeminy was noted.                   Impression:  Normal, submaximal exercise treadmill stress test for   ischemia. Arrhythmias as noted above.    < end of copied text >    < from: Echocardiogram (11.11.17 @ 09:36) >  Left Ventricle  There is mild concentric left ventricular hypertrophy.  Abnormal (paradoxical) septal motion consistent with abnormal conduction  There is basal inferior wall hypokinesis.  There is mid inferior wall hypokinesis.  There is apical septal wall hypokinesis.  There is apical hypokinesis.  The left ventricular ejection fraction is mild to moderately reduced.  The left ventricular ejection fraction is estimated to be 35-40%  Left Atrium  The left atrium is dilated.  The mitral inflow pattern is consistent with impaired left ventricular  relaxation with mildly elevated left atrial pressure (8-14mmHg).  Right Atrium  Right atrial size is normal.  Right Ventricle  The right ventricle is normal in size and function.  There is pacemaker wire in R heart  The IVC is dilated (>2.1 cm) with an abnormal inspiratory collapse (<50%)  consistent with elevated right atrial pressure.  Pericardium / Pleura  There is no pericardial effusion. PGY1 Transfer acceptance note     HOSPITAL COURSE:   72yM with PMH of recurrent VT s/p 3 ablations (9/2016, 5/2017, 10/2017) and AICD, CAD w MATTY to LAD (6/10/16) and pLAD (6/16/016), HFrEF (EF <35%), DVT on Eliquis, DANI on CPAP, pre-DM, BPH, OA, HTN, recurrent UTI who is a transfer from [facility] for VT.  At [facility] ED, patient had recurrent runs of VT, was given ASA 324mg, Amiodarone 150mg, and Sotalol 120mg. He continued to have brief runs of VT and was started on amiodarone drip eventually transitioned to carvedilol and sotalol.  AICD had 92 episodes of VT with ATP but with no shocks delivered. On 11/9 had recurrent NSVT that broke with therapeutic ATP but then had sustained VT at HR 150s that failed ATPx3 and resulted in a therapeutic ICD shock x1 to NSR. Patient Amiodarone loaded and started on Amio gtt. Patient was transferred to [facility] for possible ablation with Dr. Neal. Patient was brought directly to [facility] CCU. Tele showing NSR with frequent VT episodes.  Patient started on Amio gtt and continued on Carvedilol 25mg BID, Lisinopril 2.5mg, Coreg 25 BID, ASA 81mg and Atorvastatin 20mg qhs. Also started on heparin gtt rather than home Eliquis for possible EP procedure. Patient was seen by EP and Amio gtt transitioned to Quinidine with improvement in number of VT episodes. Received stress test yesterday, during the stress test had sustained episode of VT and received ATP by ICD, converted to Bigeminy then SR, reports felt lightheaded during episode. QTc 511ms, Quinidine d/c'ed. Today, the patient underwent epicardial VT ablation and is transferred to CCU for further monitoring.  Of note: Cath report obtained from [facility] (7.2016): MATTY mLAD 99%, EF 30%, severe single vessel dz of mLAD.     SUBJECTIVE: Patient seen and examined at bedside. Pt     OBJECTIVE:    VITAL SIGNS:  ICU Vital Signs Last 24 Hrs  T(C): 36.4 (16 Nov 2017 10:00), Max: 36.8 (15 Nov 2017 21:27)  T(F): 97.5 (16 Nov 2017 10:00), Max: 98.2 (15 Nov 2017 21:27)  HR: 68 (16 Nov 2017 08:35) (68 - 69)  BP: 98/78 (16 Nov 2017 08:35) (98/78 - 139/84)  BP(mean): 85 (16 Nov 2017 08:35) (80 - 107)  ABP: --  ABP(mean): --  RR: 17 (16 Nov 2017 08:35) (16 - 17)  SpO2: 97% (16 Nov 2017 08:35) (94% - 98%)        11-15 @ 07:01  -  11-16 @ 07:00  --------------------------------------------------------  IN: 1100 mL / OUT: 1530 mL / NET: -430 mL    11-16 @ 07:01  -  11-16 @ 16:35  --------------------------------------------------------  IN: 492 mL / OUT: 0 mL / NET: 492 mL      CAPILLARY BLOOD GLUCOSE      POCT Blood Glucose.: 89 mg/dL (15 Nov 2017 16:16)      PHYSICAL EXAM:    General: WDWN ; NAD  HEENT: NC/AT; PERRL, anicteric sclera  Neck: supple, no JVD  Respiratory: CTA B/L; no W/R/R  Cardiovascular: +S1/S2; RRR; no M/R/G  Gastrointestinal: soft, NT/ND; +BS x4  Extremities: WWP; 2+ peripheral pulses B/L; no LE edema  Skin: normal color and turgor; no rash  Neurological:     MEDICATIONS:  MEDICATIONS  (STANDING):  aspirin enteric coated 81 milliGRAM(s) Oral daily  atorvastatin 20 milliGRAM(s) Oral at bedtime  carvedilol 25 milliGRAM(s) Oral every 12 hours  finasteride 5 milliGRAM(s) Oral daily  lisinopril 2.5 milliGRAM(s) Oral daily  tamsulosin 0.4 milliGRAM(s) Oral at bedtime    MEDICATIONS  (PRN):  acetaminophen   Tablet. 650 milliGRAM(s) Oral every 6 hours PRN Moderate Pain (4 - 6)      ALLERGIES:  Allergies    No Known Allergies    Intolerances        LABS:                        12.7   7.3   )-----------( 214      ( 16 Nov 2017 07:04 )             39.4     11-16    140  |  104  |  15  ----------------------------<  96  4.4   |  24  |  1.09    Ca    9.2      16 Nov 2017 07:04  Mg     2.1     11-16        PTT - ( 16 Nov 2017 07:04 )  PTT:80.1 sec    CARDIAC MARKERS ( 15 Nov 2017 06:43 )  x     / <0.01 ng/mL / x     / x     / x            RADIOLOGY & ADDITIONAL TESTS: Reviewed.    < from: EKG Stress Test (11.15.17 @ 14:39) >  Arrhythmias:  Ventricular tachycardia at a rate of 102 developed 1:50   minutes into exercise and was terminated by overdrive pacing. During   early recovery a brief run of ventricular bigeminy was noted.                   Impression:  Normal, submaximal exercise treadmill stress test for   ischemia. Arrhythmias as noted above.    < end of copied text >    < from: Echocardiogram (11.11.17 @ 09:36) >  Left Ventricle  There is mild concentric left ventricular hypertrophy.  Abnormal (paradoxical) septal motion consistent with abnormal conduction  There is basal inferior wall hypokinesis.  There is mid inferior wall hypokinesis.  There is apical septal wall hypokinesis.  There is apical hypokinesis.  The left ventricular ejection fraction is mild to moderately reduced.  The left ventricular ejection fraction is estimated to be 35-40%  Left Atrium  The left atrium is dilated.  The mitral inflow pattern is consistent with impaired left ventricular  relaxation with mildly elevated left atrial pressure (8-14mmHg).  Right Atrium  Right atrial size is normal.  Right Ventricle  The right ventricle is normal in size and function.  There is pacemaker wire in R heart  The IVC is dilated (>2.1 cm) with an abnormal inspiratory collapse (<50%)  consistent with elevated right atrial pressure.  Pericardium / Pleura  There is no pericardial effusion. PGY1 Transfer acceptance note     HOSPITAL COURSE:   72yM with PMH of recurrent VT s/p 3 ablations (9/2016, 5/2017, 10/2017) and AICD, CAD w MATTY to LAD (6/10/16) and pLAD (6/16/016), HFrEF (EF <35%), DVT on Eliquis, DANI on CPAP, pre-DM, BPH, OA, HTN, recurrent UTI who is a transfer from [facility] for VT.  At [facility] ED, patient had recurrent runs of VT, was given ASA 324mg, Amiodarone 150mg, and Sotalol 120mg. He continued to have brief runs of VT and was started on amiodarone drip eventually transitioned to carvedilol and sotalol.  AICD had 92 episodes of VT with ATP but with no shocks delivered. On 11/9 had recurrent NSVT that broke with therapeutic ATP but then had sustained VT at HR 150s that failed ATPx3 and resulted in a therapeutic ICD shock x1 to NSR. Patient Amiodarone loaded and started on Amio gtt. Patient was transferred to [facility] for possible ablation with Dr. Neal. Patient was brought directly to [facility] CCU. Tele showing NSR with frequent VT episodes.  Patient started on Amio gtt and continued on Carvedilol 25mg BID, Lisinopril 2.5mg, Coreg 25 BID, ASA 81mg and Atorvastatin 20mg qhs. Also started on heparin gtt rather than home Eliquis for possible EP procedure. Patient was seen by EP and Amio gtt transitioned to Quinidine with improvement in number of VT episodes. Received stress test yesterday, during the stress test had sustained episode of VT and received ATP by ICD, converted to Bigeminy then SR, reports felt lightheaded during episode. QTc 511ms, Quinidine d/c'ed. Today, the patient underwent epicardial VT ablation and is transferred to CCU for further monitoring.  Of note: Cath report obtained from [facility] (7.2016): MATTY mLAD 99%, EF 30%, severe single vessel dz of mLAD.     SUBJECTIVE: Patient seen and examined at bedside. Pt tolerated the procedure well. He denies any chest pain, shortness of breath, palpitations, headaches, weakness, dizziness, lightheadedness, cough, n/v/d/c, abdominal pain, dysuria, LE edema. He states that he feels okay at this time, however feels tired overall. He notes that usually he gets dizzy prior to an episode of VT and has CP associated with the episodes. He has not had any more episodes of dizziness or chest pain since admission.    OBJECTIVE:    VITAL SIGNS:  ICU Vital Signs Last 24 Hrs  T(C): 36.4 (16 Nov 2017 10:00), Max: 36.8 (15 Nov 2017 21:27)  T(F): 97.5 (16 Nov 2017 10:00), Max: 98.2 (15 Nov 2017 21:27)  HR: 68 (16 Nov 2017 08:35) (68 - 69)  BP: 98/78 (16 Nov 2017 08:35) (98/78 - 139/84)  BP(mean): 85 (16 Nov 2017 08:35) (80 - 107)  ABP: --  ABP(mean): --  RR: 17 (16 Nov 2017 08:35) (16 - 17)  SpO2: 97% (16 Nov 2017 08:35) (94% - 98%)        11-15 @ 07:01  -  11-16 @ 07:00  --------------------------------------------------------  IN: 1100 mL / OUT: 1530 mL / NET: -430 mL    11-16 @ 07:01  -  11-16 @ 16:35  --------------------------------------------------------  IN: 492 mL / OUT: 0 mL / NET: 492 mL      CAPILLARY BLOOD GLUCOSE      POCT Blood Glucose.: 89 mg/dL (15 Nov 2017 16:16)      PHYSICAL EXAM:    General: WDWN ; NAD ; laying comfortably in bed  HEENT: NC/AT; PERRL, anicteric sclera  Neck: supple, no JVD present on exam  Respiratory: CTA B/L; no W/R/R; no increased respiratory effort  Cardiovascular: +S1/S2; RRR; no M/R/G  Gastrointestinal: soft, NT, mildly distended (not increased from baseline per pt) ; +BS x4; distended bladder palpated  Extremities: WWP; 2+ peripheral pulses B/L; no LE edema; b/l groin sites present, dressing c/d/i, examination of wound show no oozing from sites, soft and nontender to palpation. R thigh lipoma present lateral to R groin wound, hard movable in nature  Skin: normal color and turgor; no rash  Neurological: AOOx3, mentating well, moves all limbs freely    MEDICATIONS:  MEDICATIONS  (STANDING):  aspirin enteric coated 81 milliGRAM(s) Oral daily  atorvastatin 20 milliGRAM(s) Oral at bedtime  carvedilol 25 milliGRAM(s) Oral every 12 hours  finasteride 5 milliGRAM(s) Oral daily  lisinopril 2.5 milliGRAM(s) Oral daily  tamsulosin 0.4 milliGRAM(s) Oral at bedtime    MEDICATIONS  (PRN):  acetaminophen   Tablet. 650 milliGRAM(s) Oral every 6 hours PRN Moderate Pain (4 - 6)      ALLERGIES:  Allergies    No Known Allergies    Intolerances        LABS:                        12.7   7.3   )-----------( 214      ( 16 Nov 2017 07:04 )             39.4     11-16    140  |  104  |  15  ----------------------------<  96  4.4   |  24  |  1.09    Ca    9.2      16 Nov 2017 07:04  Mg     2.1     11-16        PTT - ( 16 Nov 2017 07:04 )  PTT:80.1 sec    CARDIAC MARKERS ( 15 Nov 2017 06:43 )  x     / <0.01 ng/mL / x     / x     / x            RADIOLOGY & ADDITIONAL TESTS: Reviewed.    < from: EKG Stress Test (11.15.17 @ 14:39) >  Arrhythmias:  Ventricular tachycardia at a rate of 102 developed 1:50   minutes into exercise and was terminated by overdrive pacing. During   early recovery a brief run of ventricular bigeminy was noted.                   Impression:  Normal, submaximal exercise treadmill stress test for   ischemia. Arrhythmias as noted above.    < end of copied text >    < from: Echocardiogram (11.11.17 @ 09:36) >  Left Ventricle  There is mild concentric left ventricular hypertrophy.  Abnormal (paradoxical) septal motion consistent with abnormal conduction  There is basal inferior wall hypokinesis.  There is mid inferior wall hypokinesis.  There is apical septal wall hypokinesis.  There is apical hypokinesis.  The left ventricular ejection fraction is mild to moderately reduced.  The left ventricular ejection fraction is estimated to be 35-40%  Left Atrium  The left atrium is dilated.  The mitral inflow pattern is consistent with impaired left ventricular  relaxation with mildly elevated left atrial pressure (8-14mmHg).  Right Atrium  Right atrial size is normal.  Right Ventricle  The right ventricle is normal in size and function.  There is pacemaker wire in R heart  The IVC is dilated (>2.1 cm) with an abnormal inspiratory collapse (<50%)  consistent with elevated right atrial pressure.  Pericardium / Pleura  There is no pericardial effusion.

## 2017-11-16 NOTE — PROGRESS NOTE ADULT - PROBLEM SELECTOR PLAN 5
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 Hypercoagulable w/u pursued by hematologist previously.   -Eliquis on hold  -C/w heparin drip 6 hrs after EP ablation. To Switch to Eliquis in AM pending groin site status

## 2017-11-16 NOTE — PROGRESS NOTE ADULT - SUBJECTIVE AND OBJECTIVE BOX
CARDIOLOGY NP TRANSFER OFF SERVICE/PROGRESS NOTE    HOSPITAL COURSE:  72yM with PMH significant for VT s/p 3 x ablation in past (9/2016, 5/2017, 10/2017) with AICD, CAD s/p MATTY to LAD and pLAD in 2016, HFrEF (EF <35%), Hx of DVT on Eliquis, DANI on CPAP, pre-DM, BPH, OA, HTN, recurrent UTI transferred from [facility] for VT.  Patient initially presented on 11/7/17 with complaints of lightheadedness, dizziness, and burning chest pain with radiation to his neck and head. Patient had recurrent runs of VT, was given ASA 324mg, Amiodarone 150mg, and Sotalol 120mg. He continued to have brief runs of VT and initiated on amiodarone drip with transition to carvedilol and sotalol.  Interrogation of AICD demonstrated 92 episodes of VT with ATP but with no shocks delivered. On 11/9 had recurrent NSVT that broke with therapeutic ATP but then had sustained VT at HR 150s that failed ATPx3 for which resulted in ICD shock and patient converted to NSR. Patient again amio loaded and started on amiodarone gtt. Patient was transferred to [facility] and admitted to CCU for cardiac telmetry monitoring in setting of VT for potential ablation with Dr. Neal. Telemetry monitoring during course demonstrated NSR with frequent runs of VT.  Patient medically managed with Amio gtt as well as Carvedilol 25mg BID, Lisinopril 2.5mg, Coreg 25 BID, ASA 81mg and Atorvastatin 20mg qhs. Patient switched from home eliquis to heparin gtt. Patient evaluated by EP for which recommended for transition from Amio  to Quinidine with improvement in number of VT episodes. Ablation pending optimization of medical managment. Patient determined stable for step down to [facility] for further cardiac telemetry monitoring. Patient is planned to undergo ischemic evaluation with stress test tomorrow and will be NPO after midnight. Plan to call the CCU fellow or consult fellow prior to stress test as patient high risk for VT.      Subjective: Pt seen and and examined at bedside in AM. Reported feeling well.    Overnight Events: Went for EKG stress test, ~2mins into test developed VT received ATP by ICD, converted to Bigeminy then SR, reports felt lightheaded during episode. QTc 511ms, Quinidine d/c'ed.     TELEMETRY: SR 70s, occasional PVCs, episode of NSVT w/ ATP x2 episodes converting to SR 70s        VITAL SIGNS:  T(C): 36.2 (11-16-17 @ 17:04), Max: 36.8 (11-15-17 @ 21:27)  HR: 68 (11-16-17 @ 08:35) (68 - 69)  BP: 98/78 (11-16-17 @ 08:35) (98/78 - 139/84)  RR: 17 (11-16-17 @ 08:35) (16 - 17)  SpO2: 97% (11-16-17 @ 08:35) (94% - 98%)  Wt(kg): --    I&O's Summary    15 Nov 2017 07:01  -  16 Nov 2017 07:00  --------------------------------------------------------  IN: 1100 mL / OUT: 1530 mL / NET: -430 mL    16 Nov 2017 07:01  -  16 Nov 2017 17:05  --------------------------------------------------------  IN: 492 mL / OUT: 0 mL / NET: 492 mL          PHYSICAL EXAM:    General: A/ox 3, No acute Distress  Neck: Supple, NO JVD  Cardiac: S1 S2, No M/R/G  Pulmonary: CTAB, Breathing unlabored on RA, No Rhonchi/Rales/Wheezing  Abdomen: Soft, Non -tender, +BS x 4 quads  Extremities: No Rashes, No LE edema  Neuro: A/o x 3, No focal deficits          LABS:                          12.7   7.3   )-----------( 214      ( 16 Nov 2017 07:04 )             39.4                              11-16    140  |  104  |  15  ----------------------------<  96  4.4   |  24  |  1.09    Ca    9.2      16 Nov 2017 07:04  Mg     2.1     11-16      PTT - ( 16 Nov 2017 07:04 )  PTT:80.1 sec  CAPILLARY BLOOD GLUCOSE        CARDIAC MARKERS ( 15 Nov 2017 06:43 )  x     / <0.01 ng/mL / x     / x     / x                No Known Allergies    MEDICATIONS  (STANDING):  aspirin enteric coated 81 milliGRAM(s) Oral daily  atorvastatin 20 milliGRAM(s) Oral at bedtime  carvedilol 25 milliGRAM(s) Oral every 12 hours  colchicine 0.6 milliGRAM(s) Oral two times a day  finasteride 5 milliGRAM(s) Oral daily  lisinopril 2.5 milliGRAM(s) Oral daily  tamsulosin 0.4 milliGRAM(s) Oral at bedtime    MEDICATIONS  (PRN):        DIAGNOSTIC TESTS: CARDIOLOGY NP TRANSFER OFF SERVICE/PROGRESS NOTE    HOSPITAL COURSE:  72yM with PMH significant for VT s/p 3 x ablation in past (9/2016, 5/2017, 10/2017) with AICD, CAD s/p MATTY to LAD and pLAD in 2016, HFrEF (EF <35%), Hx of DVT on Eliquis, DANI on CPAP, pre-DM, BPH, OA, HTN, recurrent UTI transferred from [facility] for VT.  Patient initially presented on 11/7/17 with complaints of lightheadedness, dizziness, and burning chest pain with radiation to his neck and head. Patient had recurrent runs of VT, was given ASA 324mg, Amiodarone 150mg, and Sotalol 120mg. He continued to have brief runs of VT and initiated on amiodarone drip with transition to carvedilol and sotalol.  Interrogation of AICD demonstrated 92 episodes of VT with ATP but with no shocks delivered. On 11/9 had recurrent NSVT that broke with therapeutic ATP but then had sustained VT at HR 150s that failed ATPx3 for which resulted in ICD shock and patient converted to NSR. Patient again amio loaded and started on amiodarone gtt. Patient was transferred to [facility] and admitted to CCU for cardiac telmetry monitoring in setting of VT for potential ablation with Dr. Neal. Telemetry monitoring during course demonstrated NSR with frequent runs of VT.  Patient medically managed with Amio gtt as well as Carvedilol 25mg BID, Lisinopril 2.5mg, Coreg 25 BID, ASA 81mg and Atorvastatin 20mg qhs. Patient switched from home eliquis to heparin gtt pending EP ablation. Patient evaluated by EP for which recommended for transition from Amio  to Quinidine with improvement in number of VT episodes. Patient stepped down from CCU to [facility] for further cardiac telemetry monitoring 11/14. Received stress test yesterday, during the stress test had sustained episode of VT and received ATP by ICD, converted to Bigeminy then SR, reports felt lightheaded during episode. QTc 511ms, Quinidine d/c'ed. Today, the patient underwent epicardial VT ablation and is transferred to CCU for further monitoring.      Subjective: Pt seen and and examined at bedside in AM. Reported feeling well.    Overnight Events: Went for EKG stress test, ~2mins into test developed VT received ATP by ICD, converted to Bigeminy then SR, reports felt lightheaded during episode. QTc 511ms, Quinidine d/c'ed.     TELEMETRY: SR 70s, occasional PVCs, episode of NSVT w/ ATP x2 episodes converting to SR 70s        VITAL SIGNS:  T(C): 36.2 (11-16-17 @ 17:04), Max: 36.8 (11-15-17 @ 21:27)  HR: 68 (11-16-17 @ 08:35) (68 - 69)  BP: 98/78 (11-16-17 @ 08:35) (98/78 - 139/84)  RR: 17 (11-16-17 @ 08:35) (16 - 17)  SpO2: 97% (11-16-17 @ 08:35) (94% - 98%)  Wt(kg): --    I&O's Summary    15 Nov 2017 07:01  -  16 Nov 2017 07:00  --------------------------------------------------------  IN: 1100 mL / OUT: 1530 mL / NET: -430 mL    16 Nov 2017 07:01  -  16 Nov 2017 17:05  --------------------------------------------------------  IN: 492 mL / OUT: 0 mL / NET: 492 mL          PHYSICAL EXAM:    General: A/ox 3, No acute Distress  Neck: Supple, NO JVD  Cardiac: S1 S2, No M/R/G  Pulmonary: CTAB, Breathing unlabored on RA, No Rhonchi/Rales/Wheezing  Abdomen: Soft, Non -tender, +BS x 4 quads  Extremities: No Rashes, No LE edema  Neuro: A/o x 3, No focal deficits          LABS:                          12.7   7.3   )-----------( 214      ( 16 Nov 2017 07:04 )             39.4                              11-16    140  |  104  |  15  ----------------------------<  96  4.4   |  24  |  1.09    Ca    9.2      16 Nov 2017 07:04  Mg     2.1     11-16      PTT - ( 16 Nov 2017 07:04 )  PTT:80.1 sec  CAPILLARY BLOOD GLUCOSE        CARDIAC MARKERS ( 15 Nov 2017 06:43 )  x     / <0.01 ng/mL / x     / x     / x                No Known Allergies    MEDICATIONS  (STANDING):  aspirin enteric coated 81 milliGRAM(s) Oral daily  atorvastatin 20 milliGRAM(s) Oral at bedtime  carvedilol 25 milliGRAM(s) Oral every 12 hours  colchicine 0.6 milliGRAM(s) Oral two times a day  finasteride 5 milliGRAM(s) Oral daily  lisinopril 2.5 milliGRAM(s) Oral daily  tamsulosin 0.4 milliGRAM(s) Oral at bedtime    MEDICATIONS  (PRN):        DIAGNOSTIC TESTS:

## 2017-11-16 NOTE — PROGRESS NOTE ADULT - PROBLEM SELECTOR PLAN 1
Hx of VT s/p 3 x ablation. Patient initially admitted to [facility] for which patient had 92 episodes of VT and 1x shock. Patient transferred to [facility] CCU for medical management s/p amio gtt and s/p 3 x shock. Patient currently on quinidine 324mg q8h. ICD has been set to ATP 6 times before shocking.   -Continue with management with quinidine 324mg q8h, carvedilol 25mg PO BID  - Exercise EKG stress test performed to evaluate for arrhythmia inducibility. VT induced w/ HR of 102 developed 1:50 minutes into exercise and was terminated by overdrive pacing from ICD. During early recovery a brief run of ventricular bigeminy was noted.   -Patient was ambulated in CCU w/o episodes of VT.   - F/u EP recs for epicardial VT ablation tomorrow  -continue to replete lytes aggressively with K at 4.5 and Mg>2.5.   -F/u AM EKG, QTc prolonged to 511ms. D/c Quinidine today per EP recs. Hx of VT s/p 3 x ablation. Patient initially admitted to [facility] for which patient had 92 episodes of VT and 1x shock. Patient transferred to [facility] CCU for medical management s/p amio gtt and s/p 3 x shock. Patient currently on quinidine 324mg q8h. ICD has been set to ATP 6 times before shocking.   -Continue with management with quinidine 324mg q8h, carvedilol 25mg PO BID  - Exercise EKG stress test performed to evaluate for arrhythmia inducibility. VT induced w/ HR of 102 developed 1:50 minutes into exercise and was terminated by overdrive pacing from ICD. During early recovery a brief run of ventricular bigeminy was noted.   -Patient was ambulated in CCU w/o episodes of VT.   - F/u EP recs for epicardial VT ablation today  -continue to replete lytes aggressively with K at 4.5 and Mg>2.5.   -F/u AM EKG, QTc prolonged to 511ms. D/c Quinidine today per EP recs. QTc improved to 490ms today.

## 2017-11-16 NOTE — PROGRESS NOTE ADULT - PROBLEM SELECTOR PLAN 2
Hx CAD s/p MATTY to 99% mLAD 6/2016 at [facility] 2/2 abn stress test. Hx chronic systolic heart failure. Past echo demonstrated EF 30-35% with LVH apical akinesis with apical septal wall akinesis and apical inferior wall akinesis.   -Repeat echo 11/11 demonstrated EF 35-40% with basal/mid inferior wall, apical septal, and apical hypokinesis. Diastolic dysfunction, mild-mod AI, mod-severe pulm regurg  -C/w lasix 40 IVP as needed. GIven 1x yesterday. Pt appears euvolemic on exam today, does not appear to be in acute CHF exacerbation.  -C/w ASA 81, Lipitor 20mg qd, Coreg 25mg BID, Lisinopril 2.5mg qd  - Strict I/O, daily weights with fluid restriction.

## 2017-11-16 NOTE — PROGRESS NOTE ADULT - PROBLEM SELECTOR PLAN 3
- Patient with known AF on Eliquis 5mg BID  - will start patient on hep gtt 6 hours after ablation starting at 15cc/hr (adjusted body weight dose)- will switch to Eliquis 5mg in AM after assessment of b/l groin sites

## 2017-11-16 NOTE — PROGRESS NOTE ADULT - ASSESSMENT
73yo M with pmh of recurrent VT s/p 3x ablation (9/2016, 5/2017, 10/2017), CAD w MATTY to LAD and pLAD 2016, ICD, DVT on Eliquis, chronic systolic CHF with EF<35%, DANI on CPAP, Pre DM, BPH, OA, HTN, recurrent UTI transferred from [facility] to CCU for ventricular tachycardia for medical optimization and ablation, now s/p epicardial ablation.

## 2017-11-16 NOTE — PROGRESS NOTE ADULT - PROBLEM SELECTOR PLAN 6
Hx of gout. Patient has been on colchicine last month and with episodes of R achilles tendon pain.  Currently stable no further episodes.  -D/c colchicine

## 2017-11-16 NOTE — PROGRESS NOTE ADULT - PROBLEM SELECTOR PLAN 3
Hx of CAD with stenting in 2016 to LAD x2. Neg trops during admission to [facility]. Given recurrent VT patient to undergo ischemic work up.  -Plan for EKG stress test today.   -C/w ASA, coreg 25mg BID, lisinopril 2.5mg qd, and atorvostatin 20mg qHS. Hx of CAD with stenting in 2016 to LAD x2. Neg trops during admission to [facility]. Given recurrent VT patient to undergo ischemic work up.  -Underwent EKG exercise stress test, normal but suboptimal study for ischemia.  -C/w ASA, coreg 25mg BID, lisinopril 2.5mg qd, and atorvostatin 20mg qHS.

## 2017-11-16 NOTE — PROGRESS NOTE ADULT - PROBLEM SELECTOR PLAN 9
F - No IVF  E - replete lytes, K>4.5 Mag>2.5  N - DASH diet     DVT ppx: No ppx currently, will be on hep gtt overnight, 6 hours after procedure    Code: Full Code  Dispo: CCU

## 2017-11-16 NOTE — PROGRESS NOTE ADULT - ASSESSMENT
73yo M with pmh of recurrent VT s/p 3x ablation (9/2016, 5/2017, 10/2017), CAD w MATTY to LAD and pLAD 2016, ICD, DVT on Eliquis, chronic systolic CHF with EF<35%, DANI on CPAP, Pre DM, BPH, OA, HTN, recurrent UTI transferred from [facility] to CCU for ventricular tachycardia for medical optimization and pending ablation, [email or web address] further management.

## 2017-11-16 NOTE — PROGRESS NOTE ADULT - PROBLEM SELECTOR PLAN 4
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 Hypercoagulable w/u pursued by hematologist previously.   -Eliquis on hold  -C/w heparin drip pending EP ablation tomorrow. To d/c at 9am tomorrow prior to procedure. 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 Hypercoagulable w/u pursued by hematologist previously.   -Eliquis on hold  -Heparin drip d/c'ed this AM, resume once cleared by EP post procedure

## 2017-11-17 LAB
ANION GAP SERPL CALC-SCNC: 14 MMOL/L — SIGNIFICANT CHANGE UP (ref 5–17)
APTT BLD: 52.9 SEC — HIGH (ref 27.5–37.4)
BLD GP AB SCN SERPL QL: NEGATIVE — SIGNIFICANT CHANGE UP
BUN SERPL-MCNC: 12 MG/DL — SIGNIFICANT CHANGE UP (ref 7–23)
CALCIUM SERPL-MCNC: 8.9 MG/DL — SIGNIFICANT CHANGE UP (ref 8.4–10.5)
CHLORIDE SERPL-SCNC: 99 MMOL/L — SIGNIFICANT CHANGE UP (ref 96–108)
CO2 SERPL-SCNC: 23 MMOL/L — SIGNIFICANT CHANGE UP (ref 22–31)
CREAT SERPL-MCNC: 0.94 MG/DL — SIGNIFICANT CHANGE UP (ref 0.5–1.3)
GLUCOSE SERPL-MCNC: 88 MG/DL — SIGNIFICANT CHANGE UP (ref 70–99)
HCT VFR BLD CALC: 36.2 % — LOW (ref 39–50)
HGB BLD-MCNC: 12 G/DL — LOW (ref 13–17)
MAGNESIUM SERPL-MCNC: 2.1 MG/DL — SIGNIFICANT CHANGE UP (ref 1.6–2.6)
MCHC RBC-ENTMCNC: 29.8 PG — SIGNIFICANT CHANGE UP (ref 27–34)
MCHC RBC-ENTMCNC: 33.1 G/DL — SIGNIFICANT CHANGE UP (ref 32–36)
MCV RBC AUTO: 89.8 FL — SIGNIFICANT CHANGE UP (ref 80–100)
PLATELET # BLD AUTO: 191 K/UL — SIGNIFICANT CHANGE UP (ref 150–400)
POTASSIUM SERPL-MCNC: 3.9 MMOL/L — SIGNIFICANT CHANGE UP (ref 3.5–5.3)
POTASSIUM SERPL-SCNC: 3.9 MMOL/L — SIGNIFICANT CHANGE UP (ref 3.5–5.3)
RBC # BLD: 4.03 M/UL — LOW (ref 4.2–5.8)
RBC # FLD: 14 % — SIGNIFICANT CHANGE UP (ref 10.3–16.9)
RH IG SCN BLD-IMP: POSITIVE — SIGNIFICANT CHANGE UP
SODIUM SERPL-SCNC: 136 MMOL/L — SIGNIFICANT CHANGE UP (ref 135–145)
WBC # BLD: 8.4 K/UL — SIGNIFICANT CHANGE UP (ref 3.8–10.5)
WBC # FLD AUTO: 8.4 K/UL — SIGNIFICANT CHANGE UP (ref 3.8–10.5)

## 2017-11-17 PROCEDURE — 71010: CPT | Mod: 26

## 2017-11-17 PROCEDURE — 99233 SBSQ HOSP IP/OBS HIGH 50: CPT

## 2017-11-17 PROCEDURE — 93010 ELECTROCARDIOGRAM REPORT: CPT

## 2017-11-17 RX ORDER — FUROSEMIDE 40 MG
40 TABLET ORAL ONCE
Qty: 0 | Refills: 0 | Status: COMPLETED | OUTPATIENT
Start: 2017-11-17 | End: 2017-11-17

## 2017-11-17 RX ORDER — HEPARIN SODIUM 5000 [USP'U]/ML
1600 INJECTION INTRAVENOUS; SUBCUTANEOUS
Qty: 25000 | Refills: 0 | Status: DISCONTINUED | OUTPATIENT
Start: 2017-11-17 | End: 2017-11-17

## 2017-11-17 RX ORDER — POTASSIUM CHLORIDE 20 MEQ
20 PACKET (EA) ORAL ONCE
Qty: 0 | Refills: 0 | Status: COMPLETED | OUTPATIENT
Start: 2017-11-17 | End: 2017-11-17

## 2017-11-17 RX ORDER — POTASSIUM CHLORIDE 20 MEQ
10 PACKET (EA) ORAL ONCE
Qty: 0 | Refills: 0 | Status: COMPLETED | OUTPATIENT
Start: 2017-11-17 | End: 2017-11-17

## 2017-11-17 RX ORDER — APIXABAN 2.5 MG/1
5 TABLET, FILM COATED ORAL EVERY 12 HOURS
Qty: 0 | Refills: 0 | Status: DISCONTINUED | OUTPATIENT
Start: 2017-11-17 | End: 2017-11-20

## 2017-11-17 RX ORDER — ATORVASTATIN CALCIUM 80 MG/1
40 TABLET, FILM COATED ORAL AT BEDTIME
Qty: 0 | Refills: 0 | Status: DISCONTINUED | OUTPATIENT
Start: 2017-11-17 | End: 2017-11-20

## 2017-11-17 RX ADMIN — APIXABAN 5 MILLIGRAM(S): 2.5 TABLET, FILM COATED ORAL at 18:10

## 2017-11-17 RX ADMIN — HEPARIN SODIUM 16 UNIT(S)/HR: 5000 INJECTION INTRAVENOUS; SUBCUTANEOUS at 04:11

## 2017-11-17 RX ADMIN — FINASTERIDE 5 MILLIGRAM(S): 5 TABLET, FILM COATED ORAL at 11:12

## 2017-11-17 RX ADMIN — APIXABAN 5 MILLIGRAM(S): 2.5 TABLET, FILM COATED ORAL at 11:12

## 2017-11-17 RX ADMIN — Medication 100 MILLIEQUIVALENT(S): at 08:09

## 2017-11-17 RX ADMIN — Medication 20 MILLIEQUIVALENT(S): at 08:53

## 2017-11-17 RX ADMIN — TAMSULOSIN HYDROCHLORIDE 0.4 MILLIGRAM(S): 0.4 CAPSULE ORAL at 21:12

## 2017-11-17 RX ADMIN — LISINOPRIL 2.5 MILLIGRAM(S): 2.5 TABLET ORAL at 06:44

## 2017-11-17 RX ADMIN — Medication 40 MILLIGRAM(S): at 14:16

## 2017-11-17 RX ADMIN — Medication 81 MILLIGRAM(S): at 11:12

## 2017-11-17 RX ADMIN — CARVEDILOL PHOSPHATE 25 MILLIGRAM(S): 80 CAPSULE, EXTENDED RELEASE ORAL at 18:12

## 2017-11-17 RX ADMIN — ATORVASTATIN CALCIUM 40 MILLIGRAM(S): 80 TABLET, FILM COATED ORAL at 21:12

## 2017-11-17 RX ADMIN — CARVEDILOL PHOSPHATE 25 MILLIGRAM(S): 80 CAPSULE, EXTENDED RELEASE ORAL at 06:44

## 2017-11-17 RX ADMIN — Medication 0.6 MILLIGRAM(S): at 18:10

## 2017-11-17 NOTE — PROGRESS NOTE ADULT - ASSESSMENT
71yo M with pmh of recurrent VT s/p 3x ablation (9/2016, 5/2017, 10/2017), CAD w MATTY to LAD and pLAD 2016, ICD, DVT on Eliquis, chronic systolic CHF with EF<35%, DANI on CPAP, Pre DM, BPH, OA, HTN, recurrent UTI transferred from [facility] to CCU for ventricular tachycardia for medical optimization and ablation, now s/p epicardial ablation.

## 2017-11-17 NOTE — PROGRESS NOTE ADULT - SUBJECTIVE AND OBJECTIVE BOX
EPS Progress Note    S: in bed, no c/o chest pain or palpitations     MEDICATIONS  (STANDING):  apixaban 5 milliGRAM(s) Oral every 12 hours  aspirin enteric coated 81 milliGRAM(s) Oral daily  atorvastatin 40 milliGRAM(s) Oral at bedtime  carvedilol 25 milliGRAM(s) Oral every 12 hours  colchicine 0.6 milliGRAM(s) Oral two times a day  finasteride 5 milliGRAM(s) Oral daily  lisinopril 2.5 milliGRAM(s) Oral daily  tamsulosin 0.4 milliGRAM(s) Oral at bedtime      Telemetry: SR           General:  NAD         Chest:  CTA B/L         Cardiac:  RRR      s1/s2        Abdomen:  +BS      Soft      Non-Tender      Non-Distended        Extremities:  no edema      Labs:                                                               12.0   8.4   )-----------( 191      ( 17 Nov 2017 05:56 )             36.2     11-17    136  |  99  |  12  ----------------------------<  88  3.9   |  23  |  0.94    Ca    8.9      17 Nov 2017 05:56  Mg     2.1     11-17      PT/INR - ( 16 Nov 2017 17:42 )   PT: 12.7 sec;   INR: 1.14          PTT - ( 17 Nov 2017 06:27 )  PTT:63.8 sec    Assessment/Plan:  71yo M with pmh of recurrent VT s/p 3 ablations (9/2016, 5/2017, 10/2017), CAD w MATTY to LAD (6/10/16), stent to pLAD (6/16/016), ICD, DVT on Eliquis CHF with EF<35%, DANI on CPAP, Pre DM, BPH, OA, HTN, recurrent UTI who is a transfer from [facility] for ventricular tachycardia.   Patient was no Quinidine, however he had episodes of VT, and stres test was inducible for VT all episodes terminated with ATP.   Patient had VT ablation on 11/16/17, he was observed in CCU overnight ,  no VT noted on telemetry. Continue anticoagulation, restarted on Eliquis, plan to repeat exercise stress test on Monday to evaluate for VT inducibility.

## 2017-11-17 NOTE — PROGRESS NOTE ADULT - PROBLEM SELECTOR PLAN 7
Mild-moderate pericardial effusion present on imaging during ablation today.   Bedside echo shows..  - lasix Mild-moderate pericardial effusion present on imaging during ablation today.   - lasix PRN for fluid overload. Will receive 40mg IVP today, monitor fluid status

## 2017-11-17 NOTE — PROGRESS NOTE ADULT - PROBLEM SELECTOR PLAN 5
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 Hypercoagulable w/u pursued by hematologist previously.   -Eliquis on hold  -C/w heparin drip 6 hrs after EP ablation. 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 Hypercoagulable w/u pursued by hematologist previously.   - Pt transitioned from hep gtt to Eliquis 5mg BID today   - c/w Eliquis 5mg BID

## 2017-11-17 NOTE — PROGRESS NOTE ADULT - SUBJECTIVE AND OBJECTIVE BOX
INTERVAL HPI/OVERNIGHT EVENTS: 1mg Mg repleted.  Patient refused bipap. Groin check x 2 stable. Post void Bladder scan in 100s. Hep gtt started. PTT at 3am subtherapeutic. Inc hep gtt by 1. Next ptt at 10am.    SUBJECTIVE: Patient seen and examined at bedside.    OBJECTIVE:    VITAL SIGNS:  ICU Vital Signs Last 24 Hrs  T(C): 37.1 (17 Nov 2017 06:39), Max: 37.2 (17 Nov 2017 01:34)  T(F): 98.7 (17 Nov 2017 06:39), Max: 98.9 (17 Nov 2017 01:34)  HR: 72 (17 Nov 2017 07:00) (68 - 72)  BP: 116/72 (17 Nov 2017 04:00) (98/78 - 150/84)  BP(mean): 84 (17 Nov 2017 04:00) (80 - 105)  ABP: 122/62 (17 Nov 2017 07:00) (106/46 - 158/82)  ABP(mean): 82 (17 Nov 2017 07:00) (64 - 108)  RR: 23 (17 Nov 2017 07:00) (11 - 23)  SpO2: 94% (17 Nov 2017 07:00) (92% - 97%)        11-16 @ 07:01  -  11-17 @ 07:00  --------------------------------------------------------  IN: 731 mL / OUT: 1100 mL / NET: -369 mL      CAPILLARY BLOOD GLUCOSE      POCT Blood Glucose.: 89 mg/dL (15 Nov 2017 16:16)      PHYSICAL EXAM:    General: WDWN ; NAD  HEENT: NC/AT; PERRL, anicteric sclera  Neck: supple, no JVD  Respiratory: CTA B/L; no W/R/R  Cardiovascular: +S1/S2; RRR; no M/R/G  Gastrointestinal: soft, NT/ND; +BS x4  Extremities: WWP; 2+ peripheral pulses B/L; no LE edema  Skin: normal color and turgor; no rash  Neurological:     MEDICATIONS:  MEDICATIONS  (STANDING):  aspirin enteric coated 81 milliGRAM(s) Oral daily  atorvastatin 20 milliGRAM(s) Oral at bedtime  carvedilol 25 milliGRAM(s) Oral every 12 hours  colchicine 0.6 milliGRAM(s) Oral two times a day  finasteride 5 milliGRAM(s) Oral daily  heparin  Infusion 1600 Unit(s)/Hr (16 mL/Hr) IV Continuous <Continuous>  lisinopril 2.5 milliGRAM(s) Oral daily  tamsulosin 0.4 milliGRAM(s) Oral at bedtime    MEDICATIONS  (PRN):      ALLERGIES:  Allergies    No Known Allergies    Intolerances        LABS:                        12.0   8.4   )-----------( 191      ( 17 Nov 2017 05:56 )             36.2     11-17    136  |  99  |  12  ----------------------------<  88  3.9   |  23  |  0.94    Ca    8.9      17 Nov 2017 05:56  Mg     2.1     11-17        PT/INR - ( 16 Nov 2017 17:42 )   PT: 12.7 sec;   INR: 1.14          PTT - ( 17 Nov 2017 06:27 )  PTT:63.8 sec          RADIOLOGY & ADDITIONAL TESTS: Reviewed. INTERVAL HPI/OVERNIGHT EVENTS: 1mg Mg repleted.  Patient refused bipap. Groin check x 2 stable. Post void Bladder scan in 100s. Hep gtt started. PTT at 3am subtherapeutic. Inc hep gtt by 1. Next ptt at 10am.    SUBJECTIVE: Patient seen and examined at bedside.     OBJECTIVE:    VITAL SIGNS:  ICU Vital Signs Last 24 Hrs  T(C): 37.1 (17 Nov 2017 06:39), Max: 37.2 (17 Nov 2017 01:34)  T(F): 98.7 (17 Nov 2017 06:39), Max: 98.9 (17 Nov 2017 01:34)  HR: 72 (17 Nov 2017 07:00) (68 - 72)  BP: 116/72 (17 Nov 2017 04:00) (98/78 - 150/84)  BP(mean): 84 (17 Nov 2017 04:00) (80 - 105)  ABP: 122/62 (17 Nov 2017 07:00) (106/46 - 158/82)  ABP(mean): 82 (17 Nov 2017 07:00) (64 - 108)  RR: 23 (17 Nov 2017 07:00) (11 - 23)  SpO2: 94% (17 Nov 2017 07:00) (92% - 97%)        11-16 @ 07:01  -  11-17 @ 07:00  --------------------------------------------------------  IN: 731 mL / OUT: 1100 mL / NET: -369 mL      CAPILLARY BLOOD GLUCOSE      POCT Blood Glucose.: 89 mg/dL (15 Nov 2017 16:16)      PHYSICAL EXAM:    General: WDWN ; NAD  HEENT: NC/AT; PERRL, anicteric sclera  Neck: supple, no JVD  Respiratory: CTA B/L; no W/R/R  Cardiovascular: +S1/S2; RRR; no M/R/G  Gastrointestinal: soft, NT/ND; +BS x4  Extremities: WWP; 2+ peripheral pulses B/L; no LE edema  Skin: normal color and turgor; no rash  Neurological:     MEDICATIONS:  MEDICATIONS  (STANDING):  aspirin enteric coated 81 milliGRAM(s) Oral daily  atorvastatin 20 milliGRAM(s) Oral at bedtime  carvedilol 25 milliGRAM(s) Oral every 12 hours  colchicine 0.6 milliGRAM(s) Oral two times a day  finasteride 5 milliGRAM(s) Oral daily  heparin  Infusion 1600 Unit(s)/Hr (16 mL/Hr) IV Continuous <Continuous>  lisinopril 2.5 milliGRAM(s) Oral daily  tamsulosin 0.4 milliGRAM(s) Oral at bedtime    MEDICATIONS  (PRN):      ALLERGIES:  Allergies    No Known Allergies    Intolerances        LABS:                        12.0   8.4   )-----------( 191      ( 17 Nov 2017 05:56 )             36.2     11-17    136  |  99  |  12  ----------------------------<  88  3.9   |  23  |  0.94    Ca    8.9      17 Nov 2017 05:56  Mg     2.1     11-17        PT/INR - ( 16 Nov 2017 17:42 )   PT: 12.7 sec;   INR: 1.14          PTT - ( 17 Nov 2017 06:27 )  PTT:63.8 sec          RADIOLOGY & ADDITIONAL TESTS: Reviewed. INTERVAL HPI/OVERNIGHT EVENTS: 1mg Mg repleted.  Patient refused bipap. Groin check x 2 stable. Post void Bladder scan in 100s. Hep gtt started. PTT at 3am subtherapeutic. Inc hep gtt by 1. Next ptt at 10am. Pt passed TOV in AM.     SUBJECTIVE: Patient seen and examined at bedside. He has no complaints today. Pt states that he feels tired, however denies any chest pain, shortness of breath, dizziness, headaches, fevers, chills, n/v/d/c, abdominal pain, oozing from b/l groin sites, tenderness as sites, LE edema. The patient notes that he is anxious he will go back into VT since he has done so after his last three ablations and is scared of getting shocked again since he feels very ill afterwards. He also notes a decrease in appetites, however has been able to urinate on his own after being straight catheterized once yesterday.    OBJECTIVE:    VITAL SIGNS:  ICU Vital Signs Last 24 Hrs  T(C): 37.1 (17 Nov 2017 06:39), Max: 37.2 (17 Nov 2017 01:34)  T(F): 98.7 (17 Nov 2017 06:39), Max: 98.9 (17 Nov 2017 01:34)  HR: 72 (17 Nov 2017 07:00) (68 - 72)  BP: 116/72 (17 Nov 2017 04:00) (98/78 - 150/84)  BP(mean): 84 (17 Nov 2017 04:00) (80 - 105)  ABP: 122/62 (17 Nov 2017 07:00) (106/46 - 158/82)  ABP(mean): 82 (17 Nov 2017 07:00) (64 - 108)  RR: 23 (17 Nov 2017 07:00) (11 - 23)  SpO2: 94% (17 Nov 2017 07:00) (92% - 97%)        11-16 @ 07:01  -  11-17 @ 07:00  --------------------------------------------------------  IN: 731 mL / OUT: 1100 mL / NET: -369 mL      CAPILLARY BLOOD GLUCOSE      POCT Blood Glucose.: 89 mg/dL (15 Nov 2017 16:16)      PHYSICAL EXAM:    General: WDWN ; NAD ; laying comfortably in bed  HEENT: NC/AT; PERRL, anicteric sclera  Neck: supple, no JVD present on exam  Respiratory: CTA B/L; no W/R/R; no increased respiratory effort  Cardiovascular: +S1/S2; RRR; no M/R/G  Gastrointestinal: soft, NT, mildly distended (not increased from baseline per pt) ; +BS x4  Extremities: WWP; 2+ peripheral pulses B/L; no LE edema; b/l groin sites present, dressing c/d/i, examination of wound show no oozing from sites, soft and nontender to palpation. R thigh lipoma present lateral to R groin wound, hard movable in nature  Skin: normal color and turgor; no rash  Neurological: AOOx3, mentating well, moves all limbs freely     MEDICATIONS:  MEDICATIONS  (STANDING):  aspirin enteric coated 81 milliGRAM(s) Oral daily  atorvastatin 20 milliGRAM(s) Oral at bedtime  carvedilol 25 milliGRAM(s) Oral every 12 hours  colchicine 0.6 milliGRAM(s) Oral two times a day  finasteride 5 milliGRAM(s) Oral daily  heparin  Infusion 1600 Unit(s)/Hr (16 mL/Hr) IV Continuous <Continuous>  lisinopril 2.5 milliGRAM(s) Oral daily  tamsulosin 0.4 milliGRAM(s) Oral at bedtime    MEDICATIONS  (PRN):      ALLERGIES:  Allergies    No Known Allergies    Intolerances        LABS:                        12.0   8.4   )-----------( 191      ( 17 Nov 2017 05:56 )             36.2     11-17    136  |  99  |  12  ----------------------------<  88  3.9   |  23  |  0.94    Ca    8.9      17 Nov 2017 05:56  Mg     2.1     11-17        PT/INR - ( 16 Nov 2017 17:42 )   PT: 12.7 sec;   INR: 1.14          PTT - ( 17 Nov 2017 06:27 )  PTT:63.8 sec          RADIOLOGY & ADDITIONAL TESTS: Reviewed. 72yM with PMH of recurrent VT s/p 3 ablations (9/2016, 5/2017, 10/2017) and AICD, CAD w MATTY to LAD (6/10/16) and pLAD (6/16/016), HFrEF (EF <35%), DVT on Eliquis, DANI on CPAP, pre-DM, BPH, OA, HTN, recurrent UTI who is a transfer from [facility] for VT.  At [facility] ED, patient had recurrent runs of VT, was given ASA 324mg, Amiodarone 150mg, and Sotalol 120mg. He continued to have brief runs of VT and was started on amiodarone drip eventually transitioned to carvedilol and sotalol.  AICD had 92 episodes of VT with ATP but with no shocks delivered. On 11/9 had recurrent NSVT that broke with therapeutic ATP but then had sustained VT at HR 150s that failed ATPx3 and resulted in a therapeutic ICD shock x1 to NSR. Patient Amiodarone loaded and started on Amio gtt. Patient was transferred to [facility] for possible ablation with Dr. Neal. Patient was brought directly to [facility] CCU. Tele showing NSR with frequent VT episodes.  Patient started on Amio gtt and continued on Carvedilol 25mg BID, Lisinopril 2.5mg, Coreg 25 BID, ASA 81mg and Atorvastatin 20mg qhs. Also started on heparin gtt rather than home Eliquis for possible EP procedure. Patient was seen by EP and Amio gtt transitioned to Quinidine with improvement in number of VT episodes. Received stress test yesterday, during the stress test had sustained episode of VT and received ATP by ICD, converted to Bigeminy then SR, reports felt lightheaded during episode. QTc 511ms, Quinidine d/c'ed. Today, the patient underwent epicardial VT ablation and is transferred to CCU for further monitoring.  Of note: Cath report obtained from [facility] (7.2016): MATTY mLAD 99%, EF 30%, severe single vessel dz of mLAD.   INTERVAL HPI/OVERNIGHT EVENTS: 1mg Mg repleted.  Patient refused bipap. Groin check x 2 stable. Post void Bladder scan in 100s. Hep gtt started. PTT at 3am subtherapeutic. Inc hep gtt by 1. Next ptt at 10am. Pt passed TOV in AM.     SUBJECTIVE: Patient seen and examined at bedside. He has no complaints today. Pt states that he feels tired, however denies any chest pain, shortness of breath, dizziness, headaches, fevers, chills, n/v/d/c, abdominal pain, oozing from b/l groin sites, tenderness as sites, LE edema. The patient notes that he is anxious he will go back into VT since he has done so after his last three ablations and is scared of getting shocked again since he feels very ill afterwards. He also notes a decrease in appetites, however has been able to urinate on his own after being straight catheterized once yesterday.    OBJECTIVE:    VITAL SIGNS:  ICU Vital Signs Last 24 Hrs  T(C): 37.1 (17 Nov 2017 06:39), Max: 37.2 (17 Nov 2017 01:34)  T(F): 98.7 (17 Nov 2017 06:39), Max: 98.9 (17 Nov 2017 01:34)  HR: 72 (17 Nov 2017 07:00) (68 - 72)  BP: 116/72 (17 Nov 2017 04:00) (98/78 - 150/84)  BP(mean): 84 (17 Nov 2017 04:00) (80 - 105)  ABP: 122/62 (17 Nov 2017 07:00) (106/46 - 158/82)  ABP(mean): 82 (17 Nov 2017 07:00) (64 - 108)  RR: 23 (17 Nov 2017 07:00) (11 - 23)  SpO2: 94% (17 Nov 2017 07:00) (92% - 97%)        11-16 @ 07:01  -  11-17 @ 07:00  --------------------------------------------------------  IN: 731 mL / OUT: 1100 mL / NET: -369 mL      CAPILLARY BLOOD GLUCOSE      POCT Blood Glucose.: 89 mg/dL (15 Nov 2017 16:16)      PHYSICAL EXAM:    General: WDWN ; NAD ; laying comfortably in bed  HEENT: NC/AT; PERRL, anicteric sclera  Neck: supple, no JVD present on exam  Respiratory: CTA B/L; no W/R/R; no increased respiratory effort  Cardiovascular: +S1/S2; RRR; no M/R/G  Gastrointestinal: soft, NT, mildly distended (not increased from baseline per pt) ; +BS x4  Extremities: WWP; 2+ peripheral pulses B/L; no LE edema; b/l groin sites present, dressing c/d/i, examination of wound show no oozing from sites, soft and nontender to palpation. R thigh lipoma present lateral to R groin wound, hard movable in nature  Skin: normal color and turgor; no rash  Neurological: AOOx3, mentating well, moves all limbs freely     MEDICATIONS:  MEDICATIONS  (STANDING):  aspirin enteric coated 81 milliGRAM(s) Oral daily  atorvastatin 20 milliGRAM(s) Oral at bedtime  carvedilol 25 milliGRAM(s) Oral every 12 hours  colchicine 0.6 milliGRAM(s) Oral two times a day  finasteride 5 milliGRAM(s) Oral daily  heparin  Infusion 1600 Unit(s)/Hr (16 mL/Hr) IV Continuous <Continuous>  lisinopril 2.5 milliGRAM(s) Oral daily  tamsulosin 0.4 milliGRAM(s) Oral at bedtime    MEDICATIONS  (PRN):      ALLERGIES:  Allergies    No Known Allergies    Intolerances        LABS:                        12.0   8.4   )-----------( 191      ( 17 Nov 2017 05:56 )             36.2     11-17    136  |  99  |  12  ----------------------------<  88  3.9   |  23  |  0.94    Ca    8.9      17 Nov 2017 05:56  Mg     2.1     11-17        PT/INR - ( 16 Nov 2017 17:42 )   PT: 12.7 sec;   INR: 1.14          PTT - ( 17 Nov 2017 06:27 )  PTT:63.8 sec          RADIOLOGY & ADDITIONAL TESTS: Reviewed. 72yM with PMH of recurrent VT s/p 3 ablations (9/2016, 5/2017, 10/2017) and AICD, CAD w MATTY to LAD (6/10/16) and pLAD (6/16/016), HFrEF (EF <35%), DVT on Eliquis, DANI on CPAP, pre-DM, BPH, OA, HTN, recurrent UTI who is a transfer from [facility] for VT.  At [facility] ED, patient had recurrent runs of VT, was given ASA 324mg, Amiodarone 150mg, and Sotalol 120mg. He continued to have brief runs of VT and was started on amiodarone drip eventually transitioned to carvedilol and sotalol.  AICD had 92 episodes of VT with ATP but with no shocks delivered. On 11/9 had recurrent NSVT that broke with therapeutic ATP but then had sustained VT at HR 150s that failed ATPx3 and resulted in a therapeutic ICD shock x1 to NSR. Patient Amiodarone loaded and started on Amio gtt. Patient was transferred to [facility] for possible ablation with Dr. Neal. Patient was brought directly to [facility] CCU. Tele showing NSR with frequent VT episodes.  Patient started on Amio gtt and continued on Carvedilol 25mg BID, Lisinopril 2.5mg, Coreg 25 BID, ASA 81mg and Atorvastatin 20mg qhs. Also started on heparin gtt rather than home Eliquis for possible EP procedure. Patient was seen by EP and Amio gtt transitioned to Quinidine with improvement in number of VT episodes. Received stress test yesterday, during the stress test had sustained episode of VT and received ATP by ICD, converted to Bigeminy then SR, reports felt lightheaded during episode. QTc 511ms, Quinidine d/c'ed. Today, the patient underwent epicardial VT ablation and is transferred to CCU for further monitoring.  Of note: Cath report obtained from [facility] (7.2016): MATTY mLAD 99%, EF 30%, severe single vessel dz of mLAD.   INTERVAL HPI/OVERNIGHT EVENTS: 1mg Mg repleted.  Patient refused bipap. Groin check x 2 stable. Post void Bladder scan in 100s. Hep gtt started. PTT at 3am subtherapeutic. Inc hep gtt by 1. Next ptt at 10am. Pt passed TOV in AM.     SUBJECTIVE: Patient seen and examined at bedside. He has no complaints today. Pt states that he feels tired, however denies any chest pain, shortness of breath, dizziness, headaches, fevers, chills, n/v/d/c, abdominal pain, oozing from b/l groin sites, tenderness as sites, LE edema. The patient notes that he is anxious he will go back into VT since he has done so after his last three ablations and is scared of getting shocked again since he feels very ill afterwards. He also notes a decrease in appetites, however has been able to urinate on his own after being straight catheterized once yesterday.    OBJECTIVE:    VITAL SIGNS:  ICU Vital Signs Last 24 Hrs  T(C): 37.1 (17 Nov 2017 06:39), Max: 37.2 (17 Nov 2017 01:34)  T(F): 98.7 (17 Nov 2017 06:39), Max: 98.9 (17 Nov 2017 01:34)  HR: 72 (17 Nov 2017 07:00) (68 - 72)  BP: 116/72 (17 Nov 2017 04:00) (98/78 - 150/84)  BP(mean): 84 (17 Nov 2017 04:00) (80 - 105)  ABP: 122/62 (17 Nov 2017 07:00) (106/46 - 158/82)  ABP(mean): 82 (17 Nov 2017 07:00) (64 - 108)  RR: 23 (17 Nov 2017 07:00) (11 - 23)  SpO2: 94% (17 Nov 2017 07:00) (92% - 97%)        11-16 @ 07:01  -  11-17 @ 07:00  --------------------------------------------------------  IN: 731 mL / OUT: 1100 mL / NET: -369 mL      CAPILLARY BLOOD GLUCOSE      POCT Blood Glucose.: 89 mg/dL (15 Nov 2017 16:16)      PHYSICAL EXAM:    General: WDWN ; NAD ; laying comfortably in bed  HEENT: NC/AT; PERRL, anicteric sclera  Neck: supple, no JVD present on exam  Respiratory: CTA B/L; no W/R/R; no increased respiratory effort  Cardiovascular: +S1/S2; RRR; no M/R/G  Gastrointestinal: soft, NT, mildly distended (not increased from baseline per pt) ; +BS x4  Extremities: WWP; 2+ peripheral pulses B/L; no LE edema; b/l groin sites present, dressing c/d/i, examination of wound show no oozing from sites, soft and nontender to palpation. R thigh lipoma present lateral to R groin wound, hard movable in nature  Skin: normal color and turgor; no rash  Neurological: AOOx3, mentating well, moves all limbs freely     MEDICATIONS:  MEDICATIONS  (STANDING):  aspirin enteric coated 81 milliGRAM(s) Oral daily  atorvastatin 20 milliGRAM(s) Oral at bedtime  carvedilol 25 milliGRAM(s) Oral every 12 hours  colchicine 0.6 milliGRAM(s) Oral two times a day  finasteride 5 milliGRAM(s) Oral daily  heparin  Infusion 1600 Unit(s)/Hr (16 mL/Hr) IV Continuous <Continuous>  lisinopril 2.5 milliGRAM(s) Oral daily  tamsulosin 0.4 milliGRAM(s) Oral at bedtime    MEDICATIONS  (PRN):      ALLERGIES:  Allergies    No Known Allergies    Intolerances        LABS:                        12.0   8.4   )-----------( 191      ( 17 Nov 2017 05:56 )             36.2     11-17    136  |  99  |  12  ----------------------------<  88  3.9   |  23  |  0.94    Ca    8.9      17 Nov 2017 05:56  Mg     2.1     11-17        PT/INR - ( 16 Nov 2017 17:42 )   PT: 12.7 sec;   INR: 1.14          PTT - ( 17 Nov 2017 06:27 )  PTT:63.8 sec          RADIOLOGY & ADDITIONAL TESTS: Reviewed.  < from: EKG Stress Test (11.15.17 @ 14:39) >  Arrhythmias:  Ventricular tachycardia at a rate of 102 developed 1:50   minutes into exercise and was terminated by overdrive pacing. During   early recovery a brief run of ventricular bigeminy was noted.                   Impression:  Normal, submaximal exercise treadmill stress test for   ischemia. Arrhythmias as noted above.    < end of copied text >    < from: Echocardiogram (11.11.17 @ 09:36) >  Left Ventricle  There is mild concentric left ventricular hypertrophy.  Abnormal (paradoxical) septal motion consistent with abnormal conduction  There is basal inferior wall hypokinesis.  There is mid inferior wall hypokinesis.  There is apical septal wall hypokinesis.  There is apical hypokinesis.  The left ventricular ejection fraction is mild to moderately reduced.  The left ventricular ejection fraction is estimated to be 35-40%  Left Atrium  The left atrium is dilated.  The mitral inflow pattern is consistent with impaired left ventricular  relaxation with mildly elevated left atrial pressure (8-14mmHg).  Right Atrium  Right atrial size is normal.  Right Ventricle  The right ventricle is normal in size and function.  There is pacemaker wire in R heart  The IVC is dilated (>2.1 cm) with an abnormal inspiratory collapse (<50%)  consistent with elevated right atrial pressure.  Pericardium / Pleura  There is no pericardial effusion. PGY 1 TRANSFER   72 with [facility] of recurrent VT s/p 3 ablations (9/2016, 5/2017, 10/2017) and AICD, CAD w MATTY to LAD (6/10/16) and pLAD (6/16/016), HFrEF (EF <35%), DVT on Eliquis, DANI on CPAP, pre-DM, BPH, OA, HTN, recurrent UTI who is a transfer from [facility] [place] for [place].  At [facility] ED, patient had recurrent runs of VT, was given ASA 324mg, Amiodarone 150mg, and Sotalol 120mg. He continued to have brief runs of VT and was started on amiodarone drip eventually transitioned to carvedilol and sotalol.  AICD had 92 episodes of VT with ATP but with no shocks delivered. On 11/9 had recurrent NSVT that broke with therapeutic ATP but then had sustained VT at HR 150s that failed ATPx3 and resulted in a therapeutic ICD shock x1 to NSR. Patient Amiodarone loaded and started on Amio gtt. Patient was transferred to [facility] for possible ablation with Dr. Neal. Patient was brought directly to [facility] CCU. Tele showing NSR with frequent VT episodes.  Patient started on Amio gtt and continued on Carvedilol 25mg BID, Lisinopril 2.5mg, Coreg 25 BID, ASA 81mg and Atorvastatin 20mg qhs. Also started on heparin gtt rather than home Eliquis for possible EP procedure. Patient was seen by EP and Amio gtt transitioned to Quinidine with improvement in number of VT episodes. Received stress test yesterday, during the stress test had sustained episode of VT and received ATP by ICD, converted to Bigeminy then SR, reports felt lightheaded during episode. QTc 511ms, Quinidine d/c'ed. Today, the patient underwent epicardial VT ablation and is transferred to CCU for further monitoring.  Of note: Cath report obtained from [facility] (7.2016): MATTY mLAD 99%, EF 30%, severe single vessel dz of mLAD.     INTERVAL HPI/OVERNIGHT EVENTS: 1mg Mg repleted.  Patient refused bipap. Groin check x 2 stable. Post void Bladder scan in 100s. Hep gtt started. PTT at 3am subtherapeutic. Inc hep gtt by 1. Next ptt at 10am. Pt passed TOV in AM.     SUBJECTIVE: Patient seen and examined at bedside. He has no complaints today. Pt states that he feels tired, however denies any chest pain, shortness of breath, dizziness, headaches, fevers, chills, n/v/d/c, abdominal pain, oozing from b/l groin sites, tenderness as sites, LE edema. The patient notes that he is anxious he will go back into VT since he has done so after his last three ablations and is scared of getting shocked again since he feels very ill afterwards. He also notes a decrease in appetites, however has been able to urinate on his own after being straight catheterized once yesterday.    OBJECTIVE:    VITAL SIGNS:  ICU Vital Signs Last 24 Hrs  T(C): 37.1 (17 Nov 2017 06:39), Max: 37.2 (17 Nov 2017 01:34)  T(F): 98.7 (17 Nov 2017 06:39), Max: 98.9 (17 Nov 2017 01:34)  HR: 72 (17 Nov 2017 07:00) (68 - 72)  BP: 116/72 (17 Nov 2017 04:00) (98/78 - 150/84)  BP(mean): 84 (17 Nov 2017 04:00) (80 - 105)  ABP: 122/62 (17 Nov 2017 07:00) (106/46 - 158/82)  ABP(mean): 82 (17 Nov 2017 07:00) (64 - 108)  RR: 23 (17 Nov 2017 07:00) (11 - 23)  SpO2: 94% (17 Nov 2017 07:00) (92% - 97%)        11-16 @ 07:01  -  11-17 @ 07:00  --------------------------------------------------------  IN: 731 mL / OUT: 1100 mL / NET: -369 mL      CAPILLARY BLOOD GLUCOSE      POCT Blood Glucose.: 89 mg/dL (15 Nov 2017 16:16)      PHYSICAL EXAM:    General: WDWN ; NAD ; laying comfortably in bed  HEENT: NC/AT; PERRL, anicteric sclera  Neck: supple, no JVD present on exam  Respiratory: CTA B/L; no W/R/R; no increased respiratory effort  Cardiovascular: +S1/S2; RRR; no M/R/G  Gastrointestinal: soft, NT, mildly distended (not increased from baseline per pt) ; +BS x4  Extremities: WWP; 2+ peripheral pulses B/L; no LE edema; b/l groin sites present, dressing c/d/i, examination of wound show no oozing from sites, soft and nontender to palpation. R thigh lipoma present lateral to R groin wound, hard movable in nature  Skin: normal color and turgor; no rash  Neurological: AOOx3, mentating well, moves all limbs freely     MEDICATIONS:  MEDICATIONS  (STANDING):  aspirin enteric coated 81 milliGRAM(s) Oral daily  atorvastatin 20 milliGRAM(s) Oral at bedtime  carvedilol 25 milliGRAM(s) Oral every 12 hours  colchicine 0.6 milliGRAM(s) Oral two times a day  finasteride 5 milliGRAM(s) Oral daily  heparin  Infusion 1600 Unit(s)/Hr (16 mL/Hr) IV Continuous <Continuous>  lisinopril 2.5 milliGRAM(s) Oral daily  tamsulosin 0.4 milliGRAM(s) Oral at bedtime    MEDICATIONS  (PRN):      ALLERGIES:  Allergies    No Known Allergies    Intolerances        LABS:                        12.0   8.4   )-----------( 191      ( 17 Nov 2017 05:56 )             36.2     11-17    136  |  99  |  12  ----------------------------<  88  3.9   |  23  |  0.94    Ca    8.9      17 Nov 2017 05:56  Mg     2.1     11-17        PT/INR - ( 16 Nov 2017 17:42 )   PT: 12.7 sec;   INR: 1.14          PTT - ( 17 Nov 2017 06:27 )  PTT:63.8 sec          RADIOLOGY & ADDITIONAL TESTS: Reviewed.  < from: EKG Stress Test (11.15.17 @ 14:39) >  Arrhythmias:  Ventricular tachycardia at a rate of 102 developed 1:50   minutes into exercise and was terminated by overdrive pacing. During   early recovery a brief run of ventricular bigeminy was noted.                   Impression:  Normal, submaximal exercise treadmill stress test for   ischemia. Arrhythmias as noted above.    < end of copied text >    < from: Echocardiogram (11.11.17 @ 09:36) >  Left Ventricle  There is mild concentric left ventricular hypertrophy.  Abnormal (paradoxical) septal motion consistent with abnormal conduction  There is basal inferior wall hypokinesis.  There is mid inferior wall hypokinesis.  There is apical septal wall hypokinesis.  There is apical hypokinesis.  The left ventricular ejection fraction is mild to moderately reduced.  The left ventricular ejection fraction is estimated to be 35-40%  Left Atrium  The left atrium is dilated.  The mitral inflow pattern is consistent with impaired left ventricular  relaxation with mildly elevated left atrial pressure (8-14mmHg).  Right Atrium  Right atrial size is normal.  Right Ventricle  The right ventricle is normal in size and function.  There is pacemaker wire in R heart  The IVC is dilated (>2.1 cm) with an abnormal inspiratory collapse (<50%)  consistent with elevated right atrial pressure.  Pericardium / Pleura  There is no pericardial effusion. PGY 1 TRANSFER   72 with [facility] of recurrent VT s/p 3 ablations (9/2016, 5/2017, 10/2017) and AICD, CAD w MATTY to LAD (6/10/16) and pLAD (6/16/016), HFrEF (EF <35%), DVT on Eliquis, DANI on CPAP, pre-DM, BPH, OA, HTN, recurrent UTI who is a transfer from [facility] for VT.  At [facility] ED, patient had recurrent runs of VT, was given ASA 324mg, Amiodarone 150mg, and Sotalol 120mg. He continued to have brief runs of VT and was started on amiodarone drip eventually transitioned to carvedilol and sotalol.  AICD had 92 episodes of VT with ATP but with no shocks delivered. On 11/9 had recurrent NSVT that broke with therapeutic ATP but then had sustained VT at HR 150s that failed ATPx3 and resulted in a therapeutic ICD shock x1 to NSR. Patient Amiodarone loaded and started on Amio gtt. Patient was transferred to [facility] for possible ablation with Dr. Neal. Patient was brought directly to [facility] CCU. Tele showing NSR with frequent VT episodes.  Patient started on Amio gtt and continued on Carvedilol 25mg BID, Lisinopril 2.5mg, Coreg 25 BID, ASA 81mg and Atorvastatin 20mg qhs. Also started on heparin gtt rather than home Eliquis for possible EP procedure. Patient was seen by EP and Amio gtt transitioned to Quinidine with improvement in number of VT episodes. Received stress test 11/15, during the stress test had sustained episode of VT and received ATP by ICD, converted to Bigeminy then SR, reports felt lightheaded during episode. QTc 511ms, Quinidine d/c'ed. 11/16, the patient underwent epicardial VT ablation and is transferred to CCU for further monitoring.  Of note: Cath report obtained from [facility] (7.2016): MATTY mLAD 99%, EF 30%, severe single vessel dz of mLAD.     INTERVAL HPI/OVERNIGHT EVENTS: 1mg Mg repleted.  Patient refused bipap. Groin check x 2 stable. Post void Bladder scan in 100s. Hep gtt started. PTT at 3am subtherapeutic. Inc hep gtt by 1. Next ptt at 10am. Pt passed TOV in AM.     SUBJECTIVE: Patient seen and examined at bedside. He has no complaints today. Pt states that he feels tired, however denies any chest pain, shortness of breath, dizziness, headaches, fevers, chills, n/v/d/c, abdominal pain, oozing from b/l groin sites, tenderness as sites, LE edema. The patient notes that he is anxious he will go back into VT since he has done so after his last three ablations and is scared of getting shocked again since he feels very ill afterwards. He also notes a decrease in appetites, however has been able to urinate on his own after being straight catheterized once yesterday.    OBJECTIVE:    VITAL SIGNS:  ICU Vital Signs Last 24 Hrs  T(C): 37.1 (17 Nov 2017 06:39), Max: 37.2 (17 Nov 2017 01:34)  T(F): 98.7 (17 Nov 2017 06:39), Max: 98.9 (17 Nov 2017 01:34)  HR: 72 (17 Nov 2017 07:00) (68 - 72)  BP: 116/72 (17 Nov 2017 04:00) (98/78 - 150/84)  BP(mean): 84 (17 Nov 2017 04:00) (80 - 105)  ABP: 122/62 (17 Nov 2017 07:00) (106/46 - 158/82)  ABP(mean): 82 (17 Nov 2017 07:00) (64 - 108)  RR: 23 (17 Nov 2017 07:00) (11 - 23)  SpO2: 94% (17 Nov 2017 07:00) (92% - 97%)        11-16 @ 07:01  -  11-17 @ 07:00  --------------------------------------------------------  IN: 731 mL / OUT: 1100 mL / NET: -369 mL      CAPILLARY BLOOD GLUCOSE      POCT Blood Glucose.: 89 mg/dL (15 Nov 2017 16:16)      PHYSICAL EXAM:    General: WDWN ; NAD ; laying comfortably in bed  HEENT: NC/AT; PERRL, anicteric sclera  Neck: supple, no JVD present on exam  Respiratory: CTA B/L; no W/R/R; no increased respiratory effort  Cardiovascular: +S1/S2; RRR; no M/R/G  Gastrointestinal: soft, NT, mildly distended (not increased from baseline per pt) ; +BS x4  Extremities: WWP; 2+ peripheral pulses B/L; no LE edema; b/l groin sites present, dressing c/d/i, examination of wound show no oozing from sites, soft and nontender to palpation. R thigh lipoma present lateral to R groin wound, hard movable in nature  Skin: normal color and turgor; no rash  Neurological: AOOx3, mentating well, moves all limbs freely     MEDICATIONS:  MEDICATIONS  (STANDING):  aspirin enteric coated 81 milliGRAM(s) Oral daily  atorvastatin 20 milliGRAM(s) Oral at bedtime  carvedilol 25 milliGRAM(s) Oral every 12 hours  colchicine 0.6 milliGRAM(s) Oral two times a day  finasteride 5 milliGRAM(s) Oral daily  heparin  Infusion 1600 Unit(s)/Hr (16 mL/Hr) IV Continuous <Continuous>  lisinopril 2.5 milliGRAM(s) Oral daily  tamsulosin 0.4 milliGRAM(s) Oral at bedtime    MEDICATIONS  (PRN):      ALLERGIES:  Allergies    No Known Allergies    Intolerances        LABS:                        12.0   8.4   )-----------( 191      ( 17 Nov 2017 05:56 )             36.2     11-17    136  |  99  |  12  ----------------------------<  88  3.9   |  23  |  0.94    Ca    8.9      17 Nov 2017 05:56  Mg     2.1     11-17        PT/INR - ( 16 Nov 2017 17:42 )   PT: 12.7 sec;   INR: 1.14          PTT - ( 17 Nov 2017 06:27 )  PTT:63.8 sec          RADIOLOGY & ADDITIONAL TESTS: Reviewed.  < from: EKG Stress Test (11.15.17 @ 14:39) >  Arrhythmias:  Ventricular tachycardia at a rate of 102 developed 1:50   minutes into exercise and was terminated by overdrive pacing. During   early recovery a brief run of ventricular bigeminy was noted.                   Impression:  Normal, submaximal exercise treadmill stress test for   ischemia. Arrhythmias as noted above.    < end of copied text >    < from: Echocardiogram (11.11.17 @ 09:36) >  Left Ventricle  There is mild concentric left ventricular hypertrophy.  Abnormal (paradoxical) septal motion consistent with abnormal conduction  There is basal inferior wall hypokinesis.  There is mid inferior wall hypokinesis.  There is apical septal wall hypokinesis.  There is apical hypokinesis.  The left ventricular ejection fraction is mild to moderately reduced.  The left ventricular ejection fraction is estimated to be 35-40%  Left Atrium  The left atrium is dilated.  The mitral inflow pattern is consistent with impaired left ventricular  relaxation with mildly elevated left atrial pressure (8-14mmHg).  Right Atrium  Right atrial size is normal.  Right Ventricle  The right ventricle is normal in size and function.  There is pacemaker wire in R heart  The IVC is dilated (>2.1 cm) with an abnormal inspiratory collapse (<50%)  consistent with elevated right atrial pressure.  Pericardium / Pleura  There is no pericardial effusion.

## 2017-11-17 NOTE — PROGRESS NOTE ADULT - PROBLEM SELECTOR PLAN 2
- Patient with previous ECHO showing EF of 30-35% mild concentric left ventricular hypertrophy apical akinesis apical septal wall akinesis, apical inferior wall akinesis.   - repeat ECHO showed EF 35-40% with inferior and apical hypokinesis  - consider [facility] for ischemic w/u as cause of VTs  - c/w Lisinopril 2.5mg, Coreg 25 BID, ASA and Atorvastatin 40  - Lasix IVP PRN for fluid overload, assess fluid status, did not receive any today   - strict I&Os, fluid restriction, daily weights Patient with previous ECHO showing EF of 30-35% mild concentric left ventricular hypertrophy apical akinesis apical septal wall akinesis, apical inferior wall akinesis.   - repeat ECHO showed EF 35-40% with inferior and apical hypokinesis  - consider [facility] for ischemic w/u as cause of VTs  - c/w Lisinopril 2.5mg, Coreg 25 BID, ASA and Atorvastatin 40mg  - Lasix IVP PRN for fluid overload, will receive 40mg IVP today   - strict I&Os, fluid restriction, daily weights

## 2017-11-17 NOTE — PROGRESS NOTE ADULT - PROBLEM SELECTOR PLAN 4
- Patient with h/o CAD s/p stent in 2016 to LAD. Recurrent VT storm may be secondary to ischemic insult. Negative tropes at [facility].  - consider PCI to evaluate ischemia, discuss with EP  - continue ASA 81mg daily, Coreg 25mg BID, Lisinopril 2.5mg daily and Atorvastatin 20mg qHs  - daily EKGs - Patient with h/o CAD s/p stent in 2016 to LAD. Recurrent VT storm may be secondary to ischemic insult. Negative tropes at [facility].  - consider PCI to evaluate ischemia, discuss with EP  - continue ASA 81mg daily, Coreg 25mg BID, Lisinopril 2.5mg daily   - Pt was on 20mg Lipitor, increased to 40mg today. Assess how pt tolerates increased dosage and consider increase to 80mg QHS  - daily EKGs

## 2017-11-17 NOTE — PROGRESS NOTE ADULT - PROBLEM SELECTOR PLAN 1
Hx of VT s/p 4 x ablation  - s/p ablation today, will follow up stat EKG, CXR, CBC, BMP, Mg, PT/PTT and INR pending transfer  - no further need for quinidine for pt (was on 324mg q8h) or antiarrythmics  - c/w carvedilol 25mg PO BID  - Exercise EKG stress test 11/15 performed to evaluate for arrhythmia inducibility. VT induced w/ HR of 102 developed 1:50 minutes into exercise and was terminated by overdrive pacing from ICD. During early recovery a brief run of ventricular bigeminy was noted.   -Patient was ambulated in CCU w/o episodes of VT.   -continue to replete lytes aggressively with K at 4.5 and Mg>2.5.   -F/u AM EKG, today QTc prolonged to 511ms. Hx of VT s/p 4 x ablation  - s/p ablation yesterday   - no further need for quinidine for pt (was on 324mg q8h) or antiarrhythmics  - c/w carvedilol 25mg PO BID  -continue to replete lytes aggressively with K at 4.5 and Mg>2.5.   -F/u AM EKG, today QTc prolonged to 511ms. Hx of VT s/p 4 x ablation  - s/p ablation yesterday   - no further need for quinidine for pt (was on 324mg q8h) or antiarrhythmics  - c/w carvedilol 25mg PO BID  - F/u AM EKG, yesterday QTc prolonged to 511ms, however today has returned WNL ([phone]), continue to monitor with serial EKG

## 2017-11-17 NOTE — PROGRESS NOTE ADULT - PROBLEM SELECTOR PLAN 3
- Patient with known AF on Eliquis 5mg BID  - will start patient on hep gtt 6 hours after ablation starting at 15cc/hr (adjusted body weight dose)- will switch to Eliquis 5mg in AM after assessment of b/l groin sites - Patient with known AF on Eliquis 5mg BID  - Pt transitioned from hep gtt to Eliquis 5mg BID today   - c/w Eliquis 5mg BID

## 2017-11-17 NOTE — PROGRESS NOTE ADULT - PROBLEM SELECTOR PLAN 9
F - No IVF  E - replete lytes, K>4.5 Mag>2.5  N - DASH diet     DVT ppx: No ppx currently, will be on hep gtt overnight, 6 hours after procedure    Code: Full Code  Dispo: CCU F - No IVF  E - replete lytes, K>4, Mg >2  N - DASH diet     DVT ppx: Eliquis 5mg BID    Code: Full Code  Dispo: CCU

## 2017-11-18 DIAGNOSIS — R63.8 OTHER SYMPTOMS AND SIGNS CONCERNING FOOD AND FLUID INTAKE: ICD-10-CM

## 2017-11-18 LAB
ANION GAP SERPL CALC-SCNC: 12 MMOL/L — SIGNIFICANT CHANGE UP (ref 5–17)
ANION GAP SERPL CALC-SCNC: 12 MMOL/L — SIGNIFICANT CHANGE UP (ref 5–17)
BUN SERPL-MCNC: 17 MG/DL — SIGNIFICANT CHANGE UP (ref 7–23)
BUN SERPL-MCNC: 19 MG/DL — SIGNIFICANT CHANGE UP (ref 7–23)
CALCIUM SERPL-MCNC: 9 MG/DL — SIGNIFICANT CHANGE UP (ref 8.4–10.5)
CALCIUM SERPL-MCNC: 9.3 MG/DL — SIGNIFICANT CHANGE UP (ref 8.4–10.5)
CHLORIDE SERPL-SCNC: 100 MMOL/L — SIGNIFICANT CHANGE UP (ref 96–108)
CHLORIDE SERPL-SCNC: 99 MMOL/L — SIGNIFICANT CHANGE UP (ref 96–108)
CO2 SERPL-SCNC: 25 MMOL/L — SIGNIFICANT CHANGE UP (ref 22–31)
CO2 SERPL-SCNC: 26 MMOL/L — SIGNIFICANT CHANGE UP (ref 22–31)
CREAT SERPL-MCNC: 1.05 MG/DL — SIGNIFICANT CHANGE UP (ref 0.5–1.3)
CREAT SERPL-MCNC: 1.09 MG/DL — SIGNIFICANT CHANGE UP (ref 0.5–1.3)
GLUCOSE SERPL-MCNC: 101 MG/DL — HIGH (ref 70–99)
GLUCOSE SERPL-MCNC: 95 MG/DL — SIGNIFICANT CHANGE UP (ref 70–99)
HCT VFR BLD CALC: 35.6 % — LOW (ref 39–50)
HGB BLD-MCNC: 11.7 G/DL — LOW (ref 13–17)
MAGNESIUM SERPL-MCNC: 2 MG/DL — SIGNIFICANT CHANGE UP (ref 1.6–2.6)
MAGNESIUM SERPL-MCNC: 2.1 MG/DL — SIGNIFICANT CHANGE UP (ref 1.6–2.6)
MCHC RBC-ENTMCNC: 29.7 PG — SIGNIFICANT CHANGE UP (ref 27–34)
MCHC RBC-ENTMCNC: 32.9 G/DL — SIGNIFICANT CHANGE UP (ref 32–36)
MCV RBC AUTO: 90.4 FL — SIGNIFICANT CHANGE UP (ref 80–100)
PLATELET # BLD AUTO: 179 K/UL — SIGNIFICANT CHANGE UP (ref 150–400)
POTASSIUM SERPL-MCNC: 3.8 MMOL/L — SIGNIFICANT CHANGE UP (ref 3.5–5.3)
POTASSIUM SERPL-MCNC: 4 MMOL/L — SIGNIFICANT CHANGE UP (ref 3.5–5.3)
POTASSIUM SERPL-SCNC: 3.8 MMOL/L — SIGNIFICANT CHANGE UP (ref 3.5–5.3)
POTASSIUM SERPL-SCNC: 4 MMOL/L — SIGNIFICANT CHANGE UP (ref 3.5–5.3)
RBC # BLD: 3.94 M/UL — LOW (ref 4.2–5.8)
RBC # FLD: 14.2 % — SIGNIFICANT CHANGE UP (ref 10.3–16.9)
SODIUM SERPL-SCNC: 136 MMOL/L — SIGNIFICANT CHANGE UP (ref 135–145)
SODIUM SERPL-SCNC: 138 MMOL/L — SIGNIFICANT CHANGE UP (ref 135–145)
WBC # BLD: 10.5 K/UL — SIGNIFICANT CHANGE UP (ref 3.8–10.5)
WBC # FLD AUTO: 10.5 K/UL — SIGNIFICANT CHANGE UP (ref 3.8–10.5)

## 2017-11-18 PROCEDURE — 71010: CPT | Mod: 26

## 2017-11-18 PROCEDURE — 93010 ELECTROCARDIOGRAM REPORT: CPT

## 2017-11-18 PROCEDURE — 99233 SBSQ HOSP IP/OBS HIGH 50: CPT

## 2017-11-18 RX ORDER — POTASSIUM CHLORIDE 20 MEQ
20 PACKET (EA) ORAL ONCE
Qty: 0 | Refills: 0 | Status: COMPLETED | OUTPATIENT
Start: 2017-11-18 | End: 2017-11-18

## 2017-11-18 RX ADMIN — APIXABAN 5 MILLIGRAM(S): 2.5 TABLET, FILM COATED ORAL at 17:57

## 2017-11-18 RX ADMIN — Medication 0.6 MILLIGRAM(S): at 17:57

## 2017-11-18 RX ADMIN — APIXABAN 5 MILLIGRAM(S): 2.5 TABLET, FILM COATED ORAL at 08:33

## 2017-11-18 RX ADMIN — Medication 20 MILLIEQUIVALENT(S): at 19:33

## 2017-11-18 RX ADMIN — Medication 81 MILLIGRAM(S): at 11:41

## 2017-11-18 RX ADMIN — FINASTERIDE 5 MILLIGRAM(S): 5 TABLET, FILM COATED ORAL at 11:41

## 2017-11-18 RX ADMIN — ATORVASTATIN CALCIUM 40 MILLIGRAM(S): 80 TABLET, FILM COATED ORAL at 22:13

## 2017-11-18 RX ADMIN — TAMSULOSIN HYDROCHLORIDE 0.4 MILLIGRAM(S): 0.4 CAPSULE ORAL at 22:13

## 2017-11-18 RX ADMIN — CARVEDILOL PHOSPHATE 25 MILLIGRAM(S): 80 CAPSULE, EXTENDED RELEASE ORAL at 11:41

## 2017-11-18 RX ADMIN — CARVEDILOL PHOSPHATE 25 MILLIGRAM(S): 80 CAPSULE, EXTENDED RELEASE ORAL at 17:57

## 2017-11-18 RX ADMIN — LISINOPRIL 2.5 MILLIGRAM(S): 2.5 TABLET ORAL at 13:46

## 2017-11-18 NOTE — PROGRESS NOTE ADULT - PROBLEM SELECTOR PLAN 10
.  DVT PPx: eliquis    CODE: FULL    LINES: peripherals    DISPO: transfer to [facility] cardiac telemetry service from CCU

## 2017-11-18 NOTE — PROGRESS NOTE ADULT - PROBLEM SELECTOR PLAN 9
F - No IVF  E - replete lytes, K>4, Mg >2  N - DASH diet     DVT ppx: Eliquis 5mg BID    Code: Full Code  Dispo: CCU .  F: no IVF  E: replete lytes PRN to maintain K>4, Mg>2  N: DASH-TLC diet w/ 1L fluid restriction

## 2017-11-18 NOTE — PROGRESS NOTE ADULT - PROBLEM SELECTOR PLAN 2
Previous ECHO showing EF of 30-35% mild concentric left ventricular hypertrophy apical akinesis apical septal wall akinesis, apical inferior wall akinesis; repeat ECHO w/ EF 35-40% with inferior and apical hypokinesis  - considering [facility] for ischemic w/u as cause of the new VTs  - continue lisinopril 2.5mg PO daily  - continue coreg 25mg PO BID  - continue ASA 81mg PO daily  - continue atorvastatin 40mg PO qHS  - receiving lasix PRN for fluid overload; s/p 40mg IVP 11/17 AM  - strict I&Os, fluid restriction, daily weights

## 2017-11-18 NOTE — PROGRESS NOTE ADULT - SUBJECTIVE AND OBJECTIVE BOX
PGY-2 CARDIOLOGY SERVICE TRANSFER ACCEPTANCE NOTE    Brief Hospital Course:  Patient is a 73yo M w/ PMH of recurrent VT s/p 3 ablations (9/2016, 5/2017, 10/2017) and AICD, CAD w/ MATTY to LAD (6/10/16) and pLAD (6/16/016), HFrEF (EF <35%), DVT on Eliquis, DANI on CPAP, pre-DM, BPH, OA, HTN, recurrent UTI who is a transfer from [facility] for VT.  At [facility] ED, patient had recurrent runs of VT, was given ASA 324mg, amiodarone 150mg, and sotalol 120mg. He continued to have brief runs of VT and was started on amiodarone drip eventually transitioned to carvedilol and sotalol. AICD had 92 episodes of VT with ATP but with no shocks delivered. On 11/9 had recurrent NSVT that broke with therapeutic ATP but then had sustained VT at HR 150s that failed ATPx3 and resulted in a therapeutic ICD shock x1 to NSR. Amiodarone was loaded and started on amio gtt. Patient was transferred to [facility] for possible ablation with Dr. Neal. Patient was brought directly to [facility] CCU. Tele showing NSR with frequent VT episodes. Continued on amio gtt and carvedilol 25mg BID, lisinopril 2.5mg, coreg 25mg BID, ASA 81mg and atorvastatin 20mg qhs. Heparin gtt initiated and held home Eliquis anticipating EP procedure. Pt evaled by EP, amio gtt transitioned to quinidine w/ improvement in number of VT episodes. Underwent exercise stress test 11/15 and stress test had sustained episode of VT, responded to ATP by ICD, converted to bigeminy then SR -- reported feeling lightheaded during episode. QTc 511ms observed, quinidine d/c'ed. 11/16, pt underwent epicardial VT ablation was transferred to CCU for further monitoring post-proecurally. While in CCU, hep gtt transitioned back to home Eliquis. Remained hemodynamically stable and stepped back down to [facility] telemetry 11/17 overnight.     Of note: Cath report obtained from [facility] (7.2016): MATTY mLAD 99%, EF 30%, severe single vessel dz of mLAD.     SUBJECTIVE: Patient seen and examined at bedside. No complaints currently.    OBJECTIVE    VITAL SIGNS:    ICU Vital Signs Last 24 Hrs  T(C): 36.1 (18 Nov 2017 00:30), Max: 37.2 (17 Nov 2017 01:34)  T(F): 97 (18 Nov 2017 00:30), Max: 98.9 (17 Nov 2017 01:34)  HR: 71 (18 Nov 2017 00:30) (68 - 78)  BP: 113/74 (18 Nov 2017 00:30) (82/56 - 121/74)  BP(mean): 92 (18 Nov 2017 00:00) (60 - 92)  ABP: 92/40 (17 Nov 2017 12:52) (92/40 - 124/60)  ABP(mean): 54 (17 Nov 2017 12:52) (54 - 84)  RR: 15 (18 Nov 2017 00:30) (15 - 28)  SpO2: 83% (18 Nov 2017 00:30) (83% - 96%)    11-16 @ 07:01  -  11-17 @ 07:00  --------------------------------------------------------  IN: 731 mL / OUT: 1100 mL / NET: -369 mL    11-17 @ 07:01  -  11-18 @ 00:46  --------------------------------------------------------  IN: 585 mL / OUT: 1150 mL / NET: -565 mL      CAPILLARY BLOOD GLUCOSE    PHYSICAL EXAM:    Constitutional: resting comfortably in bed, NAD  HEENT: NC/AT; PERRL, anicteric sclera; no oropharyngeal erythema or exudates; MMM  Neck: supple, no JVD  Respiratory: CTA B/L, no W/R/R; respirations appear non-labored, speaking full sentences  Cardiovascular: +S1/S2, RRR  Gastrointestinal: abdomen soft, NT/ND; +BS x4  Extremities: WWP; no clubbing, cyanosis or edema  Vascular: 2+ radial, DP/PT and femoral pulses B/L  Dermatologic: skin normal color and turgor; no visible rashes  Neurological:     MEDICATIONS:  MEDICATIONS  (STANDING):  apixaban 5 milliGRAM(s) Oral every 12 hours  aspirin enteric coated 81 milliGRAM(s) Oral daily  atorvastatin 40 milliGRAM(s) Oral at bedtime  carvedilol 25 milliGRAM(s) Oral every 12 hours  colchicine 0.6 milliGRAM(s) Oral two times a day  finasteride 5 milliGRAM(s) Oral daily  lisinopril 2.5 milliGRAM(s) Oral daily  tamsulosin 0.4 milliGRAM(s) Oral at bedtime    MEDICATIONS  (PRN):      ALLERGIES:  Allergies    No Known Allergies    Intolerances        LABS:                        12.0   8.4   )-----------( 191      ( 17 Nov 2017 05:56 )             36.2     11-17    136  |  99  |  12  ----------------------------<  88  3.9   |  23  |  0.94    Ca    8.9      17 Nov 2017 05:56  Mg     2.1     11-17      PT/INR - ( 16 Nov 2017 17:42 )   PT: 12.7 sec;   INR: 1.14          PTT - ( 17 Nov 2017 06:27 )  PTT:63.8 sec      RADIOLOGY & ADDITIONAL TESTS: Reviewed. PGY-2 CARDIOLOGY SERVICE TRANSFER ACCEPTANCE NOTE    Brief Hospital Course:  Patient is a 71yo M w/ PMH of recurrent VT s/p 3 ablations (9/2016, 5/2017, 10/2017) and AICD, CAD w/ MATTY to LAD (6/10/16) and pLAD (6/16/016), HFrEF (EF <35%), DVT on Eliquis, DANI on CPAP, pre-DM, BPH, OA, HTN, recurrent UTI who is a transfer from [facility] for VT.  At [facility] ED, patient had recurrent runs of VT, was given ASA 324mg, amiodarone 150mg, and sotalol 120mg. He continued to have brief runs of VT and was started on amiodarone drip eventually transitioned to carvedilol and sotalol. AICD had 92 episodes of VT with ATP but with no shocks delivered. On 11/9 had recurrent NSVT that broke with therapeutic ATP but then had sustained VT at HR 150s that failed ATPx3 and resulted in a therapeutic ICD shock x1 to NSR. Amiodarone was loaded and started on amio gtt. Patient was transferred to [facility] for possible ablation with Dr. Neal. Patient was brought directly to [facility] CCU. Tele showing NSR with frequent VT episodes. Continued on amio gtt and carvedilol 25mg BID, lisinopril 2.5mg, coreg 25mg BID, ASA 81mg and atorvastatin 20mg qhs. Heparin gtt initiated and held home Eliquis anticipating EP procedure. Pt evaled by EP, amio gtt transitioned to quinidine w/ improvement in number of VT episodes. Underwent exercise stress test 11/15 and stress test had sustained episode of VT, responded to ATP by ICD, converted to bigeminy then SR -- reported feeling lightheaded during episode. QTc 511ms observed, quinidine d/c'ed. 11/16, pt underwent epicardial VT ablation was transferred to CCU for further monitoring post-proecurally. While in CCU, hep gtt transitioned back to home Eliquis. Remained hemodynamically stable and stepped back down to [facility] telemetry 11/17 overnight.     Of note: Cath report obtained from [facility] (7.2016): MATTY mLAD 99%, EF 30%, severe single vessel dz of mLAD.     SUBJECTIVE: Patient seen and examined at bedside. No complaints currently.    OBJECTIVE    VITAL SIGNS:    ICU Vital Signs Last 24 Hrs  T(C): 36.1 (18 Nov 2017 00:30), Max: 37.2 (17 Nov 2017 01:34)  T(F): 97 (18 Nov 2017 00:30), Max: 98.9 (17 Nov 2017 01:34)  HR: 71 (18 Nov 2017 00:30) (68 - 78)  BP: 113/74 (18 Nov 2017 00:30) (82/56 - 121/74)  BP(mean): 92 (18 Nov 2017 00:00) (60 - 92)  ABP: 92/40 (17 Nov 2017 12:52) (92/40 - 124/60)  ABP(mean): 54 (17 Nov 2017 12:52) (54 - 84)  RR: 15 (18 Nov 2017 00:30) (15 - 28)  SpO2: 83% (18 Nov 2017 00:30) (83% - 96%)    11-16 @ 07:01  -  11-17 @ 07:00  --------------------------------------------------------  IN: 731 mL / OUT: 1100 mL / NET: -369 mL    11-17 @ 07:01  -  11-18 @ 00:46  --------------------------------------------------------  IN: 585 mL / OUT: 1150 mL / NET: -565 mL      CAPILLARY BLOOD GLUCOSE    PHYSICAL EXAM:    Constitutional: WDWN male resting comfortably in bed, NAD  HEENT: NC/AT; PERRL, anicteric sclera; MMM  Neck: supple, no JVD  Respiratory: CTAB; no W/R/R or increased WOB  Cardiovascular: +S1/S2, RRR; no M/R/G  Gastrointestinal: abdomen soft, NT/ND; +BS x4  Extremities: WWP; no clubbing, cyanosis or peripheral edema  Vascular: 2+ radial, DP/PT pulses B/L  Dermatologic: skin normal color and turgor; no visible rashes  Neurological: AAOx3; moving all extremities    MEDICATIONS:  MEDICATIONS  (STANDING):  apixaban 5 milliGRAM(s) Oral every 12 hours  aspirin enteric coated 81 milliGRAM(s) Oral daily  atorvastatin 40 milliGRAM(s) Oral at bedtime  carvedilol 25 milliGRAM(s) Oral every 12 hours  colchicine 0.6 milliGRAM(s) Oral two times a day  finasteride 5 milliGRAM(s) Oral daily  lisinopril 2.5 milliGRAM(s) Oral daily  tamsulosin 0.4 milliGRAM(s) Oral at bedtime    MEDICATIONS  (PRN):      ALLERGIES:  Allergies    No Known Allergies    Intolerances        LABS:                        12.0   8.4   )-----------( 191      ( 17 Nov 2017 05:56 )             36.2     11-17    136  |  99  |  12  ----------------------------<  88  3.9   |  23  |  0.94    Ca    8.9      17 Nov 2017 05:56  Mg     2.1     11-17      PT/INR - ( 16 Nov 2017 17:42 )   PT: 12.7 sec;   INR: 1.14          PTT - ( 17 Nov 2017 06:27 )  PTT:63.8 sec      RADIOLOGY & ADDITIONAL TESTS: Reviewed.

## 2017-11-18 NOTE — PROGRESS NOTE ADULT - ASSESSMENT
This is a 73yo M w/ PMH of recurrent VT s/p 3x ablation (9/2016, 5/2017, 10/2017), CAD w MATTY to LAD and pLAD 2016, ICD, DVT on Eliquis, chronic systolic CHF with EF<35%, DANI on CPAP, Pre DM, BPH, OA, HTN, recurrent UTI transferred from [facility] to [facility] for ventricular tachycardia for medical optimization and ablation, now s/p epicardial ablation 11/16.

## 2017-11-18 NOTE — PROGRESS NOTE ADULT - PROBLEM SELECTOR PLAN 7
Mild-moderate pericardial effusion present on imaging during ablation today.   - lasix PRN for fluid overload. Will receive 40mg IVP today, monitor fluid status Mild-moderate pericardial effusion present on imaging during ablation 11/16  - lasix PRN for fluid overload, as above  - monitor

## 2017-11-18 NOTE — PROGRESS NOTE ADULT - PROBLEM SELECTOR PLAN 3
On coreg for RC; eliquis for AC; transitioned back to eliquis from hep gtt on 11/17  - continue home eliquis 5mg BID On coreg for RC; eliquis for AC; transitioned back to eliquis from hep gtt on 11/17; [id number] = 5  - continue home eliquis 5mg PO BID

## 2017-11-18 NOTE — PROGRESS NOTE ADULT - PROBLEM SELECTOR PLAN 6
- Patient with BPH, has straining in AM which resolves throughout the day  - continue home tamsulosin 0.4mg qHS and finasteride 5mg daily Pt sometimes has straining in AM which resolves throughout the day  - home tamsulosin 0.4mg qHS  - home finasteride 5mg PO daily

## 2017-11-18 NOTE — PROGRESS NOTE ADULT - PROBLEM SELECTOR PLAN 4
- Patient with h/o CAD s/p stent in 2016 to LAD. Recurrent VT storm may be secondary to ischemic insult. Negative tropes at [facility]  - consider PCI to evaluate ischemia, discuss with EP  - continue ASA 81mg daily, Coreg 25mg BID, Lisinopril 2.5mg daily   - Pt was on 20mg Lipitor, increased to 40mg today. Assess how pt tolerates increased dosage and consider increase to 80mg QHS  - daily EKGs H/o CAD s/p LAD stent (2016); recurrent VT storm may be secondary to ischemic insult. Per collateral from [facility] neg trops  - still considering additional ischemic w/u e.g. cath to eval why new episode of VT  - ASA as above  - home lipitor 20mg uptitrated to 40mg PO qHS  - will consider further uptitration to 80mg qHS  - daily EKGs as above

## 2017-11-18 NOTE — PROGRESS NOTE ADULT - PROBLEM SELECTOR PLAN 8
- Patient with known previous treatment of gout with colchicine. Patient complaining of pain in the right achilles tendon which he states is recurrent and improving on colchicine.   - continue colchicine 0.6 BID Was in acute gout flare upon presentation particularly in R achilles/heel and takes colchicine at home  - continue colchicine 0.6 BID

## 2017-11-18 NOTE — PROGRESS NOTE ADULT - PROBLEM SELECTOR PLAN 1
Hx of VT s/p 4 x ablation; now s/p epicardial ablation 11/16  - continue carvedilol 25mg PO BID  - monitoring daily EKG closely, had prolonged QTc while on quinidine earlier in week (since d/c'ed) and QTc improved yesterday AM  - continuous cardiac telemetry  - f/u any additional EP recommendations (appreciated)

## 2017-11-18 NOTE — PROGRESS NOTE ADULT - PROBLEM SELECTOR PLAN 5
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 Hypercoagulable w/u pursued by hematologist previously.   - Pt transitioned from hep gtt to Eliquis 5mg BID today   - c/w Eliquis 5mg BID H/o multiple unprovoked DVT on Coumadin x4-5yrs then transitioned to Eliquis. Pt has had hypercoagulable w/u pursued by hematologist in the past  - home eliquis 5mg BID

## 2017-11-19 LAB
ANION GAP SERPL CALC-SCNC: 10 MMOL/L — SIGNIFICANT CHANGE UP (ref 5–17)
BUN SERPL-MCNC: 15 MG/DL — SIGNIFICANT CHANGE UP (ref 7–23)
CALCIUM SERPL-MCNC: 9 MG/DL — SIGNIFICANT CHANGE UP (ref 8.4–10.5)
CHLORIDE SERPL-SCNC: 100 MMOL/L — SIGNIFICANT CHANGE UP (ref 96–108)
CO2 SERPL-SCNC: 26 MMOL/L — SIGNIFICANT CHANGE UP (ref 22–31)
CREAT SERPL-MCNC: 1.07 MG/DL — SIGNIFICANT CHANGE UP (ref 0.5–1.3)
GLUCOSE SERPL-MCNC: 98 MG/DL — SIGNIFICANT CHANGE UP (ref 70–99)
HCT VFR BLD CALC: 35.5 % — LOW (ref 39–50)
HGB BLD-MCNC: 11.4 G/DL — LOW (ref 13–17)
MAGNESIUM SERPL-MCNC: 1.9 MG/DL — SIGNIFICANT CHANGE UP (ref 1.6–2.6)
MCHC RBC-ENTMCNC: 29.5 PG — SIGNIFICANT CHANGE UP (ref 27–34)
MCHC RBC-ENTMCNC: 32.1 G/DL — SIGNIFICANT CHANGE UP (ref 32–36)
MCV RBC AUTO: 92 FL — SIGNIFICANT CHANGE UP (ref 80–100)
PLATELET # BLD AUTO: 182 K/UL — SIGNIFICANT CHANGE UP (ref 150–400)
POTASSIUM SERPL-MCNC: 4.1 MMOL/L — SIGNIFICANT CHANGE UP (ref 3.5–5.3)
POTASSIUM SERPL-SCNC: 4.1 MMOL/L — SIGNIFICANT CHANGE UP (ref 3.5–5.3)
RBC # BLD: 3.86 M/UL — LOW (ref 4.2–5.8)
RBC # FLD: 14.6 % — SIGNIFICANT CHANGE UP (ref 10.3–16.9)
SODIUM SERPL-SCNC: 136 MMOL/L — SIGNIFICANT CHANGE UP (ref 135–145)
WBC # BLD: 11.5 K/UL — HIGH (ref 3.8–10.5)
WBC # FLD AUTO: 11.5 K/UL — HIGH (ref 3.8–10.5)

## 2017-11-19 PROCEDURE — 93010 ELECTROCARDIOGRAM REPORT: CPT

## 2017-11-19 PROCEDURE — 99233 SBSQ HOSP IP/OBS HIGH 50: CPT

## 2017-11-19 RX ORDER — MAGNESIUM SULFATE 500 MG/ML
1 VIAL (ML) INJECTION ONCE
Qty: 0 | Refills: 0 | Status: COMPLETED | OUTPATIENT
Start: 2017-11-19 | End: 2017-11-19

## 2017-11-19 RX ADMIN — FINASTERIDE 5 MILLIGRAM(S): 5 TABLET, FILM COATED ORAL at 11:26

## 2017-11-19 RX ADMIN — APIXABAN 5 MILLIGRAM(S): 2.5 TABLET, FILM COATED ORAL at 06:02

## 2017-11-19 RX ADMIN — Medication 81 MILLIGRAM(S): at 11:26

## 2017-11-19 RX ADMIN — Medication 0.6 MILLIGRAM(S): at 17:33

## 2017-11-19 RX ADMIN — TAMSULOSIN HYDROCHLORIDE 0.4 MILLIGRAM(S): 0.4 CAPSULE ORAL at 21:22

## 2017-11-19 RX ADMIN — Medication 0.6 MILLIGRAM(S): at 06:02

## 2017-11-19 RX ADMIN — APIXABAN 5 MILLIGRAM(S): 2.5 TABLET, FILM COATED ORAL at 17:33

## 2017-11-19 RX ADMIN — CARVEDILOL PHOSPHATE 25 MILLIGRAM(S): 80 CAPSULE, EXTENDED RELEASE ORAL at 06:02

## 2017-11-19 RX ADMIN — LISINOPRIL 2.5 MILLIGRAM(S): 2.5 TABLET ORAL at 06:02

## 2017-11-19 RX ADMIN — Medication 100 GRAM(S): at 07:41

## 2017-11-19 RX ADMIN — ATORVASTATIN CALCIUM 40 MILLIGRAM(S): 80 TABLET, FILM COATED ORAL at 21:22

## 2017-11-19 RX ADMIN — CARVEDILOL PHOSPHATE 25 MILLIGRAM(S): 80 CAPSULE, EXTENDED RELEASE ORAL at 17:33

## 2017-11-19 NOTE — PROGRESS NOTE ADULT - PROBLEM SELECTOR PLAN 1
Hx of VT s/p 4 x ablation; now s/p epicardial ablation 11/16  - continue carvedilol 25mg PO BID  - monitoring daily EKG closely, had prolonged QTc while on quinidine earlier in week (since d/c'ed) and QTc improved yesterday AM  - continuous cardiac telemetry  - f/u any additional EP recommendations (appreciated) Hx of VT s/p 4 x ablation; now s/p epicardial ablation 11/16  - NPO after midnight for stress in AM to determine if VT is catecholamine-induced.  - telemetry findings reviewed with EP; either paced rhythm or slow VT; no change to current meds.  - continue carvedilol 25mg PO BID per EP recs  - monitoring daily EKG closely, had prolonged QTc while on quinidine earlier in week (since d/c'ed) and QTc now 449  - continuous cardiac telemetry  - f/u any additional EP recommendations (appreciated)

## 2017-11-19 NOTE — PROGRESS NOTE ADULT - PROBLEM SELECTOR PROBLEM 2
Ischemic cardiomyopathy

## 2017-11-19 NOTE — PROGRESS NOTE ADULT - PROBLEM SELECTOR PLAN 5
H/o multiple unprovoked DVT on Coumadin x4-5yrs then transitioned to Eliquis. Pt has had hypercoagulable w/u pursued by hematologist in the past  - home eliquis 5mg BID

## 2017-11-19 NOTE — PROGRESS NOTE ADULT - PROBLEM SELECTOR PLAN 8
Was in acute gout flare upon presentation particularly in R achilles/heel and takes colchicine at home  - continue colchicine 0.6 BID

## 2017-11-19 NOTE — PROGRESS NOTE ADULT - PROBLEM SELECTOR PLAN 3
On coreg for RC; eliquis for AC; transitioned back to eliquis from hep gtt on 11/17; [id number] = 5  - continue home eliquis 5mg PO BID

## 2017-11-19 NOTE — PROGRESS NOTE ADULT - SUBJECTIVE AND OBJECTIVE BOX
Brief Hospital Course:  Patient is a 73yo M w/ PMH of recurrent VT s/p 3 ablations (9/2016, 5/2017, 10/2017) and AICD, CAD w/ MATTY to LAD (6/10/16) and pLAD (6/16/016), HFrEF (EF <35%), DVT on Eliquis, DANI on CPAP, pre-DM, BPH, OA, HTN, recurrent UTI who is a transfer from [facility] for VT.  At [facility] ED, patient had recurrent runs of VT, was given ASA 324mg, amiodarone 150mg, and sotalol 120mg. He continued to have brief runs of VT and was started on amiodarone drip eventually transitioned to carvedilol and sotalol. AICD had 92 episodes of VT with ATP but with no shocks delivered. On 11/9 had recurrent NSVT that broke with therapeutic ATP but then had sustained VT at HR 150s that failed ATPx3 and resulted in a therapeutic ICD shock x1 to NSR. Amiodarone was loaded and started on amio gtt. Patient was transferred to [facility] for possible ablation with Dr. Neal. Patient was brought directly to [facility] CCU. Tele showing NSR with frequent VT episodes. Continued on amio gtt and carvedilol 25mg BID, lisinopril 2.5mg, coreg 25mg BID, ASA 81mg and atorvastatin 20mg qhs. Heparin gtt initiated and held home Eliquis anticipating EP procedure. Pt evaled by EP, amio gtt transitioned to quinidine w/ improvement in number of VT episodes. Underwent exercise stress test 11/15 and stress test had sustained episode of VT, responded to ATP by ICD, converted to bigeminy then SR -- reported feeling lightheaded during episode. QTc 511ms observed, quinidine d/c'ed. 11/16, pt underwent epicardial VT ablation was transferred to CCU for further monitoring post-proecurally. While in CCU, hep gtt transitioned back to home Eliquis. Remained hemodynamically stable and stepped back down to [facility] telemetry 11/17 overnight.     Of note: Cath report obtained from [facility] (7.2016): MATTY mLAD 99%, EF 30%, severe single vessel dz of mLAD.     SUBJECTIVE: Patient seen and examined at bedside. No complaints currently.    OBJECTIVE    VITAL SIGNS:    ICU Vital Signs Last 24 Hrs  T(C): 36.1 (18 Nov 2017 00:30), Max: 37.2 (17 Nov 2017 01:34)  T(F): 97 (18 Nov 2017 00:30), Max: 98.9 (17 Nov 2017 01:34)  HR: 71 (18 Nov 2017 00:30) (68 - 78)  BP: 113/74 (18 Nov 2017 00:30) (82/56 - 121/74)  BP(mean): 92 (18 Nov 2017 00:00) (60 - 92)  ABP: 92/40 (17 Nov 2017 12:52) (92/40 - 124/60)  ABP(mean): 54 (17 Nov 2017 12:52) (54 - 84)  RR: 15 (18 Nov 2017 00:30) (15 - 28)  SpO2: 83% (18 Nov 2017 00:30) (83% - 96%)    11-16 @ 07:01  -  11-17 @ 07:00  --------------------------------------------------------  IN: 731 mL / OUT: 1100 mL / NET: -369 mL    11-17 @ 07:01  -  11-18 @ 00:46  --------------------------------------------------------  IN: 585 mL / OUT: 1150 mL / NET: -565 mL      CAPILLARY BLOOD GLUCOSE    PHYSICAL EXAM:    Constitutional: WDWN male resting comfortably in bed, NAD  HEENT: NC/AT; PERRL, anicteric sclera; MMM  Neck: supple, no JVD  Respiratory: CTAB; no W/R/R or increased WOB  Cardiovascular: +S1/S2, RRR; no M/R/G  Gastrointestinal: abdomen soft, NT/ND; +BS x4  Extremities: WWP; no clubbing, cyanosis or peripheral edema  Vascular: 2+ radial, DP/PT pulses B/L  Dermatologic: skin normal color and turgor; no visible rashes  Neurological: AAOx3; moving all extremities    MEDICATIONS:  MEDICATIONS  (STANDING):  apixaban 5 milliGRAM(s) Oral every 12 hours  aspirin enteric coated 81 milliGRAM(s) Oral daily  atorvastatin 40 milliGRAM(s) Oral at bedtime  carvedilol 25 milliGRAM(s) Oral every 12 hours  colchicine 0.6 milliGRAM(s) Oral two times a day  finasteride 5 milliGRAM(s) Oral daily  lisinopril 2.5 milliGRAM(s) Oral daily  tamsulosin 0.4 milliGRAM(s) Oral at bedtime    MEDICATIONS  (PRN):      ALLERGIES:  Allergies    No Known Allergies    Intolerances        LABS:                        12.0   8.4   )-----------( 191      ( 17 Nov 2017 05:56 )             36.2     11-17    136  |  99  |  12  ----------------------------<  88  3.9   |  23  |  0.94    Ca    8.9      17 Nov 2017 05:56  Mg     2.1     11-17      PT/INR - ( 16 Nov 2017 17:42 )   PT: 12.7 sec;   INR: 1.14          PTT - ( 17 Nov 2017 06:27 )  PTT:63.8 sec      RADIOLOGY & ADDITIONAL TESTS: Reviewed. Overnight Events: FLOWER overnight    SUBJECTIVE: Patient seen and examined at bedside. Reports feeling generally unwell. Thinks symptoms are secondary to carvedilol. Reports occasional mild chest pain of <1sec duration that he feels are associated with PVCs because whenever he feels it he looks at the telemetry and sees a PVC. Denies fever, chills, nausea, SOB, numbness, or weakness.    Tele: Slow VT vs. paced rhythm at 18:52 and 02:52; frequent PVCs    ROS otherwise negative except as mentioned above.    OBJECTIVE    VITAL SIGNS:    Vital Signs Last 24 Hrs  T(C): 37.7 (19 Nov 2017 14:00), Max: 37.7 (19 Nov 2017 14:00)  T(F): 99.8 (19 Nov 2017 14:00), Max: 99.8 (19 Nov 2017 14:00)  HR: 68 (19 Nov 2017 12:21) (68 - 70)  BP: 99/59 (19 Nov 2017 12:21) (95/54 - 112/63)  BP(mean): 75 (19 Nov 2017 12:21) (71 - 81)  RR: 15 (19 Nov 2017 12:21) (15 - 18)  SpO2: 96% (19 Nov 2017 12:21) (91% - 96%)      I&O's Detail    18 Nov 2017 07:01  -  19 Nov 2017 07:00  --------------------------------------------------------  IN:    IV PiggyBack: 100 mL    Oral Fluid: 420 mL  Total IN: 520 mL    OUT:    Voided: 675 mL  Total OUT: 675 mL    Total NET: -155 mL      19 Nov 2017 07:01  -  19 Nov 2017 16:21  --------------------------------------------------------  IN:    Oral Fluid: 727 mL  Total IN: 727 mL    OUT:    Voided: 250 mL  Total OUT: 250 mL    Total NET: 477 mL      PHYSICAL EXAM:    Constitutional: WDWN male resting comfortably in bed, NAD  HEENT: NC/AT; PERRL, anicteric sclera; MMM  Neck: supple, no JVD  Respiratory: CTAB; no W/R/R or increased WOB  Cardiovascular: +S1/S2, RRR; no M/R/G  Gastrointestinal: abdomen soft, NT/ND; +BS x4  Extremities: trace edema to B/L ankles; WWP; no clubbing or cyanosis  Vascular: 2+ radial, DP/PT pulses B/L  Dermatologic: skin normal color and turgor; no visible rashes  Neurological: AAOx3; moving all extremities    MEDICATIONS  (STANDING):  apixaban 5 milliGRAM(s) Oral every 12 hours  aspirin enteric coated 81 milliGRAM(s) Oral daily  atorvastatin 40 milliGRAM(s) Oral at bedtime  carvedilol 25 milliGRAM(s) Oral every 12 hours  colchicine 0.6 milliGRAM(s) Oral two times a day  finasteride 5 milliGRAM(s) Oral daily  lisinopril 2.5 milliGRAM(s) Oral daily  tamsulosin 0.4 milliGRAM(s) Oral at bedtime    MEDICATIONS  (PRN):    ALLERGIES:  Allergies    No Known Allergies    Intolerances        LABS:                                   11.4   11.5  )-----------( 182      ( 19 Nov 2017 06:14 )             35.5     11-19    136  |  100  |  15  ----------------------------<  98  4.1   |  26  |  1.07    Ca    9.0      19 Nov 2017 06:14  Mg     1.9     11-19      RADIOLOGY & ADDITIONAL TESTS:     EKG: NSR w/ 1st degree AV block; no ischemic changes; HR 70 QTc 449    No new CXR; patient refused.

## 2017-11-19 NOTE — PROGRESS NOTE ADULT - PROBLEM SELECTOR PROBLEM 6
Gout
BPH (benign prostatic hyperplasia)
Gout

## 2017-11-19 NOTE — PROGRESS NOTE ADULT - PROBLEM SELECTOR PROBLEM 7
Need for prophylactic measure
Pericardial effusion
Pericardial effusion
Need for prophylactic measure
Pericardial effusion
Pericardial effusion

## 2017-11-19 NOTE — PROGRESS NOTE ADULT - ATTENDING COMMENTS
Agree w Progress Note, Subjective and Objective, Assessment and Plan
Please see housestaff note for full details.  I have reviewed the case, examined the patient and agree with plan.  72 M mult comorbidities CAD PCI LAD 2016 CHF LVEF 25% VT ablation last in 10/2017  with recurrent VT and ICD shocks.  Patient transferred from [facility] for further care.  Amio switched to quinidine. No further VT.  Will have VT ablation on Monday.  Hold eliquis for now.
Agree w Progress Note, Subjective and Objective, Assessment and Plan

## 2017-11-19 NOTE — PROGRESS NOTE ADULT - ASSESSMENT
This is a 73yo M w/ PMH of recurrent VT s/p 3x ablation (9/2016, 5/2017, 10/2017), CAD w MATTY to LAD and pLAD 2016, ICD, DVT on Eliquis, chronic systolic CHF with EF<35%, DANI on CPAP, Pre DM, BPH, OA, HTN, recurrent UTI transferred from [facility] to [facility] for ventricular tachycardia for medical optimization and ablation, now s/p epicardial ablation 11/16. This is a 71yo M w/ PMH of recurrent VT s/p 3x ablation (9/2016, 5/2017, 10/2017), CAD w MATTY to LAD and pLAD 2016, ICD, DVT on Eliquis, chronic systolic CHF with EF<35%, DANI on CPAP, Pre DM, BPH, OA, HTN, recurrent UTI transferred from [facility] to [facility] for ventricular tachycardia for medical optimization and ablation, now s/p epicardial ablation 11/16 without recurrent VT episodes.

## 2017-11-19 NOTE — PROGRESS NOTE ADULT - PROBLEM SELECTOR PROBLEM 5
BPH (benign prostatic hyperplasia)
DVT (deep venous thrombosis)
DVT (deep venous thrombosis)
BPH (benign prostatic hyperplasia)
DVT (deep venous thrombosis)
DVT (deep venous thrombosis)

## 2017-11-19 NOTE — PROGRESS NOTE ADULT - PROBLEM SELECTOR PLAN 6
Pt sometimes has straining in AM which resolves throughout the day  - home tamsulosin 0.4mg qHS  - home finasteride 5mg PO daily

## 2017-11-19 NOTE — PROGRESS NOTE ADULT - PROBLEM SELECTOR PLAN 4
H/o CAD s/p LAD stent (2016); recurrent VT storm may be secondary to ischemic insult. Per collateral from [facility] neg trops  - still considering additional ischemic w/u e.g. cath to eval why new episode of VT  - ASA as above  - home lipitor 20mg uptitrated to 40mg PO qHS  - will consider further uptitration to 80mg qHS  - daily EKGs as above

## 2017-11-19 NOTE — PROGRESS NOTE ADULT - PROBLEM SELECTOR PLAN 2
Previous ECHO showing EF of 30-35% mild concentric left ventricular hypertrophy apical akinesis apical septal wall akinesis, apical inferior wall akinesis; repeat ECHO w/ EF 35-40% with inferior and apical hypokinesis  - considering C for ischemic w/u as cause of the new VTs  - continue lisinopril 2.5mg PO daily  - continue coreg 25mg PO BID  - continue ASA 81mg PO daily  - continue atorvastatin 40mg PO qHS  - receiving lasix PRN for fluid overload; s/p 40mg IVP 11/17 AM  - strict I&Os, fluid restriction, daily weights Previous ECHO showing EF of 30-35% mild concentric left ventricular hypertrophy apical akinesis apical septal wall akinesis, apical inferior wall akinesis; repeat ECHO w/ EF 35-40% with inferior and apical hypokinesis  - considering [facility] for ischemic w/u as cause of the new VTs  - continue lisinopril 2.5mg PO daily  - continue coreg 25mg PO BID  - continue ASA 81mg PO daily  - continue atorvastatin 40mg PO qHS  - receiving lasix PRN for fluid overload; s/p 40mg IVP 11/17 AM; currently euvolemic today and net negative so will hold for today  - strict I&Os, fluid restriction, daily weights

## 2017-11-19 NOTE — PROGRESS NOTE ADULT - PROBLEM SELECTOR PLAN 10
.  DVT PPx: eliquis    CODE: FULL    LINES: peripherals    DISPO: transfer to [facility] cardiac telemetry service from CCU .  DVT PPx: eliquis    CODE: FULL    LINES: peripherals    DISPO: continuous monitoring on [facility] cardiac telemetry service

## 2017-11-19 NOTE — PROGRESS NOTE ADULT - PROBLEM SELECTOR PLAN 7
Mild-moderate pericardial effusion present on imaging during ablation 11/16  - lasix PRN for fluid overload, as above  - monitor Mild-moderate pericardial effusion present on imaging during ablation 11/16; unknown etiology; no JVD/drop in BP.   - continue to monitor

## 2017-11-20 ENCOUNTER — TRANSCRIPTION ENCOUNTER (OUTPATIENT)
Age: 72
End: 2017-11-20

## 2017-11-20 VITALS — TEMPERATURE: 99 F

## 2017-11-20 LAB
ANION GAP SERPL CALC-SCNC: 15 MMOL/L — SIGNIFICANT CHANGE UP (ref 5–17)
BASOPHILS NFR BLD AUTO: 0.2 % — SIGNIFICANT CHANGE UP (ref 0–2)
BUN SERPL-MCNC: 17 MG/DL — SIGNIFICANT CHANGE UP (ref 7–23)
CALCIUM SERPL-MCNC: 8.8 MG/DL — SIGNIFICANT CHANGE UP (ref 8.4–10.5)
CHLORIDE SERPL-SCNC: 99 MMOL/L — SIGNIFICANT CHANGE UP (ref 96–108)
CO2 SERPL-SCNC: 22 MMOL/L — SIGNIFICANT CHANGE UP (ref 22–31)
CREAT SERPL-MCNC: 0.94 MG/DL — SIGNIFICANT CHANGE UP (ref 0.5–1.3)
EOSINOPHIL NFR BLD AUTO: 2.3 % — SIGNIFICANT CHANGE UP (ref 0–6)
GLUCOSE SERPL-MCNC: 108 MG/DL — HIGH (ref 70–99)
HCT VFR BLD CALC: 35.6 % — LOW (ref 39–50)
HGB BLD-MCNC: 11.4 G/DL — LOW (ref 13–17)
LYMPHOCYTES # BLD AUTO: 13.9 % — SIGNIFICANT CHANGE UP (ref 13–44)
MAGNESIUM SERPL-MCNC: 2 MG/DL — SIGNIFICANT CHANGE UP (ref 1.6–2.6)
MCHC RBC-ENTMCNC: 29.7 PG — SIGNIFICANT CHANGE UP (ref 27–34)
MCHC RBC-ENTMCNC: 32 G/DL — SIGNIFICANT CHANGE UP (ref 32–36)
MCV RBC AUTO: 92.7 FL — SIGNIFICANT CHANGE UP (ref 80–100)
MONOCYTES NFR BLD AUTO: 12.5 % — SIGNIFICANT CHANGE UP (ref 2–14)
NEUTROPHILS NFR BLD AUTO: 71.1 % — SIGNIFICANT CHANGE UP (ref 43–77)
PLATELET # BLD AUTO: 193 K/UL — SIGNIFICANT CHANGE UP (ref 150–400)
POTASSIUM SERPL-MCNC: 4 MMOL/L — SIGNIFICANT CHANGE UP (ref 3.5–5.3)
POTASSIUM SERPL-SCNC: 4 MMOL/L — SIGNIFICANT CHANGE UP (ref 3.5–5.3)
RBC # BLD: 3.84 M/UL — LOW (ref 4.2–5.8)
RBC # FLD: 14.4 % — SIGNIFICANT CHANGE UP (ref 10.3–16.9)
SODIUM SERPL-SCNC: 136 MMOL/L — SIGNIFICANT CHANGE UP (ref 135–145)
WBC # BLD: 9.7 K/UL — SIGNIFICANT CHANGE UP (ref 3.8–10.5)
WBC # FLD AUTO: 9.7 K/UL — SIGNIFICANT CHANGE UP (ref 3.8–10.5)

## 2017-11-20 PROCEDURE — 99233 SBSQ HOSP IP/OBS HIGH 50: CPT

## 2017-11-20 PROCEDURE — 99239 HOSP IP/OBS DSCHRG MGMT >30: CPT

## 2017-11-20 PROCEDURE — 93016 CV STRESS TEST SUPVJ ONLY: CPT

## 2017-11-20 PROCEDURE — 93018 CV STRESS TEST I&R ONLY: CPT

## 2017-11-20 RX ORDER — CARVEDILOL PHOSPHATE 80 MG/1
0.5 CAPSULE, EXTENDED RELEASE ORAL
Qty: 0 | Refills: 0 | DISCHARGE
Start: 2017-11-20 | End: 2017-12-20

## 2017-11-20 RX ORDER — CARVEDILOL PHOSPHATE 80 MG/1
1 CAPSULE, EXTENDED RELEASE ORAL
Qty: 60 | Refills: 0
Start: 2017-11-20 | End: 2017-12-20

## 2017-11-20 RX ORDER — COLCHICINE 0.6 MG
1 TABLET ORAL
Qty: 22 | Refills: 0
Start: 2017-11-20 | End: 2017-12-01

## 2017-11-20 RX ADMIN — APIXABAN 5 MILLIGRAM(S): 2.5 TABLET, FILM COATED ORAL at 06:10

## 2017-11-20 RX ADMIN — LISINOPRIL 2.5 MILLIGRAM(S): 2.5 TABLET ORAL at 06:10

## 2017-11-20 RX ADMIN — CARVEDILOL PHOSPHATE 25 MILLIGRAM(S): 80 CAPSULE, EXTENDED RELEASE ORAL at 06:10

## 2017-11-20 RX ADMIN — Medication 81 MILLIGRAM(S): at 11:34

## 2017-11-20 RX ADMIN — FINASTERIDE 5 MILLIGRAM(S): 5 TABLET, FILM COATED ORAL at 11:34

## 2017-11-20 RX ADMIN — Medication 0.6 MILLIGRAM(S): at 06:10

## 2017-11-20 NOTE — PROGRESS NOTE ADULT - PROBLEM SELECTOR PLAN 1
- Due to adequate suppression of VT, the patient can be maintained OFF antiarrythmic drugs.   - Continue coreg.  - Continue colchicine until 11/30.   - The patient can be discharged from an EP perspective.   - Follow-up with Dr. Neal on December 29 at 10:20 am.

## 2017-11-20 NOTE — PROGRESS NOTE ADULT - PROVIDER SPECIALTY LIST ADULT
CCU
Cardiology
Electrophysiology
Cardiology

## 2017-11-20 NOTE — PROGRESS NOTE ADULT - SUBJECTIVE AND OBJECTIVE BOX
INTERVAL HPI/OVERNIGHT EVENTS:    SUBJECTIVE: Patient seen and examined at bedside.    OBJECTIVE:    VITAL SIGNS:  ICU Vital Signs Last 24 Hrs  T(C): 36.9 (20 Nov 2017 05:00), Max: 37.7 (19 Nov 2017 14:00)  T(F): 98.4 (20 Nov 2017 05:00), Max: 99.8 (19 Nov 2017 14:00)  HR: 68 (20 Nov 2017 06:07) (68 - 68)  BP: 115/68 (20 Nov 2017 06:07) (98/52 - 130/75)  BP(mean): 85 (20 Nov 2017 06:07) (72 - 98)  ABP: --  ABP(mean): --  RR: 16 (20 Nov 2017 06:07) (15 - 16)  SpO2: 95% (20 Nov 2017 05:47) (91% - 96%)        11-18 @ 07:01  -  11-19 @ 07:00  --------------------------------------------------------  IN: 520 mL / OUT: 675 mL / NET: -155 mL    11-19 @ 07:01  -  11-20 @ 06:43  --------------------------------------------------------  IN: 877 mL / OUT: 250 mL / NET: 627 mL      CAPILLARY BLOOD GLUCOSE          PHYSICAL EXAM:    Constitutional: WDWN resting comfortably in bed; NAD  HEENT: NC/AT; PERRL, anicteric sclera; MMM  Neck: supple, no JVD  Cardiovascular: +S1/S2; RRR; no M/R/G  Respiratory: CTA B/L; no W/R/R; respirations appear non-labored  Gastrointestinal: abdomen soft, NT/ND; +BS x4  Extremities: WWP; no clubbing, cyanosis or edema  Vascular: 2+ radial, femoral, DP/PT pulses B/L  Dermatologic: skin normal color and turgor; no visible rashes  Neurological:     MEDICATIONS:  MEDICATIONS  (STANDING):  apixaban 5 milliGRAM(s) Oral every 12 hours  aspirin enteric coated 81 milliGRAM(s) Oral daily  atorvastatin 40 milliGRAM(s) Oral at bedtime  carvedilol 25 milliGRAM(s) Oral every 12 hours  colchicine 0.6 milliGRAM(s) Oral two times a day  finasteride 5 milliGRAM(s) Oral daily  lisinopril 2.5 milliGRAM(s) Oral daily  tamsulosin 0.4 milliGRAM(s) Oral at bedtime    MEDICATIONS  (PRN):      ALLERGIES:  Allergies    No Known Allergies    Intolerances        LABS:                        11.4   11.5  )-----------( 182      ( 19 Nov 2017 06:14 )             35.5     11-19    136  |  100  |  15  ----------------------------<  98  4.1   |  26  |  1.07    Ca    9.0      19 Nov 2017 06:14  Mg     1.9     11-19                  RADIOLOGY & ADDITIONAL TESTS: Reviewed.    ASSESSMENT:    PLAN:     CARDIOVASCULAR  #Pump -     #Vessels -     #Electrical -     #Valves -     PULMONARY    RENAL    NEURO    GI/FEN - no IVF; replete lytes prn; NPO    PPx -     LINES -     CODE - FULL.    DISPO - continue intensive level of care on [facility] CCU.

## 2017-11-20 NOTE — DISCHARGE NOTE ADULT - CARE PLAN
Principal Discharge DX:	VT (ventricular tachycardia)  Goal:	Follow up with Dr Hernandez on 11/29/17 at 10:20am  Instructions for follow-up, activity and diet:	You were transferred from [facility] for 2nd opinion of Ventricular Tachycardia Ablation. You were mon Principal Discharge DX:	VT (ventricular tachycardia)  Goal:	Follow up with Dr Neal on 11/29/17 at 10:20am  Instructions for follow-up, activity and diet:	You were transferred from [facility] for 2nd opinion of Ventricular Tachycardia Ablation. You were given antiarrhythmic medications Amiodarone and Quinidine, which were later discontinued. You underwent an EKG Exercise stress test where you went into ventricular tachycardia requiring ATP from your defibrillator. You underwent a 4th Ventricular Tachycardia ablation procedure on 11/16/17 with Dr Neal and you were monitored. You underwent a repeat EKG Exercise Stress test and did not have recurrent sustained ventricular tachycardia. Please continue taking Coreg 25mg twice daily. You will continue taking Colchicine until last dose on 11/30/17 for prophylactic protection from the VT ablation procedure.

## 2017-11-20 NOTE — PROGRESS NOTE ADULT - ASSESSMENT
Mr. Sauceda is a 71yo M with pmh of recurrent VT s/p 3 ablations (9/2016, 5/2017, 10/2017), CAD w MATTY to LAD (6/10/16), stent to pLAD (6/16/016), ICD, DVT on Eliquis CHF with EF<35%, DANI on CPAP, Pre DM, BPH, OA, HTN, recurrent UTI who is a transfer from [facility] for ventricular tachycardia. He was started on quinidine which helped to suppress his VT, however it also prolonged his Qtc from 470 ms to 511 ms. On 11/15 he had a stress test which induced VT (purposefully done to map VT prior to repeat ablation), and all episodes terminated with ATP. He then had his fourth VT ablation on 11/16/17 with lesions in the LVOT, RVOT and CS. He was observed in CCU overnight with VT noted on telemetry. Today, the patient is to go for repeat exercise stress test to evaluate for VT inducibility to guide further therapies. Mr. Sauceda is a 71yo M with pmh of recurrent VT s/p 3 ablations (9/2016, 5/2017, 10/2017), CAD w MATTY to LAD (6/10/16), stent to pLAD (6/16/016), ICD, DVT on Eliquis CHF with EF<35%, DANI on CPAP, Pre DM, BPH, OA, HTN, recurrent UTI who is a transfer from [facility] for ventricular tachycardia. He was started on quinidine which helped to suppress his VT, however it also prolonged his Qtc from 470 ms to 511 ms. On 11/15 he had a stress test which induced VT (purposefully done to map VT prior to repeat ablation), and all episodes terminated with ATP. He then had his fourth VT ablation on 11/16/17 with lesions in the LVOT, RVOT and CS. He was observed in CCU overnight with no VT noted on telemetry. Over the weekend, the patient continues to have PVCs and short (4 beats) NSVT, however no further VT requiring ATP. Today, the patient went for a repeat exercise stress test to evaluate for VT inducibility to guide further therapies. The patient exercised for 4 minutes without any inducible VT.

## 2017-11-20 NOTE — PROGRESS NOTE ADULT - PROBLEM SELECTOR PROBLEM 1
VT (ventricular tachycardia)

## 2017-11-20 NOTE — DISCHARGE NOTE ADULT - MEDICATION SUMMARY - MEDICATIONS TO TAKE
I will START or STAY ON the medications listed below when I get home from the hospital:    finasteride 5 mg oral tablet  -- 1 tab(s) by mouth once a day  -- Indication: For BPH (benign prostatic hyperplasia)    [facility] Aspirin 81 mg oral tablet, chewable  -- 1 tab(s) by mouth once a day  -- Indication: For Coronary artery disease involving native coronary artery of native heart without angina pectoris    lisinopril 2.5 mg oral tablet  -- 1 tab(s) by mouth once a day  -- Indication: For Ischemic cardiomyopathy/Hypertension    tamsulosin 0.4 mg oral capsule  -- 1 cap(s) by mouth once a day  -- Indication: For BPH (benign prostatic hyperplasia)    Eliquis 5 mg oral tablet  -- 1 tab(s) by mouth 2 times a day  -- Indication: For DVT (deep venous thrombosis)    colchicine 0.6 mg oral tablet  -- 1 tab(s) by mouth 2 times a day  -- Indication: For VENTRICULAR TACHYCARDIA Ablation prophylaxis    Lipitor 20 mg oral tablet  -- 1 tab(s) by mouth once a day  -- Indication: For Coronary artery disease involving native coronary artery of native heart without angina pectoris    carvedilol 25 mg oral tablet  -- 1 tab(s) by mouth every 12 hours  -- Indication: For Coronary artery disease involving native coronary artery of native heart without angina pectoris/Ischemic cardiomyopathy/Hypertension    potassium chloride 10 mEq oral capsule, extended release  -- 1 cap(s) by mouth once a day  -- Indication: For supplement    glucosamine 500 mg oral capsule  -- 1 cap(s) by mouth once a day  -- Indication: For supplement

## 2017-11-20 NOTE — DISCHARGE NOTE ADULT - PLAN OF CARE
Follow up with Dr Hernandez on 11/29/17 at 10:20am You were transferred from [facility] for 2nd opinion of Ventricular Tachycardia Ablation. You were mon Follow up with Dr Neal on 11/29/17 at 10:20am You were transferred from [facility] for 2nd opinion of Ventricular Tachycardia Ablation. You were given antiarrhythmic medications Amiodarone and Quinidine, which were later discontinued. You underwent an EKG Exercise stress test where you went into ventricular tachycardia requiring ATP from your defibrillator. You underwent a 4th Ventricular Tachycardia ablation procedure on 11/16/17 with Dr Neal and you were monitored. You underwent a repeat EKG Exercise Stress test and did not have recurrent sustained ventricular tachycardia. Please continue taking Coreg 25mg twice daily. You will continue taking Colchicine until last dose on 11/30/17 for prophylactic protection from the VT ablation procedure.

## 2017-11-20 NOTE — DISCHARGE NOTE ADULT - MEDICATION SUMMARY - MEDICATIONS TO STOP TAKING
I will STOP taking the medications listed below when I get home from the hospital:    Metoprolol Succinate ER 50 mg oral tablet, extended release  -- 1 tab(s) by mouth once a day at bedtime.    Betapace 120 mg oral tablet  -- 1 tab(s) by mouth 2 times a day

## 2017-11-20 NOTE — DISCHARGE NOTE ADULT - PATIENT PORTAL LINK FT
“You can access the [facility] Patient Portal, offered by [facility], by registering with the following website: [email or web address]”

## 2017-11-20 NOTE — DISCHARGE NOTE ADULT - CARE PROVIDER_API CALL
Pedro Neal), Cardiac Electrophysiology; Cardiology; Cardiovascular Disease  [address] [place] [address]  Phone: [phone]  Fax: [phone] Pedro Neal), Cardiac Electrophysiology; Cardiology; Cardiovascular Disease  [address] [place] [address]  Phone: [phone]  Fax: [phone]    Janay Lundberg), Internal Medicine  [address] [id number]  Phone: [phone]  Fax: [phone]

## 2017-11-20 NOTE — DISCHARGE NOTE ADULT - CARE PROVIDERS DIRECT ADDRESSES
[email or web address]

## 2017-11-20 NOTE — DISCHARGE NOTE ADULT - ADDITIONAL INSTRUCTIONS
1. Please follow up with Electrophysiologist Dr Neal on 11/29/17 at 10:20am.  Pedro Neal), Cardiac Electrophysiology; Cardiology; Cardiovascular Disease  [address], [address] [place] [address]  Phone: [phone] 1. Please follow up with Electrophysiologist Dr Neal on 11/29/17 at 10:20am.  Pedro Neal), Cardiac Electrophysiology; Cardiology; Cardiovascular Disease  [address], [address] [place] [address]  Phone: [phone]    2. Please follow up with your Cardiologist Dr Becerra 1. Please follow up with Electrophysiologist Dr Neal on 11/29/17 at 10:20am.  Pedro Neal), Cardiac Electrophysiology; Cardiology; Cardiovascular Disease  [address], [address] [place] [address]  Phone: [phone]    2. Please follow up with Cardiologist Dr Lundberg on 11/29/17 at 1:30pm.  Janay Lundberg), Cardilology, Internal Medicine  [address] [id number]  Phone: [phone]

## 2017-11-20 NOTE — PROGRESS NOTE ADULT - SUBJECTIVE AND OBJECTIVE BOX
EPS Progress Note    S:      T(C): 37.2 (11-20-17 @ 09:30), Max: 37.7 (11-19-17 @ 14:00)  HR: 69 (11-20-17 @ 09:54) (68 - 69)  BP: 132/70 (11-20-17 @ 09:54) (99/53 - 132/70)  RR: 16 (11-20-17 @ 09:54) (15 - 16)  SpO2: 96% (11-20-17 @ 09:54) (94% - 96%)     Telemetry: Sinus rhythm in 60s, some NSVT (4 beats)          General:  No acute distress      Chest:  Chest is clear to auscultation bilaterally without wheezes, crackles, or rhonchi  Cardiac:  Regular rate and rhythm.  No murmur, rubs, or gallops heard.  Abdomen:  Soft without rebound or guarding.  Bowel sounds are presnt in all 4 quadrants.  No hepatosplenomegaly  Extremities:  No lower extremity edema is present.  No cyanosis or clubbing       MEDICATIONS  (STANDING):  apixaban 5 milliGRAM(s) Oral every 12 hours  aspirin enteric coated 81 milliGRAM(s) Oral daily  atorvastatin 40 milliGRAM(s) Oral at bedtime  carvedilol 25 milliGRAM(s) Oral every 12 hours  colchicine 0.6 milliGRAM(s) Oral two times a day  finasteride 5 milliGRAM(s) Oral daily  lisinopril 2.5 milliGRAM(s) Oral daily  tamsulosin 0.4 milliGRAM(s) Oral at bedtime      LABS:                        11.4   9.7   )-----------( 193      ( 20 Nov 2017 06:17 )             35.6     11-20    136  |  99  |  17  ----------------------------<  108<H>  4.0   |  22  |  0.94    Ca    8.8      20 Nov 2017 06:17  Mg     2.0     11-20 EPS Progress Note    S:  The patient was examined at the bedside. He returned from exercise stress test and feels well. He states that he didn't experience any c/p, palpitations or dizziness during that test. He states that he became sob toward the end and when he stopped, it took a few minutes to catch his breath. He states that over the past couple of days, each time he has a PVC, he feels a minor chest pain of 1-2/10. Otherwise, he feels well and has been oob and walking around without difficultly.     T(C): 37.2 (11-20-17 @ 09:30), Max: 37.7 (11-19-17 @ 14:00)  HR: 69 (11-20-17 @ 09:54) (68 - 69)  BP: 132/70 (11-20-17 @ 09:54) (99/53 - 132/70)  RR: 16 (11-20-17 @ 09:54) (15 - 16)  SpO2: 96% (11-20-17 @ 09:54) (94% - 96%)     Telemetry: Sinus rhythm in 60s, some NSVT (4 beats)          General:  No acute distress      Chest:  Chest is clear to auscultation bilaterally without wheezes, crackles, or rhonchi  Cardiac:  Regular rate and rhythm.  No murmur, rubs, or gallops heard.  Abdomen:  Soft without rebound or guarding.  Bowel sounds are presnt in all 4 quadrants.  No hepatosplenomegaly  Extremities:  No lower extremity edema is present.  No cyanosis or clubbing       MEDICATIONS  (STANDING):  apixaban 5 milliGRAM(s) Oral every 12 hours  aspirin enteric coated 81 milliGRAM(s) Oral daily  atorvastatin 40 milliGRAM(s) Oral at bedtime  carvedilol 25 milliGRAM(s) Oral every 12 hours  colchicine 0.6 milliGRAM(s) Oral two times a day  finasteride 5 milliGRAM(s) Oral daily  lisinopril 2.5 milliGRAM(s) Oral daily  tamsulosin 0.4 milliGRAM(s) Oral at bedtime      LABS:                        11.4   9.7   )-----------( 193      ( 20 Nov 2017 06:17 )             35.6     11-20    136  |  99  |  17  ----------------------------<  108<H>  4.0   |  22  |  0.94    Ca    8.8      20 Nov 2017 06:17  Mg     2.0     11-20

## 2017-11-21 RX ORDER — LISINOPRIL 2.5 MG/1
1 TABLET ORAL
Qty: 0 | Refills: 0 | COMMUNITY

## 2017-11-21 RX ORDER — ATORVASTATIN CALCIUM 80 MG/1
1 TABLET, FILM COATED ORAL
Qty: 30 | Refills: 0
Start: 2017-11-21 | End: 2017-12-21

## 2017-11-21 RX ORDER — ATORVASTATIN CALCIUM 80 MG/1
1 TABLET, FILM COATED ORAL
Qty: 0 | Refills: 0 | COMMUNITY

## 2017-11-21 RX ORDER — LISINOPRIL 2.5 MG/1
1 TABLET ORAL
Qty: 30 | Refills: 0
Start: 2017-11-21 | End: 2017-12-21

## 2017-11-22 ENCOUNTER — TRANSCRIPTION ENCOUNTER (OUTPATIENT)
Age: 72
End: 2017-11-22

## 2017-11-28 ENCOUNTER — RESULT CHARGE (OUTPATIENT)
Age: 72
End: 2017-11-28

## 2017-11-29 ENCOUNTER — APPOINTMENT (OUTPATIENT)
Dept: HEART AND VASCULAR | Facility: CLINIC | Age: 72
End: 2017-11-29
Payer: MEDICARE

## 2017-11-29 VITALS
HEIGHT: 69.29 IN | OXYGEN SATURATION: 95 % | TEMPERATURE: 97.9 F | BODY MASS INDEX: 37.63 KG/M2 | WEIGHT: 256.99 LBS | SYSTOLIC BLOOD PRESSURE: 88 MMHG | HEART RATE: 74 BPM | DIASTOLIC BLOOD PRESSURE: 52 MMHG

## 2017-11-29 VITALS
BODY MASS INDEX: 35.14 KG/M2 | WEIGHT: 251 LBS | DIASTOLIC BLOOD PRESSURE: 77 MMHG | SYSTOLIC BLOOD PRESSURE: 126 MMHG | HEART RATE: 79 BPM | HEIGHT: 71 IN

## 2017-11-29 DIAGNOSIS — E66.9 OBESITY, UNSPECIFIED: ICD-10-CM

## 2017-11-29 DIAGNOSIS — N40.0 BENIGN PROSTATIC HYPERPLASIA WITHOUT LOWER URINARY TRACT SYMPTOMS: ICD-10-CM

## 2017-11-29 DIAGNOSIS — I31.3 PERICARDIAL EFFUSION (NONINFLAMMATORY): ICD-10-CM

## 2017-11-29 DIAGNOSIS — I50.22 CHRONIC SYSTOLIC (CONGESTIVE) HEART FAILURE: ICD-10-CM

## 2017-11-29 DIAGNOSIS — I47.2 VENTRICULAR TACHYCARDIA: ICD-10-CM

## 2017-11-29 DIAGNOSIS — Z95.810 PRESENCE OF AUTOMATIC (IMPLANTABLE) CARDIAC DEFIBRILLATOR: ICD-10-CM

## 2017-11-29 DIAGNOSIS — I48.91 UNSPECIFIED ATRIAL FIBRILLATION: ICD-10-CM

## 2017-11-29 DIAGNOSIS — Z86.718 PERSONAL HISTORY OF OTHER VENOUS THROMBOSIS AND EMBOLISM: ICD-10-CM

## 2017-11-29 DIAGNOSIS — R00.0 TACHYCARDIA, UNSPECIFIED: ICD-10-CM

## 2017-11-29 DIAGNOSIS — M10.9 GOUT, UNSPECIFIED: ICD-10-CM

## 2017-11-29 DIAGNOSIS — I25.10 ATHEROSCLEROTIC HEART DISEASE OF NATIVE CORONARY ARTERY WITHOUT ANGINA PECTORIS: ICD-10-CM

## 2017-11-29 DIAGNOSIS — G47.33 OBSTRUCTIVE SLEEP APNEA (ADULT) (PEDIATRIC): ICD-10-CM

## 2017-11-29 DIAGNOSIS — Z95.5 PRESENCE OF CORONARY ANGIOPLASTY IMPLANT AND GRAFT: ICD-10-CM

## 2017-11-29 DIAGNOSIS — I11.0 HYPERTENSIVE HEART DISEASE WITH HEART FAILURE: ICD-10-CM

## 2017-11-29 DIAGNOSIS — R73.03 PREDIABETES: ICD-10-CM

## 2017-11-29 DIAGNOSIS — I25.5 ISCHEMIC CARDIOMYOPATHY: ICD-10-CM

## 2017-11-29 DIAGNOSIS — Z79.01 LONG TERM (CURRENT) USE OF ANTICOAGULANTS: ICD-10-CM

## 2017-11-29 LAB
ALBUMIN SERPL ELPH-MCNC: 4 G/DL
ALP BLD-CCNC: 94 U/L
ALT SERPL-CCNC: 18 U/L
ANION GAP SERPL CALC-SCNC: 14 MMOL/L
AST SERPL-CCNC: 18 U/L
BILIRUB DIRECT SERPL-MCNC: 0.2 MG/DL
BILIRUB INDIRECT SERPL-MCNC: 0.3 MG/DL
BILIRUB SERPL-MCNC: 0.5 MG/DL
BUN SERPL-MCNC: 10 MG/DL
CALCIUM SERPL-MCNC: 9.1 MG/DL
CHLORIDE SERPL-SCNC: 104 MMOL/L
CO2 SERPL-SCNC: 25 MMOL/L
CREAT SERPL-MCNC: 0.9 MG/DL
GLUCOSE SERPL-MCNC: 100 MG/DL
POTASSIUM SERPL-SCNC: 4.3 MMOL/L
PROT SERPL-MCNC: 6.6 G/DL
SODIUM SERPL-SCNC: 143 MMOL/L

## 2017-11-29 PROCEDURE — 99215 OFFICE O/P EST HI 40 MIN: CPT | Mod: 25

## 2017-11-29 PROCEDURE — 99214 OFFICE O/P EST MOD 30 MIN: CPT | Mod: 25

## 2017-11-29 PROCEDURE — 93000 ELECTROCARDIOGRAM COMPLETE: CPT

## 2017-11-29 PROCEDURE — 93283 PRGRMG EVAL IMPLANTABLE DFB: CPT

## 2017-11-29 RX ORDER — BENZONATATE 200 MG/1
200 CAPSULE ORAL
Qty: 30 | Refills: 0 | Status: DISCONTINUED | COMMUNITY
Start: 2017-03-27 | End: 2017-11-29

## 2017-11-29 RX ORDER — SOTALOL HYDROCHLORIDE 80 MG/1
80 TABLET ORAL
Qty: 60 | Refills: 0 | Status: DISCONTINUED | COMMUNITY
Start: 2017-10-11 | End: 2017-11-29

## 2017-11-29 RX ORDER — CEFUROXIME AXETIL 500 MG/1
500 TABLET ORAL
Qty: 7 | Refills: 0 | Status: DISCONTINUED | COMMUNITY
Start: 2017-03-17 | End: 2017-11-29

## 2017-11-30 LAB — NT-PROBNP SERPL-MCNC: 641 PG/ML

## 2017-11-30 PROCEDURE — 85027 COMPLETE CBC AUTOMATED: CPT

## 2017-11-30 PROCEDURE — 86900 BLOOD TYPING SEROLOGIC ABO: CPT

## 2017-11-30 PROCEDURE — 85610 PROTHROMBIN TIME: CPT

## 2017-11-30 PROCEDURE — 93306 TTE W/DOPPLER COMPLETE: CPT

## 2017-11-30 PROCEDURE — 36415 COLL VENOUS BLD VENIPUNCTURE: CPT

## 2017-11-30 PROCEDURE — 82962 GLUCOSE BLOOD TEST: CPT

## 2017-11-30 PROCEDURE — 85730 THROMBOPLASTIN TIME PARTIAL: CPT

## 2017-11-30 PROCEDURE — 93005 ELECTROCARDIOGRAM TRACING: CPT

## 2017-11-30 PROCEDURE — 84484 ASSAY OF TROPONIN QUANT: CPT

## 2017-11-30 PROCEDURE — 80053 COMPREHEN METABOLIC PANEL: CPT

## 2017-11-30 PROCEDURE — 86901 BLOOD TYPING SEROLOGIC RH(D): CPT

## 2017-11-30 PROCEDURE — C1894: CPT

## 2017-11-30 PROCEDURE — 71035: CPT

## 2017-11-30 PROCEDURE — 71045 X-RAY EXAM CHEST 1 VIEW: CPT

## 2017-11-30 PROCEDURE — 93017 CV STRESS TEST TRACING ONLY: CPT

## 2017-11-30 PROCEDURE — C1766: CPT

## 2017-11-30 PROCEDURE — C1730: CPT

## 2017-11-30 PROCEDURE — 85025 COMPLETE CBC W/AUTO DIFF WBC: CPT

## 2017-11-30 PROCEDURE — C1759: CPT

## 2017-11-30 PROCEDURE — 84443 ASSAY THYROID STIM HORMONE: CPT

## 2017-11-30 PROCEDURE — C1732: CPT

## 2017-11-30 PROCEDURE — 80048 BASIC METABOLIC PNL TOTAL CA: CPT

## 2017-11-30 PROCEDURE — 86850 RBC ANTIBODY SCREEN: CPT

## 2017-11-30 PROCEDURE — 83735 ASSAY OF MAGNESIUM: CPT

## 2017-12-01 ENCOUNTER — OTHER (OUTPATIENT)
Age: 72
End: 2017-12-01

## 2017-12-06 ENCOUNTER — APPOINTMENT (OUTPATIENT)
Dept: HEART AND VASCULAR | Facility: CLINIC | Age: 72
End: 2017-12-06
Payer: MEDICARE

## 2017-12-06 VITALS
BODY MASS INDEX: 37.03 KG/M2 | WEIGHT: 250 LBS | DIASTOLIC BLOOD PRESSURE: 74 MMHG | HEART RATE: 71 BPM | SYSTOLIC BLOOD PRESSURE: 130 MMHG | HEIGHT: 69 IN

## 2017-12-06 PROCEDURE — 93283 PRGRMG EVAL IMPLANTABLE DFB: CPT

## 2017-12-06 PROCEDURE — 99215 OFFICE O/P EST HI 40 MIN: CPT | Mod: 25

## 2017-12-06 RX ORDER — COLCHICINE 0.6 MG/1
0.6 TABLET ORAL DAILY
Qty: 14 | Refills: 0 | Status: DISCONTINUED | COMMUNITY
Start: 2017-10-11 | End: 2017-12-06

## 2017-12-06 RX ORDER — FUROSEMIDE 40 MG/1
40 TABLET ORAL DAILY
Qty: 14 | Refills: 0 | Status: DISCONTINUED | COMMUNITY
End: 2017-12-06

## 2017-12-06 RX ORDER — POTASSIUM CHLORIDE 750 MG/1
10 CAPSULE, EXTENDED RELEASE ORAL
Refills: 0 | Status: DISCONTINUED | COMMUNITY
End: 2017-12-06

## 2017-12-06 RX ORDER — METOPROLOL SUCCINATE 25 MG/1
25 TABLET, EXTENDED RELEASE ORAL
Qty: 30 | Refills: 1 | Status: DISCONTINUED | COMMUNITY
Start: 2017-10-11 | End: 2017-12-06

## 2017-12-08 ENCOUNTER — OUTPATIENT (OUTPATIENT)
Dept: OUTPATIENT SERVICES | Facility: HOSPITAL | Age: 72
LOS: 1 days | Discharge: HOME | End: 2017-12-08
Payer: MEDICARE

## 2017-12-08 DIAGNOSIS — Z98.890 OTHER SPECIFIED POSTPROCEDURAL STATES: Chronic | ICD-10-CM

## 2017-12-08 DIAGNOSIS — Z95.810 PRESENCE OF AUTOMATIC (IMPLANTABLE) CARDIAC DEFIBRILLATOR: Chronic | ICD-10-CM

## 2017-12-08 DIAGNOSIS — Z90.49 ACQUIRED ABSENCE OF OTHER SPECIFIED PARTS OF DIGESTIVE TRACT: Chronic | ICD-10-CM

## 2017-12-08 PROCEDURE — 92960 CARDIOVERSION ELECTRIC EXT: CPT

## 2017-12-12 LAB
BASOPHILS # BLD AUTO: 0.02 K/UL
BASOPHILS NFR BLD AUTO: 0.2 %
EOSINOPHIL # BLD AUTO: 0.17 K/UL
EOSINOPHIL NFR BLD AUTO: 1.8 %
HCT VFR BLD CALC: 37.2 %
HGB BLD-MCNC: 11.9 G/DL
IMM GRANULOCYTES NFR BLD AUTO: 0.2 %
LYMPHOCYTES # BLD AUTO: 1.52 K/UL
LYMPHOCYTES NFR BLD AUTO: 15.9 %
MAN DIFF?: NORMAL
MCHC RBC-ENTMCNC: 30.1 PG
MCHC RBC-ENTMCNC: 32 GM/DL
MCV RBC AUTO: 93.9 FL
MONOCYTES # BLD AUTO: 0.7 K/UL
MONOCYTES NFR BLD AUTO: 7.3 %
NEUTROPHILS # BLD AUTO: 7.15 K/UL
NEUTROPHILS NFR BLD AUTO: 74.6 %
PLATELET # BLD AUTO: 284 K/UL
RBC # BLD: 3.96 M/UL
RBC # FLD: 14.7 %
WBC # FLD AUTO: 9.58 K/UL

## 2017-12-19 PROCEDURE — 80048 BASIC METABOLIC PNL TOTAL CA: CPT

## 2017-12-19 PROCEDURE — 99285 EMERGENCY DEPT VISIT HI MDM: CPT | Mod: 25

## 2017-12-19 PROCEDURE — 36415 COLL VENOUS BLD VENIPUNCTURE: CPT

## 2017-12-19 PROCEDURE — 83036 HEMOGLOBIN GLYCOSYLATED A1C: CPT

## 2017-12-19 PROCEDURE — 80053 COMPREHEN METABOLIC PANEL: CPT

## 2017-12-19 PROCEDURE — 93306 TTE W/DOPPLER COMPLETE: CPT

## 2017-12-19 PROCEDURE — 71045 X-RAY EXAM CHEST 1 VIEW: CPT

## 2017-12-19 PROCEDURE — 80061 LIPID PANEL: CPT

## 2017-12-19 PROCEDURE — 84100 ASSAY OF PHOSPHORUS: CPT

## 2017-12-19 PROCEDURE — C1759: CPT

## 2017-12-19 PROCEDURE — 93005 ELECTROCARDIOGRAM TRACING: CPT

## 2017-12-19 PROCEDURE — C1894: CPT

## 2017-12-19 PROCEDURE — 82553 CREATINE MB FRACTION: CPT

## 2017-12-19 PROCEDURE — 84484 ASSAY OF TROPONIN QUANT: CPT

## 2017-12-19 PROCEDURE — 86901 BLOOD TYPING SEROLOGIC RH(D): CPT

## 2017-12-19 PROCEDURE — C1732: CPT

## 2017-12-19 PROCEDURE — 85025 COMPLETE CBC W/AUTO DIFF WBC: CPT

## 2017-12-19 PROCEDURE — 86850 RBC ANTIBODY SCREEN: CPT

## 2017-12-19 PROCEDURE — 86900 BLOOD TYPING SEROLOGIC ABO: CPT

## 2017-12-19 PROCEDURE — 83735 ASSAY OF MAGNESIUM: CPT

## 2017-12-19 PROCEDURE — 85610 PROTHROMBIN TIME: CPT

## 2017-12-19 PROCEDURE — 85730 THROMBOPLASTIN TIME PARTIAL: CPT

## 2017-12-19 PROCEDURE — C1730: CPT

## 2017-12-19 PROCEDURE — 82550 ASSAY OF CK (CPK): CPT

## 2017-12-19 PROCEDURE — 85027 COMPLETE CBC AUTOMATED: CPT

## 2017-12-26 ENCOUNTER — RESULT CHARGE (OUTPATIENT)
Age: 72
End: 2017-12-26

## 2017-12-27 ENCOUNTER — APPOINTMENT (OUTPATIENT)
Dept: HEART AND VASCULAR | Facility: CLINIC | Age: 72
End: 2017-12-27
Payer: MEDICARE

## 2017-12-27 VITALS
DIASTOLIC BLOOD PRESSURE: 64 MMHG | WEIGHT: 259 LBS | HEART RATE: 62 BPM | SYSTOLIC BLOOD PRESSURE: 104 MMHG | TEMPERATURE: 97.7 F | BODY MASS INDEX: 37.08 KG/M2 | HEIGHT: 70 IN | OXYGEN SATURATION: 97 %

## 2017-12-27 PROCEDURE — 93000 ELECTROCARDIOGRAM COMPLETE: CPT

## 2017-12-27 PROCEDURE — 99214 OFFICE O/P EST MOD 30 MIN: CPT | Mod: 25

## 2018-01-03 ENCOUNTER — APPOINTMENT (OUTPATIENT)
Dept: HEART AND VASCULAR | Facility: CLINIC | Age: 73
End: 2018-01-03
Payer: MEDICARE

## 2018-01-03 VITALS
SYSTOLIC BLOOD PRESSURE: 140 MMHG | DIASTOLIC BLOOD PRESSURE: 85 MMHG | HEIGHT: 70 IN | BODY MASS INDEX: 36.94 KG/M2 | HEART RATE: 60 BPM | WEIGHT: 258 LBS

## 2018-01-03 PROCEDURE — 93283 PRGRMG EVAL IMPLANTABLE DFB: CPT

## 2018-01-03 RX ORDER — DOFETILIDE 0.25 MG/1
250 CAPSULE ORAL TWICE DAILY
Qty: 60 | Refills: 0 | Status: DISCONTINUED | COMMUNITY
Start: 2017-12-06 | End: 2018-01-03

## 2018-01-24 ENCOUNTER — APPOINTMENT (OUTPATIENT)
Dept: HEART AND VASCULAR | Facility: CLINIC | Age: 73
End: 2018-01-24
Payer: MEDICARE

## 2018-01-24 VITALS
OXYGEN SATURATION: 95 % | HEIGHT: 70 IN | DIASTOLIC BLOOD PRESSURE: 69 MMHG | HEART RATE: 60 BPM | SYSTOLIC BLOOD PRESSURE: 116 MMHG | BODY MASS INDEX: 37.37 KG/M2 | TEMPERATURE: 97.6 F | WEIGHT: 261.01 LBS

## 2018-01-24 PROCEDURE — 93000 ELECTROCARDIOGRAM COMPLETE: CPT

## 2018-01-24 PROCEDURE — 99214 OFFICE O/P EST MOD 30 MIN: CPT | Mod: 25

## 2018-02-09 ENCOUNTER — MOBILE ON CALL (OUTPATIENT)
Age: 73
End: 2018-02-09

## 2018-02-12 ENCOUNTER — APPOINTMENT (OUTPATIENT)
Dept: PULMONOLOGY | Facility: CLINIC | Age: 73
End: 2018-02-12
Payer: MEDICARE

## 2018-02-12 VITALS
DIASTOLIC BLOOD PRESSURE: 74 MMHG | WEIGHT: 259 LBS | BODY MASS INDEX: 37.08 KG/M2 | HEART RATE: 74 BPM | SYSTOLIC BLOOD PRESSURE: 130 MMHG | HEIGHT: 70 IN | OXYGEN SATURATION: 98 %

## 2018-02-12 PROCEDURE — 99213 OFFICE O/P EST LOW 20 MIN: CPT

## 2018-03-15 ENCOUNTER — APPOINTMENT (OUTPATIENT)
Dept: HEART AND VASCULAR | Facility: CLINIC | Age: 73
End: 2018-03-15
Payer: MEDICARE

## 2018-03-15 VITALS
HEART RATE: 71 BPM | BODY MASS INDEX: 37.22 KG/M2 | WEIGHT: 260 LBS | SYSTOLIC BLOOD PRESSURE: 123 MMHG | HEIGHT: 70 IN | DIASTOLIC BLOOD PRESSURE: 70 MMHG

## 2018-03-15 LAB
BASOPHILS # BLD AUTO: 0.02 K/UL
BASOPHILS NFR BLD AUTO: 0.2 %
EOSINOPHIL # BLD AUTO: 0.14 K/UL
EOSINOPHIL NFR BLD AUTO: 1.6 %
HCT VFR BLD CALC: 42.8 %
HGB BLD-MCNC: 13.6 G/DL
IMM GRANULOCYTES NFR BLD AUTO: 0.1 %
LYMPHOCYTES # BLD AUTO: 1.41 K/UL
LYMPHOCYTES NFR BLD AUTO: 16.3 %
MAN DIFF?: NORMAL
MCHC RBC-ENTMCNC: 29.1 PG
MCHC RBC-ENTMCNC: 31.8 GM/DL
MCV RBC AUTO: 91.6 FL
MONOCYTES # BLD AUTO: 0.74 K/UL
MONOCYTES NFR BLD AUTO: 8.6 %
NEUTROPHILS # BLD AUTO: 6.32 K/UL
NEUTROPHILS NFR BLD AUTO: 73.2 %
NT-PROBNP SERPL-MCNC: 321 PG/ML
PLATELET # BLD AUTO: 226 K/UL
RBC # BLD: 4.67 M/UL
RBC # FLD: 14.7 %
WBC # FLD AUTO: 8.64 K/UL

## 2018-03-15 PROCEDURE — 93283 PRGRMG EVAL IMPLANTABLE DFB: CPT

## 2018-03-15 PROCEDURE — 99215 OFFICE O/P EST HI 40 MIN: CPT | Mod: 25

## 2018-03-21 LAB
ANION GAP SERPL CALC-SCNC: 15 MMOL/L
BUN SERPL-MCNC: 19 MG/DL
CALCIUM SERPL-MCNC: 9.5 MG/DL
CHLORIDE SERPL-SCNC: 105 MMOL/L
CO2 SERPL-SCNC: 24 MMOL/L
CREAT SERPL-MCNC: 1.02 MG/DL
GLUCOSE SERPL-MCNC: 134 MG/DL
POTASSIUM SERPL-SCNC: 4.4 MMOL/L
SODIUM SERPL-SCNC: 144 MMOL/L
TSH SERPL-ACNC: 1.62 UIU/ML

## 2018-04-11 ENCOUNTER — APPOINTMENT (OUTPATIENT)
Dept: HEART AND VASCULAR | Facility: CLINIC | Age: 73
End: 2018-04-11
Payer: MEDICARE

## 2018-04-11 VITALS
BODY MASS INDEX: 37.22 KG/M2 | HEIGHT: 70 IN | HEART RATE: 65 BPM | WEIGHT: 260 LBS | SYSTOLIC BLOOD PRESSURE: 150 MMHG | DIASTOLIC BLOOD PRESSURE: 80 MMHG

## 2018-04-11 PROCEDURE — 93283 PRGRMG EVAL IMPLANTABLE DFB: CPT

## 2018-04-11 PROCEDURE — 93000 ELECTROCARDIOGRAM COMPLETE: CPT | Mod: 59

## 2018-04-11 PROCEDURE — 99215 OFFICE O/P EST HI 40 MIN: CPT | Mod: 25

## 2018-04-12 LAB
ALBUMIN SERPL ELPH-MCNC: 3.5 G/DL
ALP BLD-CCNC: 81 U/L
ALT SERPL-CCNC: 12 U/L
ANION GAP SERPL CALC-SCNC: 11 MMOL/L
AST SERPL-CCNC: 20 U/L
BASOPHILS # BLD AUTO: 0.03 K/UL
BASOPHILS NFR BLD AUTO: 0.3 %
BILIRUB DIRECT SERPL-MCNC: 0.2 MG/DL
BILIRUB INDIRECT SERPL-MCNC: 0.4 MG/DL
BILIRUB SERPL-MCNC: 0.6 MG/DL
BUN SERPL-MCNC: 12 MG/DL
CALCIUM SERPL-MCNC: 8.9 MG/DL
CHLORIDE SERPL-SCNC: 105 MMOL/L
CHOLEST SERPL-MCNC: 125 MG/DL
CHOLEST/HDLC SERPL: 2.8 RATIO
CO2 SERPL-SCNC: 27 MMOL/L
CREAT SERPL-MCNC: 0.87 MG/DL
EOSINOPHIL # BLD AUTO: 0.15 K/UL
EOSINOPHIL NFR BLD AUTO: 1.5 %
GLUCOSE SERPL-MCNC: 105 MG/DL
HBA1C MFR BLD HPLC: 6.2 %
HCT VFR BLD CALC: 42.7 %
HDLC SERPL-MCNC: 44 MG/DL
HGB BLD-MCNC: 13.2 G/DL
IMM GRANULOCYTES NFR BLD AUTO: 0.3 %
LDLC SERPL CALC-MCNC: 54 MG/DL
LYMPHOCYTES # BLD AUTO: 1.48 K/UL
LYMPHOCYTES NFR BLD AUTO: 15 %
MAN DIFF?: NORMAL
MCHC RBC-ENTMCNC: 29.3 PG
MCHC RBC-ENTMCNC: 30.9 GM/DL
MCV RBC AUTO: 94.7 FL
MONOCYTES # BLD AUTO: 0.88 K/UL
MONOCYTES NFR BLD AUTO: 8.9 %
NEUTROPHILS # BLD AUTO: 7.32 K/UL
NEUTROPHILS NFR BLD AUTO: 74 %
NT-PROBNP SERPL-MCNC: 541 PG/ML
PLATELET # BLD AUTO: 224 K/UL
POTASSIUM SERPL-SCNC: 4.3 MMOL/L
PROT SERPL-MCNC: 6.8 G/DL
RBC # BLD: 4.51 M/UL
RBC # FLD: 14.6 %
SODIUM SERPL-SCNC: 143 MMOL/L
TRIGL SERPL-MCNC: 135 MG/DL
WBC # FLD AUTO: 9.89 K/UL

## 2018-04-19 ENCOUNTER — APPOINTMENT (OUTPATIENT)
Dept: HEART AND VASCULAR | Facility: CLINIC | Age: 73
End: 2018-04-19
Payer: MEDICARE

## 2018-04-19 VITALS
HEART RATE: 77 BPM | SYSTOLIC BLOOD PRESSURE: 124 MMHG | WEIGHT: 260 LBS | HEIGHT: 70 IN | DIASTOLIC BLOOD PRESSURE: 81 MMHG | BODY MASS INDEX: 37.22 KG/M2

## 2018-04-19 PROCEDURE — 93283 PRGRMG EVAL IMPLANTABLE DFB: CPT

## 2018-04-19 PROCEDURE — 99215 OFFICE O/P EST HI 40 MIN: CPT | Mod: 25

## 2018-04-24 ENCOUNTER — APPOINTMENT (OUTPATIENT)
Dept: HEART AND VASCULAR | Facility: CLINIC | Age: 73
End: 2018-04-24
Payer: MEDICARE

## 2018-04-24 VITALS
OXYGEN SATURATION: 94 % | SYSTOLIC BLOOD PRESSURE: 110 MMHG | BODY MASS INDEX: 37.51 KG/M2 | DIASTOLIC BLOOD PRESSURE: 74 MMHG | HEIGHT: 70 IN | TEMPERATURE: 98 F | WEIGHT: 262 LBS | HEART RATE: 72 BPM

## 2018-04-24 PROCEDURE — 93000 ELECTROCARDIOGRAM COMPLETE: CPT

## 2018-04-24 PROCEDURE — 99214 OFFICE O/P EST MOD 30 MIN: CPT

## 2018-05-02 ENCOUNTER — APPOINTMENT (OUTPATIENT)
Dept: HEART AND VASCULAR | Facility: CLINIC | Age: 73
End: 2018-05-02
Payer: MEDICARE

## 2018-05-02 PROCEDURE — 93306 TTE W/DOPPLER COMPLETE: CPT

## 2018-05-09 ENCOUNTER — OTHER (OUTPATIENT)
Age: 73
End: 2018-05-09

## 2018-05-09 RX ORDER — ALPRAZOLAM 0.25 MG/1
0.25 TABLET ORAL
Qty: 20 | Refills: 0 | Status: DISCONTINUED | COMMUNITY
Start: 2018-01-25

## 2018-05-09 RX ORDER — POTASSIUM CHLORIDE 750 MG/1
10 TABLET, EXTENDED RELEASE ORAL
Qty: 90 | Refills: 0 | Status: DISCONTINUED | COMMUNITY
Start: 2017-10-22

## 2018-05-09 RX ORDER — CARVEDILOL 25 MG/1
25 TABLET, FILM COATED ORAL
Qty: 60 | Refills: 0 | Status: DISCONTINUED | COMMUNITY
Start: 2017-11-20

## 2018-05-09 RX ORDER — TAMSULOSIN HYDROCHLORIDE 0.4 MG/1
0.4 CAPSULE ORAL
Qty: 30 | Refills: 0 | Status: DISCONTINUED | COMMUNITY
Start: 2017-11-02

## 2018-06-06 ENCOUNTER — APPOINTMENT (OUTPATIENT)
Dept: HEART AND VASCULAR | Facility: CLINIC | Age: 73
End: 2018-06-06

## 2018-06-20 ENCOUNTER — RX RENEWAL (OUTPATIENT)
Age: 73
End: 2018-06-20

## 2018-07-25 ENCOUNTER — APPOINTMENT (OUTPATIENT)
Dept: HEART AND VASCULAR | Facility: CLINIC | Age: 73
End: 2018-07-25
Payer: MEDICARE

## 2018-07-25 VITALS
WEIGHT: 265 LBS | BODY MASS INDEX: 37.94 KG/M2 | DIASTOLIC BLOOD PRESSURE: 73 MMHG | HEART RATE: 70 BPM | SYSTOLIC BLOOD PRESSURE: 124 MMHG | HEIGHT: 70 IN

## 2018-07-25 PROBLEM — I48.91 UNSPECIFIED ATRIAL FIBRILLATION: Chronic | Status: ACTIVE | Noted: 2017-10-03

## 2018-07-25 PROBLEM — Z95.810 PRESENCE OF AUTOMATIC (IMPLANTABLE) CARDIAC DEFIBRILLATOR: Chronic | Status: ACTIVE | Noted: 2017-10-03

## 2018-07-25 PROCEDURE — 93283 PRGRMG EVAL IMPLANTABLE DFB: CPT

## 2018-10-15 ENCOUNTER — APPOINTMENT (OUTPATIENT)
Dept: HEART AND VASCULAR | Facility: CLINIC | Age: 73
End: 2018-10-15
Payer: MEDICARE

## 2018-10-15 VITALS
HEIGHT: 68.5 IN | WEIGHT: 264 LBS | BODY MASS INDEX: 39.55 KG/M2 | SYSTOLIC BLOOD PRESSURE: 120 MMHG | TEMPERATURE: 98 F | OXYGEN SATURATION: 97 % | HEART RATE: 75 BPM | DIASTOLIC BLOOD PRESSURE: 60 MMHG

## 2018-10-15 DIAGNOSIS — Z72.3 LACK OF PHYSICAL EXERCISE: ICD-10-CM

## 2018-10-15 DIAGNOSIS — Z78.9 OTHER SPECIFIED HEALTH STATUS: ICD-10-CM

## 2018-10-15 PROCEDURE — 93000 ELECTROCARDIOGRAM COMPLETE: CPT

## 2018-10-15 PROCEDURE — 99214 OFFICE O/P EST MOD 30 MIN: CPT

## 2018-11-28 ENCOUNTER — APPOINTMENT (OUTPATIENT)
Dept: HEART AND VASCULAR | Facility: CLINIC | Age: 73
End: 2018-11-28
Payer: MEDICARE

## 2018-11-28 VITALS — HEIGHT: 68 IN | SYSTOLIC BLOOD PRESSURE: 130 MMHG | HEART RATE: 72 BPM | DIASTOLIC BLOOD PRESSURE: 75 MMHG

## 2018-11-28 PROCEDURE — 93283 PRGRMG EVAL IMPLANTABLE DFB: CPT

## 2018-11-28 PROCEDURE — 99213 OFFICE O/P EST LOW 20 MIN: CPT | Mod: 25

## 2018-11-28 NOTE — REASON FOR VISIT
[Follow-up Device Check] : follow-up device check visit [Follow-Up - Clinic] : a clinic follow-up of [Atrial Fibrillation] : atrial fibrillation [Hypertension] : hypertension [Ventricular Tachycardia] : ventricular tachycardia

## 2018-11-28 NOTE — END OF VISIT
[] : Physician Assistant [>50% of Time Spent on Counseling and Coordination of Care for  ___] : Greater than 50% of the encounter time was spent on counseling and coordination of care for [unfilled] [Time Spent: ___ minutes] : I have spent [unfilled] minutes of face to face time with the patient

## 2018-11-29 NOTE — REVIEW OF SYSTEMS
[Feeling Fatigued] : feeling fatigued [see HPI] : see HPI [Anxiety] : anxiety [Negative] : Heme/Lymph [Fever] : no fever [Chills] : no chills [Cough] : no cough [Wheezing] : no wheezing [Coughing Up Blood] : no hemoptysis [Dizziness] : no dizziness [Confusion] : no confusion was observed [Under Stress] : not under stress [Suicidal] : not suicidal

## 2018-11-29 NOTE — PROCEDURE
[No] : not [NSR] : normal sinus rhythm [ICD] : Implantable cardioverter-defibrillator [DDDR] : DDDR [Threshold Testing Performed] : Threshold testing was performed [Lead Imp:  ___ohms] : lead impedance was [unfilled] ohms [Sensing Amplitude ___mv] : sensing amplitude was [unfilled] mv [___V @] : [unfilled] V [___ ms] : [unfilled] ms [de-identified] : STEPHANIE [de-identified] : FORTIFY [de-identified] : [id number] [de-identified] : 7/13/14 [de-identified] : 70/100 [de-identified] : 32-3.5years to ERNESTINE [de-identified] : shock impedance 44 [de-identified] : AP 71%\par  26%\par 2 short episodes of AT lasting seconds - no other episodes since last interrogation

## 2018-11-29 NOTE — HISTORY OF PRESENT ILLNESS
[Palpitations] : no palpitations [SOB] : no dyspnea [Syncope] : no syncope [Dizziness] : no dizziness [Chest Pain] : no chest pain or discomfort [ICD Shocks] : no recent ICD shocks [Shoulder Pain] : no shoulder pain [Pain at Site] : no pain at device site [Erythema at Site] : no erythema at device site [Swelling at Site] : no swelling at device site [FreeTextEntry1] : Mr. Sauceda is a 74 y/o M with chronic systolic heart failure with EF 30-35% s/p ST Donaldo ICD, CAD s/p MATTY to LAD, DANI on CPAP, Pre-DM, BPH, OA, HTN, recurrent UTI, atrial fibrillation on Eliquis s/p DCCV 12/2017, recurrent ventricular tachycardia s/p ablation 10/5/17 and 11/16/17, who now presents for follow up.\par \par His history is as follows by his report and patient discharge information from multiple hospitalizations. He underwent surgery for bladder stones 7/2014; post op course significant for NSVT. Work-up thereafter included a stress test followed by an EP study at [facility]. He ultimately had an ICD placed 7/2014. Reports he was admitted with a CHF exacerbation 7/2015. He underwent cardiac catheterization at [facility] 6/2016 s/p MATTY to LAD. Reports he had VT and had ICD shock, followed by VT ablation 9/2016 at [facility]. Amiodarone was started post-procedure for arrhythmia suppression. He had palpitations and was admitted to [facility] 11/2016 with heart rate up to 170 bpm. He was given IV Amiodarone followed by a cardioversion. Amiodarone was stopped due to concern for long term side effects and Sotalol was started. VT treated with ATP 1/2017; Sotalol was discontinued and Amiodarone with load restarted. He was told he would likely need another ablation. Case was discussed with Dr. Diaz at [place] and the decision was made to D/C ASA, continue Plavix and Eliquis until one year post stent. \par \par He presented to Dr. Arceo’ office 1-2-17 complaining of dizziness x a few days. He had been off amiodarone since the beginning of September. Device interrogation revealed recurrent VT terminated by ATP and he was admitted to the hospital. He was admitted to the CCU and started on amiodarone.  He underwent an EP study and VT ablation 10/5/17 and was restarted on sotalol and toprol.  He had PVCs and NSVT (different then what was ablated).  He had orthostatic hypotension and was hydrated and his medications were adjusted.  QTc remained stable on sotalol.  He was also found to have gout and started on medication.  \par \par He was doing well until early November 2017 when he had near syncope.  He was admitted to an OSH with recurrent VT and placed on amiodarone with breakthrough episodes.  All of the episodes were terminated with ATP; until 11/9 when he got an appropriate ICD shock.  He was transferred to [facility].  Amiodarone was switched to Quinidine and he had less VT; however during exercise stress test he had sustained VT terminated by ATP with resultant lightheadedness.  He underwent a successful epicardial VT ablation on 11/16.  \par \par In late November he was found to be in atrial fibrillation and was recommended to start Tikosyn; but secondary to price is now on Sotalol.  He had a cardioversion 12/8/17.   His pacing rate was increased to suppress new PVCs; which he was symptomatic from.  \par \par He presents for routine follow up and states he is doing well.  He notes some fatigue and states his apple watch states his heart rate sometimes goes down to 50bpm.  He also occasionally notes a skipped heart beat.   No chest pain, syncope, near syncope, SOB, orthopnea, PND or sustained palpitations.  No device related complaints.  He is compliant with Sotalol and Eliquis\par \par  \par \par \par RWJ - EPS/VT ablation 9/2016. Decreased voltage suggestive of scar noted in the anterior portion of the LV close to the septum and right beneath LVOT. During LV mapping in superior-lateral portion of this scar VT initiated spontaneous with mapping. Several lesions were delivered which initiated VT every time and at times accelerated to drop BP. VT did not completely eliminate but slowed down after few lesions. Post ablation EPS with 3 EST revealed no inducible VT but did not test w/ Isuprel as BP dropped significantly every time fast VT initiated.\par S/P VT ablation 5/25/17 @ [facility]; clinical VT originating from the left aortic valve at the commissure with the right cusp. No ventricular arrhythmias inducible at the end of the case. Absence of endocardial scar suggesting nonischemic origin of his cardiomyopathy. \par

## 2018-11-29 NOTE — PHYSICAL EXAM
[General Appearance - Well Developed] : well developed [Normal Appearance] : normal appearance [Well Groomed] : well groomed [General Appearance - Well Nourished] : well nourished [No Deformities] : no deformities [General Appearance - In No Acute Distress] : no acute distress [Heart Rate And Rhythm] : heart rate and rhythm were normal [Heart Sounds] : normal S1 and S2 [Respiration, Rhythm And Depth] : normal respiratory rhythm and effort [Exaggerated Use Of Accessory Muscles For Inspiration] : no accessory muscle use [Left Infraclavicular] : left infraclavicular area [Clean] : clean [Dry] : dry [Well-Healed] : well-healed [] : no ischemic changes [Normal Oral Mucosa] : normal oral mucosa [No Oral Cyanosis] : no oral cyanosis [Normal Jugular Venous V Waves Present] : normal jugular venous V waves present [Normal Conjunctiva] : the conjunctiva exhibited no abnormalities [Eyelids - No Xanthelasma] : the eyelids demonstrated no xanthelasmas [Abnormal Walk] : normal gait [Gait - Sufficient For Exercise Testing] : the gait was sufficient for exercise testing [Oriented To Time, Place, And Person] : oriented to person, place, and time [Affect] : the affect was normal [Mood] : the mood was normal [Palpable Crepitus] : no palpable crepitus [Bleeding] : no active bleeding [Foul Odor] : no foul smell [Purulent Drainage] : no purulent drainage [Serosanguineous Drainage] : no serosanquineous drainage [Serous Drainage] : no serous drainage [Erythema] : not erythematous [Warm] : not warm [Tender] : not tender [Indurated] : not indurated [Fluctuant] : not fluctuant

## 2018-11-29 NOTE — DISCUSSION/SUMMARY
[AICD Function Normal] : normal AICD function [FreeTextEntry1] : Mr. Sauceda is a 72 y/o M with chronic systolic heart failure with EF 30-35% s/p ST Donaldo ICD, CAD s/p MATTY to LAD, DANI on CPAP, Pre-DM, BPH, OA, HTN, recurrent UTI, atrial fibrillation on eliquis s/p DCCV 12/2017, recurrent ventricular tachycardia s/p ablation 10/5/17 and 11/16/17, who now presents for follow up.  He is now in normal sinus rhythm maintained on Eliquis and Sotalol.  He is doing well overall and notes occasional skipped heart beats which are likely PVCs.  No medication changes made today and he will continue Coreg and SOtalol.  Device interrogation reveals normal function.  All measured data is within normal limits.  He is euvolemic on exam today and I have encouraged him to loose weight and stay active. He will follow up in 4 month or sooner if needed.  He knows to call with any questions or concerns in the interm.

## 2018-12-20 ENCOUNTER — OUTPATIENT (OUTPATIENT)
Dept: OUTPATIENT SERVICES | Facility: HOSPITAL | Age: 73
LOS: 1 days | Discharge: HOME | End: 2018-12-20
Payer: MEDICARE

## 2018-12-20 DIAGNOSIS — Z98.890 OTHER SPECIFIED POSTPROCEDURAL STATES: Chronic | ICD-10-CM

## 2018-12-20 DIAGNOSIS — Z90.49 ACQUIRED ABSENCE OF OTHER SPECIFIED PARTS OF DIGESTIVE TRACT: Chronic | ICD-10-CM

## 2018-12-20 DIAGNOSIS — Z95.810 PRESENCE OF AUTOMATIC (IMPLANTABLE) CARDIAC DEFIBRILLATOR: Chronic | ICD-10-CM

## 2018-12-20 PROCEDURE — 92960 CARDIOVERSION ELECTRIC EXT: CPT

## 2018-12-26 ENCOUNTER — RX RENEWAL (OUTPATIENT)
Age: 73
End: 2018-12-26

## 2018-12-27 DIAGNOSIS — N40.0 BENIGN PROSTATIC HYPERPLASIA WITHOUT LOWER URINARY TRACT SYMPTOMS: ICD-10-CM

## 2018-12-27 DIAGNOSIS — Z79.82 LONG TERM (CURRENT) USE OF ASPIRIN: ICD-10-CM

## 2018-12-27 DIAGNOSIS — I48.1 PERSISTENT ATRIAL FIBRILLATION: ICD-10-CM

## 2018-12-27 DIAGNOSIS — Z79.01 LONG TERM (CURRENT) USE OF ANTICOAGULANTS: ICD-10-CM

## 2018-12-27 DIAGNOSIS — I50.22 CHRONIC SYSTOLIC (CONGESTIVE) HEART FAILURE: ICD-10-CM

## 2018-12-27 DIAGNOSIS — E10.9 TYPE 1 DIABETES MELLITUS WITHOUT COMPLICATIONS: ICD-10-CM

## 2018-12-27 DIAGNOSIS — Z95.5 PRESENCE OF CORONARY ANGIOPLASTY IMPLANT AND GRAFT: ICD-10-CM

## 2018-12-27 DIAGNOSIS — M19.90 UNSPECIFIED OSTEOARTHRITIS, UNSPECIFIED SITE: ICD-10-CM

## 2018-12-27 DIAGNOSIS — I25.5 ISCHEMIC CARDIOMYOPATHY: ICD-10-CM

## 2018-12-27 DIAGNOSIS — I11.0 HYPERTENSIVE HEART DISEASE WITH HEART FAILURE: ICD-10-CM

## 2018-12-27 DIAGNOSIS — G47.33 OBSTRUCTIVE SLEEP APNEA (ADULT) (PEDIATRIC): ICD-10-CM

## 2018-12-27 DIAGNOSIS — Z95.810 PRESENCE OF AUTOMATIC (IMPLANTABLE) CARDIAC DEFIBRILLATOR: ICD-10-CM

## 2018-12-27 DIAGNOSIS — I47.2 VENTRICULAR TACHYCARDIA: ICD-10-CM

## 2019-01-10 ENCOUNTER — APPOINTMENT (OUTPATIENT)
Dept: HEART AND VASCULAR | Facility: CLINIC | Age: 74
End: 2019-01-10
Payer: MEDICARE

## 2019-01-10 ENCOUNTER — NON-APPOINTMENT (OUTPATIENT)
Age: 74
End: 2019-01-10

## 2019-01-10 VITALS
HEIGHT: 68 IN | HEART RATE: 85 BPM | SYSTOLIC BLOOD PRESSURE: 114 MMHG | BODY MASS INDEX: 38.19 KG/M2 | WEIGHT: 252 LBS | DIASTOLIC BLOOD PRESSURE: 72 MMHG

## 2019-01-10 PROCEDURE — 93283 PRGRMG EVAL IMPLANTABLE DFB: CPT

## 2019-01-10 PROCEDURE — 99212 OFFICE O/P EST SF 10 MIN: CPT

## 2019-01-22 ENCOUNTER — RX RENEWAL (OUTPATIENT)
Age: 74
End: 2019-01-22

## 2019-02-11 ENCOUNTER — APPOINTMENT (OUTPATIENT)
Dept: PULMONOLOGY | Facility: CLINIC | Age: 74
End: 2019-02-11
Payer: MEDICARE

## 2019-02-11 VITALS
HEIGHT: 68 IN | SYSTOLIC BLOOD PRESSURE: 122 MMHG | WEIGHT: 250 LBS | OXYGEN SATURATION: 97 % | HEART RATE: 77 BPM | DIASTOLIC BLOOD PRESSURE: 80 MMHG | BODY MASS INDEX: 37.89 KG/M2

## 2019-02-11 PROCEDURE — 99214 OFFICE O/P EST MOD 30 MIN: CPT

## 2019-02-11 NOTE — PHYSICAL EXAM
[General Appearance - Well Developed] : well developed [Normal Appearance] : normal appearance [Well Groomed] : well groomed [General Appearance - Well Nourished] : well nourished [No Deformities] : no deformities [General Appearance - In No Acute Distress] : no acute distress [Normal Conjunctiva] : the conjunctiva exhibited no abnormalities [Eyelids - No Xanthelasma] : the eyelids demonstrated no xanthelasmas [Normal Oropharynx] : abnormal oropharynx [Low Lying Soft Palate] : low lying soft palate [IV] : IV [Heart Rate And Rhythm] : heart rate was normal and rhythm regular [Heart Sounds] : normal S1 and S2 [Heart Sounds Gallop] : no gallops [Murmurs] : no murmurs [Heart Sounds Pericardial Friction Rub] : no pericardial rub [FreeTextEntry1] : occ extrasystole [] : no respiratory distress [Auscultation Breath Sounds / Voice Sounds] : lungs were clear to auscultation bilaterally

## 2019-02-11 NOTE — HISTORY OF PRESENT ILLNESS
[FreeTextEntry1] : 2/12/18:  72-year-old male treated for sleep apnea. He has a history of ischemic cardiomyopathy and  both atrial and ventricular arrhythmias. He has an implanted defibrillator.\par \par Had overnight polysomnography May 2017 showing very severe obstructive sleep apnea with Apnea Hypopnea Index 70s.  Split night, started on CPAP with resolution.  On autoPAP now since about 6/1.  Sleep much better, excessive daytime somnolence mugh better.  Now sleeps 1-2 am until 10-11 am with one wake.  >7h each night, had been 2-3 h most nights. \par \par Last seen 6 months ago, compliance 100%, sleeps well w CPAP, bed 1-2 am, 2 brief wakes, up at 10-11 am. No c/o excessive daytime somnolence.  His machine shows Apnea Hypopnea Index <5 on rx.\par \par Past 6 months has had more problems w arrhythmia, VT and atrial fibrillation, has AICD\par \par 2/11/19:  Over past year no problems using CPAP; his phone leonel shows 100% use, >4h, Apnea Hypopnea Index 5-8, P 90 = 9.8. Masks, supplies OK, replaced appropriately.  No snore or excessive daytime somnolence

## 2019-02-11 NOTE — ASSESSMENT
[FreeTextEntry1] : Doing very well with CPAP, excellent compliance and efficacy.  Given long term advice about CPAP use.  Call durable medical equipment provider provider if any equipment problems.  Replace interface as per schedule, generally at last every 3 months.  Stressed importance of CPAP compliance.  Return to see me in about 12 months if doing well.\par \par Order sent to durable medical equipment provider ([facility])

## 2019-02-19 ENCOUNTER — RX RENEWAL (OUTPATIENT)
Age: 74
End: 2019-02-19

## 2019-03-04 ENCOUNTER — TRANSCRIPTION ENCOUNTER (OUTPATIENT)
Age: 74
End: 2019-03-04

## 2019-03-06 NOTE — REASON FOR VISIT
[Follow-Up - Clinic] : a clinic follow-up of [Atrial Fibrillation] : atrial fibrillation [Hypertension] : hypertension [Ventricular Tachycardia] : ventricular tachycardia [Follow-up Device Check] : follow-up device check visit

## 2019-03-06 NOTE — DISCUSSION/SUMMARY
[FreeTextEntry1] : Mr. Sauceda is a 74 y/o M with chronic systolic heart failure with EF 30-35% s/p ST Donaldo ICD, CAD s/p MATTY \par \par He continuous to maintain normal sinus rhythm maintained on Eliquis and Sotalol.No medication changes made today and he will continue Coreg and SOtalol.  Device interrogation reveals normal function. I reprogrammed device to allow intrinsic conduction.  All measured data is within normal limits (see device print out).  He is euvolemic on exam today and I have encouraged him to loose weight and stay active. He will follow up in 4 month or sooner if needed.  He knows to call with any questions or concerns in the interm.   [AICD Function Normal] : normal AICD function

## 2019-03-06 NOTE — HISTORY OF PRESENT ILLNESS
[FreeTextEntry1] : PAtient with VT s/o successful ablation and Afib s/o cardioversion on Sotalol, cardiomyopathy with ICD presents for arrhtyhmia management and device interrogation. \par  [None] : The patient complains of no symptoms [Palpitations] : no palpitations [SOB] : no dyspnea [Syncope] : no syncope [Dizziness] : no dizziness [Chest Pain] : no chest pain or discomfort [ICD Shocks] : no recent ICD shocks [Shoulder Pain] : no shoulder pain [Pain at Site] : no pain at device site [Erythema at Site] : no erythema at device site [Swelling at Site] : no swelling at device site

## 2019-03-06 NOTE — REVIEW OF SYSTEMS
[Fever] : no fever [Chills] : no chills [Feeling Fatigued] : feeling fatigued [see HPI] : see HPI [Cough] : no cough [Wheezing] : no wheezing [Coughing Up Blood] : no hemoptysis [Dizziness] : no dizziness [Confusion] : no confusion was observed [Anxiety] : anxiety [Under Stress] : not under stress [Suicidal] : not suicidal [Negative] : Heme/Lymph

## 2019-03-06 NOTE — PROCEDURE
[No] : not [NSR] : normal sinus rhythm [ICD] : Implantable cardioverter-defibrillator [DDDR] : DDDR [Threshold Testing Performed] : Threshold testing was performed [Lead Imp:  ___ohms] : lead impedance was [unfilled] ohms [Sensing Amplitude ___mv] : sensing amplitude was [unfilled] mv [___V @] : [unfilled] V [___ ms] : [unfilled] ms [de-identified] : STEPHANIE [de-identified] : FORTIFY [de-identified] : [id number] [de-identified] : 7[birth year] [de-identified] : 70/100 [de-identified] : 32-3.5years to ERNESTINE [de-identified] : shock impedance 44 [de-identified] : AP 71%\par  26%\par 2 short episodes of AT lasting seconds - no other episodes since last interrogation

## 2019-03-06 NOTE — PHYSICAL EXAM
[General Appearance - Well Developed] : well developed [Normal Appearance] : normal appearance [Well Groomed] : well groomed [General Appearance - Well Nourished] : well nourished [No Deformities] : no deformities [General Appearance - In No Acute Distress] : no acute distress [Normal Conjunctiva] : the conjunctiva exhibited no abnormalities [Eyelids - No Xanthelasma] : the eyelids demonstrated no xanthelasmas [Normal Oral Mucosa] : normal oral mucosa [No Oral Cyanosis] : no oral cyanosis [Normal Jugular Venous V Waves Present] : normal jugular venous V waves present [Heart Rate And Rhythm] : heart rate and rhythm were normal [Heart Sounds] : normal S1 and S2 [Respiration, Rhythm And Depth] : normal respiratory rhythm and effort [Exaggerated Use Of Accessory Muscles For Inspiration] : no accessory muscle use [Abnormal Walk] : normal gait [Gait - Sufficient For Exercise Testing] : the gait was sufficient for exercise testing [] : no ischemic changes [Oriented To Time, Place, And Person] : oriented to person, place, and time [Affect] : the affect was normal [Mood] : the mood was normal [Left Infraclavicular] : left infraclavicular area [Clean] : clean [Dry] : dry [Well-Healed] : well-healed [Palpable Crepitus] : no palpable crepitus [Bleeding] : no active bleeding [Foul Odor] : no foul smell [Purulent Drainage] : no purulent drainage [Serosanguineous Drainage] : no serosanquineous drainage [Serous Drainage] : no serous drainage [Erythema] : not erythematous [Warm] : not warm [Tender] : not tender [Indurated] : not indurated [Fluctuant] : not fluctuant

## 2019-03-07 ENCOUNTER — APPOINTMENT (OUTPATIENT)
Dept: HEART AND VASCULAR | Facility: CLINIC | Age: 74
End: 2019-03-07
Payer: MEDICARE

## 2019-03-07 VITALS
HEART RATE: 79 BPM | BODY MASS INDEX: 37.28 KG/M2 | HEIGHT: 68 IN | SYSTOLIC BLOOD PRESSURE: 132 MMHG | WEIGHT: 246 LBS | DIASTOLIC BLOOD PRESSURE: 83 MMHG

## 2019-03-07 PROCEDURE — 93283 PRGRMG EVAL IMPLANTABLE DFB: CPT

## 2019-03-07 PROCEDURE — 99213 OFFICE O/P EST LOW 20 MIN: CPT | Mod: 25

## 2019-03-15 NOTE — DISCUSSION/SUMMARY
[AICD Function Normal] : normal AICD function [FreeTextEntry1] : Mr. Sauceda is a 72 y/o M with chronic systolic heart failure with EF 30-35% s/p ST Donaldo ICD, CAD s/p MATTY to LAD, DANI on CPAP, Pre-DM, BPH, OA, HTN, recurrent UTI, atrial fibrillation on eliquis s/p DCCV 12/2018, recurrent ventricular tachycardia s/p ablation 10/5/17 and 11/16/17, who now presents for follow up.  He is now in normal sinus rhythm maintained on Eliquis and Sotalol.  He is doing well from a heart rhythm and cardiac perspective and I reassured him that he has had no further arrhythmia.  He became emotional as he is fearful he will have another issue.  He admits to feeling depressed and is looking into seeing a psychiatrist; which I encouraged him to pursue.     No medication changes made today and he will continue Coreg and Elisquis and Sotalol.  Device interrogation reveals normal function.  All measured data is within normal limits.  He is euvolemic on exam today and I have encouraged him to loose weight and stay active. He will follow up in 4 month or sooner if needed.  He knows to call with any questions or concerns in the interm.

## 2019-03-15 NOTE — PROCEDURE
[No] : not [NSR] : normal sinus rhythm [ICD] : Implantable cardioverter-defibrillator [DDDR] : DDDR [Threshold Testing Performed] : Threshold testing was performed [Lead Imp:  ___ohms] : lead impedance was [unfilled] ohms [Sensing Amplitude ___mv] : sensing amplitude was [unfilled] mv [___V @] : [unfilled] V [___ ms] : [unfilled] ms [de-identified] : STEPHANIE [de-identified] : FORTIFY [de-identified] : [id number] [de-identified] : 7[birth year]13[birth year] [de-identified] : 70/100 [de-identified] : 3-3.3 years to ERNESTINE [de-identified] : shock impedance 44 [de-identified] : AP 79%\par  22%\par no recordings

## 2019-03-15 NOTE — PHYSICAL EXAM
[General Appearance - Well Developed] : well developed [Normal Appearance] : normal appearance [Well Groomed] : well groomed [General Appearance - Well Nourished] : well nourished [No Deformities] : no deformities [General Appearance - In No Acute Distress] : no acute distress [Heart Rate And Rhythm] : heart rate and rhythm were normal [Heart Sounds] : normal S1 and S2 [Respiration, Rhythm And Depth] : normal respiratory rhythm and effort [Exaggerated Use Of Accessory Muscles For Inspiration] : no accessory muscle use [Left Infraclavicular] : left infraclavicular area [Clean] : clean [Dry] : dry [Well-Healed] : well-healed [] : no ischemic changes [Normal Conjunctiva] : the conjunctiva exhibited no abnormalities [Eyelids - No Xanthelasma] : the eyelids demonstrated no xanthelasmas [No Oral Cyanosis] : no oral cyanosis [Normal Jugular Venous V Waves Present] : normal jugular venous V waves present [Abnormal Walk] : normal gait [Gait - Sufficient For Exercise Testing] : the gait was sufficient for exercise testing [Oriented To Time, Place, And Person] : oriented to person, place, and time [Affect] : the affect was normal [Palpable Crepitus] : no palpable crepitus [Bleeding] : no active bleeding [Foul Odor] : no foul smell [Purulent Drainage] : no purulent drainage [Serosanguineous Drainage] : no serosanquineous drainage [Serous Drainage] : no serous drainage [Erythema] : not erythematous [Warm] : not warm [Tender] : not tender [Indurated] : not indurated [Fluctuant] : not fluctuant

## 2019-03-15 NOTE — REVIEW OF SYSTEMS
[Feeling Fatigued] : feeling fatigued [see HPI] : see HPI [Anxiety] : anxiety [Negative] : Heme/Lymph [Depression] : depression [Under Stress] : under stress [Fever] : no fever [Chills] : no chills [Cough] : no cough [Wheezing] : no wheezing [Coughing Up Blood] : no hemoptysis [Dizziness] : no dizziness [Confusion] : no confusion was observed [Suicidal] : not suicidal

## 2019-03-15 NOTE — HISTORY OF PRESENT ILLNESS
[None] : The patient complains of no symptoms [Palpitations] : no palpitations [SOB] : no dyspnea [Syncope] : no syncope [Dizziness] : no dizziness [Chest Pain] : no chest pain or discomfort [ICD Shocks] : no recent ICD shocks [Shoulder Pain] : no shoulder pain [Pain at Site] : no pain at device site [Erythema at Site] : no erythema at device site [Swelling at Site] : no swelling at device site [FreeTextEntry1] : Mr. Sauceda is a 74 y/o M with chronic systolic heart failure with EF 30-35% s/p ST Donaldo ICD, CAD s/p MATTY to LAD, DANI on CPAP, Pre-DM, BPH, OA, HTN, recurrent UTI, atrial fibrillation on Eliquis s/p DCCV 12/2017 and 12/2018, recurrent ventricular tachycardia s/p ablation 10/5/17 and 11/16/17,  who now presents for follow up.\par \par His history is as follows by his report and patient discharge information from multiple hospitalizations. He underwent surgery for bladder stones 7/2014; post op course significant for NSVT. Work-up thereafter included a stress test followed by an EP study at [facility]. He ultimately had an ICD placed 7/2014. Reports he was admitted with a CHF exacerbation 7/2015. He underwent cardiac catheterization at [facility] 6/2016 s/p MATTY to LAD. Reports he had VT and had ICD shock, followed by VT ablation 9/2016 at [facility]. Amiodarone was started post-procedure for arrhythmia suppression. He had palpitations and was admitted to [facility] 11/2016 with heart rate up to 170 bpm. He was given IV Amiodarone followed by a cardioversion. Amiodarone was stopped due to concern for long term side effects and Sotalol was started. VT treated with ATP 1/2017; Sotalol was discontinued and Amiodarone with load restarted. He was told he would likely need another ablation. Case was discussed with Dr. Diaz at [facility] and the decision was made to D/C ASA, continue Plavix and Eliquis until one year post stent. \par \par He presented to Dr. Arceo’ office 1-2-17 complaining of dizziness x a few days. He had been off amiodarone since the beginning of September. Device interrogation revealed recurrent VT terminated by ATP and he was admitted to the hospital. He was admitted to the CCU and started on amiodarone.  He underwent an EP study and VT ablation 10/5/17 and was restarted on sotalol and toprol.  He had PVCs and NSVT (different then what was ablated).  He had orthostatic hypotension and was hydrated and his medications were adjusted.  QTc remained stable on sotalol.  He was also found to have gout and started on medication.  \par \par He was doing well until early November 2017 when he had near syncope.  He was admitted to an OSH with recurrent VT and placed on amiodarone with breakthrough episodes.  All of the episodes were terminated with ATP; until 11/9 when he got an appropriate ICD shock.  He was transferred to [facility].  Amiodarone was switched to Quinidine and he had less VT; however during exercise stress test he had sustained VT terminated by ATP with resultant lightheadedness.  He underwent a successful epicardial VT ablation on 11/16.  \par \par In late November he was found to be in atrial fibrillation and was recommended to start Tikosyn; but secondary to price is now on Sotalol.  He had a cardioversion [id number]   Hi \par \par He presents for routine follow up and states he is doing well.  He notes feeling depressed and like he is going to get sick again.  He notes some fatigue and feels that his heart rate is erratic on his apple watch.   No chest pain, syncope, near syncope, SOB, orthopnea, PND or sustained palpitations.  No device related complaints.  He is compliant with Sotalol and Eliquis\par \par  \par \par \par RW - EPS/VT ablation 9/2016. Decreased voltage suggestive of scar noted in the anterior portion of the LV close to the septum and right beneath LVOT. During LV mapping in superior-lateral portion of this scar VT initiated spontaneous with mapping. Several lesions were delivered which initiated VT every time and at times accelerated to drop BP. VT did not completely eliminate but slowed down after few lesions. Post ablation EPS with 3 EST revealed no inducible VT but did not test w/ Isuprel as BP dropped significantly every time fast VT initiated.\par S/P VT ablation 5/25/17 @ [facility]; clinical VT originating from the left aortic valve at the commissure with the right cusp. No ventricular arrhythmias inducible at the end of the case. Absence of endocardial scar suggesting nonischemic origin of his cardiomyopathy. \par

## 2019-04-02 ENCOUNTER — APPOINTMENT (OUTPATIENT)
Dept: HEART AND VASCULAR | Facility: CLINIC | Age: 74
End: 2019-04-02
Payer: MEDICARE

## 2019-04-02 VITALS
HEIGHT: 69 IN | BODY MASS INDEX: 37.62 KG/M2 | SYSTOLIC BLOOD PRESSURE: 148 MMHG | OXYGEN SATURATION: 98 % | TEMPERATURE: 97.3 F | HEART RATE: 73 BPM | WEIGHT: 253.99 LBS | DIASTOLIC BLOOD PRESSURE: 80 MMHG

## 2019-04-02 PROCEDURE — G0447 BEHAVIOR COUNSEL OBESITY 15M: CPT | Mod: 59

## 2019-04-02 PROCEDURE — 93000 ELECTROCARDIOGRAM COMPLETE: CPT | Mod: 59

## 2019-04-02 PROCEDURE — 99214 OFFICE O/P EST MOD 30 MIN: CPT | Mod: 25

## 2019-04-02 PROCEDURE — 96161 CAREGIVER HEALTH RISK ASSMT: CPT

## 2019-04-02 NOTE — DISCUSSION/SUMMARY
[FreeTextEntry1] : The number of diagnostic and/or management options \par \par BP by me 110/70 pt no dizzy\par \par paroxysmal atrial fibrillation, on eliquis, recurrent ventricular tachycardia, DVT, CHF with EF 30-35%, CAD s/p MATTY to LAD, DANI on CPAP, Pre-DM, BPH, OA, HTN, recurrent UTI who presented to the office earlier this month with VT terminated by his ICD s/p VT ablation\par \par EP recs appreciated - since last visit saw EP who He is having PVCs which is causing RV pacing and I have increased his pacing rate to 70 which adequately suppressed the PVCs and allowed his intrinsic rhythm to come through. These are different PVCs then we recently ablated.\par \par no need for lasixrtc 6mo repeat echo\par recent echo 50-55% on GDMT. \par \par repeat echo this year ordered\par \par Labs, radiology: ekg\par \par Aspirin therapy: yes\par \par LDL: statin\par \par Overall Risk: High Complexity\par \par Administration of PHQ-2/9 for the benefit of the patient\par Score = 0\par Rx = Reassurance provided\par \par Prevention counseling 17min\par Patient was competent and alert at the time of the counseling provided. Will reinforce subsequent visit.\par ·	The patient’s blood pressure goal SBP<130mmHg\par ·	Advised Mediterranean diet discussion, No salt diet - including increased CAD PAD and mortality \par ·	Assessed willingness to attempt - contemplation stage\par ·	Providing methods and skills for prevention - prevention medicine referral, nutritionist\par ·	Medication management - n/a\par ·	Resources provided - prevention medicine referral, nutritionist, cbt therapy, group therapy\par ·	Setting goals - not ready to commit to a date              \par ·	Follow-up arranged - next scheduled visit\par \par BMI of >/= 30 face-to-face behavioral counseling for obesity, 15 minutes\par Assess: risk of inc CVD\par Advise: Need to lose at least 10lbs in next three months, result in lower risk of CAD, DM, and HTN\par Agree: pt willing to change, agrees to 10lbs goal in 3mo\par Assist: behavioral change re: appropriate food choices, confidence in ability to lose weight\par Arrange: follow up visit for progress, preventive cardiology / nutritionist referral discussed\par  \par \par

## 2019-04-02 NOTE — HISTORY OF PRESENT ILLNESS
[FreeTextEntry1] : 71 y/o M with h/o paroxysmal atrial fibrillation, on eliquis, recurrent ventricular tachycardia, DVT, CHF with EF 30-35%, CAD s/p MATTY to LAD, DANI on CPAP, Pre-DM, BPH, OA, HTN, recurrent UTI who presented to the office earlier this month with VT terminated by his ICD s/p VT ablation, who presents for follow up. \par \par His history is not entirely clear, but history is as follows by his report and patient discharge information from multiple hospitalizations. He underwent surgery for bladder stones 7/2014; post op course significant for NSVT. Work-up thereafter included a stress test followed by an EP study at [facility]. He ultimately had an ICD placed 7/2014. Reports he was admitted with a CHF exacerbation 7/2015. He underwent cardiac catheterization at [facility] 6/2016 s/p MATTY to LAD. Reports he had VT and had ICD shock, followed by VT ablation 9/2016 at [facility]. Amiodarone was started post-procedure for arrhythmia suppression. He had palpitations and was admitted to [facility] 11/2016 with heart rate up to 170 bpm. He was given IV Amiodarone followed by a cardioversion. Amiodarone was stopped due to concern for long term side effects and Sotalol was started. VT treated with ATP 1/2017; Sotalol was discontinued and Amiodarone with load restarted. He was told he would likely need another ablation. Case was discussed with Dr. Diaz at [place] and the decision was made to D/C ASA, continue Plavix and Eliquis until one year post stent. \par He presented to Dr. Arceo’ office earlier this month complaining of dizziness x a few days. He had been off amiodarone since the beginning of September. Device interrogation revealed recurrent VT terminated by ATP and he was admitted to the hospital. He was admitted to the CCU and started on amiodarone. He underwent an EP study and VT ablation 10/5/17 and was restarted on sotalol and toprol. He had PVCs and NSVT (different then what was ablated). He had orthostatic hypotension and was hydrated and his medications were adjusted. QTc remained stable on sotalol. He was also found to have gout and started on medication. \par Since discharge he states he has been doing well. He is compliant with his CPA. He notes occasional postural hypotension; but overall is doing well. No chest pain, syncope, palpitations, ICD shocks, orthopnea or worsening STEELE. No device related complaints. \par \par \par RWJ - EPS/VT ablation 9/2016. Decreased voltage suggestive of scar noted in the anterior portion of the LV close to the septum and right beneath LVOT. During LV mapping in superior-lateral portion of this scar VT initiated spontaneous with mapping. Several lesions were delivered which initiated VT every time and at times accelerated to drop BP. VT did not completely eliminate but slowed down after few lesions. Post ablation EPS with 3 EST revealed no inducible VT but did not test w/ Isuprel as BP dropped significantly every time fast VT initiated.\par S/P VT ablation 5/25/17 @ [facility]; clinical VT originating from the left aortic valve at the commissure with the right cusp. No ventricular arrhythmias inducible at the end of the case. Absence of endocardial scar suggesting nonischemic origin of his cardiomyopathy. \par \par now s/p DXCCV Dec 8th 2017 with Dr HARRIS. c/o fatigue and lethargy no syncope\par \par 1/24/18 c/o occasional palpitations, very anxious, sent sx trepoort to EP without any reported af or vt, today EKG SR 1st AVB, feels better, USOH\par \par since last visit saw EP who  He is having PVCs which is causing RV pacing and I have increased his pacing rate to 70 which adequately suppressed the PVCs and allowed his intrinsic rhythm to come through. These are different PVCs then we recently ablated.\par \par 10/15/18 off lasix labs from pcp wnl a1c 6 ldl 70 \par usoh\par ekg unchanged\par \par 4/2/19 USOH, 100% compliant with meds\par location: chest\par duration: na\par  modifying factors: na\par timing: na\par severity: 0/10\par EKG unchanged from prior (in chart)\par consultation notes reviewed where appropriate\par  \par

## 2019-04-02 NOTE — PHYSICAL EXAM
[General Appearance - Well Developed] : well developed [Normal Appearance] : normal appearance [Well Groomed] : well groomed [General Appearance - Well Nourished] : well nourished [No Deformities] : no deformities [General Appearance - In No Acute Distress] : no acute distress [Normal Oral Mucosa] : normal oral mucosa [No Oral Pallor] : no oral pallor [No Oral Cyanosis] : no oral cyanosis [Normal Jugular Venous A Waves Present] : normal jugular venous A waves present [Normal Jugular Venous V Waves Present] : normal jugular venous V waves present [No Jugular Venous Root A Waves] : no jugular venous root A waves [Respiration, Rhythm And Depth] : normal respiratory rhythm and effort [Exaggerated Use Of Accessory Muscles For Inspiration] : no accessory muscle use [Auscultation Breath Sounds / Voice Sounds] : lungs were clear to auscultation bilaterally [Nail Clubbing] : no clubbing of the fingernails [Cyanosis, Localized] : no localized cyanosis [Petechial Hemorrhages (___cm)] : no petechial hemorrhages [] : no ischemic changes

## 2019-04-10 ENCOUNTER — NEW PATIENT (OUTPATIENT)
Dept: URBAN - METROPOLITAN AREA CLINIC 87 | Facility: CLINIC | Age: 74
End: 2019-04-10

## 2019-04-10 DIAGNOSIS — H25.13: ICD-10-CM

## 2019-04-10 PROCEDURE — 92134 CPTRZ OPH DX IMG PST SGM RTA: CPT

## 2019-04-10 PROCEDURE — 99203 OFFICE O/P NEW LOW 30 MIN: CPT

## 2019-04-10 ASSESSMENT — VISUAL ACUITY
OS_CC: 20/40-1
OS_PH: 20/25+2
OD_CC: 20/20
OD_CC: 20/20
OS_CC: 20/20

## 2019-04-10 ASSESSMENT — TONOMETRY
OS_IOP_MMHG: 15
OD_IOP_MMHG: 14

## 2019-05-03 ENCOUNTER — APPOINTMENT (OUTPATIENT)
Dept: HEART AND VASCULAR | Facility: CLINIC | Age: 74
End: 2019-05-03
Payer: MEDICARE

## 2019-05-03 PROCEDURE — 93306 TTE W/DOPPLER COMPLETE: CPT

## 2019-05-13 ENCOUNTER — RX RENEWAL (OUTPATIENT)
Age: 74
End: 2019-05-13

## 2019-06-04 ENCOUNTER — RX RENEWAL (OUTPATIENT)
Age: 74
End: 2019-06-04

## 2019-06-24 ENCOUNTER — RX RENEWAL (OUTPATIENT)
Age: 74
End: 2019-06-24

## 2019-07-11 ENCOUNTER — APPOINTMENT (OUTPATIENT)
Dept: HEART AND VASCULAR | Facility: CLINIC | Age: 74
End: 2019-07-11
Payer: MEDICARE

## 2019-07-11 VITALS
BODY MASS INDEX: 37.77 KG/M2 | DIASTOLIC BLOOD PRESSURE: 76 MMHG | SYSTOLIC BLOOD PRESSURE: 112 MMHG | WEIGHT: 255 LBS | HEART RATE: 74 BPM | HEIGHT: 69 IN

## 2019-07-11 PROCEDURE — 93283 PRGRMG EVAL IMPLANTABLE DFB: CPT

## 2019-07-11 PROCEDURE — 99213 OFFICE O/P EST LOW 20 MIN: CPT | Mod: 25

## 2019-07-11 NOTE — END OF VISIT
[] : Physician Assistant [Time Spent: ___ minutes] : I have spent [unfilled] minutes of face to face time with the patient [>50% of Time Spent on Counseling and Coordination of Care for  ___] : Greater than 50% of the encounter time was spent on counseling and coordination of care for [unfilled]

## 2019-07-18 NOTE — PROCEDURE
[No] : not [NSR] : normal sinus rhythm [ICD] : Implantable cardioverter-defibrillator [DDDR] : DDDR [Threshold Testing Performed] : Threshold testing was performed [Lead Imp:  ___ohms] : lead impedance was [unfilled] ohms [Sensing Amplitude ___mv] : sensing amplitude was [unfilled] mv [___V @] : [unfilled] V [___ ms] : [unfilled] ms [de-identified] : STEPHANIE [de-identified] : FORTIFY [de-identified] : [id number] [de-identified] : 7/13/14 [de-identified] : 70/100 [de-identified] : 2.7-3 years to ERNESTINE [de-identified] : shock impedance 44 [de-identified] : AP 84%\par  15%\par no recordings

## 2019-07-18 NOTE — PHYSICAL EXAM
[General Appearance - Well Developed] : well developed [Normal Appearance] : normal appearance [Well Groomed] : well groomed [General Appearance - Well Nourished] : well nourished [No Deformities] : no deformities [General Appearance - In No Acute Distress] : no acute distress [Heart Rate And Rhythm] : heart rate and rhythm were normal [Heart Sounds] : normal S1 and S2 [Exaggerated Use Of Accessory Muscles For Inspiration] : no accessory muscle use [Respiration, Rhythm And Depth] : normal respiratory rhythm and effort [Left Infraclavicular] : left infraclavicular area [Dry] : dry [Clean] : clean [Well-Healed] : well-healed [] : no ischemic changes [Normal Conjunctiva] : the conjunctiva exhibited no abnormalities [Normal Jugular Venous V Waves Present] : normal jugular venous V waves present [Eyelids - No Xanthelasma] : the eyelids demonstrated no xanthelasmas [Abnormal Walk] : normal gait [Gait - Sufficient For Exercise Testing] : the gait was sufficient for exercise testing [Oriented To Time, Place, And Person] : oriented to person, place, and time [Affect] : the affect was normal [Mood] : the mood was normal [No Anxiety] : not feeling anxious [Palpable Crepitus] : no palpable crepitus [Bleeding] : no active bleeding [Foul Odor] : no foul smell [Purulent Drainage] : no purulent drainage [Serosanguineous Drainage] : no serosanquineous drainage [Serous Drainage] : no serous drainage [Erythema] : not erythematous [Warm] : not warm [Tender] : not tender [Indurated] : not indurated [Fluctuant] : not fluctuant

## 2019-07-18 NOTE — HISTORY OF PRESENT ILLNESS
[None] : The patient complains of no symptoms [Palpitations] : no palpitations [SOB] : no dyspnea [Syncope] : no syncope [Dizziness] : no dizziness [Chest Pain] : no chest pain or discomfort [ICD Shocks] : no recent ICD shocks [Shoulder Pain] : no shoulder pain [Pain at Site] : no pain at device site [Erythema at Site] : no erythema at device site [Swelling at Site] : no swelling at device site [FreeTextEntry1] : Mr. Sauceda is a 72 y/o M with chronic systolic heart failure with EF 30-35% s/p ST Donaldo ICD, CAD s/p MATTY to LAD, DANI on CPAP, Pre-DM, BPH, OA, HTN, recurrent UTI, atrial fibrillation on Eliquis s/p DCCV 12/2017 and 12/2018, recurrent ventricular tachycardia s/p ablation 10/5/17 and 11/16/17,  who now presents for follow up.\par \par His history is as follows by his report and patient discharge information from multiple hospitalizations. He underwent surgery for bladder stones 7/2014; post op course significant for NSVT. Work-up thereafter included a stress test followed by an EP study at [facility]. He ultimately had an ICD placed 7/2014. Reports he was admitted with a CHF exacerbation 7/2015. He underwent cardiac catheterization at [facility] 6/2016 s/p MATTY to LAD. Reports he had VT and had ICD shock, followed by VT ablation 9/2016 at [facility]. Amiodarone was started post-procedure for arrhythmia suppression. He had palpitations and was admitted to [facility] 11/2016 with heart rate up to 170 bpm. He was given IV Amiodarone followed by a cardioversion. Amiodarone was stopped due to concern for long term side effects and Sotalol was started. VT treated with ATP 1/2017; Sotalol was discontinued and Amiodarone with load restarted. He was told he would likely need another ablation. Case was discussed with Dr. Diaz at [place] and the decision was made to D/C ASA, continue Plavix and Eliquis until one year post stent. \par \par He presented to Dr. Arceo’ office 1-2-17 complaining of dizziness x a few days. He had been off amiodarone since the beginning of September. Device interrogation revealed recurrent VT terminated by ATP and he was admitted to the hospital. He was admitted to the CCU and started on amiodarone.  He underwent an EP study and VT ablation 10/5/17 and was restarted on sotalol and toprol.  He had PVCs and NSVT (different then what was ablated).  He had orthostatic hypotension and was hydrated and his medications were adjusted.  QTc remained stable on sotalol.  \par \par He was doing well until early November 2017 when he had near syncope.  He was admitted to an OSH with recurrent VT and placed on amiodarone with breakthrough episodes.  All of the episodes were terminated with ATP; until 11/9 when he got an appropriate ICD shock.  He was transferred to [facility].  Amiodarone was switched to Quinidine and he had less VT; however during exercise stress test he had sustained VT terminated by ATP with resultant lightheadedness.  He underwent a successful epicardial VT ablation on 11/16.  \par \par In late November he was found to be in atrial fibrillation and was recommended to start Tikosyn; but secondary to price is now on Sotalol.  He had a cardioversion 12/2018   \par \par He presents for routine follow up and states he is doing well.    No chest pain, syncope, near syncope, palpitations, SOB, orthopnea, PND or sustained palpitations.  No device related complaints.  He is compliant with Sotalol and Eliquis\par \par EKG in cardiologist office in april with PVC from papillary muscle- different then ablation location.  \par  \par \par \par RWJ - EPS/VT ablation 9/2016. Decreased voltage suggestive of scar noted in the anterior portion of the LV close to the septum and right beneath LVOT. During LV mapping in superior-lateral portion of this scar VT initiated spontaneous with mapping. Several lesions were delivered which initiated VT every time and at times accelerated to drop BP. VT did not completely eliminate but slowed down after few lesions. Post ablation EPS with 3 EST revealed no inducible VT but did not test w/ Isuprel as BP dropped significantly every time fast VT initiated.\par S/P VT ablation 5/25/17 @ [facility]; clinical VT originating from the left aortic valve at the commissure with the right cusp. No ventricular arrhythmias inducible at the end of the case. Absence of endocardial scar suggesting nonischemic origin of his cardiomyopathy. \par \par

## 2019-07-18 NOTE — REVIEW OF SYSTEMS
[see HPI] : see HPI [Anxiety] : anxiety [Negative] : Heme/Lymph [Fever] : no fever [Chills] : no chills [Cough] : no cough [Wheezing] : no wheezing [Coughing Up Blood] : no hemoptysis [Dizziness] : no dizziness [Confusion] : no confusion was observed

## 2019-08-26 NOTE — DIETITIAN INITIAL EVALUATION ADULT. - NUTRITIONGOAL OUTCOME1
[FreeTextEntry1] : This is an 86 year old man w a recent woresning anemia to <10, which was his prior baseline. It is possible his prior baseline was secondary to his renal insufficiency - (cr had been stable around 2.4 for a few years), but unless there has been a change in Cr recently which i did not see on labs in april, it is unlikely that this is the cause.  ? If inflammatory 2/2 CIDP flair?\par \par 1. Still with MGUS, send skeletal survey\par 2. EARL, intolerant of oral iron, s/p IV iron- injectafer, Hb and symptoms significantly improved\par -celiac panel looks possibly c/w celiac disease- discussed patient should go to see GI. DO not change diet until talks to GI - needs to have exposure to gluten for 4-6 weeks prior to endoscopy to diagnose- if GI feels endoscopy for diagnosis is necessary. check antigliadin antibody today as well, not checked last visit\par 3, return visit in 4 months to monitor Hb, likely hovering around 10 given renal insufficiency, however will follow given presence of MGUS- if drops further may need bmbx\par \par  Patient will verbalize understanding of cardiac and weight loss diet regimen

## 2019-09-04 NOTE — ED ADULT TRIAGE NOTE - NS ED TRIAGE CLINICAL UPGRADE
Applied
Deteriorating patient status - Patient was clinically upgraded due to deteriorating patient status.

## 2019-09-11 ENCOUNTER — RX RENEWAL (OUTPATIENT)
Age: 74
End: 2019-09-11

## 2019-10-02 ENCOUNTER — APPOINTMENT (OUTPATIENT)
Dept: HEART AND VASCULAR | Facility: CLINIC | Age: 74
End: 2019-10-02
Payer: MEDICARE

## 2019-10-02 VITALS
OXYGEN SATURATION: 96 % | BODY MASS INDEX: 37.18 KG/M2 | HEART RATE: 76 BPM | DIASTOLIC BLOOD PRESSURE: 76 MMHG | SYSTOLIC BLOOD PRESSURE: 106 MMHG | TEMPERATURE: 97.7 F | WEIGHT: 251 LBS | HEIGHT: 68.9 IN

## 2019-10-02 PROCEDURE — 93000 ELECTROCARDIOGRAM COMPLETE: CPT

## 2019-10-02 PROCEDURE — 99215 OFFICE O/P EST HI 40 MIN: CPT

## 2019-10-02 NOTE — HISTORY OF PRESENT ILLNESS
[FreeTextEntry1] : 71 y/o M with h/o paroxysmal atrial fibrillation, on eliquis, recurrent ventricular tachycardia, DVT, CHF with EF 30-35%, CAD s/p MATTY to LAD, DANI on CPAP, Pre-DM, BPH, OA, HTN, recurrent UTI who presented to the office earlier this month with VT terminated by his ICD s/p VT ablation, who presents for follow up. \par \par His history is not entirely clear, but history is as follows by his report and patient discharge information from multiple hospitalizations. He underwent surgery for bladder stones 7/2014; post op course significant for NSVT. Work-up thereafter included a stress test followed by an EP study at [facility]. He ultimately had an ICD placed 7/2014. Reports he was admitted with a CHF exacerbation 7/2015. He underwent cardiac catheterization at [facility] 6/2016 s/p MATTY to LAD. Reports he had VT and had ICD shock, followed by VT ablation 9/2016 at [facility]. Amiodarone was started post-procedure for arrhythmia suppression. He had palpitations and was admitted to [facility] 11/2016 with heart rate up to 170 bpm. He was given IV Amiodarone followed by a cardioversion. Amiodarone was stopped due to concern for long term side effects and Sotalol was started. VT treated with ATP 1/2017; Sotalol was discontinued and Amiodarone with load restarted. He was told he would likely need another ablation. Case was discussed with Dr. Diaz at [place] and the decision was made to D/C ASA, continue Plavix and Eliquis until one year post stent. \par He presented to Dr. Arceo’ office earlier this month complaining of dizziness x a few days. He had been off amiodarone since the beginning of September. Device interrogation revealed recurrent VT terminated by ATP and he was admitted to the hospital. He was admitted to the CCU and started on amiodarone. He underwent an EP study and VT ablation 10/5/17 and was restarted on sotalol and toprol. He had PVCs and NSVT (different then what was ablated). He had orthostatic hypotension and was hydrated and his medications were adjusted. QTc remained stable on sotalol. He was also found to have gout and started on medication. \par Since discharge he states he has been doing well. He is compliant with his CPA. He notes occasional postural hypotension; but overall is doing well. No chest pain, syncope, palpitations, ICD shocks, orthopnea or worsening STEELE. No device related complaints. \par \par \par RWJ - EPS/VT ablation 9/2016. Decreased voltage suggestive of scar noted in the anterior portion of the LV close to the septum and right beneath LVOT. During LV mapping in superior-lateral portion of this scar VT initiated spontaneous with mapping. Several lesions were delivered which initiated VT every time and at times accelerated to drop BP. VT did not completely eliminate but slowed down after few lesions. Post ablation EPS with 3 EST revealed no inducible VT but did not test w/ Isuprel as BP dropped significantly every time fast VT initiated.\par S/P VT ablation 5/25/17 @ [facility]; clinical VT originating from the left aortic valve at the commissure with the right cusp. No ventricular arrhythmias inducible at the end of the case. Absence of endocardial scar suggesting nonischemic origin of his cardiomyopathy. \par \par now s/p DXCCV Dec 8th 2017 with Dr HARRIS. c/o fatigue and lethargy no syncope\par \par 1/24/18 c/o occasional palpitations, very anxious, sent sx trepoort to EP without any reported af or vt, today EKG SR 1st AVB, feels better, USOH\par \par since last visit saw EP who  He is having PVCs which is causing RV pacing and I have increased his pacing rate to 70 which adequately suppressed the PVCs and allowed his intrinsic rhythm to come through. These are different PVCs then we recently ablated.\par \par 10/15/18 off lasix labs from pcp wnl a1c 6 ldl 70 \par usoh\par ekg unchanged\par \par 4/2/19 USOH, 100% compliant with meds\par location: chest\par duration: na\par  modifying factors: na\par timing: na\par severity: 0/10\par EKG unchanged from prior (in chart)\par consultation notes reviewed where appropriate\par \par 10/2/19 Interval Symptoms: USOH, 100% compliant with meds\par location: chest\par duration: na\par  modifying factors: na\par timing: na\par severity: 0/10\par EKG unchanged from prior (in chart)\par consultation notes reviewed where appropriate\par NYHA Functional Class: II\par Home monitoring: Blood Pressure\par Interval Triggers: None. \par Medications: the patient is adherent with his medication regimen. denies medication side effects. \par Disease Management: the patient is doing well with goals. \par \par  \par  \par

## 2019-10-02 NOTE — PHYSICAL EXAM
[General Appearance - Well Developed] : well developed [Normal Appearance] : normal appearance [Well Groomed] : well groomed [General Appearance - Well Nourished] : well nourished [No Deformities] : no deformities [General Appearance - In No Acute Distress] : no acute distress [Normal Oral Mucosa] : normal oral mucosa [No Oral Pallor] : no oral pallor [No Oral Cyanosis] : no oral cyanosis [Normal Jugular Venous A Waves Present] : normal jugular venous A waves present [Normal Jugular Venous V Waves Present] : normal jugular venous V waves present [No Jugular Venous Root A Waves] : no jugular venous root A waves [Respiration, Rhythm And Depth] : normal respiratory rhythm and effort [Exaggerated Use Of Accessory Muscles For Inspiration] : no accessory muscle use [Auscultation Breath Sounds / Voice Sounds] : lungs were clear to auscultation bilaterally [Heart Rate And Rhythm] : heart rate and rhythm were normal [Heart Sounds] : normal S1 and S2 [Murmurs] : no murmurs present [Abdomen Soft] : soft [Abdomen Tenderness] : non-tender [Abdomen Mass (___ Cm)] : no abdominal mass palpated [Abnormal Walk] : normal gait [Gait - Sufficient For Exercise Testing] : the gait was sufficient for exercise testing [Nail Clubbing] : no clubbing of the fingernails [Cyanosis, Localized] : no localized cyanosis [Petechial Hemorrhages (___cm)] : no petechial hemorrhages [Skin Color & Pigmentation] : normal skin color and pigmentation [] : no rash [No Venous Stasis] : no venous stasis [Skin Lesions] : no skin lesions [No Skin Ulcers] : no skin ulcer [No Xanthoma] : no  xanthoma was observed [Oriented To Time, Place, And Person] : oriented to person, place, and time [Affect] : the affect was normal [Mood] : the mood was normal [No Anxiety] : not feeling anxious

## 2019-10-02 NOTE — DISCUSSION/SUMMARY
[FreeTextEntry1] : The number of diagnostic and/or management options \par \par BP by me 110/70 pt no dizzy\par \par paroxysmal atrial fibrillation, on eliquis, recurrent ventricular tachycardia, DVT, CHF with EF 30-35%, CAD s/p MATTY to LAD, DANI on CPAP, Pre-DM, BPH, OA, HTN,  ICD s/p VT ablation\par \par EP recs appreciated - since last visit saw EP no changesi n device settings, no vt, no chenge in meds.\par \par no need for lasix\par \par repeatc echo mildly reduced may 2019 40-45 will repeat in 6months\par recent echo 45% on GDMT. \par \par \par Labs, radiology: ekg\par \par Aspirin therapy: yes\par \par LDL: statin\par \par Overall Risk: High Complexity\par \par

## 2019-12-04 ENCOUNTER — APPOINTMENT (OUTPATIENT)
Dept: HEART AND VASCULAR | Facility: CLINIC | Age: 74
End: 2019-12-04
Payer: MEDICARE

## 2019-12-04 ENCOUNTER — NON-APPOINTMENT (OUTPATIENT)
Age: 74
End: 2019-12-04

## 2019-12-04 VITALS
BODY MASS INDEX: 37.03 KG/M2 | HEART RATE: 38 BPM | HEIGHT: 68.9 IN | DIASTOLIC BLOOD PRESSURE: 60 MMHG | WEIGHT: 250 LBS | SYSTOLIC BLOOD PRESSURE: 134 MMHG

## 2019-12-04 PROCEDURE — 99215 OFFICE O/P EST HI 40 MIN: CPT | Mod: 25

## 2019-12-04 PROCEDURE — 93283 PRGRMG EVAL IMPLANTABLE DFB: CPT

## 2019-12-04 RX ORDER — SOTALOL HYDROCHLORIDE 80 MG/1
80 TABLET ORAL
Qty: 180 | Refills: 3 | Status: DISCONTINUED | COMMUNITY
Start: 2018-01-03 | End: 2019-12-04

## 2019-12-04 NOTE — END OF VISIT
[>50% of Time Spent on Counseling and Coordination of Care for  ___] : Greater than 50% of the encounter time was spent on counseling and coordination of care for [unfilled] [] : Physician Assistant [Time Spent: ___ minutes] : I have spent [unfilled] minutes of face to face time with the patient

## 2019-12-04 NOTE — DISCUSSION/SUMMARY
[AICD Function Normal] : normal AICD function [FreeTextEntry1] : Mr. Sauceda is a 73 y/o M with chronic systolic heart failure with EF 30-35% s/p ST Donaldo ICD, CAD s/p MATTY to LAD, DANI on CPAP, Pre-DM, BPH, OA, HTN, recurrent UTI, atrial fibrillation on Eliquis s/p DCCV 12/2017 and 12/2018, recurrent ventricular tachycardia s/p ablation 10/5/17 and 11/16/17,  who now presents for follow up secondary to not feeling well.  He is having PVCs from the papillary muscle - which is different from where his prior ablation is.  He is symptomatic from these.  We discussed options of a re-do ablation or switching his antiarrhythmic.  We will start with changing his antiarrhythmic medication and in the future if he has persistent PVCs he will consider an ablation.  We discussed switching him to amiodarone, which is not a great option given the potential side effects long term or adding mexiletine to his Sotalol - but we want to avoid using two antiarrhythmic medications.  I have recommended he stops his Sotalol and in two days starts Flecainide.  HE does have a history of CAD, but has an ICD for protection.  He will continue Coreg and Eliquis.   Device interrogation reveals normal function.  All measured data is within normal limits.   He will do a remote transmission next week and call our office.  He will follow up in 1 month or sooner if needed.  He knows to call with any questions or concerns in the interm.

## 2019-12-04 NOTE — PROCEDURE
[No] : not [ICD] : Implantable cardioverter-defibrillator [Threshold Testing Performed] : Threshold testing was performed [DDDR] : DDDR [Lead Imp:  ___ohms] : lead impedance was [unfilled] ohms [Sensing Amplitude ___mv] : sensing amplitude was [unfilled] mv [___ ms] : [unfilled] ms [___V @] : [unfilled] V [Sinus Bradycardia] : sinus bradycardia [de-identified] : STEPHANIE [de-identified] : FORTIFY [de-identified] : [id number] [de-identified] : 7/13/14 [de-identified] : 70/100 [de-identified] : 2.5 years to ERNESTINE [de-identified] : shock impedance 43 [de-identified] : AP 84%\par  15%\par no recordings

## 2019-12-04 NOTE — PHYSICAL EXAM
[General Appearance - Well Developed] : well developed [Normal Appearance] : normal appearance [Well Groomed] : well groomed [General Appearance - In No Acute Distress] : no acute distress [General Appearance - Well Nourished] : well nourished [No Deformities] : no deformities [Heart Rate And Rhythm] : heart rate and rhythm were normal [Heart Sounds] : normal S1 and S2 [Respiration, Rhythm And Depth] : normal respiratory rhythm and effort [Exaggerated Use Of Accessory Muscles For Inspiration] : no accessory muscle use [Left Infraclavicular] : left infraclavicular area [Clean] : clean [Well-Healed] : well-healed [Dry] : dry [Normal Conjunctiva] : the conjunctiva exhibited no abnormalities [Eyelids - No Xanthelasma] : the eyelids demonstrated no xanthelasmas [] : no ischemic changes [Normal Jugular Venous V Waves Present] : normal jugular venous V waves present [Abnormal Walk] : normal gait [Gait - Sufficient For Exercise Testing] : the gait was sufficient for exercise testing [Oriented To Time, Place, And Person] : oriented to person, place, and time [Affect] : the affect was normal [No Anxiety] : not feeling anxious [Mood] : the mood was normal [5th Left ICS - MCL] : palpated at the 5th LICS in the midclavicular line [Normal] : normal [No Precordial Heave] : no precordial heave was noted [Normal Rate] : normal [Regularly Irregular] : regularly irregular [Normal S1] : normal S1 [Apical Thrill] : no thrill palpable at the apex [Foul Odor] : no foul smell [Palpable Crepitus] : no palpable crepitus [Bleeding] : no active bleeding [Purulent Drainage] : no purulent drainage [Serosanguineous Drainage] : no serosanquineous drainage [Erythema] : not erythematous [Serous Drainage] : no serous drainage [Warm] : not warm [Tender] : not tender [Indurated] : not indurated [Fluctuant] : not fluctuant

## 2019-12-04 NOTE — REVIEW OF SYSTEMS
[see HPI] : see HPI [Anxiety] : anxiety [Negative] : Heme/Lymph [Feeling Fatigued] : feeling fatigued [Eyeglasses] : currently wearing eyeglasses [Fever] : no fever [Chills] : no chills [Cough] : no cough [Wheezing] : no wheezing [Coughing Up Blood] : no hemoptysis [Dizziness] : no dizziness [Confusion] : no confusion was observed

## 2019-12-04 NOTE — HISTORY OF PRESENT ILLNESS
[None] : The patient complains of no symptoms [SOB] : no dyspnea [Palpitations] : no palpitations [Syncope] : no syncope [Dizziness] : no dizziness [Chest Pain] : no chest pain or discomfort [ICD Shocks] : no recent ICD shocks [Shoulder Pain] : no shoulder pain [Pain at Site] : no pain at device site [Erythema at Site] : no erythema at device site [FreeTextEntry1] : Mr. Sauecda is a 75 y/o M with chronic systolic heart failure with EF 30-35% s/p ST Donaldo ICD, CAD s/p MATTY to LAD, DANI on CPAP, Pre-DM, BPH, OA, HTN, recurrent UTI, atrial fibrillation on Eliquis s/p DCCV 12/2017 and 12/2018, recurrent ventricular tachycardia s/p ablation 10/5/17 and 11/16/17,  who now presents for follow up secondary to not feeling well\par \par His history is as follows by his report and patient discharge information from multiple hospitalizations. He underwent surgery for bladder stones 7/2014; post op course significant for NSVT. Work-up thereafter included a stress test followed by an EP study at [facility]. He ultimately had an ICD placed 7/2014. Reports he was admitted with a CHF exacerbation 7/2015. He underwent cardiac catheterization at [facility] 6/2016 s/p MATTY to LAD. Reports he had VT and had ICD shock, followed by VT ablation 9/2016 at [facility]. Amiodarone was started post-procedure for arrhythmia suppression. He had palpitations and was admitted to [facility] 11/2016 with heart rate up to 170 bpm. He was given IV Amiodarone followed by a cardioversion. Amiodarone was stopped due to concern for long term side effects and Sotalol was started. VT treated with ATP 1/2017; Sotalol was discontinued and Amiodarone with load restarted. He was told he would likely need another ablation. Case was discussed with Dr. Diaz at [place] and the decision was made to D/C ASA, continue Plavix and Eliquis until one year post stent. \par \par He presented to Dr. Arceo’ office 1-2-17 complaining of dizziness x a few days. He had been off amiodarone since the beginning of September. Device interrogation revealed recurrent VT terminated by ATP and he was admitted to the hospital. He was admitted to the CCU and started on amiodarone.  He underwent an EP study and VT ablation 10/5/17 and was restarted on sotalol and toprol.  He had PVCs and NSVT (different then what was ablated).  He had orthostatic hypotension and was hydrated and his medications were adjusted.  QTc remained stable on sotalol.  \par \par He was doing well until early November 2017 when he had near syncope.  He was admitted to an OSH with recurrent VT and placed on amiodarone with breakthrough episodes.  All of the episodes were terminated with ATP; until 11/9 when he got an appropriate ICD shock.  He was transferred to [facility].  Amiodarone was switched to Quinidine and he had less VT; however during exercise stress test he had sustained VT terminated by ATP with resultant lightheadedness.  He underwent a successful epicardial VT ablation on 11/16.  \par \par In late November he was found to be in atrial fibrillation and was recommended to start Tikosyn; but secondary to price is now on Sotalol.  He had a cardioversion 12/2018   \par \par He presents for follow up and states for the past two weeks he has had palpitations, "missed heart beats " and fatigue.    No chest pain, syncope, near syncope, SOB, orthopnea, PND .  No device related complaints.  He is compliant with Sotalol and Eliquis\par \par EKG in cardiologist office in april with PVC from papillary muscle- different then ablation location.  \par  \par \par \par RWJ - EPS/VT ablation 9/2016. Decreased voltage suggestive of scar noted in the anterior portion of the LV close to the septum and right beneath LVOT. During LV mapping in superior-lateral portion of this scar VT initiated spontaneous with mapping. Several lesions were delivered which initiated VT every time and at times accelerated to drop BP. VT did not completely eliminate but slowed down after few lesions. Post ablation EPS with 3 EST revealed no inducible VT but did not test w/ Isuprel as BP dropped significantly every time fast VT initiated.\par S/P VT ablation 5/25/17 @ [facility]; clinical VT originating from the left aortic valve at the commissure with the right cusp. No ventricular arrhythmias inducible at the end of the case. Absence of endocardial scar suggesting nonischemic origin of his cardiomyopathy. \par \par \par   [Swelling at Site] : no swelling at device site

## 2020-01-16 ENCOUNTER — APPOINTMENT (OUTPATIENT)
Dept: HEART AND VASCULAR | Facility: CLINIC | Age: 75
End: 2020-01-16
Payer: MEDICARE

## 2020-01-16 VITALS
SYSTOLIC BLOOD PRESSURE: 117 MMHG | HEART RATE: 80 BPM | WEIGHT: 250 LBS | HEIGHT: 68 IN | DIASTOLIC BLOOD PRESSURE: 77 MMHG | BODY MASS INDEX: 37.89 KG/M2

## 2020-01-16 PROCEDURE — 99215 OFFICE O/P EST HI 40 MIN: CPT | Mod: 25

## 2020-01-16 PROCEDURE — 93283 PRGRMG EVAL IMPLANTABLE DFB: CPT

## 2020-01-16 NOTE — DISCUSSION/SUMMARY
[AICD Function Normal] : normal AICD function [FreeTextEntry1] : Mr. Sauceda is a 75 y/o M with chronic systolic heart failure with EF 30-35% s/p ST Donaldo ICD, CAD s/p MATTY to LAD, DANI on CPAP, Pre-DM, BPH, OA, HTN, recurrent UTI, atrial fibrillation on Eliquis s/p DCCV 12/2017 and 12/2018, recurrent ventricular tachycardia s/p ablation 10/5/17 and 11/16/17,  who now presents for follow up. Device interrogation reveals normal function.  All measured data is within normal limits.   He has been in atrial fibrillation for 2 weeks with controlled rates.  No further PVCs and he overall feels better.  He had been maintaining NSR and I have asked him to increase his Flecainide to 150mg BID and proceed with a cardioversion.  No need for a ARELY as he is compliant with oral anticoagulation.  We discussed the procedure in detail including risks, benefits and alternatives. He knows to call with any questions or concerns in the interm.

## 2020-01-16 NOTE — PROCEDURE
[No] : not [Sinus Bradycardia] : sinus bradycardia [ICD] : Implantable cardioverter-defibrillator [DDDR] : DDDR [Threshold Testing Performed] : Threshold testing was performed [Lead Imp:  ___ohms] : lead impedance was [unfilled] ohms [___V @] : [unfilled] V [Sensing Amplitude ___mv] : sensing amplitude was [unfilled] mv [___ ms] : [unfilled] ms [de-identified] : STEPHANIE [de-identified] : 7/13/14 [de-identified] : FORTIFY [de-identified] : [id number] [de-identified] : 2.5 years to ERNESTINE [de-identified] : 70/100 [de-identified] : %\par  18%\par no more PVC, been in afib with controlled rates for 2 weeks [de-identified] : shock impedance 44

## 2020-01-16 NOTE — HISTORY OF PRESENT ILLNESS
[None] : The patient complains of no symptoms [Palpitations] : no palpitations [SOB] : no dyspnea [Syncope] : no syncope [Dizziness] : no dizziness [ICD Shocks] : no recent ICD shocks [Chest Pain] : no chest pain or discomfort [Shoulder Pain] : no shoulder pain [Pain at Site] : no pain at device site [Swelling at Site] : no swelling at device site [Erythema at Site] : no erythema at device site [FreeTextEntry1] : Mr. Sauceda is a 75 y/o M with chronic systolic heart failure with EF 30-35% s/p ST Donaldo ICD, CAD s/p MATTY to LAD, DANI on CPAP, Pre-DM, BPH, OA, HTN, recurrent UTI, atrial fibrillation on Eliquis s/p DCCV 12/2017 and 12/2018, recurrent ventricular tachycardia s/p ablation 10/5/17 and 11/16/17,  who now presents for follow up secondary to not feeling well\par \par His history is as follows by his report and patient discharge information from multiple hospitalizations. He underwent surgery for bladder stones 7/2014; post op course significant for NSVT. Work-up thereafter included a stress test followed by an EP study at [facility]. He ultimately had an ICD placed 7/2014. Reports he was admitted with a CHF exacerbation 7/2015. He underwent cardiac catheterization at [facility] 6/2016 s/p MATTY to LAD. Reports he had VT and had ICD shock, followed by VT ablation 9/2016 at [facility]. Amiodarone was started post-procedure for arrhythmia suppression. He had palpitations and was admitted to [facility] 11/2016 with heart rate up to 170 bpm. He was given IV Amiodarone followed by a cardioversion. Amiodarone was stopped due to concern for long term side effects and Sotalol was started. VT treated with ATP 1/2017; Sotalol was discontinued and Amiodarone with load restarted. He was told he would likely need another ablation. Case was discussed with Dr. Diaz at [place] and the decision was made to D/C ASA, continue Plavix and Eliquis until one year post stent. \par \par He presented to Dr. Arceo’ office 1-2-17 complaining of dizziness x a few days. He had been off amiodarone since the beginning of September. Device interrogation revealed recurrent VT terminated by ATP and he was admitted to the hospital. He was admitted to the CCU and started on amiodarone.  He underwent an EP study and VT ablation 10/5/17 and was restarted on sotalol and toprol.  He had PVCs and NSVT (different then what was ablated).  He had orthostatic hypotension and was hydrated and his medications were adjusted.  QTc remained stable on sotalol.  \par \par He was doing well until early November 2017 when he had near syncope.  He was admitted to an OSH with recurrent VT and placed on amiodarone with breakthrough episodes.  All of the episodes were terminated with ATP; until 11/9 when he got an appropriate ICD shock.  He was transferred to [facility].  Amiodarone was switched to Quinidine and he had less VT; however during exercise stress test he had sustained VT terminated by ATP with resultant lightheadedness.  He underwent a successful epicardial VT ablation on 11/16.  \par \par In late November he was found to be in atrial fibrillation and was recommended to start Tikosyn; but secondary to price is now on Sotalol.  He had a cardioversion 12/2018   \par \par Since his last visit he was having "skipped beats" and remote transmission showed ventrcular bigeminy.  We stopped his Sotalol with the understanding that atrial fibrillation may come back and he was started on Flecainide.  Since then he notes an improvement in the way he feels.  NO further skipped beats.  Stable fatigue and STEELE.  No chest pain, syncope, near syncope, SOB, orthopnea, PND .  No device related complaints.  He is compliant with Eliquis\par  \par  \par \par \par RW - EPS/VT ablation 9/2016. Decreased voltage suggestive of scar noted in the anterior portion of the LV close to the septum and right beneath LVOT. During LV mapping in superior-lateral portion of this scar VT initiated spontaneous with mapping. Several lesions were delivered which initiated VT every time and at times accelerated to drop BP. VT did not completely eliminate but slowed down after few lesions. Post ablation EPS with 3 EST revealed no inducible VT but did not test w/ Isuprel as BP dropped significantly every time fast VT initiated.\par S/P VT ablation 5/25/17 @ [facility]; clinical VT originating from the left aortic valve at the commissure with the right cusp. No ventricular arrhythmias inducible at the end of the case. Absence of endocardial scar suggesting nonischemic origin of his cardiomyopathy. \par \par

## 2020-01-16 NOTE — REVIEW OF SYSTEMS
[Feeling Fatigued] : feeling fatigued [Eyeglasses] : currently wearing eyeglasses [see HPI] : see HPI [Anxiety] : anxiety [Negative] : Heme/Lymph [Chills] : no chills [Fever] : no fever [Wheezing] : no wheezing [Coughing Up Blood] : no hemoptysis [Cough] : no cough [Confusion] : no confusion was observed [Dizziness] : no dizziness

## 2020-01-16 NOTE — PHYSICAL EXAM
[General Appearance - Well Developed] : well developed [Normal Appearance] : normal appearance [Well Groomed] : well groomed [General Appearance - Well Nourished] : well nourished [No Deformities] : no deformities [General Appearance - In No Acute Distress] : no acute distress [] : no respiratory distress [Respiration, Rhythm And Depth] : normal respiratory rhythm and effort [Exaggerated Use Of Accessory Muscles For Inspiration] : no accessory muscle use [Clean] : clean [Dry] : dry [Left Infraclavicular] : left infraclavicular area [Well-Healed] : well-healed [5th Left ICS - MCL] : palpated at the 5th LICS in the midclavicular line [Normal] : normal [No Precordial Heave] : no precordial heave was noted [Normal Rate] : normal [Normal S1] : normal S1 [Normal Conjunctiva] : the conjunctiva exhibited no abnormalities [Eyelids - No Xanthelasma] : the eyelids demonstrated no xanthelasmas [Normal Jugular Venous V Waves Present] : normal jugular venous V waves present [Abnormal Walk] : normal gait [Gait - Sufficient For Exercise Testing] : the gait was sufficient for exercise testing [Oriented To Time, Place, And Person] : oriented to person, place, and time [Affect] : the affect was normal [Mood] : the mood was normal [Palpable Crepitus] : no palpable crepitus [Foul Odor] : no foul smell [Bleeding] : no active bleeding [Serosanguineous Drainage] : no serosanquineous drainage [Purulent Drainage] : no purulent drainage [Erythema] : not erythematous [Serous Drainage] : no serous drainage [Warm] : not warm [Tender] : not tender [Indurated] : not indurated [Fluctuant] : not fluctuant [Apical Thrill] : no thrill palpable at the apex [Rhythm Regular] : regular [Normal S2] : normal S2 [Click] : no click [Distant] : the heart sounds were ~L not distant [Pericardial Rub] : no pericardial rub

## 2020-01-23 ENCOUNTER — OUTPATIENT (OUTPATIENT)
Dept: OUTPATIENT SERVICES | Facility: HOSPITAL | Age: 75
LOS: 1 days | Discharge: HOME | End: 2020-01-23
Payer: MEDICARE

## 2020-01-23 DIAGNOSIS — Z90.49 ACQUIRED ABSENCE OF OTHER SPECIFIED PARTS OF DIGESTIVE TRACT: Chronic | ICD-10-CM

## 2020-01-23 DIAGNOSIS — Z95.810 PRESENCE OF AUTOMATIC (IMPLANTABLE) CARDIAC DEFIBRILLATOR: Chronic | ICD-10-CM

## 2020-01-23 DIAGNOSIS — Z98.890 OTHER SPECIFIED POSTPROCEDURAL STATES: Chronic | ICD-10-CM

## 2020-01-23 PROCEDURE — 99235 HOSP IP/OBS SAME DATE MOD 70: CPT

## 2020-01-23 PROCEDURE — 92960 CARDIOVERSION ELECTRIC EXT: CPT

## 2020-02-10 ENCOUNTER — APPOINTMENT (OUTPATIENT)
Dept: PULMONOLOGY | Facility: CLINIC | Age: 75
End: 2020-02-10
Payer: MEDICARE

## 2020-02-10 VITALS
BODY MASS INDEX: 38.65 KG/M2 | HEART RATE: 77 BPM | WEIGHT: 255 LBS | RESPIRATION RATE: 12 BRPM | SYSTOLIC BLOOD PRESSURE: 120 MMHG | TEMPERATURE: 97.8 F | OXYGEN SATURATION: 97 % | HEIGHT: 68 IN | DIASTOLIC BLOOD PRESSURE: 76 MMHG

## 2020-02-10 PROCEDURE — 99214 OFFICE O/P EST MOD 30 MIN: CPT

## 2020-02-11 NOTE — PHYSICAL EXAM
[Normal Appearance] : normal appearance [General Appearance - Well Developed] : well developed [No Deformities] : no deformities [General Appearance - Well Nourished] : well nourished [Well Groomed] : well groomed [General Appearance - In No Acute Distress] : no acute distress [Eyelids - No Xanthelasma] : the eyelids demonstrated no xanthelasmas [Normal Conjunctiva] : the conjunctiva exhibited no abnormalities [Low Lying Soft Palate] : low lying soft palate [IV] : IV [Heart Rate And Rhythm] : heart rate was normal and rhythm regular [Murmurs] : no murmurs [Heart Sounds] : normal S1 and S2 [Heart Sounds Gallop] : no gallops [Heart Sounds Pericardial Friction Rub] : no pericardial rub [] : no respiratory distress [Auscultation Breath Sounds / Voice Sounds] : lungs were clear to auscultation bilaterally [Abnormal Walk] : normal gait [Musculoskeletal - Swelling] : no joint swelling seen [Motor Tone] : muscle strength and tone were normal [2+ Pitting] : 2+  pitting [Cyanosis, Localized] : no localized cyanosis [Nail Clubbing] : no clubbing of the fingernails [Normal Oropharynx] : abnormal oropharynx [FreeTextEntry2] : bilaterally [FreeTextEntry1] : regular rhythm with occ extrasystole

## 2020-02-11 NOTE — ASSESSMENT
[FreeTextEntry1] : obstructive sleep apnea on CPAP\par \par Doing very well with CPAP, excellent compliance and efficacy subjectively.  Asked his durable medical equipment provider to send download. \par \par The patient is aware that in addition to worsening sleep leading to daytime sleepiness, sleep apnea may be associated with worsening hypertension and may be a risk factor for cardiovascular disease, arrhythmia  and stroke. \par \par .  Given long term advice about CPAP use.  Call durable medical equipment provider provider if any equipment problems.  Replace interface as per schedule, generally at last every 3 months.  Stressed importance of CPAP compliance.  Return to see me in about 12 months if doing well.\par

## 2020-02-11 NOTE — HISTORY OF PRESENT ILLNESS
[FreeTextEntry1] : 2/12/18:  72-year-old male treated for sleep apnea. He has a history of ischemic cardiomyopathy and  both atrial and ventricular arrhythmias. He has an implanted defibrillator.\par \par Had overnight polysomnography May 2017 showing very severe obstructive sleep apnea with Apnea Hypopnea Index 70s.  Split night, started on CPAP with resolution.  On autoPAP now since about 6/1.  Sleep much better, excessive daytime somnolence mugh better.  Now sleeps 1-2 am until 10-11 am with one wake.  >7h each night, had been 2-3 h most nights. \par \par Last seen 6 months ago, compliance 100%, sleeps well w CPAP, bed 1-2 am, 2 brief wakes, up at 10-11 am. No c/o excessive daytime somnolence.  His machine shows Apnea Hypopnea Index <5 on rx.\par \par Past 6 months has had more problems w arrhythmia, VT and atrial fibrillation, has AICD\par \par 2/11/19:  Over past year no problems using CPAP; his phone leonel shows 100% use, >4h, Apnea Hypopnea Index 5-8, P 90 = 9.8. Masks, supplies OK, replaced appropriately.  No snore or excessive daytime somnolence \par \par 2/10/20: OK on CPAP, usual bedtime 2-3 am, up 10-11 am.  Feels tired, not sleepy, admits bored, not much to do. his durable medical equipment provider is [employer]- no recent download available\par \par Occasional feeling of palpitations, cardioverted since I last saw him, NSR now

## 2020-03-04 ENCOUNTER — APPOINTMENT (OUTPATIENT)
Dept: HEART AND VASCULAR | Facility: CLINIC | Age: 75
End: 2020-03-04

## 2020-03-04 ENCOUNTER — APPOINTMENT (OUTPATIENT)
Dept: HEART AND VASCULAR | Facility: CLINIC | Age: 75
End: 2020-03-04
Payer: MEDICARE

## 2020-03-04 VITALS
HEIGHT: 68 IN | BODY MASS INDEX: 38.65 KG/M2 | SYSTOLIC BLOOD PRESSURE: 121 MMHG | WEIGHT: 255 LBS | DIASTOLIC BLOOD PRESSURE: 70 MMHG | HEART RATE: 79 BPM

## 2020-03-04 PROCEDURE — 93283 PRGRMG EVAL IMPLANTABLE DFB: CPT

## 2020-03-04 PROCEDURE — 99215 OFFICE O/P EST HI 40 MIN: CPT | Mod: 25

## 2020-03-04 NOTE — PHYSICAL EXAM
[Normal Appearance] : normal appearance [General Appearance - Well Developed] : well developed [Well Groomed] : well groomed [General Appearance - Well Nourished] : well nourished [No Deformities] : no deformities [] : no respiratory distress [General Appearance - In No Acute Distress] : no acute distress [Respiration, Rhythm And Depth] : normal respiratory rhythm and effort [Exaggerated Use Of Accessory Muscles For Inspiration] : no accessory muscle use [Left Infraclavicular] : left infraclavicular area [Clean] : clean [Dry] : dry [Well-Healed] : well-healed [Palpable Crepitus] : no palpable crepitus [Bleeding] : no active bleeding [Foul Odor] : no foul smell [Serous Drainage] : no serous drainage [Serosanguineous Drainage] : no serosanquineous drainage [Purulent Drainage] : no purulent drainage [Erythema] : not erythematous [Warm] : not warm [Tender] : not tender [Fluctuant] : not fluctuant [Indurated] : not indurated [5th Left ICS - MCL] : palpated at the 5th LICS in the midclavicular line [Normal] : normal [No Precordial Heave] : no precordial heave was noted [Rhythm Regular] : regular [Apical Thrill] : no thrill palpable at the apex [Normal Rate] : normal [Normal S1] : normal S1 [Normal S2] : normal S2 [Click] : no click [Pericardial Rub] : no pericardial rub [No Pitting Edema] : no pitting edema present [Normal Conjunctiva] : the conjunctiva exhibited no abnormalities [Eyelids - No Xanthelasma] : the eyelids demonstrated no xanthelasmas [Normal Jugular Venous V Waves Present] : normal jugular venous V waves present [Abnormal Walk] : normal gait [Gait - Sufficient For Exercise Testing] : the gait was sufficient for exercise testing [Oriented To Time, Place, And Person] : oriented to person, place, and time [Affect] : the affect was normal [Mood] : the mood was normal

## 2020-03-04 NOTE — HISTORY OF PRESENT ILLNESS
[None] : The patient complains of no symptoms [Palpitations] : no palpitations [SOB] : no dyspnea [Syncope] : no syncope [Dizziness] : no dizziness [Chest Pain] : no chest pain or discomfort [ICD Shocks] : no recent ICD shocks [Shoulder Pain] : no shoulder pain [Pain at Site] : no pain at device site [Erythema at Site] : no erythema at device site [Swelling at Site] : no swelling at device site [FreeTextEntry1] : Mr. Sauceda is a 75 y/o M with chronic systolic heart failure with EF 30-35% s/p ST Donaldo ICD, CAD s/p MATTY to LAD, DANI on CPAP, Pre-DM, BPH, OA, HTN, recurrent UTI, atrial fibrillation on Eliquis s/p DCCV 12/2017 and 12/2018, recurrent ventricular tachycardia s/p ablation 10/5/17 and 11/16/17,  who now presents for follow up secondary to not feeling well\par \par His history is as follows by his report and patient discharge information from multiple hospitalizations. He underwent surgery for bladder stones 7/2014; post op course significant for NSVT. Work-up thereafter included a stress test followed by an EP study at [facility]. He ultimately had an ICD placed 7/2014. Reports he was admitted with a CHF exacerbation 7/2015. He underwent cardiac catheterization at [facility] 6/2016 s/p MATTY to LAD. Reports he had VT and had ICD shock, followed by VT ablation 9/2016 at [facility]. Amiodarone was started post-procedure for arrhythmia suppression. He had palpitations and was admitted to [facility] 11/2016 with heart rate up to 170 bpm. He was given IV Amiodarone followed by a cardioversion. Amiodarone was stopped due to concern for long term side effects and Sotalol was started. VT treated with ATP 1/2017; Sotalol was discontinued and Amiodarone with load restarted. He was told he would likely need another ablation. Case was discussed with Dr. Diaz at [place] and the decision was made to D/C ASA, continue Plavix and Eliquis until one year post stent. \par \par He presented to Dr. Arceo’ office 1-2-17 complaining of dizziness x a few days. He had been off amiodarone since the beginning of September. Device interrogation revealed recurrent VT terminated by ATP and he was admitted to the hospital. He was admitted to the CCU and started on amiodarone.  He underwent an EP study and VT ablation 10/5/17 and was restarted on sotalol and toprol.  He had PVCs and NSVT (different then what was ablated).  He had orthostatic hypotension and was hydrated and his medications were adjusted.  QTc remained stable on sotalol.  \par \par He was doing well until early November 2017 when he had near syncope.  He was admitted to an OSH with recurrent VT and placed on amiodarone with breakthrough episodes.  All of the episodes were terminated with ATP; until 11/9 when he got an appropriate ICD shock.  He was transferred to [facility].  Amiodarone was switched to Quinidine and he had less VT; however during exercise stress test he had sustained VT terminated by ATP with resultant lightheadedness.  He underwent a successful epicardial VT ablation on 11/16.  \par \par In late November he was found to be in atrial fibrillation and was recommended to start Tikosyn; but secondary to price went on Sotalol.  He had a cardioversion 12/2018   \par \par 12 /2019 he had  "skipped beats" and remote transmission showed ventricular bigeminy.  We stopped his Sotalol with the understanding that atrial fibrillation may come back and he was started on Flecainide.  He underwent a cardioversion 1/2020.  He was doing well until the pats couple of weeks where he states he has skipped heart beats and fatigue.  He states he "feels horrible".  Stable STEELE.  No chest pain, syncope, near syncope, SOB, orthopnea, PND .  No device related complaints.  He is compliant with Eliquis\par  \par  \par \par \par RW - EPS/VT ablation 9/2016. Decreased voltage suggestive of scar noted in the anterior portion of the LV close to the septum and right beneath LVOT. During LV mapping in superior-lateral portion of this scar VT initiated spontaneous with mapping. Several lesions were delivered which initiated VT every time and at times accelerated to drop BP. VT did not completely eliminate but slowed down after few lesions. Post ablation EPS with 3 EST revealed no inducible VT but did not test w/ Isuprel as BP dropped significantly every time fast VT initiated.\par S/P VT ablation 5/25/17 @ [facility]; clinical VT originating from the left aortic valve at the commissure with the right cusp. No ventricular arrhythmias inducible at the end of the case. Absence of endocardial scar suggesting nonischemic origin of his cardiomyopathy. \par \par

## 2020-03-04 NOTE — REVIEW OF SYSTEMS
[Fever] : no fever [Chills] : no chills [Feeling Fatigued] : feeling fatigued [Eyeglasses] : currently wearing eyeglasses [see HPI] : see HPI [Cough] : no cough [Wheezing] : no wheezing [Coughing Up Blood] : no hemoptysis [Dizziness] : no dizziness [Confusion] : no confusion was observed [Anxiety] : anxiety [Negative] : Heme/Lymph [FreeTextEntry2] : constipation

## 2020-03-04 NOTE — PROCEDURE
no [No] : not [Sinus Bradycardia] : sinus bradycardia [ICD] : Implantable cardioverter-defibrillator [DDDR] : DDDR [Threshold Testing Performed] : Threshold testing was performed [Lead Imp:  ___ohms] : lead impedance was [unfilled] ohms [Sensing Amplitude ___mv] : sensing amplitude was [unfilled] mv [___V @] : [unfilled] V [___ ms] : [unfilled] ms [de-identified] : STEPHANIE [de-identified] : FORTIFY [de-identified] : [id number] [de-identified] : 7/13/14 [de-identified] : 70/100 [de-identified] : 2.2 years to ERNESTINE [de-identified] : shock impedance 46 [de-identified] : AP 79%\par  16%\par no recurrent afib\par no events\par infrequent PVCs while monitoring

## 2020-03-04 NOTE — DISCUSSION/SUMMARY
[AICD Function Normal] : normal AICD function [FreeTextEntry1] : Mr. Sauceda is a 73 y/o M with chronic systolic heart failure with EF 30-35% s/p ST Donaldo ICD, CAD s/p MATTY to LAD, DANI on CPAP, Pre-DM, BPH, OA, HTN, recurrent UTI, atrial fibrillation on Eliquis s/p DCCV 12/2017 and 12/2018, recurrent ventricular tachycardia s/p ablation 10/5/17 and 11/16/17,  who now presents for follow up. Device interrogation reveals normal function.  All measured data is within normal limits.   No recurrent atrial fibrillation and no events recorded.  He monitors his pulse via a fit bit and states that he regularly has skipped beats.  HE did not have frequent PVCs on today's monitoring, but a recent remote transmission showed some.  I have asked him to add Mexiletine to his medications to see if this helps.  If it does not we can consider a repeat PVC ablation as the symptoms are impacting his daily life (different PVC then prior ablation).  He will follow up in 2 weeks or sooner if needed.   He knows to call with any questions or concerns in the interm.

## 2020-03-09 ENCOUNTER — RX RENEWAL (OUTPATIENT)
Age: 75
End: 2020-03-09

## 2020-03-19 ENCOUNTER — APPOINTMENT (OUTPATIENT)
Dept: HEART AND VASCULAR | Facility: CLINIC | Age: 75
End: 2020-03-19

## 2020-03-20 RX ORDER — FLECAINIDE ACETATE 150 MG/1
150 TABLET ORAL
Qty: 60 | Refills: 3 | Status: DISCONTINUED | COMMUNITY
Start: 2019-12-04 | End: 2020-03-20

## 2020-03-20 RX ORDER — FUROSEMIDE 20 MG/1
20 TABLET ORAL DAILY
Qty: 30 | Refills: 0 | Status: DISCONTINUED | COMMUNITY
Start: 2018-03-15 | End: 2020-03-20

## 2020-03-20 RX ORDER — MEXILETINE HYDROCHLORIDE 150 MG/1
150 CAPSULE ORAL 3 TIMES DAILY
Qty: 90 | Refills: 2 | Status: DISCONTINUED | COMMUNITY
Start: 2020-03-04 | End: 2020-03-20

## 2020-04-29 ENCOUNTER — APPOINTMENT (OUTPATIENT)
Dept: HEART AND VASCULAR | Facility: CLINIC | Age: 75
End: 2020-04-29
Payer: MEDICARE

## 2020-04-29 PROCEDURE — 99214 OFFICE O/P EST MOD 30 MIN: CPT | Mod: 95

## 2020-05-04 NOTE — DISCUSSION/SUMMARY
[FreeTextEntry1] : Device interrogation reveals normal function.  All measured data is within normal limits.  No events for review and nor recurrent afib.  PVC count <1%.  He is maintained on amiodarone and I have asked him to have local blood work (CBC, BMP, TFTs, TSH).  Plan for amiodarone for 4-5 months and then stop and if needed reconsider a repeat ablation when the hospital is hopefully back to normal function.  Overall he is doing well on his current medications.  No changes made and refills sent to his pharmacy.  He will follow up in 2-3 months via teleconference or in person and in the meantime will fax us his blood work results.  He will utilize remote monitoring and knows to call with any questions or concerns.

## 2020-05-04 NOTE — HISTORY OF PRESENT ILLNESS
[FreeTextEntry1] : Mr. Sauceda is a 75 y/o M with chronic systolic heart failure with EF 30-35% s/p ST Donaldo ICD, CAD s/p MATTY to LAD, DANI on CPAP, Pre-DM, BPH, OA, HTN, recurrent UTI, atrial fibrillation on Eliquis s/p DCCV 12/2017 and 12/2018, recurrent ventricular tachycardia s/p ablation 10/5/17 and 11/16/17, who now presents for remote follow up.\par \par His history is as follows by his report and patient discharge information from multiple hospitalizations. He underwent surgery for bladder stones 7/2014; post op course significant for NSVT. Work-up thereafter included a stress test followed by an EP study at [facility]. He ultimately had an ICD placed 7/2014. Reports he was admitted with a CHF exacerbation 7/2015. He underwent cardiac catheterization at [facility] 6/2016 s/p MATTY to LAD. Reports he had VT and had ICD shock, followed by VT ablation 9/2016 at [facility]. Amiodarone was started post-procedure for arrhythmia suppression. He had palpitations and was admitted to [facility] 11/2016 with heart rate up to 170 bpm. He was given IV Amiodarone followed by a cardioversion. Amiodarone was stopped due to concern for long term side effects and Sotalol was started. VT treated with ATP 1/2017; Sotalol was discontinued and Amiodarone with load restarted. He was told he would likely need another ablation. Case was discussed with Dr. Diaz at [place] and the decision was made to D/C ASA, continue Plavix and Eliquis until one year post stent. \par \par He presented to Dr. Arceo’ office 1-2-17 complaining of dizziness x a few days. He had been off amiodarone since the beginning of September. Device interrogation revealed recurrent VT terminated by ATP and he was admitted to the hospital. He was admitted to the CCU and started on amiodarone. He underwent an EP study and VT ablation 10/5/17 and was restarted on sotalol and toprol. He had PVCs and NSVT (different then what was ablated). He had orthostatic hypotension and was hydrated and his medications were adjusted. QTc remained stable on sotalol. \par \par He was doing well until early November 2017 when he had near syncope. He was admitted to an OSH with recurrent VT and placed on amiodarone with breakthrough episodes. All of the episodes were terminated with ATP; until 11/9 when he got an appropriate ICD shock. He was transferred to [facility]. Amiodarone was switched to Quinidine and he had less VT; however during exercise stress test he had sustained VT terminated by ATP with resultant lightheadedness. He underwent a successful epicardial VT ablation on 11/16. \par \par In late November he was found to be in atrial fibrillation and was recommended to start Tikosyn; but secondary to price went on Sotalol. He had a cardioversion 12/2018 \par \par 12 /2019 he had "skipped beats" and remote transmission showed ventricular bigeminy. We stopped his Sotalol with the understanding that atrial fibrillation may come back and he was started on Flecainide. Since his last visit he was admitted at an OSH with PVCs and switched to amiodarone.  He has finished the loading dose and is now on 200mg daily.  Overall he is feeling better.. Stable STEELE. No chest pain, syncope, near syncope, SOB, orthopnea, PND . No device related complaints. He is compliant with Eliquis\par  \par

## 2020-05-16 ENCOUNTER — APPOINTMENT (OUTPATIENT)
Dept: DISASTER EMERGENCY | Facility: CLINIC | Age: 75
End: 2020-05-16

## 2020-05-17 LAB — SARS-COV-2 N GENE NPH QL NAA+PROBE: NOT DETECTED

## 2020-05-18 ENCOUNTER — OUTPATIENT (OUTPATIENT)
Dept: OUTPATIENT SERVICES | Facility: HOSPITAL | Age: 75
LOS: 1 days | Discharge: HOME | End: 2020-05-18
Payer: MEDICARE

## 2020-05-18 ENCOUNTER — RX RENEWAL (OUTPATIENT)
Age: 75
End: 2020-05-18

## 2020-05-18 DIAGNOSIS — Z95.810 PRESENCE OF AUTOMATIC (IMPLANTABLE) CARDIAC DEFIBRILLATOR: Chronic | ICD-10-CM

## 2020-05-18 DIAGNOSIS — Z90.49 ACQUIRED ABSENCE OF OTHER SPECIFIED PARTS OF DIGESTIVE TRACT: Chronic | ICD-10-CM

## 2020-05-18 DIAGNOSIS — Z98.890 OTHER SPECIFIED POSTPROCEDURAL STATES: Chronic | ICD-10-CM

## 2020-05-18 PROCEDURE — 92960 CARDIOVERSION ELECTRIC EXT: CPT

## 2020-05-27 DIAGNOSIS — I48.91 UNSPECIFIED ATRIAL FIBRILLATION: ICD-10-CM

## 2020-05-27 DIAGNOSIS — I25.10 ATHEROSCLEROTIC HEART DISEASE OF NATIVE CORONARY ARTERY WITHOUT ANGINA PECTORIS: ICD-10-CM

## 2020-05-27 DIAGNOSIS — I42.9 CARDIOMYOPATHY, UNSPECIFIED: ICD-10-CM

## 2020-06-29 ENCOUNTER — APPOINTMENT (OUTPATIENT)
Dept: HEART AND VASCULAR | Facility: CLINIC | Age: 75
End: 2020-06-29
Payer: MEDICARE

## 2020-06-29 VITALS
WEIGHT: 250 LBS | HEART RATE: 70 BPM | BODY MASS INDEX: 37.89 KG/M2 | DIASTOLIC BLOOD PRESSURE: 86 MMHG | HEIGHT: 68 IN | TEMPERATURE: 98 F | OXYGEN SATURATION: 96 % | SYSTOLIC BLOOD PRESSURE: 139 MMHG

## 2020-06-29 PROCEDURE — 93283 PRGRMG EVAL IMPLANTABLE DFB: CPT

## 2020-06-29 PROCEDURE — 99213 OFFICE O/P EST LOW 20 MIN: CPT | Mod: 25

## 2020-07-14 NOTE — HISTORY OF PRESENT ILLNESS
[FreeTextEntry1] : Mr. Sauceda is a 75 y/o M with chronic systolic heart failure with EF 30-35% s/p ST Donaldo ICD, CAD s/p MATTY to LAD, DANI on CPAP, Pre-DM, BPH, OA, HTN, recurrent UTI, atrial fibrillation on Eliquis s/p DCCV 12/2017 and 12/2018 and 5/2020, recurrent ventricular tachycardia s/p ablation 10/5/17 and 11/16/17, who now presents for remote follow up.\par \par His history is as follows by his report and patient discharge information from multiple hospitalizations. He underwent surgery for bladder stones 7/2014; post op course significant for NSVT. Work-up thereafter included a stress test followed by an EP study at [facility] He ultimately had an ICD placed 7/2014. Reports he was admitted with a CHF exacerbation 7/2015. He underwent cardiac catheterization at [facility] 6/2016 s/p MATTY to LAD. Reports he had VT and had ICD shock, followed by VT ablation 9/2016 at [facility] Amiodarone was started post-procedure for arrhythmia suppression. He had palpitations and was admitted to [facility] 11/2016 with heart rate up to 170 bpm. He was given IV Amiodarone followed by a cardioversion. Amiodarone was stopped due to concern for long term side effects and Sotalol was started. VT treated with ATP 1/2017; Sotalol was discontinued and Amiodarone with load restarted. He was told he would likely need another ablation. Case was discussed with Dr. Diaz at [place] and the decision was made to D/C ASA, continue Plavix and Eliquis until one year post stent. \par \par He presented to Dr. Arceo’ office 1-2-17 complaining of dizziness x a few days. He had been off amiodarone since the beginning of September. Device interrogation revealed recurrent VT terminated by ATP and he was admitted to the hospital. He was admitted to the CCU and started on amiodarone. He underwent an EP study and VT ablation 10/5/17 and was restarted on sotalol and toprol. He had PVCs and NSVT (different then what was ablated). He had orthostatic hypotension and was hydrated and his medications were adjusted. QTc remained stable on sotalol. \par \par He was doing well until early November 2017 when he had near syncope. He was admitted to an OSH with recurrent VT and placed on amiodarone with breakthrough episodes. All of the episodes were terminated with ATP; until 11/9 when he got an appropriate ICD shock. He was transferred to [facility]. Amiodarone was switched to Quinidine and he had less VT; however during exercise stress test he had sustained VT terminated by ATP with resultant lightheadedness. He underwent a successful epicardial VT ablation on 11/16. \par \par In late November he was found to be in atrial fibrillation and was recommended to start Tikosyn; but secondary to price went on Sotalol. He had a cardioversion 12/2018 \par \par 12 /2019 he had "skipped beats" and remote transmission showed ventricular bigeminy. We stopped his He was then admitted at an OSH with PVCs and switched to amiodarone.  He has finished the loading dose and is now on 200mg daily.  He was foung to be back in afin 5/2020 with some SOB and underwent a cardioversion.  Overall he is feeling better.. Stable STEELE. No chest pain, syncope, near syncope, SOB, orthopnea, PND . No device related complaints. He is compliant with Eliquis. recent lab work reviewed\par  \par

## 2020-07-14 NOTE — PHYSICAL EXAM
[General Appearance - Well Developed] : well developed [Well Groomed] : well groomed [Normal Appearance] : normal appearance [General Appearance - Well Nourished] : well nourished [No Deformities] : no deformities [Heart Rate And Rhythm] : heart rate and rhythm were normal [General Appearance - In No Acute Distress] : no acute distress [Heart Sounds] : normal S1 and S2 [Exaggerated Use Of Accessory Muscles For Inspiration] : no accessory muscle use [Respiration, Rhythm And Depth] : normal respiratory rhythm and effort [Clean] : clean [Auscultation Breath Sounds / Voice Sounds] : lungs were clear to auscultation bilaterally [Well-Healed] : well-healed [Dry] : dry [] : no ischemic changes [Palpable Crepitus] : no palpable crepitus [Bleeding] : no active bleeding [Foul Odor] : no foul smell [Purulent Drainage] : no purulent drainage [Serosanguineous Drainage] : no serosanquineous drainage [Serous Drainage] : no serous drainage [Erythema] : not erythematous [Warm] : not warm [Tender] : not tender [Indurated] : not indurated [Fluctuant] : not fluctuant

## 2020-07-14 NOTE — PROCEDURE
[No] : not [NSR] : normal sinus rhythm [Pacemaker] : pacemaker [DDDR] : DDDR [Threshold Testing Performed] : Threshold testing was performed [Lead Imp:  ___ohms] : lead impedance was [unfilled] ohms [Sensing Amplitude ___mv] : sensing amplitude was [unfilled] mv [___V @] : [unfilled] V [___ ms] : [unfilled] ms [de-identified] : St Donaldo [de-identified] : [id number] [de-identified] : fortify assura [de-identified] : 7/2014 [de-identified] : 70/90 [de-identified] : 2 years [de-identified] : shock impedance 47\par no events or recurrent afib\par AP 82%\par  15%

## 2020-07-14 NOTE — DISCUSSION/SUMMARY
[FreeTextEntry1] : Device interrogation reveals normal function.  All measured data is within normal limits.  No events for review and nor recurrent afib.  PVC count <1%. He is maintained on amiodarone and recent blood work reviewed.  We dicussed long term options of a afib ablation vs. upgrade to a BiV (symptoms of heart failure in afib secondary to RV pacing).  Will continue to monitor for now. He will follow up in 4 months or sooner if needed.  He will utilize remote monitoring and knows to call with any questions or concerns.

## 2020-10-14 ENCOUNTER — APPOINTMENT (OUTPATIENT)
Dept: PULMONOLOGY | Facility: CLINIC | Age: 75
End: 2020-10-14
Payer: MEDICARE

## 2020-10-14 ENCOUNTER — APPOINTMENT (OUTPATIENT)
Dept: HEART AND VASCULAR | Facility: CLINIC | Age: 75
End: 2020-10-14
Payer: MEDICARE

## 2020-10-14 VITALS
BODY MASS INDEX: 37.89 KG/M2 | HEIGHT: 68 IN | HEART RATE: 75 BPM | DIASTOLIC BLOOD PRESSURE: 76 MMHG | SYSTOLIC BLOOD PRESSURE: 136 MMHG | WEIGHT: 250 LBS

## 2020-10-14 VITALS
HEART RATE: 77 BPM | SYSTOLIC BLOOD PRESSURE: 120 MMHG | OXYGEN SATURATION: 97 % | WEIGHT: 250 LBS | HEIGHT: 68 IN | BODY MASS INDEX: 37.89 KG/M2 | DIASTOLIC BLOOD PRESSURE: 84 MMHG | TEMPERATURE: 97.6 F

## 2020-10-14 PROCEDURE — 99214 OFFICE O/P EST MOD 30 MIN: CPT | Mod: 25

## 2020-10-14 PROCEDURE — 99214 OFFICE O/P EST MOD 30 MIN: CPT

## 2020-10-14 PROCEDURE — 93283 PRGRMG EVAL IMPLANTABLE DFB: CPT

## 2020-10-14 NOTE — HISTORY OF PRESENT ILLNESS
[FreeTextEntry1] : 2/12/18:  72-year-old male treated for sleep apnea. He has a history of ischemic cardiomyopathy and  both atrial and ventricular arrhythmias. He has an implanted defibrillator.\par \par Had overnight polysomnography May 2017 showing very severe obstructive sleep apnea with Apnea Hypopnea Index 70s.  Split night, started on CPAP with resolution.  On autoPAP now since about 6/1.  Sleep much better, excessive daytime somnolence mugh better.  Now sleeps 1-2 am until 10-11 am with one wake.  >7h each night, had been 2-3 h most nights. \par \par Last seen 6 months ago, compliance 100%, sleeps well w CPAP, bed 1-2 am, 2 brief wakes, up at 10-11 am. No c/o excessive daytime somnolence.  His machine shows Apnea Hypopnea Index <5 on rx.\par \par Past 6 months has had more problems w arrhythmia, VT and atrial fibrillation, has AICD\par \par 2/11/19:  Over past year no problems using CPAP; his phone leonel shows 100% use, >4h, Apnea Hypopnea Index 5-8, P 90 = 9.8. Masks, supplies OK, replaced appropriately.  No snore or excessive daytime somnolence \par \par 2/10/20: OK on CPAP, usual bedtime 2-3 am, up 10-11 am.  Feels tired, not sleepy, admits bored, not much to do. his durable medical equipment provider is [employer]- no recent download available\par \par Occasional feeling of palpitations, cardioverted since I last saw him, NSR now\par \par 10/14/2020: has been using CPAP religiously, usual bedtime 2-3 am, up 10-11 am, wakes up 1 time, no AM headaches, mostly feels refreshed, sleeps during day as he feels bored. He is getting new supplies every 3 months. He has been on amiodarone now for 4 months now for PVC.

## 2020-10-14 NOTE — ASSESSMENT
[FreeTextEntry1] : He suffers from Obstructive sleep apnea ( AHI =70 , 2017) on AutoPAP 6-16, we do not have access to his downloaded data , patient has an leonel in his phone showing > 95% compliance with > 4 hrs use /night, , AHI 0.5-4, no significant leak. Doing very well with CPAP, excellent compliance and efficacy subjectively.  Will reach out to his durable medical equipment provider to send download data again. \par Given long term advice about CPAP use.  Call durable medical equipment provider provider if any equipment problems.  Replace interface as per schedule, generally at last every 3 months.  Stressed importance of CPAP compliance.  \par No intrinsic lung disease, now on amiodarone 200 mg /day. Most of pulmonary toxicity of amiodarone is seen on > 400 mg /day dose but can be seen in smaller dose. He is NYHA 2-3 at baseline but due to his ischemic cardiomyopathy. We would get a baseline PFT in a non urgent manner , may be next visit as it is difficult for him to commute from [place] for COVID swab and then PFT. His TSH in normal, will be seeing ophthalmologist soon. \par He is asked to watch out for dry cough , fever, worsening SOB as that could denote onset of pulmonary toxicity due to amiodarone which comes in different flavors eg Interstitial pneumonitis, OP, Eosinophilic pneumonia etc. \par \par Follow up in 6 months from now. \par \par

## 2020-10-14 NOTE — REVIEW OF SYSTEMS
[Fatigue] : fatigue [Shortness Of Breath] : shortness of breath [Obesity] : obesity [Nasal Congestion] : no nasal congestion [EDS: ESS=____] : no daytime somnolence [Witnessed Apneas] : no witnessed apnea [Snoring] : no snoring [Postnasal Drip] : no postnasal drip [A.M. Dry Mouth] : no a.m. dry mouth [Chest Pain] : no chest pain [A.M. Headache] : no headache present upon awakening [Anemia] : no anemia [Thyroid Disease] : no thyroid disease [Leg Dysesthesias] : no leg dysesthesias [Heartburn] : no heartburn [Arthralgias] : no arthralgias [Nocturia] : no nocturia [Depression] : no depression [Difficulty Initiating Sleep] : no difficulty falling asleep [Difficulty Maintaining Sleep] : no difficulty maintaining sleep [Hypersomnolence] : not sleeping much more than usual [Unusual Sleep Behavior] : no unusual sleep behavior [Cataplexy] :  no cataplexy

## 2020-10-14 NOTE — PHYSICAL EXAM
[General Appearance - Well Developed] : well developed [Normal Appearance] : normal appearance [Well Groomed] : well groomed [General Appearance - Well Nourished] : well nourished [No Deformities] : no deformities [General Appearance - In No Acute Distress] : no acute distress [Normal Conjunctiva] : the conjunctiva exhibited no abnormalities [Eyelids - No Xanthelasma] : the eyelids demonstrated no xanthelasmas [Low Lying Soft Palate] : low lying soft palate [IV] : IV [Heart Rate And Rhythm] : heart rate was normal and rhythm regular [Heart Sounds] : normal S1 and S2 [Heart Sounds Gallop] : no gallops [Murmurs] : no murmurs [Heart Sounds Pericardial Friction Rub] : no pericardial rub [] : no respiratory distress [Auscultation Breath Sounds / Voice Sounds] : lungs were clear to auscultation bilaterally [Abnormal Walk] : normal gait [Musculoskeletal - Swelling] : no joint swelling seen [Motor Tone] : muscle strength and tone were normal [Nail Clubbing] : no clubbing of the fingernails [Cyanosis, Localized] : no localized cyanosis [2+ Pitting] : 2+  pitting [Normal Oropharynx] : abnormal oropharynx [FreeTextEntry1] : regular rhythm with occ extrasystole [FreeTextEntry2] : bilaterally

## 2020-10-21 NOTE — REVIEW OF SYSTEMS
[see HPI] : see HPI [Feeling Fatigued] : feeling fatigued [Negative] : Psychiatric [Fever] : no fever [Recent Weight Gain (___ Lbs)] : no recent weight gain [Chills] : no chills [Recent Weight Loss (___ Lbs)] : no recent weight loss

## 2020-10-21 NOTE — DISCUSSION/SUMMARY
[AICD Function Normal] : normal AICD function [FreeTextEntry1] : Device interrogation reveals normal function.  All measured data is within normal limits.  No events for review and nor recurrent afib.  PVC count <1%. He is maintained on amiodarone and recent blood work reviewed. He complains of fatigue and we have decreased his amiodarone to 100mg daily.   We discussed long term options of a afib ablation vs. upgrade to a BiV (symptoms of heart failure in afib secondary to RV pacing).  Will continue to monitor for now on low dose amiodarone.. He will follow up in 4 months or sooner if needed.  He will utilize remote monitoring and knows to call with any questions or concerns.

## 2020-10-21 NOTE — PROCEDURE
[Pacemaker] : pacemaker [No] : not [NSR] : normal sinus rhythm [DDDR] : DDDR [Threshold Testing Performed] : Threshold testing was performed [Lead Imp:  ___ohms] : lead impedance was [unfilled] ohms [Sensing Amplitude ___mv] : sensing amplitude was [unfilled] mv [___V @] : [unfilled] V [___ ms] : [unfilled] ms [None] : none [de-identified] : [employer] [de-identified] : fortify assura [de-identified] : [id number] [de-identified] : 7/2014 [de-identified] : 70/90 [de-identified] : 1.8 years [de-identified] : shock impedance 47\par no events or recurrent afib\par AP 87%\par  11%

## 2020-10-21 NOTE — PHYSICAL EXAM
[General Appearance - Well Developed] : well developed [Well Groomed] : well groomed [Normal Appearance] : normal appearance [General Appearance - In No Acute Distress] : no acute distress [General Appearance - Well Nourished] : well nourished [No Deformities] : no deformities [Heart Rate And Rhythm] : heart rate and rhythm were normal [Heart Sounds] : normal S1 and S2 [Respiration, Rhythm And Depth] : normal respiratory rhythm and effort [Auscultation Breath Sounds / Voice Sounds] : lungs were clear to auscultation bilaterally [Clean] : clean [Exaggerated Use Of Accessory Muscles For Inspiration] : no accessory muscle use [Dry] : dry [Well-Healed] : well-healed [] : no ischemic changes [Edema] : no peripheral edema present [Palpable Crepitus] : no palpable crepitus [Bleeding] : no active bleeding [Foul Odor] : no foul smell [Purulent Drainage] : no purulent drainage [Serosanguineous Drainage] : no serosanquineous drainage [Serous Drainage] : no serous drainage [Erythema] : not erythematous [Warm] : not warm [Tender] : not tender [Indurated] : not indurated [Fluctuant] : not fluctuant

## 2020-10-21 NOTE — HISTORY OF PRESENT ILLNESS
[Palpitations] : no palpitations [SOB] : no dyspnea [Syncope] : no syncope [Chest Pain] : no chest pain or discomfort [ICD Shocks] : no recent ICD shocks [Shoulder Pain] : no shoulder pain [Pain at Site] : no pain at device site [Erythema at Site] : no erythema at device site [Swelling at Site] : no swelling at device site [FreeTextEntry1] : Mr. Sauceda is a 76 y/o M with chronic systolic heart failure with EF 30-35% s/p ST Donaldo ICD, CAD s/p MATTY to LAD, DANI on CPAP, Pre-DM, BPH, OA, HTN, recurrent UTI, atrial fibrillation on Eliquis s/p DCCV 12/2017 and 12/2018 and 5/2020, recurrent ventricular tachycardia s/p ablation 10/5/17 and 11/16/17, who now presents for remote follow up.\par \par His history is as follows by his report and patient discharge information from multiple hospitalizations. He underwent surgery for bladder stones 7/2014; post op course significant for NSVT. Work-up thereafter included a stress test followed by an EP study at [facility]. He ultimately had an ICD placed 7/2014. Reports he was admitted with a CHF exacerbation 7/2015. He underwent cardiac catheterization at [facility] 6/2016 s/p MATTY to LAD. Reports he had VT and had ICD shock, followed by VT ablation 9/2016 at [facility]. Amiodarone was started post-procedure for arrhythmia suppression. He had palpitations and was admitted to [facility] 11/2016 with heart rate up to 170 bpm. He was given IV Amiodarone followed by a cardioversion. Amiodarone was stopped due to concern for long term side effects and Sotalol was started. VT treated with ATP 1/2017; Sotalol was discontinued and Amiodarone with load restarted. He was told he would likely need another ablation. Case was discussed with Dr. Diaz at [place] and the decision was made to D/C ASA, continue Plavix and Eliquis until one year post stent. \par \par He presented to Dr. Arceo’ office 1-2-17 complaining of dizziness x a few days. He had been off amiodarone since the beginning of September. Device interrogation revealed recurrent VT terminated by ATP and he was admitted to the hospital. He was admitted to the CCU and started on amiodarone. He underwent an EP study and VT ablation 10/5/17 and was restarted on sotalol and toprol. He had PVCs and NSVT (different then what was ablated). He had orthostatic hypotension and was hydrated and his medications were adjusted. QTc remained stable on sotalol. \par \par He was doing well until early November 2017 when he had near syncope. He was admitted to an OSH with recurrent VT and placed on amiodarone with breakthrough episodes. All of the episodes were terminated with ATP; until 11/9 when he got an appropriate ICD shock. He was transferred to [facility]. Amiodarone was switched to Quinidine and he had less VT; however during exercise stress test he had sustained VT terminated by ATP with resultant lightheadedness. He underwent a successful epicardial VT ablation on 11/16. \par \par In late November he was found to be in atrial fibrillation and was recommended to start Tikosyn; but secondary to price went on Sotalol. He had a cardioversion 12/2018 \par \par 12 /2019 he had "skipped beats" and remote transmission showed ventricular bigeminy. We stopped his He was then admitted at an OSH with PVCs and switched to amiodarone.  He has finished the loading dose and is now on 200mg daily.  He was found to be back in afib 5/2020 with some SOB and underwent a cardioversion. \par \par He presents for follow up and overall he is doing well. Stable STEELE and fatigue. No chest pain, syncope, near syncope, SOB, orthopnea, PND . No device related complaints. He is compliant with Eliquis\par

## 2020-12-01 NOTE — DIETITIAN INITIAL EVALUATION ADULT. - PROBLEM/PLAN-2
----- Message from Anton Piña MD sent at 12/1/2020  8:46 AM CST -----  Please notify patient      US negative for blood clots   DISPLAY PLAN FREE TEXT

## 2021-01-07 ENCOUNTER — RX RENEWAL (OUTPATIENT)
Age: 76
End: 2021-01-07

## 2021-01-13 ENCOUNTER — APPOINTMENT (OUTPATIENT)
Dept: HEART AND VASCULAR | Facility: CLINIC | Age: 76
End: 2021-01-13
Payer: MEDICARE

## 2021-01-13 VITALS
BODY MASS INDEX: 37.89 KG/M2 | SYSTOLIC BLOOD PRESSURE: 137 MMHG | HEART RATE: 69 BPM | DIASTOLIC BLOOD PRESSURE: 79 MMHG | HEIGHT: 68 IN | WEIGHT: 250 LBS | TEMPERATURE: 97.6 F | OXYGEN SATURATION: 96 %

## 2021-01-13 PROCEDURE — 93283 PRGRMG EVAL IMPLANTABLE DFB: CPT

## 2021-01-13 PROCEDURE — 99214 OFFICE O/P EST MOD 30 MIN: CPT | Mod: 25

## 2021-01-13 NOTE — REVIEW OF SYSTEMS
[see HPI] : see HPI [Negative] : Heme/Lymph [Fever] : no fever [Recent Weight Gain (___ Lbs)] : no recent weight gain [Chills] : no chills [Anxiety] : anxiety

## 2021-01-13 NOTE — PROCEDURE
[No] : not [NSR] : normal sinus rhythm [Pacemaker] : pacemaker [DDDR] : DDDR [Threshold Testing Performed] : Threshold testing was performed [Lead Imp:  ___ohms] : lead impedance was [unfilled] ohms [Sensing Amplitude ___mv] : sensing amplitude was [unfilled] mv [___V @] : [unfilled] V [___ ms] : [unfilled] ms [None] : none [de-identified] : [employer] [de-identified] : fortify assura [de-identified] : [id number] [de-identified] : 7/2014 [de-identified] : 70/90 [de-identified] : 1.8 years [de-identified] : shock impedance 42\par no events or recurrent afib\par AP 89%\par  9%

## 2021-01-13 NOTE — DISCUSSION/SUMMARY
[AICD Function Normal] : normal AICD function [FreeTextEntry1] : Device interrogation reveals normal function.  All measured data is within normal limits.  No events for review and nor recurrent afib.  PVC count <1%.  He is maintained on eliquis and low dose amiodarone and overall is doing well.  We discussed long term options of a afib ablation vs. upgrade to a BiV (symptoms of heart failure in afib secondary to RV pacing).  Will continue to monitor for now. He will follow up in 6 months or sooner if needed.  He will utilize remote monitoring and knows to call with any questions or concerns.

## 2021-01-13 NOTE — PHYSICAL EXAM
[General Appearance - Well Developed] : well developed [Normal Appearance] : normal appearance [Well Groomed] : well groomed [General Appearance - Well Nourished] : well nourished [No Deformities] : no deformities [General Appearance - In No Acute Distress] : no acute distress [Heart Rate And Rhythm] : heart rate and rhythm were normal [Heart Sounds] : normal S1 and S2 [Edema] : no peripheral edema present [Respiration, Rhythm And Depth] : normal respiratory rhythm and effort [Exaggerated Use Of Accessory Muscles For Inspiration] : no accessory muscle use [Auscultation Breath Sounds / Voice Sounds] : lungs were clear to auscultation bilaterally [Clean] : clean [Dry] : dry [Well-Healed] : well-healed [] : no ischemic changes [Palpable Crepitus] : no palpable crepitus [Bleeding] : no active bleeding [Foul Odor] : no foul smell [Purulent Drainage] : no purulent drainage [Serosanguineous Drainage] : no serosanquineous drainage [Serous Drainage] : no serous drainage [Erythema] : not erythematous [Warm] : not warm [Tender] : not tender [Indurated] : not indurated [Fluctuant] : not fluctuant

## 2021-01-13 NOTE — HISTORY OF PRESENT ILLNESS
[Palpitations] : no palpitations [SOB] : no dyspnea [Syncope] : no syncope [Chest Pain] : no chest pain or discomfort [ICD Shocks] : no recent ICD shocks [Shoulder Pain] : no shoulder pain [Pain at Site] : no pain at device site [Erythema at Site] : no erythema at device site [Swelling at Site] : no swelling at device site [FreeTextEntry1] : Mr. Sauceda is a 76 y/o M with chronic systolic heart failure with EF 30-35% s/p ST Donaldo ICD, CAD s/p MATTY to LAD, DANI on CPAP, Pre-DM, BPH, OA, HTN, recurrent UTI, atrial fibrillation on Eliquis s/p DCCV 12/2017 and 12/2018 and 5/2020, recurrent ventricular tachycardia s/p ablation 10/5/17 and 11/16/17, who now presents for remote follow up.\par \par His history is as follows by his report and patient discharge information from multiple hospitalizations. He underwent surgery for bladder stones 7/2014; post op course significant for NSVT. Work-up thereafter included a stress test followed by an EP study at [facility]. He ultimately had an ICD placed 7/2014. Reports he was admitted with a CHF exacerbation 7/2015. He underwent cardiac catheterization at [facility] 6/2016 s/p MATTY to LAD. Reports he had VT and had ICD shock, followed by VT ablation 9/2016 at [facility]. Amiodarone was started post-procedure for arrhythmia suppression. He had palpitations and was admitted to [facility] 11/2016 with heart rate up to 170 bpm. He was given IV Amiodarone followed by a cardioversion. Amiodarone was stopped due to concern for long term side effects and Sotalol was started. VT treated with ATP 1/2017; Sotalol was discontinued and Amiodarone with load restarted. He was told he would likely need another ablation. Case was discussed with Dr. Diaz at [place] and the decision was made to D/C ASA, continue Plavix and Eliquis until one year post stent. \par \par He presented to Dr. Arceo’ office 1-2-17 complaining of dizziness x a few days. He had been off amiodarone since the beginning of September. Device interrogation revealed recurrent VT terminated by ATP and he was admitted to the hospital. He was admitted to the CCU and started on amiodarone. He underwent an EP study and VT ablation 10/5/17 and was restarted on sotalol and toprol. He had PVCs and NSVT (different then what was ablated). He had orthostatic hypotension and was hydrated and his medications were adjusted. QTc remained stable on sotalol. \par \par He was doing well until early November 2017 when he had near syncope. He was admitted to an OSH with recurrent VT and placed on amiodarone with breakthrough episodes. All of the episodes were terminated with ATP; until 11/9 when he got an appropriate ICD shock. He was transferred to [facility]. Amiodarone was switched to Quinidine and he had less VT; however during exercise stress test he had sustained VT terminated by ATP with resultant lightheadedness. He underwent a successful epicardial VT ablation on 11/16. \par \par In late November he was found to be in atrial fibrillation and was recommended to start Tikosyn; but secondary to price went on Sotalol. He had a cardioversion 12/2018 \par \par 12 /2019 he had "skipped beats" and remote transmission showed ventricular bigeminy. We stopped his He was then admitted at an OSH with PVCs and switched to amiodarone.  He has finished the loading dose and is now on 200mg daily.  He was found to be back in afib 5/2020 with some SOB and underwent a cardioversion. \par \par He presents for follow up and overall he is doing well. Stable anxiety and fatigue. No chest pain, syncope, near syncope, SOB, orthopnea, PND . No device related complaints. He is compliant with Eliquis and on low dose amiodarone. \par

## 2021-01-19 NOTE — PATIENT PROFILE ADULT. - FUNCTIONAL SCREEN CURRENT LEVEL: SWALLOWING (IF SCORE 2 OR MORE FOR ANY ITEM, CONSULT REHAB SERVICES), MLM)
pleasant, well nourished, well developed, in no acute distress , normal communication ability
(0) swallows foods/liquids without difficulty

## 2021-01-25 ENCOUNTER — RX RENEWAL (OUTPATIENT)
Age: 76
End: 2021-01-25

## 2021-02-05 NOTE — DISCHARGE NOTE ADULT - HOSPITAL COURSE
forehead 71yo M w/ PMH of recurrent VT s/p 3 ablations (9/2016, 5/2017, 10/2017) and AICD, CAD w/ MATTY to LAD (6/10/16) and pLAD (6/16/016), HFrEF (EF <35%), DVT on Eliquis, DANI on CPAP, pre-DM, BPH, OA, HTN, recurrent UTI who is a transfer from [facility] for VT.  At [facility] ED, patient had recurrent runs of VT, was given ASA 324mg, amiodarone 150mg, and sotalol 120mg. He continued to have brief runs of VT and was started on amiodarone drip eventually transitioned to carvedilol and sotalol. AICD had 92 episodes of VT with ATP but with no shocks delivered. On 11/9 had recurrent NSVT that broke with therapeutic ATP but then had sustained VT at HR 150s that failed ATPx3 and resulted in a therapeutic ICD shock x1 to NSR. Amiodarone was loaded and started on amio gtt. Patient was transferred to [facility] for possible ablation with Dr. Neal. Patient was brought directly to [facility] CCU. Tele showing NSR with frequent VT episodes. Continued on amio gtt and carvedilol 25mg BID, lisinopril 2.5mg, coreg 25mg BID, ASA 81mg and atorvastatin 20mg qhs. Heparin gtt initiated and held home Eliquis anticipating EP procedure. Pt evaled by EP, amio gtt transitioned to quinidine w/ improvement in number of VT episodes. Underwent exercise stress test 11/15 and stress test had sustained episode of VT, responded to ATP by ICD, converted to bigeminy then SR -- reported feeling lightheaded during episode. QTc 511ms observed, quinidine d/c'ed. 11/16, pt underwent epicardial VT ablation was transferred to CCU for further monitoring post-proecurally. While in CCU, hep gtt transitioned back to home Eliquis. Remained hemodynamically stable 71yo M w/ PMH of recurrent VT s/p 3 ablations (9/2016, 5/2017, 10/2017) and AICD, CAD w/ MATTY to LAD (6/10/16) and pLAD (6/16/016), HFrEF (EF <35%), DVT on Eliquis, DANI on CPAP, pre-DM, BPH, OA, HTN, recurrent UTI who is a transfer from [facility] for VT.  At [facility] ED, patient had recurrent runs of VT, was given ASA 324mg, amiodarone 150mg, and sotalol 120mg. He continued to have brief runs of VT and was started on amiodarone drip eventually transitioned to carvedilol and sotalol. AICD had 92 episodes of VT with ATP but with no shocks delivered. On 11/9 had recurrent NSVT that broke with therapeutic ATP but then had sustained VT at HR 150s that failed ATPx3 and resulted in a therapeutic ICD shock x1 to NSR. Amiodarone was loaded and started on amio gtt. Patient was transferred to [facility] for possible ablation with Dr. Neal. Patient was brought directly to [facility] CCU. Tele showing NSR with frequent VT episodes. Continued on amio gtt and carvedilol 25mg BID, lisinopril 2.5mg, coreg 25mg BID, ASA 81mg and atorvastatin 20mg qhs. Heparin gtt initiated and held home Eliquis anticipating EP procedure. Pt evaled by EP, amio gtt transitioned to quinidine w/ improvement in number of VT episodes. Underwent exercise stress test 11/15 and stress test had sustained episode of VT, responded to ATP by ICD, converted to bigeminy then SR -- reported feeling lightheaded during episode. QTc 511ms observed, quinidine d/c'ed. 11/16, pt underwent epicardial VT ablation was transferred to CCU for further monitoring post-proecurally. While in CCU, hep gtt transitioned back to home Eliquis. He remained hemodynamically stable and was stepped down to tele 5Lachman. He underwent repeat EKG exercise stress test on 11/20/17 w/o recurrent sustained VT, but had NSVT [id number] and PVCs. He was discharged hemodynamically stable w/ outpt follow up on 11/29/17 w/ EP Dr Neal and Cardiologist Dr Lundberg.

## 2021-03-24 ENCOUNTER — APPOINTMENT (OUTPATIENT)
Dept: PULMONOLOGY | Facility: CLINIC | Age: 76
End: 2021-03-24

## 2021-04-14 ENCOUNTER — APPOINTMENT (OUTPATIENT)
Dept: HEART AND VASCULAR | Facility: CLINIC | Age: 76
End: 2021-04-14
Payer: MEDICARE

## 2021-04-14 ENCOUNTER — NON-APPOINTMENT (OUTPATIENT)
Age: 76
End: 2021-04-14

## 2021-04-14 VITALS
OXYGEN SATURATION: 95 % | BODY MASS INDEX: 37.89 KG/M2 | WEIGHT: 250 LBS | SYSTOLIC BLOOD PRESSURE: 132 MMHG | DIASTOLIC BLOOD PRESSURE: 81 MMHG | HEIGHT: 68 IN | HEART RATE: 70 BPM

## 2021-04-14 DIAGNOSIS — Z01.818 ENCOUNTER FOR OTHER PREPROCEDURAL EXAMINATION: ICD-10-CM

## 2021-04-14 DIAGNOSIS — E83.42 HYPOMAGNESEMIA: ICD-10-CM

## 2021-04-14 DIAGNOSIS — Z79.899 OTHER LONG TERM (CURRENT) DRUG THERAPY: ICD-10-CM

## 2021-04-14 DIAGNOSIS — Z00.00 ENCOUNTER FOR GENERAL ADULT MEDICAL EXAMINATION W/OUT ABNORMAL FINDINGS: ICD-10-CM

## 2021-04-14 PROCEDURE — 99215 OFFICE O/P EST HI 40 MIN: CPT

## 2021-04-14 PROCEDURE — 93000 ELECTROCARDIOGRAM COMPLETE: CPT

## 2021-04-14 RX ORDER — FINASTERIDE 5 MG/1
5 TABLET, FILM COATED ORAL
Refills: 0 | Status: DISCONTINUED | COMMUNITY
End: 2021-04-14

## 2021-04-14 RX ORDER — MAGNESIUM OXIDE 241.3 MG/1000MG
400 TABLET ORAL DAILY
Qty: 30 | Refills: 0 | Status: DISCONTINUED | COMMUNITY
Start: 2020-03-20 | End: 2021-04-14

## 2021-04-14 NOTE — HISTORY OF PRESENT ILLNESS
[FreeTextEntry1] : Mr. Sauceda presents for initial evaluation and management of CAD s/p PCI LAD, chronic systolic heart failure secondary to ischemic cardiomyopathy, recurrent VT s/p ICD and VT ablation, paroxysmal atrial fibrillation, varicose veins s/p ablation, DVT, DANI, pre-DM2, and HTN.  He has a complex cardiovascular history and was previously followed by several other cardiologists, most recently Janay Lundberg MD. He is followed by a heart rhythm specialist, Pedro Neal MD.  He was first noted to have NSVT and systolic heart failure postoperatively in 2014 when he was admitted for a urologic procedure to address bladder stones. He had a stress test at that time followed by an EP study at [facility] and underwent implantation of an ICD in July, 2014.  In June, 2016 he underwent invasive coronary angiography at [facility] and had PCI of his LAD.   Around that time, he had an appropriate ICD therapy for VT and went on to have a VT ablation at [facility]. He was placed on amiodarone for suppressive therapy.  In 2017, he had an episode of recurrent VT treated with ATP. In 2018, he once again had recurrent VT which was terminated with ATP and he underwent another VT ablation on 10/5/17 and was restarted on sotalol and Toprol. He was noted to have frequent PVCS and runs of VT an, on 11/6/2017, he underwent a third VT ablation which was epicardial. Shortly after, this ablation he developed atrial fibrillation requiring DCCV. In 2019, he had ventricular bigeminy and his sotalol was switched to amiodarone again. In May, 2020, he required another DCCV.    \par \par Currently, he is doing relatively well but still has complaints of symptomatic PVCs.  His most recent device interrogation reveals < 1% PVC burden.  \par \par

## 2021-04-14 NOTE — PHYSICAL EXAM
[General Appearance - Well Developed] : well developed [Normal Appearance] : normal appearance [Well Groomed] : well groomed [General Appearance - Well Nourished] : well nourished [No Deformities] : no deformities [General Appearance - In No Acute Distress] : no acute distress [Normal Conjunctiva] : the conjunctiva exhibited no abnormalities [Eyelids - No Xanthelasma] : the eyelids demonstrated no xanthelasmas [Normal Oral Mucosa] : normal oral mucosa [No Oral Pallor] : no oral pallor [No Oral Cyanosis] : no oral cyanosis [Normal Jugular Venous A Waves Present] : normal jugular venous A waves present [Normal Jugular Venous V Waves Present] : normal jugular venous V waves present [No Jugular Venous Root A Waves] : no jugular venous root A waves [Respiration, Rhythm And Depth] : normal respiratory rhythm and effort [Exaggerated Use Of Accessory Muscles For Inspiration] : no accessory muscle use [Auscultation Breath Sounds / Voice Sounds] : lungs were clear to auscultation bilaterally [Normal Rate] : normal [Premature Beats] : regular with premature beats [Normal S1] : normal S1 [Normal S2] : normal S2 [No Murmur] : no murmurs heard [2+] : left 2+ [No Abnormalities] : the abdominal aorta was not enlarged and no bruit was heard [___ +] : bilateral [unfilled]U+ pretibial pitting edema [Abdomen Soft] : soft [Abdomen Tenderness] : non-tender [Abdomen Mass (___ Cm)] : no abdominal mass palpated [Abnormal Walk] : normal gait [Gait - Sufficient For Exercise Testing] : the gait was sufficient for exercise testing [Nail Clubbing] : no clubbing of the fingernails [Cyanosis, Localized] : no localized cyanosis [Petechial Hemorrhages (___cm)] : no petechial hemorrhages [Skin Color & Pigmentation] : normal skin color and pigmentation [] : no rash [Skin Lesions] : no skin lesions [No Skin Ulcers] : no skin ulcer [No Xanthoma] : no  xanthoma was observed [Oriented To Time, Place, And Person] : oriented to person, place, and time [Affect] : the affect was normal [Mood] : the mood was normal [No Anxiety] : not feeling anxious [S3] : no S3 [S4] : no S4 [Right Carotid Bruit] : no bruit heard over the right carotid [Left Carotid Bruit] : no bruit heard over the left carotid [Right Femoral Bruit] : no bruit heard over the right femoral artery [Left Femoral Bruit] : no bruit heard over the left femoral artery [FreeTextEntry1] : + varicose veins b/l

## 2021-04-14 NOTE — REASON FOR VISIT
[Initial Evaluation] : an initial evaluation of [FreeTextEntry1] : Diagnostic Tests:\par -----------------------------\par ECG:\par 04/14/21: undetermined rhythm (possible sinus rhythm with low amplitude P-waves), old septal and inferior MI. \par 12/04/19: sinus arrest, demand pacing, frequent PVCs. \par 10/02/19: normal sinus rhythm, demand pacing, PVCs. \par -----------------------------\par Echo:\par 05/03/19: EF 40-45%, mild global hypokinesis, PASP 53 mmHg. \par -----------------------------\par Sleep studies: \par 05/1719: severe sleep apnea.

## 2021-04-14 NOTE — ASSESSMENT
[FreeTextEntry1] : 1. CAD: s/p PCI LAD 2016: CCS 0\par       - continue aggressive medical management\par       - continue off antiplatelet therapy given need for systemic anticoagulation\par       - will send for a vasodilator MPI stress test to assess burden of ischemia\par   \par 2. Chronic systolic heart failure secondary to ICM: NYHA II\par       - continue carvedilol 12.5mg po bid (not tolerant of 25mg po bid in past secondary to hypotension)\par       - continue lisinopril 2.5mg po qd\par       - will send for an echocardiogram\par       - will consider switching lisinopril to Entresto in the future if SBP allows\par       - check BNP today\par \par 3. Ventricular tachycardia: s/p VT ablation x 3 (2016, 2017, 2017): has frequent PVCs\par       - follow up with heart rhythm specialist, Pedro Neal MD\par       - continue amiodarone 100mg po qd (check periodic LFTs, TFTs, PFTs)\par \par 4. s/p ICD implantation: secondary prevention of VT, [employer], Fortify Assura DR [id number] ICD (implanted 07/13/14, Nely Ordoñez MD)\par      - follow up with device clinic at [facility]\par \par 5. Atrial fibrillation: paroxysmal: [id number] Score 4\par      - continue amiodarone 100 mg daily (check periodic LFTs, TFTs, PFTs)\par      - continue carvedilol 12.5 mg bid\par      - will monitor PFTs routinely with Dr. Estevez \par      - continue Eliquis 5mg po bid \par      - discussed with patient avoidance of ASA and NSAIDS as well as need for bleeding surveillance. \par \par  6. Dyslipidemia: on low dose statin (? nont tolerant of higher dose in the past)\par      - will repeat lab work today \par      - will increase atorvastatin to 40mg po daily post review\par \par 7. HTN: BP at ACC/AHA 2017 guideline target\par      - continue carvedilol 12.5mg po bid\par      - continue lisinopril 2.5mg po qd\par      - will uptitrate HF regimen as SBP allows\par \par  \par

## 2021-04-14 NOTE — REVIEW OF SYSTEMS
[Dyspnea on exertion] : dyspnea during exertion [Palpitations] : palpitations [Joint Pain] : joint pain [Negative] : Heme/Lymph

## 2021-04-15 LAB
ALBUMIN SERPL ELPH-MCNC: 4 G/DL
ALP BLD-CCNC: 88 U/L
ALT SERPL-CCNC: 11 U/L
ANION GAP SERPL CALC-SCNC: 12 MMOL/L
AST SERPL-CCNC: 20 U/L
BASOPHILS # BLD AUTO: 0.05 K/UL
BASOPHILS NFR BLD AUTO: 0.5 %
BILIRUB SERPL-MCNC: 0.4 MG/DL
BUN SERPL-MCNC: 15 MG/DL
CALCIUM SERPL-MCNC: 9.1 MG/DL
CHLORIDE SERPL-SCNC: 107 MMOL/L
CHOLEST SERPL-MCNC: 100 MG/DL
CO2 SERPL-SCNC: 24 MMOL/L
CREAT SERPL-MCNC: 0.95 MG/DL
EOSINOPHIL # BLD AUTO: 0.2 K/UL
EOSINOPHIL NFR BLD AUTO: 2.1 %
ESTIMATED AVERAGE GLUCOSE: 126 MG/DL
GLUCOSE SERPL-MCNC: 95 MG/DL
HBA1C MFR BLD HPLC: 6 %
HCT VFR BLD CALC: 42.2 %
HDLC SERPL-MCNC: 38 MG/DL
HGB BLD-MCNC: 12.7 G/DL
IMM GRANULOCYTES NFR BLD AUTO: 0.4 %
LDLC SERPL CALC-MCNC: 42 MG/DL
LDLC SERPL DIRECT ASSAY-MCNC: 45 MG/DL
LYMPHOCYTES # BLD AUTO: 1.47 K/UL
LYMPHOCYTES NFR BLD AUTO: 15.4 %
MAN DIFF?: NORMAL
MCHC RBC-ENTMCNC: 29.7 PG
MCHC RBC-ENTMCNC: 30.1 GM/DL
MCV RBC AUTO: 98.6 FL
MONOCYTES # BLD AUTO: 0.71 K/UL
MONOCYTES NFR BLD AUTO: 7.5 %
NEUTROPHILS # BLD AUTO: 7.05 K/UL
NEUTROPHILS NFR BLD AUTO: 74.1 %
NONHDLC SERPL-MCNC: 62 MG/DL
NT-PROBNP SERPL-MCNC: 184 PG/ML
PLATELET # BLD AUTO: 237 K/UL
POTASSIUM SERPL-SCNC: 4.7 MMOL/L
PROT SERPL-MCNC: 6.5 G/DL
RBC # BLD: 4.28 M/UL
RBC # FLD: 13.5 %
SODIUM SERPL-SCNC: 143 MMOL/L
TRIGL SERPL-MCNC: 100 MG/DL
TSH SERPL-ACNC: 2.21 UIU/ML
WBC # FLD AUTO: 9.52 K/UL

## 2021-04-27 ENCOUNTER — APPOINTMENT (OUTPATIENT)
Dept: HEART AND VASCULAR | Facility: CLINIC | Age: 76
End: 2021-04-27
Payer: MEDICARE

## 2021-04-27 PROCEDURE — 93295 DEV INTERROG REMOTE 1/2/MLT: CPT

## 2021-04-27 PROCEDURE — 93296 REM INTERROG EVL PM/IDS: CPT

## 2021-05-04 ENCOUNTER — FORM ENCOUNTER (OUTPATIENT)
Age: 76
End: 2021-05-04

## 2021-05-05 ENCOUNTER — RESULT REVIEW (OUTPATIENT)
Age: 76
End: 2021-05-05

## 2021-05-05 ENCOUNTER — OUTPATIENT (OUTPATIENT)
Dept: OUTPATIENT SERVICES | Facility: HOSPITAL | Age: 76
LOS: 1 days | End: 2021-05-05
Payer: MEDICARE

## 2021-05-05 DIAGNOSIS — Z95.810 PRESENCE OF AUTOMATIC (IMPLANTABLE) CARDIAC DEFIBRILLATOR: Chronic | ICD-10-CM

## 2021-05-05 DIAGNOSIS — Z90.49 ACQUIRED ABSENCE OF OTHER SPECIFIED PARTS OF DIGESTIVE TRACT: Chronic | ICD-10-CM

## 2021-05-05 DIAGNOSIS — Z98.890 OTHER SPECIFIED POSTPROCEDURAL STATES: Chronic | ICD-10-CM

## 2021-05-05 DIAGNOSIS — I25.10 ATHEROSCLEROTIC HEART DISEASE OF NATIVE CORONARY ARTERY WITHOUT ANGINA PECTORIS: ICD-10-CM

## 2021-05-05 PROCEDURE — 93306 TTE W/DOPPLER COMPLETE: CPT | Mod: 26

## 2021-05-05 PROCEDURE — 78452 HT MUSCLE IMAGE SPECT MULT: CPT | Mod: 26,MH

## 2021-05-05 PROCEDURE — 93306 TTE W/DOPPLER COMPLETE: CPT

## 2021-05-05 PROCEDURE — 78452 HT MUSCLE IMAGE SPECT MULT: CPT

## 2021-05-05 PROCEDURE — 93017 CV STRESS TEST TRACING ONLY: CPT

## 2021-05-05 PROCEDURE — A9500: CPT

## 2021-05-05 PROCEDURE — 93016 CV STRESS TEST SUPVJ ONLY: CPT

## 2021-05-05 PROCEDURE — A9505: CPT

## 2021-05-05 PROCEDURE — 93018 CV STRESS TEST I&R ONLY: CPT

## 2021-05-06 ENCOUNTER — NON-APPOINTMENT (OUTPATIENT)
Age: 76
End: 2021-05-06

## 2021-07-14 ENCOUNTER — APPOINTMENT (OUTPATIENT)
Dept: HEART AND VASCULAR | Facility: CLINIC | Age: 76
End: 2021-07-14
Payer: MEDICARE

## 2021-07-14 VITALS
HEART RATE: 69 BPM | DIASTOLIC BLOOD PRESSURE: 81 MMHG | WEIGHT: 250 LBS | HEIGHT: 68 IN | BODY MASS INDEX: 37.89 KG/M2 | SYSTOLIC BLOOD PRESSURE: 136 MMHG

## 2021-07-14 VITALS
HEIGHT: 68 IN | WEIGHT: 260 LBS | DIASTOLIC BLOOD PRESSURE: 86 MMHG | HEART RATE: 72 BPM | SYSTOLIC BLOOD PRESSURE: 141 MMHG | OXYGEN SATURATION: 96 % | BODY MASS INDEX: 39.4 KG/M2

## 2021-07-14 PROCEDURE — 99214 OFFICE O/P EST MOD 30 MIN: CPT

## 2021-07-14 PROCEDURE — 93283 PRGRMG EVAL IMPLANTABLE DFB: CPT

## 2021-07-14 PROCEDURE — 99212 OFFICE O/P EST SF 10 MIN: CPT | Mod: 25

## 2021-07-20 ENCOUNTER — RX RENEWAL (OUTPATIENT)
Age: 76
End: 2021-07-20

## 2021-07-20 NOTE — HISTORY OF PRESENT ILLNESS
[Palpitations] : no palpitations [SOB] : no dyspnea [Syncope] : no syncope [ICD Shocks] : no recent ICD shocks [Chest Pain] : no chest pain or discomfort [Shoulder Pain] : no shoulder pain [Pain at Site] : no pain at device site [Erythema at Site] : no erythema at device site [Swelling at Site] : no swelling at device site [FreeTextEntry1] : Mr. Sauceda is a 76 y/o M with chronic systolic heart failure with EF 30-35% s/p ST Donaldo ICD, CAD s/p MATTY to LAD, DANI on CPAP, Pre-DM, BPH, OA, HTN, recurrent UTI, atrial fibrillation on Eliquis s/p DCCV 12/2017 and 12/2018 and 5/2020, recurrent ventricular tachycardia s/p ablation 10/5/17 and 11/16/17, who now presents for remote follow up.\par \par His history is as follows by his report and patient discharge information from multiple hospitalizations. He underwent surgery for bladder stones 7/2014; post op course significant for NSVT. Work-up thereafter included a stress test followed by an EP study at [facility]. He ultimately had an ICD placed 7/2014. Reports he was admitted with a CHF exacerbation 7/2015. He underwent cardiac catheterization at [facility] 6/2016 s/p MATTY to LAD. Reports he had VT and had ICD shock, followed by VT ablation 9/2016 at [facility]. Amiodarone was started post-procedure for arrhythmia suppression. He had palpitations and was admitted to [facility] 11/2016 with heart rate up to 170 bpm. He was given IV Amiodarone followed by a cardioversion. Amiodarone was stopped due to concern for long term side effects and Sotalol was started. VT treated with ATP 1/2017; Sotalol was discontinued and Amiodarone with load restarted. He was told he would likely need another ablation. Case was discussed with Dr. Diaz at [place] and the decision was made to D/C ASA, continue Plavix and Eliquis until one year post stent. \par \par He presented to Dr. Arceo’ office 1-2-17 complaining of dizziness x a few days. He had been off amiodarone since the beginning of September. Device interrogation revealed recurrent VT terminated by ATP and he was admitted to the hospital. He was admitted to the CCU and started on amiodarone. He underwent an EP study and VT ablation 10/5/17 and was restarted on sotalol and toprol. He had PVCs and NSVT (different then what was ablated). He had orthostatic hypotension and was hydrated and his medications were adjusted. QTc remained stable on sotalol. \par \par He was doing well until early November 2017 when he had near syncope. He was admitted to an OSH with recurrent VT and placed on amiodarone with breakthrough episodes. All of the episodes were terminated with ATP; until 11/9 when he got an appropriate ICD shock. He was transferred to [facility]. Amiodarone was switched to Quinidine and he had less VT; however during exercise stress test he had sustained VT terminated by ATP with resultant lightheadedness. He underwent a successful epicardial VT ablation on 11/16. \par \par In late November he was found to be in atrial fibrillation and was recommended to start Tikosyn; but secondary to price went on Sotalol. He had a cardioversion 12/2018 \par \par 12 /2019 he had "skipped beats" and remote transmission showed ventricular bigeminy. We stopped his He was then admitted at an OSH with PVCs and switched to amiodarone.  He has finished the loading dose and is now on 200mg daily.  He was found to be back in afib 5/2020 with some SOB and underwent a cardioversion. \par \par He presents for follow up and overall he is doing well. Stable anxiety and fatigue.  He states he still has PVCs as per his watch.  No chest pain, syncope, near syncope, SOB, orthopnea, PND . No device related complaints. He is compliant with Eliquis and on low dose amiodarone. \par

## 2021-07-20 NOTE — DISCUSSION/SUMMARY
[AICD Function Normal] : normal AICD function [FreeTextEntry1] : Device interrogation reveals normal function.  All measured data is within normal limits.  No events for review and nor recurrent afib.  PVC count <1%.  He is maintained on eliquis and low dose amiodarone and overall is doing well.  We discussed long term options of a afib ablation vs. upgrade to a BiV (symptoms of heart failure in afib secondary to RV pacing).  Will continue to monitor for now as the amiodarone is successfully suppressing his afib. He will follow up in 6 months or sooner if needed.  He will utilize remote monitoring and knows to call with any questions or concerns.

## 2021-07-20 NOTE — PHYSICAL EXAM
[General Appearance - Well Developed] : well developed [Normal Appearance] : normal appearance [Well Groomed] : well groomed [General Appearance - Well Nourished] : well nourished [No Deformities] : no deformities [General Appearance - In No Acute Distress] : no acute distress [Heart Rate And Rhythm] : heart rate and rhythm were normal [Heart Sounds] : normal S1 and S2 [Edema] : no peripheral edema present [] : no respiratory distress [Respiration, Rhythm And Depth] : normal respiratory rhythm and effort [Exaggerated Use Of Accessory Muscles For Inspiration] : no accessory muscle use [Auscultation Breath Sounds / Voice Sounds] : lungs were clear to auscultation bilaterally [Clean] : clean [Dry] : dry [Well-Healed] : well-healed [Palpable Crepitus] : no palpable crepitus [Bleeding] : no active bleeding [Foul Odor] : no foul smell [Purulent Drainage] : no purulent drainage [Serosanguineous Drainage] : no serosanquineous drainage [Serous Drainage] : no serous drainage [Erythema] : not erythematous [Warm] : not warm [Tender] : not tender [Indurated] : not indurated [Fluctuant] : not fluctuant

## 2021-07-20 NOTE — PROCEDURE
[No] : not [NSR] : normal sinus rhythm [Pacemaker] : pacemaker [DDDR] : DDDR [Threshold Testing Performed] : Threshold testing was performed [Lead Imp:  ___ohms] : lead impedance was [unfilled] ohms [Sensing Amplitude ___mv] : sensing amplitude was [unfilled] mv [___V @] : [unfilled] V [___ ms] : [unfilled] ms [None] : none [de-identified] : [employer] [de-identified] : fortify assura [de-identified] : [id number] [de-identified] : 7/2014 [de-identified] : 70/90 [de-identified] : 1.9 years [de-identified] : shock impedance 44\par no events or recurrent afib\par AP 91%\par  7.8%

## 2021-07-28 ENCOUNTER — NON-APPOINTMENT (OUTPATIENT)
Age: 76
End: 2021-07-28

## 2021-07-28 ENCOUNTER — APPOINTMENT (OUTPATIENT)
Dept: HEART AND VASCULAR | Facility: CLINIC | Age: 76
End: 2021-07-28
Payer: MEDICARE

## 2021-07-28 PROCEDURE — 93296 REM INTERROG EVL PM/IDS: CPT

## 2021-07-28 PROCEDURE — 93295 DEV INTERROG REMOTE 1/2/MLT: CPT

## 2021-08-03 ENCOUNTER — RX RENEWAL (OUTPATIENT)
Age: 76
End: 2021-08-03

## 2021-08-31 ENCOUNTER — NON-APPOINTMENT (OUTPATIENT)
Age: 76
End: 2021-08-31

## 2021-09-07 NOTE — REASON FOR VISIT
[Other: ____] : [unfilled] [FreeTextEntry1] : Diagnostic Tests:\par -----------------------------\par ECG:\par 04/14/21: undetermined rhythm (possible sinus rhythm with low amplitude P-waves), old septal and inferior MI. \par 12/04/19: sinus arrest, demand pacing, frequent PVCs. \par 10/02/19: normal sinus rhythm, demand pacing, PVCs. \par -----------------------------\par Echo:\par 05/05/21: EF 47%, global hypokinesis, mild LVH, grade I diastolic dysfunction, mildly dilated LA, aortic sclerosis, mild AI, mild MR, ICD. \par 05/03/19: EF 40-45%, mild global hypokinesis, PASP 53 mmHg. \par -----------------------------\par Sleep studies: \par 05/1719: severe sleep apnea. \par -----------------------------\par Stress:\par 05/05/21: Regadenoson MPI: EF 46%, small apical MI, dilated LV, apical hypokinesis.

## 2021-09-07 NOTE — PHYSICAL EXAM
[S3] : no S3 [S4] : no S4 [Right Carotid Bruit] : no bruit heard over the right carotid [Left Carotid Bruit] : no bruit heard over the left carotid [Right Femoral Bruit] : no bruit heard over the right femoral artery [Left Femoral Bruit] : no bruit heard over the left femoral artery [FreeTextEntry1] : + varicose veins b/l

## 2021-09-07 NOTE — ASSESSMENT
[FreeTextEntry1] : 1. CAD: s/p PCI LAD 2016: CCS 0: s/p 05/05/21: Regadenoson MPI: EF 46%, small apical MI, dilated LV, apical hypokinesis. s/p 05/05/21: Regadenoson MPI: EF 46%, small apical MI, dilated LV, apical hypokinesis. \par       - continue aggressive medical management\par       - continue off antiplatelet therapy given need for systemic anticoagulation\par   \par 2. Chronic systolic heart failure secondary to ICM: NYHA II\par       - continue carvedilol 12.5mg po bid (not tolerant of 25mg po bid in past secondary to hypotension)\par       - increase lisinopril from 2.5mg po qd to 5mg po qd \par       - will follow with serial echocardiograms\par       - will consider switching lisinopril to Entresto in the future if SBP allows\par \par 3. Ventricular tachycardia: s/p VT ablation x 3 (2016, 2017, 2017): has frequent PVCs\par       - follow up with heart rhythm specialist, Pedro Neal MD\par       - continue amiodarone 100mg po qd (check periodic LFTs, TFTs, PFTs)\par \par 4. s/p ICD implantation: secondary prevention of VT, [employer], Fortify Assura DR [id number] ICD (implanted 07/13/14, Nely Ordoñez MD)\par      - follow up with device clinic at [facility]\par \par 5. Atrial fibrillation: paroxysmal: [id number] Score 4\par      - continue amiodarone 100 mg daily (check periodic LFTs, TFTs, PFTs)\par      - continue carvedilol 12.5 mg bid\par      - will monitor PFTs routinely with Dr. Estevez \par      - continue Eliquis 5mg po bid \par      - discussed with patient avoidance of ASA and NSAIDS as well as need for bleeding surveillance. \par \par  6. Dyslipidemia: on low dose statin (? not tolerant of higher dose in the past), LDL 45 (04/15/21)\par      - continue atorvastatin 20mg po qd \par \par 7. HTN: BP at ACC/AHA 2017 guideline target\par      - continue carvedilol 12.5mg po bid\par      - increase lisinopril from 2.5mg po qd to 5mg po qd \par      - will uptitrate HF regimen as SBP allows\par \par 8. Obstructive sleep apnea: severe\par     - continue CPAP\par     - follow up with sleep medicine specialist, Richard Plunkett MD\par \par  \par

## 2021-09-07 NOTE — ADDENDUM
[FreeTextEntry1] : 09/07/21: There are no cardiac contraindications to cataract surgery.  He may hold Eliquis for 1 day if required.

## 2021-10-13 ENCOUNTER — APPOINTMENT (OUTPATIENT)
Dept: HEART AND VASCULAR | Facility: CLINIC | Age: 76
End: 2021-10-13
Payer: MEDICARE

## 2021-10-13 ENCOUNTER — NON-APPOINTMENT (OUTPATIENT)
Age: 76
End: 2021-10-13

## 2021-10-13 VITALS
OXYGEN SATURATION: 98 % | DIASTOLIC BLOOD PRESSURE: 88 MMHG | HEIGHT: 68 IN | WEIGHT: 240 LBS | BODY MASS INDEX: 36.37 KG/M2 | HEART RATE: 70 BPM | SYSTOLIC BLOOD PRESSURE: 142 MMHG

## 2021-10-13 VITALS
SYSTOLIC BLOOD PRESSURE: 155 MMHG | HEART RATE: 72 BPM | HEIGHT: 68 IN | BODY MASS INDEX: 36.37 KG/M2 | WEIGHT: 240 LBS | DIASTOLIC BLOOD PRESSURE: 86 MMHG

## 2021-10-13 VITALS — SYSTOLIC BLOOD PRESSURE: 134 MMHG | DIASTOLIC BLOOD PRESSURE: 90 MMHG

## 2021-10-13 PROCEDURE — 93000 ELECTROCARDIOGRAM COMPLETE: CPT

## 2021-10-13 PROCEDURE — 99213 OFFICE O/P EST LOW 20 MIN: CPT | Mod: 25

## 2021-10-13 PROCEDURE — 93283 PRGRMG EVAL IMPLANTABLE DFB: CPT

## 2021-10-13 PROCEDURE — 99214 OFFICE O/P EST MOD 30 MIN: CPT | Mod: 25

## 2021-10-13 NOTE — REVIEW OF SYSTEMS
[Negative] : Heme/Lymph [Feeling Fatigued] : feeling fatigued [Weight Loss (___ Lbs)] : [unfilled] ~Ulb weight loss [Dyspnea on exertion] : dyspnea during exertion [Palpitations] : palpitations [Dizziness] : dizziness [Numbness (Hypoesthesia)] : numbness [Weakness] : weakness [Depression] : depression

## 2021-10-14 LAB
ALBUMIN SERPL ELPH-MCNC: 4 G/DL
ALBUMIN SERPL ELPH-MCNC: 4.1 G/DL
ALP BLD-CCNC: 89 U/L
ALP BLD-CCNC: 94 U/L
ALT SERPL-CCNC: 11 U/L
ALT SERPL-CCNC: 14 U/L
ANION GAP SERPL CALC-SCNC: 9 MMOL/L
AST SERPL-CCNC: 17 U/L
AST SERPL-CCNC: 18 U/L
BASOPHILS # BLD AUTO: 0.06 K/UL
BASOPHILS NFR BLD AUTO: 0.7 %
BILIRUB DIRECT SERPL-MCNC: 0.2 MG/DL
BILIRUB INDIRECT SERPL-MCNC: 0.5 MG/DL
BILIRUB SERPL-MCNC: 0.6 MG/DL
BILIRUB SERPL-MCNC: 0.6 MG/DL
BUN SERPL-MCNC: 13 MG/DL
CALCIUM SERPL-MCNC: 9.1 MG/DL
CHLORIDE SERPL-SCNC: 105 MMOL/L
CHOLEST SERPL-MCNC: 122 MG/DL
CO2 SERPL-SCNC: 27 MMOL/L
CREAT SERPL-MCNC: 0.9 MG/DL
EOSINOPHIL # BLD AUTO: 0.13 K/UL
EOSINOPHIL NFR BLD AUTO: 1.5 %
ESTIMATED AVERAGE GLUCOSE: 120 MG/DL
GLUCOSE SERPL-MCNC: 100 MG/DL
HBA1C MFR BLD HPLC: 5.8 %
HCT VFR BLD CALC: 42.8 %
HDLC SERPL-MCNC: 48 MG/DL
HGB BLD-MCNC: 13.3 G/DL
IMM GRANULOCYTES NFR BLD AUTO: 0.1 %
LDLC SERPL CALC-MCNC: 58 MG/DL
LDLC SERPL DIRECT ASSAY-MCNC: 56 MG/DL
LYMPHOCYTES # BLD AUTO: 1.2 K/UL
LYMPHOCYTES NFR BLD AUTO: 13.6 %
MAN DIFF?: NORMAL
MCHC RBC-ENTMCNC: 30.1 PG
MCHC RBC-ENTMCNC: 31.1 GM/DL
MCV RBC AUTO: 96.8 FL
MONOCYTES # BLD AUTO: 0.73 K/UL
MONOCYTES NFR BLD AUTO: 8.3 %
NEUTROPHILS # BLD AUTO: 6.67 K/UL
NEUTROPHILS NFR BLD AUTO: 75.8 %
NONHDLC SERPL-MCNC: 75 MG/DL
NT-PROBNP SERPL-MCNC: 211 PG/ML
PLATELET # BLD AUTO: 206 K/UL
POTASSIUM SERPL-SCNC: 4 MMOL/L
PROT SERPL-MCNC: 6.3 G/DL
PROT SERPL-MCNC: 6.4 G/DL
RBC # BLD: 4.42 M/UL
RBC # FLD: 14.2 %
SODIUM SERPL-SCNC: 141 MMOL/L
T3FREE SERPL-MCNC: 3.03 PG/ML
T4 FREE SERPL-MCNC: 1.6 NG/DL
TRIGL SERPL-MCNC: 85 MG/DL
TSH SERPL-ACNC: 2.14 UIU/ML
WBC # FLD AUTO: 8.8 K/UL

## 2021-10-18 ENCOUNTER — RX RENEWAL (OUTPATIENT)
Age: 76
End: 2021-10-18

## 2021-10-18 NOTE — DISCUSSION/SUMMARY
[AICD Function Normal] : normal AICD function [FreeTextEntry1] : Device interrogation reveals normal function.  All measured data is within normal limits.  No events for review and nor recurrent afib.  PVC count <1%.  He is maintained on eliquis and low dose amiodarone. He was reassured that there are no arrhythmias to account for the way he is feeling.  He has upcoming blood work and will follow up with Dr. Finkelstein and his PMD for further assessment.   We discussed if he were to have recurrent atrial fibrillation options of afib ablation vs. upgrade to a BiV (symptoms of heart failure in afib secondary to RV pacing).  Will continue to monitor for now as the amiodarone is successfully suppressing his afib. He will follow up in 6 months or sooner if needed.  He will utilize remote monitoring and knows to call with any questions or concerns.

## 2021-10-18 NOTE — REVIEW OF SYSTEMS
[Feeling Fatigued] : feeling fatigued [Anxiety] : anxiety [Fever] : no fever [Chills] : no chills [SOB] : no shortness of breath [Chest Discomfort] : no chest discomfort [Palpitations] : no palpitations [Syncope] : no syncope [Cough] : no cough [Rash] : no rash [Dizziness] : no dizziness [Negative] : Neurological

## 2021-10-18 NOTE — PROCEDURE
[No] : not [NSR] : normal sinus rhythm [Pacemaker] : pacemaker [DDDR] : DDDR [Threshold Testing Performed] : Threshold testing was performed [Lead Imp:  ___ohms] : lead impedance was [unfilled] ohms [Sensing Amplitude ___mv] : sensing amplitude was [unfilled] mv [___V @] : [unfilled] V [___ ms] : [unfilled] ms [None] : none [de-identified] : [employer] [de-identified] : fortify assura [de-identified] : [id number] [de-identified] : 7/2014 [de-identified] : 70/90 [de-identified] : 2 years [de-identified] : shock impedance 45\par no events or recurrent afib\par AP 93%\par  7.8%

## 2021-10-18 NOTE — HISTORY OF PRESENT ILLNESS
[Palpitations] : no palpitations [SOB] : no dyspnea [Syncope] : no syncope [Chest Pain] : no chest pain or discomfort [ICD Shocks] : no recent ICD shocks [Shoulder Pain] : no shoulder pain [Pain at Site] : no pain at device site [Erythema at Site] : no erythema at device site [Swelling at Site] : no swelling at device site [FreeTextEntry1] : Mr. Sauceda is a 75 y/o M with chronic systolic heart failure with EF 30-35% s/p ST Donaldo ICD, CAD s/p MATTY to LAD, DANI on CPAP, Pre-DM, BPH, OA, HTN, recurrent UTI, atrial fibrillation on Eliquis s/p DCCV 12/2017 and 12/2018 and 5/2020, recurrent ventricular tachycardia s/p ablation 10/5/17 and 11/16/17, who now presents for remote follow up secondary to "feeling horrible".\par \par His history is as follows by his report and patient discharge information from multiple hospitalizations. He underwent surgery for bladder stones 7/2014; post op course significant for NSVT. Work-up thereafter included a stress test followed by an EP study at [facility]. He ultimately had an ICD placed 7/2014. Reports he was admitted with a CHF exacerbation 7/2015. He underwent cardiac catheterization at [facility] 6/2016 s/p MATTY to LAD. Reports he had VT and had ICD shock, followed by VT ablation 9/2016 at [facility]. Amiodarone was started post-procedure for arrhythmia suppression. He had palpitations and was admitted to [facility] 11/2016 with heart rate up to 170 bpm. He was given IV Amiodarone followed by a cardioversion. Amiodarone was stopped due to concern for long term side effects and Sotalol was started. VT treated with ATP 1/2017; Sotalol was discontinued and Amiodarone with load restarted. He was told he would likely need another ablation. Case was discussed with Dr. Diaz at [place] and the decision was made to D/C ASA, continue Plavix and Eliquis until one year post stent. \par \par He presented to Dr. Arceo’ office 1-2-17 complaining of dizziness x a few days. He had been off amiodarone since the beginning of September. Device interrogation revealed recurrent VT terminated by ATP and he was admitted to the hospital. He was admitted to the CCU and started on amiodarone. He underwent an EP study and VT ablation 10/5/17 and was restarted on sotalol and toprol. He had PVCs and NSVT (different then what was ablated). He had orthostatic hypotension and was hydrated and his medications were adjusted. QTc remained stable on sotalol. \par \par He was doing well until early November 2017 when he had near syncope. He was admitted to an OSH with recurrent VT and placed on amiodarone with breakthrough episodes. All of the episodes were terminated with ATP; until 11/9 when he got an appropriate ICD shock. He was transferred to [facility]. Amiodarone was switched to Quinidine and he had less VT; however during exercise stress test he had sustained VT terminated by ATP with resultant lightheadedness. He underwent a successful epicardial VT ablation on 11/16. \par \par In late November he was found to be in atrial fibrillation and was recommended to start Tikosyn; but secondary to price went on Sotalol. He had a cardioversion 12/2018 \par \par 12 /2019 he had "skipped beats" and remote transmission showed ventricular bigeminy. We stopped his He was then admitted at an OSH with PVCs and switched to amiodarone.  He has finished the loading dose and is now on 200mg daily.  He was found to be back in afib 5/2020 with some SOB and underwent a cardioversion. \par \par He presents for follow up and "states he feels horrible" Stable anxiety - but worsening fatigue.  He states that he "just doesn’t feel good".  He states he still has PVCs as per his watch.  No chest pain, syncope, near syncope, SOB, orthopnea, PND . No device related complaints. He is compliant with Eliquis and on low dose amiodarone. \par

## 2021-10-27 ENCOUNTER — APPOINTMENT (OUTPATIENT)
Dept: HEART AND VASCULAR | Facility: CLINIC | Age: 76
End: 2021-10-27
Payer: MEDICARE

## 2021-10-27 ENCOUNTER — NON-APPOINTMENT (OUTPATIENT)
Age: 76
End: 2021-10-27

## 2021-10-27 PROCEDURE — 93295 DEV INTERROG REMOTE 1/2/MLT: CPT

## 2021-10-27 PROCEDURE — 93296 REM INTERROG EVL PM/IDS: CPT | Mod: NC

## 2021-11-16 ENCOUNTER — FORM ENCOUNTER (OUTPATIENT)
Age: 76
End: 2021-11-16

## 2021-11-17 ENCOUNTER — OUTPATIENT (OUTPATIENT)
Dept: OUTPATIENT SERVICES | Facility: HOSPITAL | Age: 76
LOS: 1 days | End: 2021-11-17
Payer: MEDICARE

## 2021-11-17 ENCOUNTER — NON-APPOINTMENT (OUTPATIENT)
Age: 76
End: 2021-11-17

## 2021-11-17 DIAGNOSIS — Z95.810 PRESENCE OF AUTOMATIC (IMPLANTABLE) CARDIAC DEFIBRILLATOR: Chronic | ICD-10-CM

## 2021-11-17 DIAGNOSIS — Z98.890 OTHER SPECIFIED POSTPROCEDURAL STATES: Chronic | ICD-10-CM

## 2021-11-17 DIAGNOSIS — I50.22 CHRONIC SYSTOLIC (CONGESTIVE) HEART FAILURE: ICD-10-CM

## 2021-11-17 DIAGNOSIS — Z90.49 ACQUIRED ABSENCE OF OTHER SPECIFIED PARTS OF DIGESTIVE TRACT: Chronic | ICD-10-CM

## 2021-11-17 PROCEDURE — 93306 TTE W/DOPPLER COMPLETE: CPT | Mod: 26

## 2021-11-17 PROCEDURE — 93306 TTE W/DOPPLER COMPLETE: CPT

## 2021-12-01 NOTE — HISTORY OF PRESENT ILLNESS
[FreeTextEntry1] : Mr. Sauceda presents for follow up and management of CAD s/p PCI LAD, chronic systolic heart failure secondary to ischemic cardiomyopathy, recurrent VT s/p ICD and VT ablation, paroxysmal atrial fibrillation, varicose veins s/p ablation, DVT, DANI, pre-DM2, and HTN.  He has a complex cardiovascular history and was previously followed by several other cardiologists, most recently Janay Lundberg MD. He is followed by a heart rhythm specialist, Pedro Neal MD.  He was first noted to have NSVT and systolic heart failure postoperatively in 2014 when he was admitted for a urologic procedure to address bladder stones. He had a stress test at that time followed by an EP study at [facility] and underwent implantation of an ICD in July, 2014.  In June, 2016 he underwent invasive coronary angiography at [facility] and had PCI of his LAD.   Around that time, he had an appropriate ICD therapy for VT and went on to have a VT ablation at [facility]. He was placed on amiodarone for suppressive therapy.  In 2017, he had an episode of recurrent VT treated with ATP. In 2018, he once again had recurrent VT which was terminated with ATP and he underwent another VT ablation on 10/5/17 and was restarted on sotalol and Toprol. He was noted to have frequent PVCS and runs of VT an, on 11/6/2017, he underwent a third VT ablation which was epicardial. Shortly after, this ablation he developed atrial fibrillation requiring DCCV. In 2019, he had ventricular bigeminy and his sotalol was switched to amiodarone again. In May, 2020, he required another DCCV. On 05/05/21 he had an echocardiogram which revealed an EF of 47%, global hypokinesis, mild LVH, grade I diastolic dysfunction, mildly dilated LA, aortic sclerosis, mild AI, mild MR, and an ICD.  On 05/05/21 he had a regadenoson MPI which revealed an EF of 46%, small apical MI, dilated LV, and apical hypokinesis.  His most recent device interrogation reveals < 1% PVC burden.  He was evaluated in the ED at [facility] on 08/30 21 with complaints of feeling poorly.  He had a normal work up and was released from the ED.  At present, he feels poorly.  He has complaints of generalized weakness, dizziness, and poor appetite.  He lost 10 pounds since last visit.  He admits to significant depression.  \par \par

## 2021-12-01 NOTE — REASON FOR VISIT
[Other: ____] : [unfilled] [FreeTextEntry1] : Diagnostic Tests:\par -----------------------------\par ECG:\par 10/13/21: undetermined rhythm (possible sinus rhythm with low amplitude P-waves vs. accelerated junctional rhythm), old septal and inferior MI. \par 04/14/21: undetermined rhythm (possible sinus rhythm with low amplitude P-waves), old septal and inferior MI. \par 12/04/19: sinus arrest, demand pacing, frequent PVCs. \par 10/02/19: normal sinus rhythm, demand pacing, PVCs. \par -----------------------------\par Echo:\par 05/05/21: EF 47%, global hypokinesis, mild LVH, grade I diastolic dysfunction, mildly dilated LA, aortic sclerosis, mild AI, mild MR, ICD. \par 05/03/19: EF 40-45%, mild global hypokinesis, PASP 53 mmHg. \par -----------------------------\par Sleep studies: \par 05/1719: severe sleep apnea. \par -----------------------------\par Stress:\par 05/05/21: Regadenoson MPI: EF 46%, small apical MI, dilated LV, apical hypokinesis.

## 2021-12-01 NOTE — ADDENDUM
[FreeTextEntry1] : 12/01/21: Mr. Sauceda has significant cardiovascular disease which is currently well managed and stable.  he has not active cardiovascular complaints.  He may proceed with eye surgery without cardiac contraindications. He should hold Eliquis for 1 day prior and resume 1 day post procedure.

## 2021-12-01 NOTE — ASSESSMENT
[FreeTextEntry1] : 1. CAD: s/p PCI LAD 2016: CCS 0: s/p 05/05/21: Regadenoson MPI: EF 46%, small apical MI, dilated LV, apical hypokinesis. s/p 05/05/21: Regadenoson MPI: EF 46%, small apical MI, dilated LV, apical hypokinesis. \par       - continue aggressive medical management\par       - continue off antiplatelet therapy given need for systemic anticoagulation\par   \par 2. Chronic systolic heart failure secondary to ICM: NYHA II\par       - continue carvedilol 12.5mg po bid (not tolerant of 25mg po bid in past secondary to hypotension)\par       - continue lisinopril 5mg po qd \par       - will follow with serial echocardiograms (ordered today)\par       - will consider switching lisinopril to Entresto in the future if SBP allows\par \par 3. Ventricular tachycardia: s/p VT ablation x 3 (2016, 2017, 2017): has frequent PVCs\par       - follow up with heart rhythm specialist, Pedro Neal MD\par       - continue amiodarone 100mg po qd (check periodic LFTs, TFTs, PFTs)\par \par 4. s/p ICD implantation: secondary prevention of VT, [employer], Fortify [address]ura DR 2357-40Q ICD (implanted 07/13/14, Nely Ordoñez MD)\par      - follow up with device clinic at [facility]\par \par 5. Atrial fibrillation: paroxysmal: [id number] Score 4\par      - continue amiodarone 100 mg daily (check periodic LFTs, TFTs, PFTs)\par      - continue carvedilol 12.5 mg bid\par      - will monitor PFTs routinely with Dr. Estevez \par      - continue Eliquis 5mg po bid \par      - discussed with patient avoidance of ASA and NSAIDS as well as need for bleeding surveillance. \par \par  6. Dyslipidemia: on low dose statin (? not tolerant of higher dose in the past), LDL 45 (04/15/21)\par      - continue atorvastatin 20mg po qd \par      - check lab work today \par \par 7. HTN: BP at ACC/AHA 2017 guideline target\par      - continue carvedilol 12.5mg po bid\par      - continue 5mg po qd \par      - will uptitrate HF regimen as SBP allows\par \par 8. Obstructive sleep apnea: severe\par     - continue CPAP\par     - follow up with sleep medicine specialist, Richard Plunkett MD\par \par  \par

## 2022-01-12 ENCOUNTER — APPOINTMENT (OUTPATIENT)
Dept: HEART AND VASCULAR | Facility: CLINIC | Age: 77
End: 2022-01-12
Payer: MEDICARE

## 2022-01-26 ENCOUNTER — APPOINTMENT (OUTPATIENT)
Dept: HEART AND VASCULAR | Facility: CLINIC | Age: 77
End: 2022-01-26
Payer: MEDICARE

## 2022-01-26 ENCOUNTER — NON-APPOINTMENT (OUTPATIENT)
Age: 77
End: 2022-01-26

## 2022-01-26 VITALS
BODY MASS INDEX: 35.92 KG/M2 | SYSTOLIC BLOOD PRESSURE: 117 MMHG | TEMPERATURE: 97.3 F | DIASTOLIC BLOOD PRESSURE: 76 MMHG | WEIGHT: 237 LBS | HEART RATE: 70 BPM | HEIGHT: 68 IN

## 2022-01-26 VITALS — HEART RATE: 69 BPM | OXYGEN SATURATION: 99 % | SYSTOLIC BLOOD PRESSURE: 123 MMHG | DIASTOLIC BLOOD PRESSURE: 80 MMHG

## 2022-01-26 VITALS — WEIGHT: 237 LBS | BODY MASS INDEX: 36.04 KG/M2

## 2022-01-26 DIAGNOSIS — F32.A DEPRESSION, UNSPECIFIED: ICD-10-CM

## 2022-01-26 LAB — SARS-COV-2 N GENE NPH QL NAA+PROBE: DETECTED

## 2022-01-26 PROCEDURE — 93283 PRGRMG EVAL IMPLANTABLE DFB: CPT

## 2022-01-26 PROCEDURE — 99214 OFFICE O/P EST MOD 30 MIN: CPT | Mod: 25

## 2022-01-26 PROCEDURE — 93000 ELECTROCARDIOGRAM COMPLETE: CPT

## 2022-01-26 NOTE — HISTORY OF PRESENT ILLNESS
[FreeTextEntry1] : Mr. Sauceda presents for follow up and management of CAD s/p PCI LAD, chronic systolic heart failure secondary to ischemic cardiomyopathy, recurrent VT s/p ICD and VT ablation, paroxysmal atrial fibrillation, varicose veins s/p ablation, DVT, DANI, pre-DM2, and HTN.  He has a complex cardiovascular history and was previously followed by several other cardiologists, most recently Janay Lundberg MD. He is followed by a heart rhythm specialist, Pedro Neal MD.  He was first noted to have NSVT and systolic heart failure postoperatively in 2014 when he was admitted for a urologic procedure to address bladder stones. He had a stress test at that time followed by an EP study at [facility] and underwent implantation of an ICD in July, 2014.  In June, 2016 he underwent invasive coronary angiography at [facility] and had PCI of his LAD.   Around that time, he had an appropriate ICD therapy for VT and went on to have a VT ablation at [facility]. He was placed on amiodarone for suppressive therapy.  In 2017, he had an episode of recurrent VT treated with ATP. In 2018, he once again had recurrent VT which was terminated with ATP and he underwent another VT ablation on 10/5/17 and was restarted on sotalol and Toprol. He was noted to have frequent PVCS and runs of VT on 11/6/2017, and he underwent a third VT ablation which was epicardial. Shortly after, this ablation he developed atrial fibrillation requiring DCCV. In 2019, he had ventricular bigeminy and his sotalol was switched to amiodarone again. In May, 2020, he required another DCCV.  On 05/05/21 he had a regadenoson MPI which revealed an EF of 46%, small apical MI, dilated LV, and apical hypokinesis.  He was evaluated in the ED at [facility] on 08/30/21 with complaints of feeling poorly.  He had a normal work up and was sent home.  On 11/17/21 he had an echocardiogram which revealed an of EF 45-50%, grade II diastolic dysfunction, borderline dilated RV, dilated LA/RA, mild MR, PASP 32 mmHg, and ICD leads.    At present, he feels poorly.  He has complaints of generalized weakness, dizziness, and poor appetite.  He lost 10 pounds since last visit.  He admits to significant depression.  \par \par \par

## 2022-01-26 NOTE — REVIEW OF SYSTEMS
[Feeling Fatigued] : feeling fatigued [Anxiety] : anxiety [Negative] : Heme/Lymph [Under Stress] : under stress [Fever] : no fever [Chills] : no chills [SOB] : no shortness of breath [Chest Discomfort] : no chest discomfort [Palpitations] : no palpitations [Syncope] : no syncope [Cough] : no cough [Rash] : no rash [Dizziness] : no dizziness

## 2022-01-26 NOTE — ASSESSMENT
[FreeTextEntry1] : 1. CAD: s/p PCI LAD 2016: CCS 0: s/p 05/05/21: Regadenoson MPI: EF 46%, small apical MI, dilated LV, apical hypokinesis. s/p 05/05/21: Regadenoson MPI: EF 46%, small apical MI, dilated LV, apical hypokinesis. \par       - continue aggressive medical management\par       - continue off antiplatelet therapy given need for systemic anticoagulation\par   \par 2. Chronic systolic heart failure secondary to ICM: NYHA II s/p echo: EF 45-50%, grade II diastolic dysfunction, borderline dilated RV, dilated LA/RA, mild MR, PASP 32 mmHg, ICD leads.   \par       - continue carvedilol 12.5mg po bid (not tolerant of 25mg po bid in past secondary to hypotension)\par       - continue lisinopril 5mg po qd \par       - will follow with serial echocardiograms \par       - will consider switching lisinopril to Entresto in the future if SBP allows\par \par 3. Ventricular tachycardia: s/p VT ablation x 3 (2016, 2017, 2017): has symptomatic PVCs\par       - follow up with heart rhythm specialist, Pedro Neal MD\par       - continue amiodarone 100mg po qd (check periodic LFTs, TFTs, PFTs)\par \par 4. s/p ICD implantation: secondary prevention of VT, [employer], Fortify Neema HARPER 2357-40Q ICD (implanted 07/13/14, Nely Ordoñez MD)\par      - follow up with device clinic at [facility]\par \par 5. Atrial fibrillation: persistent: [id number] Score 4\par      - continue amiodarone 100 mg daily (check periodic LFTs, TFTs, PFTs)\par      - continue carvedilol 12.5 mg bid\par      - will monitor PFTs routinely with Dr. Estevez \par      - continue Eliquis 5mg po bid \par      - discussed with patient avoidance of ASA and NSAIDS as well as need for bleeding surveillance. \par \par  6. Dyslipidemia: on low dose statin (? not tolerant of higher dose in the past), LDL 48 (10/14/21)\par      - continue atorvastatin 20mg po qd \par      - discussed therapeutic lifestyle changes to promote improved lipid metabolism \par \par 7. HTN: BP at ACC/AHA 2017 guideline target\par      - continue carvedilol 12.5mg po bid\par      - continue 5mg po qd \par      - will uptitrate HF regimen as SBP allows\par \par 8. Obstructive sleep apnea: severe\par     - continue CPAP\par     - follow up with sleep medicine specialist, Richard Plunkett MD\par \par 9. Depression:\par     - will initiate escitalopram 10mg po qd (possible adverse effects of new medications discussed) \par \par \par An ECG was obtained to evaluate heart rhythm and screen for any underlying cardiac abnormalities. \par \par The patient was seen and examined with a cardiology fellow as part of their longitudinal ambulatory cardiology training experience.  Permission was obtained at the time of the visit from the patient for the fellow's participation. \par \par  \par

## 2022-01-26 NOTE — REASON FOR VISIT
[FreeTextEntry1] : Diagnostic Tests:\par -----------------------------\par ECG:\par 01/26/22: underlying atrial fibrillation, V-paced. \par 10/13/21: undetermined rhythm (possible sinus rhythm with low amplitude P-waves vs. accelerated junctional rhythm), old septal and inferior MI. \par 04/14/21: undetermined rhythm (possible sinus rhythm with low amplitude P-waves), old septal and inferior MI. \par 12/04/19: sinus arrest, demand pacing, frequent PVCs. \par 10/02/19: normal sinus rhythm, demand pacing, PVCs. \par -----------------------------\par Echo:\par 11/17/21: EF 45-50%, grade II diastolic dysfunction, borderline dilated RV, dilated LA/RA, mild MR, PASP 32 mmHg, ICD leads.  \par 05/05/21: EF 47%, global hypokinesis, mild LVH, grade I diastolic dysfunction, mildly dilated LA, aortic sclerosis, mild AI, mild MR, ICD. \par 05/03/19: EF 40-45%, mild global hypokinesis, PASP 53 mmHg. \par -----------------------------\par Sleep studies: \par 05/1719: severe sleep apnea. \par -----------------------------\par Stress:\par 05/05/21: regadenoson MPI: EF 46%, small apical MI, dilated LV, apical hypokinesis.

## 2022-01-26 NOTE — PROCEDURE
[No] : not [NSR] : normal sinus rhythm [Pacemaker] : pacemaker [DDDR] : DDDR [Threshold Testing Performed] : Threshold testing was performed [Lead Imp:  ___ohms] : lead impedance was [unfilled] ohms [Sensing Amplitude ___mv] : sensing amplitude was [unfilled] mv [___V @] : [unfilled] V [___ ms] : [unfilled] ms [None] : none [de-identified] : [employer] [de-identified] : fortify assura [de-identified] : [id number] [de-identified] : 7/2014 [de-identified] : 70/90 [de-identified] : 1.9 years [de-identified] : shock impedance 47\par no VT/NSVT\par 20% afib\par AP 76%\par  26%

## 2022-01-26 NOTE — DISCUSSION/SUMMARY
[AICD Function Normal] : normal AICD function [FreeTextEntry1] : Device interrogation reveals normal function.  All measured data is within normal limits.  He has been in atrial fibrillation for the past few days - rate controlled but this leads to RV pacing.  Can not program around this.  Traditionally when RV pacing he goes into heart failure.  I do not want to keep him in atrial fibrillation and have recommended proceeding with a repeat cardioversion vs. an ablation.  He would like to proceed with an ablation.  We discussed the procedure in detail including risks, benefits and alternatives.   I have quoted him an approximately 70% chance for success and a 1:700 chance of major complication related to the procedure.  Risks including, but not limited to; infection, vascular injury, cardiac perforation, TE/CVA, phrenic nerve injury were discussed.  All questions answered.  He was given pre procedure instructions.   He knows to call with any questions or concerns.

## 2022-01-26 NOTE — ADDENDUM
[FreeTextEntry1] :  I, Pedro Neal, hereby attest that the medical record entry for this patient accurately reflects signatures/notations that I made on the Date of Service in my capacity as an Attending Physician when I treated/diagnosed the above patient. I do hereby attest that this information is true, accurate and complete to the best of my knowledge and I understand that any falsification, omission, or concealment of material fact may subject me to administrative, civil, or, criminal liability.  I was present for the entire visit and supervised the entire visit and made/agree with the plan as outlined.\par \par

## 2022-01-26 NOTE — HISTORY OF PRESENT ILLNESS
[Palpitations] : no palpitations [SOB] : no dyspnea [Syncope] : no syncope [Chest Pain] : no chest pain or discomfort [ICD Shocks] : no recent ICD shocks [Shoulder Pain] : no shoulder pain [Pain at Site] : no pain at device site [Erythema at Site] : no erythema at device site [Swelling at Site] : no swelling at device site [FreeTextEntry1] : Mr. Sauceda is a 77 y/o M with chronic systolic heart failure with EF 30-35% s/p ST Donaldo ICD, CAD s/p MATTY to LAD, DANI on CPAP, Pre-DM, BPH, OA, HTN, recurrent UTI, atrial fibrillation on Eliquis s/p DCCV 12/2017 and 12/2018 and 5/2020, recurrent ventricular tachycardia s/p ablation 10/5/17 and 11/16/17, who now presents for follow up.\par \par His history is as follows by his report and patient discharge information from multiple hospitalizations. He underwent surgery for bladder stones 7/2014; post op course significant for NSVT. Work-up thereafter included a stress test followed by an EP study at [facility] He ultimately had an ICD placed 7/2014. Reports he was admitted with a CHF exacerbation 7/2015. He underwent cardiac catheterization at [facility] 6/2016 s/p MATTY to LAD. Reports he had VT and had ICD shock, followed by VT ablation 9/2016 at [facility] Amiodarone was started post-procedure for arrhythmia suppression. He had palpitations and was admitted to [facility] 11/2016 with heart rate up to 170 bpm. He was given IV Amiodarone followed by a cardioversion. Amiodarone was stopped due to concern for long term side effects and Sotalol was started. VT treated with ATP 1/2017; Sotalol was discontinued and Amiodarone with load restarted. He was told he would likely need another ablation. Case was discussed with Dr. Diaz at [facility] and the decision was made to D/C ASA, continue Plavix and Eliquis until one year post stent. \par \par He presented to Dr. Arceo’ office 1-2-17 complaining of dizziness x a few days. He had been off amiodarone since the beginning of September. Device interrogation revealed recurrent VT terminated by ATP and he was admitted to the hospital. He was admitted to the CCU and started on amiodarone. He underwent an EP study and VT ablation 10/5/17 and was restarted on sotalol and toprol. He had PVCs and NSVT (different then what was ablated). He had orthostatic hypotension and was hydrated and his medications were adjusted. QTc remained stable on sotalol. \par \par He was doing well until early November 2017 when he had near syncope. He was admitted to an OSH with recurrent VT and placed on amiodarone with breakthrough episodes. All of the episodes were terminated with ATP; until 11/9 when he got an appropriate ICD shock. He was transferred to [facility]. Amiodarone was switched to Quinidine and he had less VT; however during exercise stress test he had sustained VT terminated by ATP with resultant lightheadedness. He underwent a successful epicardial VT ablation on 11/16. \par \par In late November he was found to be in atrial fibrillation and was recommended to start Tikosyn; but secondary to price went on Sotalol. He had a cardioversion 12/2018 \par \par 12 /2019 he had "skipped beats" and remote transmission showed ventricular bigeminy. We stopped his He was then admitted at an OSH with PVCs and switched to amiodarone.  He has finished the loading dose and is now on 200mg daily.  He was found to be back in afib 5/2020 with some SOB and underwent a cardioversion. \par \par He presents for follow up and continues to "feel horrible" Stable anxiety - but worsening fatigue.  No chest pain, syncope, near syncope, SOB, orthopnea, PND . He thinks he is still having frequent PVCs.  No device related complaints. He is compliant with Eliquis and on low dose amiodarone. \par \par

## 2022-01-26 NOTE — PHYSICAL EXAM
[General Appearance - Well Developed] : well developed [Normal Appearance] : normal appearance [Well Groomed] : well groomed [General Appearance - Well Nourished] : well nourished [No Deformities] : no deformities [General Appearance - In No Acute Distress] : no acute distress [Heart Rate And Rhythm] : heart rate and rhythm were normal [Heart Sounds] : normal S1 and S2 [Edema] : no peripheral edema present [] : no respiratory distress [Respiration, Rhythm And Depth] : normal respiratory rhythm and effort [Exaggerated Use Of Accessory Muscles For Inspiration] : no accessory muscle use [Auscultation Breath Sounds / Voice Sounds] : lungs were clear to auscultation bilaterally [Clean] : clean [Dry] : dry [Well-Healed] : well-healed [Cyanosis, Localized] : no localized cyanosis [Palpable Crepitus] : no palpable crepitus [Bleeding] : no active bleeding [Foul Odor] : no foul smell [Purulent Drainage] : no purulent drainage [Serosanguineous Drainage] : no serosanquineous drainage [Serous Drainage] : no serous drainage [Erythema] : not erythematous [Warm] : not warm [Tender] : not tender [Indurated] : not indurated [Fluctuant] : not fluctuant

## 2022-01-31 ENCOUNTER — INPATIENT (INPATIENT)
Facility: HOSPITAL | Age: 77
LOS: 0 days | Discharge: HOME | DRG: 274 | End: 2022-02-01
Attending: INTERNAL MEDICINE | Admitting: INTERNAL MEDICINE
Payer: MEDICARE

## 2022-01-31 VITALS — SYSTOLIC BLOOD PRESSURE: 118 MMHG | HEART RATE: 70 BPM | RESPIRATION RATE: 16 BRPM | DIASTOLIC BLOOD PRESSURE: 80 MMHG

## 2022-01-31 DIAGNOSIS — Z98.890 OTHER SPECIFIED POSTPROCEDURAL STATES: Chronic | ICD-10-CM

## 2022-01-31 DIAGNOSIS — Z95.810 PRESENCE OF AUTOMATIC (IMPLANTABLE) CARDIAC DEFIBRILLATOR: Chronic | ICD-10-CM

## 2022-01-31 DIAGNOSIS — Z90.49 ACQUIRED ABSENCE OF OTHER SPECIFIED PARTS OF DIGESTIVE TRACT: Chronic | ICD-10-CM

## 2022-01-31 DIAGNOSIS — I48.19 OTHER PERSISTENT ATRIAL FIBRILLATION: ICD-10-CM

## 2022-01-31 LAB
ALBUMIN SERPL ELPH-MCNC: 3.5 G/DL — SIGNIFICANT CHANGE UP (ref 3.3–5)
ALP SERPL-CCNC: 79 U/L — SIGNIFICANT CHANGE UP (ref 40–120)
ALT FLD-CCNC: 18 U/L — SIGNIFICANT CHANGE UP (ref 10–45)
ANION GAP SERPL CALC-SCNC: 9 MMOL/L — SIGNIFICANT CHANGE UP (ref 5–17)
APTT BLD: 34.8 SEC — SIGNIFICANT CHANGE UP (ref 27.5–35.5)
APTT BLD: >200 SEC — CRITICAL HIGH (ref 27.5–35.5)
AST SERPL-CCNC: 49 U/L — HIGH (ref 10–40)
BASOPHILS # BLD AUTO: 0.03 K/UL — SIGNIFICANT CHANGE UP (ref 0–0.2)
BASOPHILS NFR BLD AUTO: 0.3 % — SIGNIFICANT CHANGE UP (ref 0–2)
BILIRUB SERPL-MCNC: 0.6 MG/DL — SIGNIFICANT CHANGE UP (ref 0.2–1.2)
BLD GP AB SCN SERPL QL: NEGATIVE — SIGNIFICANT CHANGE UP
BUN SERPL-MCNC: 12 MG/DL — SIGNIFICANT CHANGE UP (ref 7–23)
BUN SERPL-MCNC: 13 MG/DL — SIGNIFICANT CHANGE UP (ref 7–23)
BUN SERPL-MCNC: 14 MG/DL — SIGNIFICANT CHANGE UP (ref 7–23)
CALCIUM SERPL-MCNC: 8.2 MG/DL — LOW (ref 8.4–10.5)
CALCIUM SERPL-MCNC: 8.4 MG/DL — SIGNIFICANT CHANGE UP (ref 8.4–10.5)
CALCIUM SERPL-MCNC: 9.1 MG/DL — SIGNIFICANT CHANGE UP (ref 8.4–10.5)
CHLORIDE SERPL-SCNC: 106 MMOL/L — SIGNIFICANT CHANGE UP (ref 96–108)
CHLORIDE SERPL-SCNC: 108 MMOL/L — SIGNIFICANT CHANGE UP (ref 96–108)
CHLORIDE SERPL-SCNC: 109 MMOL/L — HIGH (ref 96–108)
CK MB CFR SERPL CALC: 12.6 NG/ML — HIGH (ref 0–6.7)
CK MB CFR SERPL CALC: 16.1 NG/ML — HIGH (ref 0–6.7)
CK SERPL-CCNC: 203 U/L — HIGH (ref 30–200)
CK SERPL-CCNC: 383 U/L — HIGH (ref 30–200)
CO2 SERPL-SCNC: 24 MMOL/L — SIGNIFICANT CHANGE UP (ref 22–31)
CO2 SERPL-SCNC: 24 MMOL/L — SIGNIFICANT CHANGE UP (ref 22–31)
CO2 SERPL-SCNC: 27 MMOL/L — SIGNIFICANT CHANGE UP (ref 22–31)
CREAT SERPL-MCNC: 0.87 MG/DL — SIGNIFICANT CHANGE UP (ref 0.5–1.3)
CREAT SERPL-MCNC: 0.88 MG/DL — SIGNIFICANT CHANGE UP (ref 0.5–1.3)
CREAT SERPL-MCNC: 0.99 MG/DL — SIGNIFICANT CHANGE UP (ref 0.5–1.3)
EOSINOPHIL # BLD AUTO: 0.02 K/UL — SIGNIFICANT CHANGE UP (ref 0–0.5)
EOSINOPHIL NFR BLD AUTO: 0.2 % — SIGNIFICANT CHANGE UP (ref 0–6)
GLUCOSE SERPL-MCNC: 106 MG/DL — HIGH (ref 70–99)
GLUCOSE SERPL-MCNC: 120 MG/DL — HIGH (ref 70–99)
GLUCOSE SERPL-MCNC: 134 MG/DL — HIGH (ref 70–99)
HCT VFR BLD CALC: 38 % — LOW (ref 39–50)
HCT VFR BLD CALC: 38.6 % — LOW (ref 39–50)
HCT VFR BLD CALC: 44 % — SIGNIFICANT CHANGE UP (ref 39–50)
HGB BLD-MCNC: 12.1 G/DL — LOW (ref 13–17)
HGB BLD-MCNC: 12.3 G/DL — LOW (ref 13–17)
HGB BLD-MCNC: 13.8 G/DL — SIGNIFICANT CHANGE UP (ref 13–17)
IMM GRANULOCYTES NFR BLD AUTO: 1 % — SIGNIFICANT CHANGE UP (ref 0–1.5)
INR BLD: 1.27 — HIGH (ref 0.88–1.16)
INR BLD: 1.63 — HIGH (ref 0.88–1.16)
INR BLD: 3.98 — HIGH (ref 0.88–1.16)
INR BLD: 9.12 — CRITICAL HIGH (ref 0.88–1.16)
ISTAT INR: 1.2 — HIGH (ref 0.88–1.16)
ISTAT PT: 14.7 SEC — HIGH (ref 10–12.9)
ISTAT VENOUS BE: 2 MMOL/L — SIGNIFICANT CHANGE UP (ref -2–3)
ISTAT VENOUS GLUCOSE: 107 MG/DL — HIGH (ref 70–99)
ISTAT VENOUS HCO3: 27 MMOL/L — SIGNIFICANT CHANGE UP (ref 23–28)
ISTAT VENOUS HEMATOCRIT: 43 % — SIGNIFICANT CHANGE UP (ref 39–50)
ISTAT VENOUS HEMOGLOBIN: 14.6 GM/DL — SIGNIFICANT CHANGE UP (ref 13–17)
ISTAT VENOUS IONIZED CALCIUM: 1.12 MMOL/L — SIGNIFICANT CHANGE UP (ref 1.12–1.3)
ISTAT VENOUS PCO2: 40 MMHG — LOW (ref 41–51)
ISTAT VENOUS PH: 7.44 — HIGH (ref 7.31–7.41)
ISTAT VENOUS PO2: <66 MMHG — LOW (ref 35–40)
ISTAT VENOUS POTASSIUM: 4.1 MMOL/L — SIGNIFICANT CHANGE UP (ref 3.5–5.3)
ISTAT VENOUS SODIUM: 143 MMOL/L — SIGNIFICANT CHANGE UP (ref 135–145)
ISTAT VENOUS TCO2: 28 MMOL/L — SIGNIFICANT CHANGE UP (ref 22–31)
LACTATE SERPL-SCNC: 1 MMOL/L — SIGNIFICANT CHANGE UP (ref 0.5–2)
LYMPHOCYTES # BLD AUTO: 0.79 K/UL — LOW (ref 1–3.3)
LYMPHOCYTES # BLD AUTO: 6.8 % — LOW (ref 13–44)
MAGNESIUM SERPL-MCNC: 1.7 MG/DL — SIGNIFICANT CHANGE UP (ref 1.6–2.6)
MCHC RBC-ENTMCNC: 29.6 PG — SIGNIFICANT CHANGE UP (ref 27–34)
MCHC RBC-ENTMCNC: 29.8 PG — SIGNIFICANT CHANGE UP (ref 27–34)
MCHC RBC-ENTMCNC: 30.5 PG — SIGNIFICANT CHANGE UP (ref 27–34)
MCHC RBC-ENTMCNC: 31.3 GM/DL — LOW (ref 32–36)
MCHC RBC-ENTMCNC: 31.4 GM/DL — LOW (ref 32–36)
MCHC RBC-ENTMCNC: 32.4 GM/DL — SIGNIFICANT CHANGE UP (ref 32–36)
MCV RBC AUTO: 94.3 FL — SIGNIFICANT CHANGE UP (ref 80–100)
MCV RBC AUTO: 94.4 FL — SIGNIFICANT CHANGE UP (ref 80–100)
MCV RBC AUTO: 95 FL — SIGNIFICANT CHANGE UP (ref 80–100)
MONOCYTES # BLD AUTO: 0.18 K/UL — SIGNIFICANT CHANGE UP (ref 0–0.9)
MONOCYTES NFR BLD AUTO: 1.5 % — LOW (ref 2–14)
NEUTROPHILS # BLD AUTO: 10.55 K/UL — HIGH (ref 1.8–7.4)
NEUTROPHILS NFR BLD AUTO: 90.2 % — HIGH (ref 43–77)
NRBC # BLD: 0 /100 WBCS — SIGNIFICANT CHANGE UP (ref 0–0)
PLATELET # BLD AUTO: 187 K/UL — SIGNIFICANT CHANGE UP (ref 150–400)
PLATELET # BLD AUTO: 205 K/UL — SIGNIFICANT CHANGE UP (ref 150–400)
PLATELET # BLD AUTO: 235 K/UL — SIGNIFICANT CHANGE UP (ref 150–400)
POCT ISTAT CREATININE: 1 MG/DL — SIGNIFICANT CHANGE UP (ref 0.5–1.3)
POTASSIUM SERPL-MCNC: 4.2 MMOL/L — SIGNIFICANT CHANGE UP (ref 3.5–5.3)
POTASSIUM SERPL-MCNC: 4.3 MMOL/L — SIGNIFICANT CHANGE UP (ref 3.5–5.3)
POTASSIUM SERPL-MCNC: 4.6 MMOL/L — SIGNIFICANT CHANGE UP (ref 3.5–5.3)
POTASSIUM SERPL-SCNC: 4.2 MMOL/L — SIGNIFICANT CHANGE UP (ref 3.5–5.3)
POTASSIUM SERPL-SCNC: 4.3 MMOL/L — SIGNIFICANT CHANGE UP (ref 3.5–5.3)
POTASSIUM SERPL-SCNC: 4.6 MMOL/L — SIGNIFICANT CHANGE UP (ref 3.5–5.3)
PROT SERPL-MCNC: 5.9 G/DL — LOW (ref 6–8.3)
PROTHROM AB SERPL-ACNC: 15.1 SEC — HIGH (ref 10.6–13.6)
PROTHROM AB SERPL-ACNC: 19.1 SEC — HIGH (ref 10.6–13.6)
PROTHROM AB SERPL-ACNC: 44.7 SEC — HIGH (ref 10.6–13.6)
PROTHROM AB SERPL-ACNC: 98.5 SEC — HIGH (ref 10.6–13.6)
RBC # BLD: 4.03 M/UL — LOW (ref 4.2–5.8)
RBC # BLD: 4.09 M/UL — LOW (ref 4.2–5.8)
RBC # BLD: 4.63 M/UL — SIGNIFICANT CHANGE UP (ref 4.2–5.8)
RBC # FLD: 14.1 % — SIGNIFICANT CHANGE UP (ref 10.3–14.5)
RBC # FLD: 14.2 % — SIGNIFICANT CHANGE UP (ref 10.3–14.5)
RBC # FLD: 14.2 % — SIGNIFICANT CHANGE UP (ref 10.3–14.5)
RH IG SCN BLD-IMP: POSITIVE — SIGNIFICANT CHANGE UP
SODIUM SERPL-SCNC: 141 MMOL/L — SIGNIFICANT CHANGE UP (ref 135–145)
SODIUM SERPL-SCNC: 142 MMOL/L — SIGNIFICANT CHANGE UP (ref 135–145)
SODIUM SERPL-SCNC: 142 MMOL/L — SIGNIFICANT CHANGE UP (ref 135–145)
THROMBIN TIME: >300 SEC — HIGH (ref 17.6–24)
TROPONIN T SERPL-MCNC: 1.91 NG/ML — CRITICAL HIGH (ref 0–0.01)
TROPONIN T SERPL-MCNC: 2.4 NG/ML — CRITICAL HIGH (ref 0–0.01)
WBC # BLD: 10.69 K/UL — HIGH (ref 3.8–10.5)
WBC # BLD: 11.59 K/UL — HIGH (ref 3.8–10.5)
WBC # BLD: 11.69 K/UL — HIGH (ref 3.8–10.5)
WBC # FLD AUTO: 10.69 K/UL — HIGH (ref 3.8–10.5)
WBC # FLD AUTO: 11.59 K/UL — HIGH (ref 3.8–10.5)
WBC # FLD AUTO: 11.69 K/UL — HIGH (ref 3.8–10.5)

## 2022-01-31 PROCEDURE — 93010 ELECTROCARDIOGRAM REPORT: CPT

## 2022-01-31 PROCEDURE — 71045 X-RAY EXAM CHEST 1 VIEW: CPT | Mod: 26

## 2022-01-31 PROCEDURE — 93662 INTRACARDIAC ECG (ICE): CPT | Mod: 26

## 2022-01-31 PROCEDURE — 93656 COMPRE EP EVAL ABLTJ ATR FIB: CPT

## 2022-01-31 PROCEDURE — 93306 TTE W/DOPPLER COMPLETE: CPT | Mod: 26

## 2022-01-31 PROCEDURE — 93657 TX L/R ATRIAL FIB ADDL: CPT

## 2022-01-31 PROCEDURE — 93613 INTRACARDIAC EPHYS 3D MAPG: CPT

## 2022-01-31 PROCEDURE — 99223 1ST HOSP IP/OBS HIGH 75: CPT

## 2022-01-31 PROCEDURE — 99291 CRITICAL CARE FIRST HOUR: CPT | Mod: 25

## 2022-01-31 RX ORDER — ATORVASTATIN CALCIUM 80 MG/1
20 TABLET, FILM COATED ORAL DAILY
Refills: 0 | Status: DISCONTINUED | OUTPATIENT
Start: 2022-01-31 | End: 2022-02-01

## 2022-01-31 RX ORDER — AMIODARONE HYDROCHLORIDE 400 MG/1
200 TABLET ORAL DAILY
Refills: 0 | Status: DISCONTINUED | OUTPATIENT
Start: 2022-02-01 | End: 2022-02-01

## 2022-01-31 RX ORDER — TAMSULOSIN HYDROCHLORIDE 0.4 MG/1
1 CAPSULE ORAL
Qty: 0 | Refills: 0 | DISCHARGE

## 2022-01-31 RX ORDER — CARVEDILOL PHOSPHATE 80 MG/1
12.5 CAPSULE, EXTENDED RELEASE ORAL
Refills: 0 | Status: DISCONTINUED | OUTPATIENT
Start: 2022-01-31 | End: 2022-01-31

## 2022-01-31 RX ORDER — FUROSEMIDE 40 MG
40 TABLET ORAL ONCE
Refills: 0 | Status: COMPLETED | OUTPATIENT
Start: 2022-01-31 | End: 2022-01-31

## 2022-01-31 RX ORDER — FINASTERIDE 5 MG/1
1 TABLET, FILM COATED ORAL
Qty: 0 | Refills: 0 | DISCHARGE

## 2022-01-31 RX ORDER — PANTOPRAZOLE SODIUM 20 MG/1
40 TABLET, DELAYED RELEASE ORAL
Refills: 0 | Status: DISCONTINUED | OUTPATIENT
Start: 2022-01-31 | End: 2022-02-01

## 2022-01-31 RX ORDER — HEPARIN SODIUM 5000 [USP'U]/ML
INJECTION INTRAVENOUS; SUBCUTANEOUS ONCE
Qty: 25000 | Refills: 0 | Status: DISCONTINUED | OUTPATIENT
Start: 2022-01-31 | End: 2022-01-31

## 2022-01-31 RX ORDER — AMIODARONE HYDROCHLORIDE 400 MG/1
100 TABLET ORAL ONCE
Refills: 0 | Status: COMPLETED | OUTPATIENT
Start: 2022-01-31 | End: 2022-01-31

## 2022-01-31 RX ORDER — ATORVASTATIN CALCIUM 80 MG/1
20 TABLET, FILM COATED ORAL DAILY
Refills: 0 | Status: DISCONTINUED | OUTPATIENT
Start: 2022-01-31 | End: 2022-01-31

## 2022-01-31 RX ORDER — AMIODARONE HYDROCHLORIDE 400 MG/1
100 TABLET ORAL DAILY
Refills: 0 | Status: DISCONTINUED | OUTPATIENT
Start: 2022-01-31 | End: 2022-01-31

## 2022-01-31 RX ORDER — LISINOPRIL 2.5 MG/1
5 TABLET ORAL DAILY
Refills: 0 | Status: DISCONTINUED | OUTPATIENT
Start: 2022-01-31 | End: 2022-01-31

## 2022-01-31 RX ORDER — APIXABAN 2.5 MG/1
5 TABLET, FILM COATED ORAL EVERY 12 HOURS
Refills: 0 | Status: DISCONTINUED | OUTPATIENT
Start: 2022-01-31 | End: 2022-02-01

## 2022-01-31 RX ORDER — CARVEDILOL PHOSPHATE 80 MG/1
12.5 CAPSULE, EXTENDED RELEASE ORAL
Refills: 0 | Status: DISCONTINUED | OUTPATIENT
Start: 2022-01-31 | End: 2022-02-01

## 2022-01-31 RX ORDER — APIXABAN 2.5 MG/1
5 TABLET, FILM COATED ORAL
Refills: 0 | Status: DISCONTINUED | OUTPATIENT
Start: 2022-01-31 | End: 2022-01-31

## 2022-01-31 RX ORDER — POTASSIUM CHLORIDE 20 MEQ
1 PACKET (EA) ORAL
Qty: 0 | Refills: 0 | DISCHARGE

## 2022-01-31 RX ORDER — COLCHICINE 0.6 MG
0.6 TABLET ORAL DAILY
Refills: 0 | Status: DISCONTINUED | OUTPATIENT
Start: 2022-01-31 | End: 2022-02-01

## 2022-01-31 RX ADMIN — Medication 0.6 MILLIGRAM(S): at 16:01

## 2022-01-31 RX ADMIN — APIXABAN 5 MILLIGRAM(S): 2.5 TABLET, FILM COATED ORAL at 21:15

## 2022-01-31 RX ADMIN — AMIODARONE HYDROCHLORIDE 100 MILLIGRAM(S): 400 TABLET ORAL at 19:27

## 2022-01-31 RX ADMIN — Medication 40 MILLIGRAM(S): at 16:00

## 2022-01-31 RX ADMIN — AMIODARONE HYDROCHLORIDE 100 MILLIGRAM(S): 400 TABLET ORAL at 16:49

## 2022-01-31 RX ADMIN — ATORVASTATIN CALCIUM 20 MILLIGRAM(S): 80 TABLET, FILM COATED ORAL at 21:13

## 2022-01-31 RX ADMIN — CARVEDILOL PHOSPHATE 12.5 MILLIGRAM(S): 80 CAPSULE, EXTENDED RELEASE ORAL at 18:57

## 2022-01-31 NOTE — PATIENT PROFILE ADULT - FALL HARM RISK - HARM RISK INTERVENTIONS
Assistance with ambulation/Assistance OOB with selected safe patient handling equipment/Communicate Risk of Fall with Harm to all staff/Monitor gait and stability/Reinforce activity limits and safety measures with patient and family/Review medications for side effects contributing to fall risk/Sit up slowly, dangle for a short time, stand at bedside before walking/Tailored Fall Risk Interventions/Toileting schedule using arm’s reach rule for commode and bathroom/Use of alarms - bed, chair and/or voice tab/Visual Cue: Yellow wristband and red socks/Bed in lowest position, wheels locked, appropriate side rails in place/Call bell, personal items and telephone in reach/Instruct patient to call for assistance before getting out of bed or chair/Non-slip footwear when patient is out of bed/[place] to call system/Physically safe environment - no spills, clutter or unnecessary equipment/Purposeful Proactive Rounding/Room/bathroom lighting operational, light cord in reach

## 2022-01-31 NOTE — PROGRESS NOTE ADULT - ASSESSMENT
75 y/o M with chronic systolic heart failure with EF 30-35% s/p ST Donaldo ICD, CAD s/p MATTY to LAD, DANI on CPAP, Pre-DM, BPH, OA, HTN, recurrent UTI, atrial fibrillation on Eliquis s/p DCCV 12/2017 and 12/2018 and 5/2020, recurrent ventricular tachycardia s/p ablation 10/5/17 and 11/16/17, who was found to be in persistent afib with ventricular pacing presented for elective ablation c/b episode of VFib requiring ACLS protocol (episode lasting one minute) and cardioversion x2 now s/p AFib ablation as well as PVC ablation being monitored in the CCU for development of arrythmia.     Neuro       Cardio   #S/p AFib ablation and PVC ablation c/b VFib   - complicated by 1 minute episode of Vfib requiring compressions and cardioversion   - transferred to CCU for monitoring given risk of arrythmia   - being paced transvenously   - c/w dobutamine for CO support   - Obtain TTE     #HFrEF (30-35%)  - c/w goal directed therapy:  - coreg  - lisinopril   - atorvastatin     #AFib   - c/w eliquis   - c/w amio     #HLD  - c/w atorvastatin       Pulm      Renal      GI      Endo      Heme      ID      Prophylaxis   F:  E:  N:  DVT PPX:  Code Status:  Dispo: CCU 77 y/o M with chronic systolic heart failure with EF 30-35% s/p ST Donaldo ICD, CAD s/p MATTY to LAD, DANI on CPAP, Pre-DM, BPH, OA, HTN, recurrent UTI, atrial fibrillation on Eliquis s/p DCCV 12/2017 and 12/2018 and 5/2020, recurrent ventricular tachycardia s/p ablation 10/5/17 and 11/16/17, who was found to be in persistent afib with ventricular pacing presented for elective ablation c/b episode of VFib requiring ACLS protocol (episode lasting one minute) and cardioversion x2 now s/p AFib ablation as well as PVC ablation being monitored in the CCU for development of arrythmia.     Neuro       Cardio   #S/p AFib ablation and PVC ablation c/b VFib   - complicated by 1 minute episode of Vfib requiring compressions and cardioversion   - transferred to CCU for monitoring given risk of arrythmia     #HFrEF (30-35%)  - c/w goal directed therapy:  - coreg  - lisinopril   - atorvastatin     #AFib   - c/w eliquis   - c/w amio     #HLD  - c/w atorvastatin       Pulm      Renal      GI      Endo      Heme      ID      Prophylaxis   F:  E:  N:  DVT PPX:  Code Status:  Dispo: CCU 75 y/o M with chronic systolic heart failure with EF 30-35% s/p ST Donaldo ICD, CAD s/p MATTY to LAD, DANI on CPAP, Pre-DM, BPH, OA, HTN, recurrent UTI, atrial fibrillation on Eliquis s/p DCCV 12/2017 and 12/2018 and 5/2020, recurrent ventricular tachycardia s/p ablation 10/5/17 and 11/16/17, who was found to be in persistent afib with ventricular pacing presented for elective ablation c/b episode of VFib requiring ACLS protocol (episode lasting one minute) and cardioversion x2 now s/p AFib ablation as well as PVC ablation being monitored in the CCU for development of arrythmia.     Neuro       Cardio   #S/p AFib ablation and PVC ablation c/b VFib   - complicated by 1 minute episode of Vfib requiring compressions and cardioversion   - transferred to CCU for monitoring given risk of arrythmia     #HFrEF (30-35%)  - s/p ST DONALDO ICD  - c/w goal directed therapy:  - coreg  - lisinopril   - atorvastatin     #AFib   - c/w eliquis   - c/w amio     #HLD  - c/w atorvastatin       Pulm      Renal      GI      Endo      Heme      ID      Prophylaxis   F:  E:  N:  DVT PPX:  Code Status:  Dispo: CCU 77 y/o M with chronic systolic heart failure with EF 30-35% s/p ST Donaldo ICD, CAD s/p MATTY to LAD, DANI on CPAP, Pre-DM, BPH, OA, HTN, recurrent UTI, atrial fibrillation on Eliquis s/p DCCV 12/2017 and 12/2018 and 5/2020, recurrent ventricular tachycardia s/p ablation 10/5/17 and 11/16/17, who was found to be in persistent afib with ventricular pacing presented for elective ablation c/b episode of VFib requiring ACLS protocol (episode lasting one minute) and shocked x5 now s/p AFib ablation as well as PVC ablation being monitored in the CCU for development of arrythmia.     Neuro   Stable     Cardio   #S/p AFib ablation and PVC ablation c/b VFib   - complicated by 1 minute episode of Vfib requiring compressions and cardioversion   - transferred to CCU for monitoring given risk of arrythmia   - bed rest for 3 hours  - TTE and stat labs     #HFrEF (30-35%)  - s/p ST DONALDO ICD  - c/w goal directed therapy:  - coreg  - lisinopril   - atorvastatin     #AFib   - restart elipuis this PM   - c/w amio     #HLD  - c/w atorvastatin     Pulm  Stable     Renal  Stable     GI  Stable     Endo  #Pre-DM  - sugars are wnl thus far  - consider mISS if sugars rise     Heme  #Leukocytosis   - likely 2/2 stress from procedure, no other SIRS criteria or qSOFA criteria to suspect sepsis   - trend     ID  Stable     Prophylaxis   F:  E:  N:  DVT PPX:  Code Status:  Dispo: CCU 77 y/o M with chronic systolic heart failure with EF 30-35% s/p ST Donaldo ICD, CAD s/p MATTY to LAD, DANI on CPAP, Pre-DM, BPH, OA, HTN, recurrent UTI, atrial fibrillation on Eliquis s/p DCCV 12/2017 and 12/2018 and 5/2020, recurrent ventricular tachycardia s/p ablation 10/5/17 and 11/16/17, who was found to be in persistent afib with ventricular pacing presented for elective ablation c/b episode of VFib requiring ACLS protocol (episode lasting one minute) and shocked x5 now s/p AFib ablation as well as PVC ablation being monitored in the CCU for development of arrythmia.     Neuro   Stable     Cardio   #S/p AFib ablation and PVC ablation c/b VFib   - complicated by 1 minute episode of Vfib requiring compressions and cardioversion   - Monitor groins access sites   - transferred to CCU for monitoring given risk of arrythmia   - bed rest for 3 hours  - TTE and stat labs     #HFrEF (30-35%)  - s/p [facility] DONALDO ICD  - c/w goal directed therapy:  - coreg  - hold lisinopril for now   - atorvastatin   - ordered 40mg lasix x1    #AFib s/p ablation   - restart elipuis this PM   - c/w amio     #CAD  - c/w statin   - c/w coreg     #HLD  - c/w atorvastatin     Pulm  #DANI   - uses CPAP at home  - continue while inpatient     Renal  #BPH  - stable, does not use medications at home   - patient reports "extensive urethral surgery", clarify once he's more awake     GI  Stable     Endo  #Pre-DM  - sugars are wnl thus far  - consider mISS if sugars rise     Heme  #Leukocytosis   - likely 2/2 stress from procedure, no other SIRS criteria or qSOFA criteria to suspect sepsis   - trend     #Normocytic anemia   - Likely dilutional, trend and transfuse with a goal of 7     ID  Stable     Prophylaxis   F: None   E: replete PRN   N: DASH TLC when able to eat   DVT PPX: eliquis   Code Status: confirm   Dispo: CCU 77 y/o M with chronic systolic heart failure (EF 53% 11/21) s/p [facility]do ICD, CAD s/p MATTY to LAD, DANI on CPAP, Pre-DM, BPH, OA, HTN, recurrent UTI, atrial fibrillation on Eliquis s/p DCCV 12/2017 and 12/2018 and 5/2020, recurrent ventricular tachycardia s/p ablation 10/5/17 and 11/16/17, who was found to be in persistent afib with ventricular pacing presented for elective ablation c/b episode of VFib requiring ACLS protocol (episode lasting one minute) and shocked x5 now s/p AFib ablation as well as PVC ablation being monitored in the CCU for development of arrythmia.     Neuro   #Depression   - takes escitalopram 10mg at home   - consider restarting inpatient     Cardio   #S/p AFib ablation and PVC ablation c/b VFib   - complicated by 1 minute episode of Vfib requiring compressions and cardioversion   - Monitor groins access sites   - transferred to CCU for monitoring given risk of arrythmia   - bed rest for 3 hours  - TTE and stat labs     #HFrEF (53% in 11/21)  - s/p [facility]DO ICD  - c/w goal directed therapy:  - coreg  - hold lisinopril for now   - atorvastatin   - ordered 40mg lasix x1    #AFib s/p ablation   - restart elipuis this PM   - c/w amio     #CAD  - c/w statin   - c/w coreg     #HLD  - c/w atorvastatin     Pulm  #DANI   - uses CPAP at home  - continue while inpatient     Renal  #BPH  - stable, does not use medications at home   - patient reports "extensive urethral surgery", clarify once he's more awake     GI  Stable     Endo  #Pre-DM  - sugars are wnl thus far  - consider mISS if sugars rise     Heme  #Leukocytosis   - likely 2/2 stress from procedure, no other SIRS criteria or qSOFA criteria to suspect sepsis   - trend     #Normocytic anemia   - Likely dilutional, trend and transfuse with a goal of 7     ID  Stable     Prophylaxis   F: None   E: replete PRN   N: DASH TLC when able to eat   DVT PPX: eliquis   Code Status: confirm   Dispo: CCU

## 2022-01-31 NOTE — H&P ADULT - HISTORY OF PRESENT ILLNESS
Mr. Sauceda is a 77 y/o M with chronic systolic heart failure with EF 30-35% s/p [facility]do ICD, CAD s/p MATTY to LAD, DANI on CPAP, Pre-DM, BPH, OA, HTN, recurrent UTI, atrial fibrillation on Eliquis s/p DCCV 12/2017 and 12/2018 and 5/2020, recurrent ventricular tachycardia s/p ablation 10/5/17 and 11/16/17, who was found to be in persistent afib with ventricular pacing and now presents for elective ablation    His history is as follows by his report and patient discharge information from multiple hospitalizations. He underwent surgery for bladder stones 7/2014; post op course significant for NSVT. Work-up thereafter included a stress test followed by an EP study at [facility]. He ultimately had an ICD placed 7/2014. Reports he was admitted with a CHF exacerbation 7/2015. He underwent cardiac catheterization at [facility] 6/2016 s/p MATTY to LAD. Reports he had VT and had ICD shock, followed by VT ablation 9/2016 at [facility]. Amiodarone was started post-procedure for arrhythmia suppression. He had palpitations and was admitted to [facility] 11/2016 with heart rate up to 170 bpm. He was given IV Amiodarone followed by a cardioversion. Amiodarone was stopped due to concern for long term side effects and Sotalol was started. VT treated with ATP 1/2017; Sotalol was discontinued and Amiodarone with load restarted. He was told he would likely need another ablation. Case was discussed with Dr. Diaz at [place] and the decision was made to D/C ASA, continue Plavix and Eliquis until one year post stent.     He presented to Dr. Arceo’ office 1-2-17 complaining of dizziness x a few days. He had been off amiodarone since the beginning of September. Device interrogation revealed recurrent VT terminated by ATP and he was admitted to the hospital. He was admitted to the CCU and started on amiodarone. He underwent an EP study and VT ablation 10/5/17 and was restarted on sotalol and toprol. He had PVCs and NSVT (different then what was ablated). He had orthostatic hypotension and was hydrated and his medications were adjusted. QTc remained stable on sotalol.     He was doing well until early November 2017 when he had near syncope. He was admitted to an OSH with recurrent VT and placed on amiodarone with breakthrough episodes. All of the episodes were terminated with ATP; until 11/9 when he got an appropriate ICD shock. He was transferred to [facility]. Amiodarone was switched to Quinidine and he had less VT; however during exercise stress test he had sustained VT terminated by ATP with resultant lightheadedness. He underwent a successful epicardial VT ablation on 11/16.     In late November he was found to be in atrial fibrillation and was recommended to start Tikosyn; but secondary to price went on Sotalol. He had a cardioversion 12/2018 12 /2019 he had "skipped beats" and remote transmission showed ventricular bigeminy. We stopped his He was then admitted at an OSH with PVCs and switched to amiodarone. He has finished the loading dose and is now on 200mg daily. He was found to be back in afib 5/2020 with some SOB and underwent a cardioversion.     He was seen in the office last week and continued not to feel well.  Stable anxiety - but worsening fatigue. No chest pain, syncope, near syncope, SOB, orthopnea, PND . He thinks he is still having frequent PVCs. No device related complaints. He is compliant with Eliquis and on low dose amiodarone. He was found to be in afib x 1 week with RV pacing that we could not reprogram around.  Traditionally when he goes into afib he has RV pacing and heart failure.  After a thorough discussion plan made to proceed with an afib ablation .        office device check  Device Check The patient is not pacemaker dependent.   Underlying Rhythm: normal sinus rhythm.   Device Type: pacemaker   Pacemaker/ICD : [employer] [facility]do.   Model: fortify assura. Serial Number: [id number]. Date of Implant: 7/2014.   Mode: DDDR. Rate: 70/90.   Battery Status: 1.9 years.   Measurement: Threshold testing was performed.   Atrial: lead impedance was 400 ohms. sensing amplitude was 1.1 mv. Pacing Threshold: afib V @.   Rt Ventricle:. lead impedance was 450 ohms. sensing amplitude was 11.9 mv. Pacing Threshold: 1 V @ 0.5 ms.   Program Changes: none.   Comments:. shock impedance 47  no VT/NSVT  20% afib  AP 76%   26%.

## 2022-01-31 NOTE — H&P ADULT - ASSESSMENT
75 y/o M with chronic systolic heart failure with EF 30-35% s/p ST Donaldo ICD, CAD s/p MATTY to LAD, DANI on CPAP, Pre-DM, BPH, OA, HTN, recurrent UTI, atrial fibrillation on Eliquis s/p DCCV 12/2017 and 12/2018 and 5/2020, recurrent ventricular tachycardia s/p ablation 10/5/17 and 11/16/17, who was found to be in persistent afib with ventricular pacing and now presents for elective ablation.

## 2022-01-31 NOTE — CHART NOTE - NSCHARTNOTEFT_GEN_A_CORE
MIGUELITO CABALLEROAZ  [id number]    PROCEDURE:  Afib ablation  PVC ablation    INDICATION:  Atrial fibrillation  PVCs    ELECTROPHYSIOLOGIST(S):  MD Antonio Viveros MD (fellow)    ANESTHESIOLOGY:  GA    FINDINGS:  Successful RF PVI  Successful PVC ablation    COMPLICATIONS:  None    RECOMMENDATIONS:  Bedrest 3 hours  Resume AC

## 2022-01-31 NOTE — PROGRESS NOTE ADULT - SUBJECTIVE AND OBJECTIVE BOX
CC: Patient is a 76y old  Male who presents with a chief complaint of scheduled ablation       INTERVAL EVENTS: FLOWER    SUBJECTIVE / INTERVAL HPI: Patient seen and examined at bedside.     ROS: negative unless otherwise stated above.    VITAL SIGNS:  Vital Signs Last 24 Hrs  T(C): --  T(F): --  HR: 70 (31 Jan 2022 09:41) (70 - 70)  BP: 118/80 (31 Jan 2022 09:41) (118/80 - 118/80)  BP(mean): 92 (31 Jan 2022 09:41) (92 - 92)  RR: 16 (31 Jan 2022 09:41) (16 - 16)  SpO2: --        PHYSICAL EXAM:    General: NAD  HEENT: MMM  Neck: supple  Cardiovascular: +S1/S2; RRR  Respiratory: CTA B/L; no W/R/R  Gastrointestinal: soft, NT/ND  Extremities: WWP; no edema, clubbing or cyanosis  Vascular: 2+ radial, DP/PT pulses B/L  Neurological: AAOx3; no focal deficits    MEDICATIONS:  MEDICATIONS  (STANDING):  aMIOdarone    Tablet 100 milliGRAM(s) Oral daily  apixaban 5 milliGRAM(s) Oral two times a day  atorvastatin 20 milliGRAM(s) Oral daily  carvedilol 12.5 milliGRAM(s) Oral two times a day  heparin  Infusion.  Unit(s)/Hr IV Continuous Once  lisinopril 5 milliGRAM(s) Oral daily    MEDICATIONS  (PRN):      ALLERGIES:  Allergies    No Known Allergies    Intolerances        LABS:                        12.1   11.59 )-----------( 235      ( 31 Jan 2022 10:50 )             38.6     01-31    141  |  108  |  13  ----------------------------<  120<H>  4.3   |  24  |  0.87    Ca    8.4      31 Jan 2022 10:50  Mg     1.7     01-31      PT/INR - ( 31 Jan 2022 11:53 )   PT: 44.7 sec;   INR: 3.98          PTT - ( 31 Jan 2022 11:53 )  PTT:>200.0 sec    CAPILLARY BLOOD GLUCOSE          RADIOLOGY & ADDITIONAL TESTS: Reviewed. CC: Patient is a 76y old  Male who presents with a chief complaint of scheduled ablation     Interval HPI:  Patient was cardioverted prior to the ablation and sent into Vfib for which ALCS protocol was enacted for one minute (5 shocks) followed by the patients own ICD going off and returning to sinus. Once in sinus, the patient had his AFib and well as PVC ablated. Transferred to CCU for arrythmia monitoring.     INTERVAL EVENTS: FLOWER    SUBJECTIVE / INTERVAL HPI: Patient seen and examined at bedside.     ROS: negative unless otherwise stated above.    VITAL SIGNS:  Vital Signs Last 24 Hrs  T(C): --  T(F): --  HR: 70 (31 Jan 2022 09:41) (70 - 70)  BP: 118/80 (31 Jan 2022 09:41) (118/80 - 118/80)  BP(mean): 92 (31 Jan 2022 09:41) (92 - 92)  RR: 16 (31 Jan 2022 09:41) (16 - 16)  SpO2: --        PHYSICAL EXAM:    General: NAD, fatigued   HEENT: MMM, EOM+  Cardiovascular: +S1/S2; RRR,, mo MRG  Respiratory: CTA B/L; no W/R/R  Gastrointestinal: soft, NT/ND, obese male   Extremities: WWP; , no clubbing or cyanosis. Trace edema to BL LE's   Vascular: 2+ radial, DP/PT pulses B/L  Neurological: AAOx3; no focal deficits, very fatigued     MEDICATIONS:  MEDICATIONS  (STANDING):  aMIOdarone    Tablet 100 milliGRAM(s) Oral daily  apixaban 5 milliGRAM(s) Oral two times a day  atorvastatin 20 milliGRAM(s) Oral daily  carvedilol 12.5 milliGRAM(s) Oral two times a day  heparin  Infusion.  Unit(s)/Hr IV Continuous Once  lisinopril 5 milliGRAM(s) Oral daily    MEDICATIONS  (PRN):      ALLERGIES:  Allergies    No Known Allergies    Intolerances        LABS:                        12.1   11.59 )-----------( 235      ( 31 Jan 2022 10:50 )             38.6     01-31    141  |  108  |  13  ----------------------------<  120<H>  4.3   |  24  |  0.87    Ca    8.4      31 Jan 2022 10:50  Mg     1.7     01-31      PT/INR - ( 31 Jan 2022 11:53 )   PT: 44.7 sec;   INR: 3.98          PTT - ( 31 Jan 2022 11:53 )  PTT:>200.0 sec    CAPILLARY BLOOD GLUCOSE          RADIOLOGY & ADDITIONAL TESTS: Reviewed. CC: Patient is a 76y old  Male who presents with a chief complaint of scheduled ablation     Hospital Course:  77 y/o M with chronic systolic heart failure (EF 53% 11/21) s/p ST [facility]onaldo ICD, CAD s/p MATTY to LAD, DANI on CPAP, Pre-DM, BPH, OA, HTN, recurrent UTI, atrial fibrillation on Eliquis s/p DCCV 12/2017 and 12/2018 and 5/2020, recurrent ventricular tachycardia s/p ablation 10/5/17 and 11/16/17, who was found to be in persistent afib with ventricular pacing presented for elective ablation c/b episode of VFib requiring ACLS protocol (episode lasting one minute) and shocked x5 now s/p AFib ablation as well as PVC ablation being monitored in the CCU for development of arrythmia.   Patient was cardioverted prior to the ablation and sent into Vfib for which ALCS protocol was enacted for one minute (5 shocks) followed by the patients own ICD going off and returning to sinus. Once in sinus, the patient had his AFib and well as PVC ablated. Transferred to CCU for arrythmia monitoring.     INTERVAL EVENTS: FLOWER    SUBJECTIVE / INTERVAL HPI: Patient seen and examined at bedside.     ROS: negative unless otherwise stated above.    VITAL SIGNS:  Vital Signs Last 24 Hrs  T(C): --  T(F): --  HR: 70 (31 Jan 2022 09:41) (70 - 70)  BP: 118/80 (31 Jan 2022 09:41) (118/80 - 118/80)  BP(mean): 92 (31 Jan 2022 09:41) (92 - 92)  RR: 16 (31 Jan 2022 09:41) (16 - 16)  SpO2: --        PHYSICAL EXAM:    General: NAD, fatigued   HEENT: MMM, EOM+  Cardiovascular: +S1/S2; RRR,, mo MRG  Respiratory: CTA B/L; no W/R/R  Gastrointestinal: soft, NT/ND, obese male   Extremities: WWP; , no clubbing or cyanosis. Trace edema to BL LE's   Vascular: 2+ radial, DP/PT pulses B/L  Neurological: AAOx3; no focal deficits, very fatigued     MEDICATIONS:  MEDICATIONS  (STANDING):  aMIOdarone    Tablet 100 milliGRAM(s) Oral daily  apixaban 5 milliGRAM(s) Oral two times a day  atorvastatin 20 milliGRAM(s) Oral daily  carvedilol 12.5 milliGRAM(s) Oral two times a day  heparin  Infusion.  Unit(s)/Hr IV Continuous Once  lisinopril 5 milliGRAM(s) Oral daily    MEDICATIONS  (PRN):      ALLERGIES:  Allergies    No Known Allergies    Intolerances        LABS:                        12.1   11.59 )-----------( 235      ( 31 Jan 2022 10:50 )             38.6     01-31    141  |  108  |  13  ----------------------------<  120<H>  4.3   |  24  |  0.87    Ca    8.4      31 Jan 2022 10:50  Mg     1.7     01-31      PT/INR - ( 31 Jan 2022 11:53 )   PT: 44.7 sec;   INR: 3.98          PTT - ( 31 Jan 2022 11:53 )  PTT:>200.0 sec    CAPILLARY BLOOD GLUCOSE          RADIOLOGY & ADDITIONAL TESTS: Reviewed.

## 2022-01-31 NOTE — H&P ADULT - GASTROINTESTINAL
Subjective   Patient ID: Kang is a 4 year old male.    Chief Complaint   Patient presents with   • Vomiting     since this morning (4 times)     Kang is a 4 year old male, otherwise healthy child, accompanied by his parents, complaining of vomiting x4 times since this morning. Last time was 30 minutes ago. Complaining of stomach hurting to his parents. No diarrhea. No fever but they think he feels warm. Last stool was last night. Has history of constipation so strains to have BM sometimes. Mom said small amount of red blood in his stool last night after straining but has had this before. It was a normal sized stool. Used  #[id number] during visit.       No past medical history on file.    MEDICATIONS:  No current outpatient medications on file.     No current facility-administered medications for this visit.        ALLERGIES:  ALLERGIES:  Allergies no known allergies    PAST SURGICAL HISTORY:  No past surgical history on file.    FAMILY HISTORY:  No family history on file.    SOCIAL HISTORY:  Social History     Tobacco Use   • Smoking status: Not on file   Substance Use Topics   • Alcohol use: Not on file   • Drug use: Not on file         Patient's medications, allergies, past medical, surgical, and social history  were reviewed and updated as appropriate.    Review of Systems   Constitutional: Positive for appetite change. Negative for activity change, fever and irritability.   HENT: Negative for congestion, ear pain and sore throat.    Respiratory: Negative for cough.    Gastrointestinal: Positive for abdominal pain and vomiting. Negative for abdominal distention, blood in stool and diarrhea.   Genitourinary: Negative for decreased urine volume, dysuria, flank pain and hematuria.   Musculoskeletal: Negative for gait problem.   Skin: Negative for pallor, rash and wound.   Psychiatric/Behavioral: Negative for agitation and sleep disturbance.   All other systems reviewed and are  negative.      Objective   Physical Exam   Constitutional: He appears well-developed. He is active.  Non-toxic appearance. No distress.   HENT:   Head: Normocephalic and atraumatic.   Right Ear: Tympanic membrane, external ear and canal normal.   Left Ear: Tympanic membrane, external ear and canal normal.   Nose: Nose normal. No mucosal edema, rhinorrhea or congestion.   Mouth/Throat: Mucous membranes are moist. Tonsils are 3+ on the right. Tonsils are 3+ on the left. No tonsillar exudate. Oropharynx is clear.   Eyes: Pupils are equal, round, and reactive to light. Conjunctivae and lids are normal.   Neck: Normal range of motion. Neck supple.   Cardiovascular: Normal rate, regular rhythm, S1 normal and S2 normal.   No murmur heard.  Pulmonary/Chest: Effort normal and breath sounds normal. No respiratory distress.   Abdominal: Soft. Bowel sounds are normal. There is no tenderness. There is no rigidity and no guarding.   Neurological: He is alert.   Skin: Skin is warm and dry.     Visit Vitals  BP 94/70   Pulse 90   Temp 99.2 °F (37.3 °C) (Oral)   Resp 17   Ht 3' 4\" (1.016 m)   Wt 15.5 kg (34 lb 2.7 oz)   BMI 15.02 kg/m²       Assessment   Problem List Items Addressed This Visit     None      Visit Diagnoses     Gastroenteritis    -  Primary    Non-intractable vomiting with nausea, unspecified vomiting type        Hypertrophy of tonsils alone        Relevant Orders    POCT RAPID STREP A    STREP GROUP A CULTURE        Discussed viral nature of illness. Instructions and Followup precautions as follows:  Symptoms of viral gastroenteritis should improve over the next 24-48 hrs.   May give 5ml Tylenol if he has a fever  Go to ED for worsening abdominal pain, inability to stool, dehydration  Signs of dehydration reviewed at length  Push hydration, may add Pedialyte or Gatorade. Follow BRAT diet until symptoms resolve  Will send throat culture to confirm no strep. Followup with pediatrician at his well visit regarding  tonsillar hypertrophy, sooner for sleeping trouble, throat pain, difficulty swallowing.    Instructions provided as documented in the AVS.    Thank you for visiting [facility].  Please follow up with your PCP for persistant symptoms.   negative

## 2022-01-31 NOTE — H&P ADULT - NSICDXPASTSURGICALHX_GEN_ALL_CORE_FT
PAST SURGICAL HISTORY:  H/O ureter repair     S/P cholecystectomy     S/P ICD (internal cardiac defibrillator) procedure

## 2022-01-31 NOTE — H&P ADULT - NSICDXPASTMEDICALHX_GEN_ALL_CORE_FT
PAST MEDICAL HISTORY:  AICD (automatic cardioverter/defibrillator) present     Atrial fibrillation     Coronary artery disease involving native coronary artery of native heart without angina pectoris     Ischemic cardiomyopathy     VT (ventricular tachycardia)

## 2022-02-01 ENCOUNTER — TRANSCRIPTION ENCOUNTER (OUTPATIENT)
Age: 77
End: 2022-02-01

## 2022-02-01 VITALS
RESPIRATION RATE: 20 BRPM | OXYGEN SATURATION: 96 % | SYSTOLIC BLOOD PRESSURE: 100 MMHG | HEART RATE: 71 BPM | DIASTOLIC BLOOD PRESSURE: 62 MMHG

## 2022-02-01 LAB
ALBUMIN SERPL ELPH-MCNC: 3.5 G/DL — SIGNIFICANT CHANGE UP (ref 3.3–5)
ALP SERPL-CCNC: 72 U/L — SIGNIFICANT CHANGE UP (ref 40–120)
ALT FLD-CCNC: 15 U/L — SIGNIFICANT CHANGE UP (ref 10–45)
ANION GAP SERPL CALC-SCNC: 12 MMOL/L — SIGNIFICANT CHANGE UP (ref 5–17)
APTT BLD: 31.4 SEC — SIGNIFICANT CHANGE UP (ref 27.5–35.5)
AST SERPL-CCNC: 60 U/L — HIGH (ref 10–40)
BASOPHILS # BLD AUTO: 0.02 K/UL — SIGNIFICANT CHANGE UP (ref 0–0.2)
BASOPHILS NFR BLD AUTO: 0.2 % — SIGNIFICANT CHANGE UP (ref 0–2)
BILIRUB SERPL-MCNC: 0.6 MG/DL — SIGNIFICANT CHANGE UP (ref 0.2–1.2)
BUN SERPL-MCNC: 16 MG/DL — SIGNIFICANT CHANGE UP (ref 7–23)
CALCIUM SERPL-MCNC: 8 MG/DL — LOW (ref 8.4–10.5)
CHLORIDE SERPL-SCNC: 108 MMOL/L — SIGNIFICANT CHANGE UP (ref 96–108)
CK MB CFR SERPL CALC: 15.4 NG/ML — HIGH (ref 0–6.7)
CK SERPL-CCNC: 595 U/L — HIGH (ref 30–200)
CO2 SERPL-SCNC: 24 MMOL/L — SIGNIFICANT CHANGE UP (ref 22–31)
CREAT SERPL-MCNC: 0.89 MG/DL — SIGNIFICANT CHANGE UP (ref 0.5–1.3)
EOSINOPHIL # BLD AUTO: 0 K/UL — SIGNIFICANT CHANGE UP (ref 0–0.5)
EOSINOPHIL NFR BLD AUTO: 0 % — SIGNIFICANT CHANGE UP (ref 0–6)
GLUCOSE SERPL-MCNC: 110 MG/DL — HIGH (ref 70–99)
HCT VFR BLD CALC: 35.9 % — LOW (ref 39–50)
HCV AB S/CO SERPL IA: 0.06 S/CO — SIGNIFICANT CHANGE UP
HCV AB SERPL-IMP: SIGNIFICANT CHANGE UP
HGB BLD-MCNC: 11.7 G/DL — LOW (ref 13–17)
IMM GRANULOCYTES NFR BLD AUTO: 0.4 % — SIGNIFICANT CHANGE UP (ref 0–1.5)
INR BLD: 1.41 — HIGH (ref 0.88–1.16)
ISTAT ARTERIAL BE: 1 MMOL/L — SIGNIFICANT CHANGE UP (ref -2–3)
ISTAT ARTERIAL GLUCOSE: 126 MG/DL — HIGH (ref 70–99)
ISTAT ARTERIAL HCO3: 27 MMOL/L — HIGH (ref 22–26)
ISTAT ARTERIAL HEMATOCRIT: 37 % — LOW (ref 39–50)
ISTAT ARTERIAL HEMOGLOBIN: 12.6 G/DL — LOW (ref 13–17)
ISTAT ARTERIAL IONIZED CALCIUM: 1.17 MMOL/L — SIGNIFICANT CHANGE UP (ref 1.12–1.3)
ISTAT ARTERIAL PCO2: 49 MMHG — HIGH (ref 35–45)
ISTAT ARTERIAL PH: 7.36 — SIGNIFICANT CHANGE UP (ref 7.35–7.45)
ISTAT ARTERIAL PO2: 221 MMHG — HIGH (ref 80–105)
ISTAT ARTERIAL POTASSIUM: 4.3 MMOL/L — SIGNIFICANT CHANGE UP (ref 3.5–5.3)
ISTAT ARTERIAL SODIUM: 141 MMOL/L — SIGNIFICANT CHANGE UP (ref 135–145)
ISTAT ARTERIAL TCO2: 29 MMOL/L — SIGNIFICANT CHANGE UP (ref 22–31)
LYMPHOCYTES # BLD AUTO: 0.88 K/UL — LOW (ref 1–3.3)
LYMPHOCYTES # BLD AUTO: 7.2 % — LOW (ref 13–44)
MAGNESIUM SERPL-MCNC: 2.2 MG/DL — SIGNIFICANT CHANGE UP (ref 1.6–2.6)
MCHC RBC-ENTMCNC: 30.4 PG — SIGNIFICANT CHANGE UP (ref 27–34)
MCHC RBC-ENTMCNC: 32.6 GM/DL — SIGNIFICANT CHANGE UP (ref 32–36)
MCV RBC AUTO: 93.2 FL — SIGNIFICANT CHANGE UP (ref 80–100)
MONOCYTES # BLD AUTO: 1.04 K/UL — HIGH (ref 0–0.9)
MONOCYTES NFR BLD AUTO: 8.5 % — SIGNIFICANT CHANGE UP (ref 2–14)
NEUTROPHILS # BLD AUTO: 10.3 K/UL — HIGH (ref 1.8–7.4)
NEUTROPHILS NFR BLD AUTO: 83.7 % — HIGH (ref 43–77)
NRBC # BLD: 0 /100 WBCS — SIGNIFICANT CHANGE UP (ref 0–0)
PHOSPHATE SERPL-MCNC: 3.7 MG/DL — SIGNIFICANT CHANGE UP (ref 2.5–4.5)
PLATELET # BLD AUTO: 174 K/UL — SIGNIFICANT CHANGE UP (ref 150–400)
POTASSIUM SERPL-MCNC: 4 MMOL/L — SIGNIFICANT CHANGE UP (ref 3.5–5.3)
POTASSIUM SERPL-SCNC: 4 MMOL/L — SIGNIFICANT CHANGE UP (ref 3.5–5.3)
PROT SERPL-MCNC: 5.8 G/DL — LOW (ref 6–8.3)
PROTHROM AB SERPL-ACNC: 16.6 SEC — HIGH (ref 10.6–13.6)
RBC # BLD: 3.85 M/UL — LOW (ref 4.2–5.8)
RBC # FLD: 14.2 % — SIGNIFICANT CHANGE UP (ref 10.3–14.5)
SODIUM SERPL-SCNC: 144 MMOL/L — SIGNIFICANT CHANGE UP (ref 135–145)
TROPONIN T SERPL-MCNC: 2.4 NG/ML — CRITICAL HIGH (ref 0–0.01)
WBC # BLD: 12.29 K/UL — HIGH (ref 3.8–10.5)
WBC # FLD AUTO: 12.29 K/UL — HIGH (ref 3.8–10.5)

## 2022-02-01 PROCEDURE — 85027 COMPLETE CBC AUTOMATED: CPT

## 2022-02-01 PROCEDURE — 82947 ASSAY GLUCOSE BLOOD QUANT: CPT

## 2022-02-01 PROCEDURE — 84100 ASSAY OF PHOSPHORUS: CPT

## 2022-02-01 PROCEDURE — 82803 BLOOD GASES ANY COMBINATION: CPT

## 2022-02-01 PROCEDURE — 85610 PROTHROMBIN TIME: CPT

## 2022-02-01 PROCEDURE — 36415 COLL VENOUS BLD VENIPUNCTURE: CPT

## 2022-02-01 PROCEDURE — 71045 X-RAY EXAM CHEST 1 VIEW: CPT | Mod: 26

## 2022-02-01 PROCEDURE — 84132 ASSAY OF SERUM POTASSIUM: CPT

## 2022-02-01 PROCEDURE — 85670 THROMBIN TIME PLASMA: CPT

## 2022-02-01 PROCEDURE — 86803 HEPATITIS C AB TEST: CPT

## 2022-02-01 PROCEDURE — 82550 ASSAY OF CK (CPK): CPT

## 2022-02-01 PROCEDURE — 85014 HEMATOCRIT: CPT

## 2022-02-01 PROCEDURE — 83735 ASSAY OF MAGNESIUM: CPT

## 2022-02-01 PROCEDURE — C1766: CPT

## 2022-02-01 PROCEDURE — C1759: CPT

## 2022-02-01 PROCEDURE — 85025 COMPLETE CBC W/AUTO DIFF WBC: CPT

## 2022-02-01 PROCEDURE — 84295 ASSAY OF SERUM SODIUM: CPT

## 2022-02-01 PROCEDURE — 86901 BLOOD TYPING SEROLOGIC RH(D): CPT

## 2022-02-01 PROCEDURE — 80048 BASIC METABOLIC PNL TOTAL CA: CPT

## 2022-02-01 PROCEDURE — 82330 ASSAY OF CALCIUM: CPT

## 2022-02-01 PROCEDURE — C1894: CPT

## 2022-02-01 PROCEDURE — 99239 HOSP IP/OBS DSCHRG MGMT >30: CPT

## 2022-02-01 PROCEDURE — 82565 ASSAY OF CREATININE: CPT

## 2022-02-01 PROCEDURE — 86850 RBC ANTIBODY SCREEN: CPT

## 2022-02-01 PROCEDURE — 93005 ELECTROCARDIOGRAM TRACING: CPT

## 2022-02-01 PROCEDURE — C1730: CPT

## 2022-02-01 PROCEDURE — C8929: CPT

## 2022-02-01 PROCEDURE — 71045 X-RAY EXAM CHEST 1 VIEW: CPT

## 2022-02-01 PROCEDURE — C1760: CPT

## 2022-02-01 PROCEDURE — 80053 COMPREHEN METABOLIC PANEL: CPT

## 2022-02-01 PROCEDURE — 86900 BLOOD TYPING SEROLOGIC ABO: CPT

## 2022-02-01 PROCEDURE — 84484 ASSAY OF TROPONIN QUANT: CPT

## 2022-02-01 PROCEDURE — C1732: CPT

## 2022-02-01 PROCEDURE — 83605 ASSAY OF LACTIC ACID: CPT

## 2022-02-01 PROCEDURE — 82553 CREATINE MB FRACTION: CPT

## 2022-02-01 PROCEDURE — 85730 THROMBOPLASTIN TIME PARTIAL: CPT

## 2022-02-01 RX ORDER — AMIODARONE HYDROCHLORIDE 400 MG/1
1 TABLET ORAL
Qty: 0 | Refills: 0 | DISCHARGE

## 2022-02-01 RX ORDER — AMIODARONE HYDROCHLORIDE 400 MG/1
1 TABLET ORAL
Qty: 30 | Refills: 0
Start: 2022-02-01 | End: 2022-03-02

## 2022-02-01 RX ORDER — FUROSEMIDE 40 MG
40 TABLET ORAL ONCE
Refills: 0 | Status: COMPLETED | OUTPATIENT
Start: 2022-02-01 | End: 2022-02-01

## 2022-02-01 RX ORDER — FUROSEMIDE 40 MG
1 TABLET ORAL
Qty: 5 | Refills: 0
Start: 2022-02-01 | End: 2022-02-05

## 2022-02-01 RX ORDER — LISINOPRIL 2.5 MG/1
5 TABLET ORAL DAILY
Refills: 0 | Status: DISCONTINUED | OUTPATIENT
Start: 2022-02-01 | End: 2022-02-01

## 2022-02-01 RX ORDER — MAGNESIUM SULFATE 500 MG/ML
2 VIAL (ML) INJECTION ONCE
Refills: 0 | Status: COMPLETED | OUTPATIENT
Start: 2022-02-01 | End: 2022-02-01

## 2022-02-01 RX ORDER — COLCHICINE 0.6 MG
1 TABLET ORAL
Qty: 6 | Refills: 0
Start: 2022-02-01 | End: 2022-02-06

## 2022-02-01 RX ADMIN — Medication 40 MILLIGRAM(S): at 09:21

## 2022-02-01 RX ADMIN — APIXABAN 5 MILLIGRAM(S): 2.5 TABLET, FILM COATED ORAL at 09:22

## 2022-02-01 RX ADMIN — Medication 25 GRAM(S): at 03:18

## 2022-02-01 RX ADMIN — CARVEDILOL PHOSPHATE 12.5 MILLIGRAM(S): 80 CAPSULE, EXTENDED RELEASE ORAL at 06:50

## 2022-02-01 RX ADMIN — LISINOPRIL 5 MILLIGRAM(S): 2.5 TABLET ORAL at 11:37

## 2022-02-01 RX ADMIN — Medication 0.6 MILLIGRAM(S): at 11:37

## 2022-02-01 RX ADMIN — PANTOPRAZOLE SODIUM 40 MILLIGRAM(S): 20 TABLET, DELAYED RELEASE ORAL at 06:51

## 2022-02-01 NOTE — DISCHARGE NOTE PROVIDER - NSDCCPCAREPLAN_GEN_ALL_CORE_FT
PRINCIPAL DISCHARGE DIAGNOSIS  Diagnosis: Other persistent atrial fibrillation  Assessment and Plan of Treatment: You have a history of atrial fibrillation. Your heart rate is well controlled and you will need to continue your prescribed medication, especially your blood thinner to avoid clot formation. Please follow up with you primary care physician/Cardiologist for continue medical management.  While inpatient you underwent ablation of your AFib. This was complicated by an episode of VFib, which has since resolved. It is very important that you take your medications and follow very closely with the physician that performed your ablation.   We have increased the dosage of your amiodorone from 100 to 200mg daily and have added a medication called lasix 20mg for you to take for 5 days.

## 2022-02-01 NOTE — PROGRESS NOTE ADULT - SUBJECTIVE AND OBJECTIVE BOX
CC: Patient is a 76y old  Male who presents with a chief complaint of scheduled ablation       INTERVAL EVENTS: FLOWER    SUBJECTIVE / INTERVAL HPI: Patient seen and examined at bedside.     ROS: negative unless otherwise stated above.    VITAL SIGNS:  Vital Signs Last 24 Hrs  T(C): 36.5 (01 Feb 2022 05:21), Max: 36.8 (31 Jan 2022 18:15)  T(F): 97.7 (01 Feb 2022 05:21), Max: 98.2 (31 Jan 2022 18:15)  HR: 68 (01 Feb 2022 04:08) (68 - 80)  BP: 107/62 (31 Jan 2022 14:15) (107/62 - 118/80)  BP(mean): 78 (31 Jan 2022 14:15) (78 - 92)  RR: 21 (01 Feb 2022 04:08) (16 - 26)  SpO2: 95% (01 Feb 2022 04:08) (95% - 99%)      01-31-22 @ 07:01  -  02-01-22 @ 06:45  --------------------------------------------------------  IN: 180 mL / OUT: 1850 mL / NET: -1670 mL        PHYSICAL EXAM:    General: NAD  HEENT: MMM  Neck: supple  Cardiovascular: +S1/S2; RRR  Respiratory: CTA B/L; no W/R/R  Gastrointestinal: soft, NT/ND  Extremities: WWP; no edema, clubbing or cyanosis  Vascular: 2+ radial, DP/PT pulses B/L  Neurological: AAOx3; no focal deficits    MEDICATIONS:  MEDICATIONS  (STANDING):  aMIOdarone    Tablet 200 milliGRAM(s) Oral daily  apixaban 5 milliGRAM(s) Oral every 12 hours  atorvastatin 20 milliGRAM(s) Oral daily  carvedilol 12.5 milliGRAM(s) Oral two times a day  colchicine 0.6 milliGRAM(s) Oral daily  pantoprazole    Tablet 40 milliGRAM(s) Oral before breakfast    MEDICATIONS  (PRN):      ALLERGIES:  Allergies    No Known Allergies    Intolerances        LABS:                        11.7   12.29 )-----------( 174      ( 01 Feb 2022 05:50 )             35.9     02-01    144  |  108  |  16  ----------------------------<  110<H>  4.0   |  24  |  0.89    Ca    8.0<L>      01 Feb 2022 05:50  Phos  3.7     02-01  Mg     2.2     02-01    TPro  5.8<L>  /  Alb  3.5  /  TBili  0.6  /  DBili  x   /  AST  60<H>  /  ALT  15  /  AlkPhos  72  02-01    PT/INR - ( 01 Feb 2022 05:50 )   PT: 16.6 sec;   INR: 1.41          PTT - ( 01 Feb 2022 05:50 )  PTT:31.4 sec    CAPILLARY BLOOD GLUCOSE          RADIOLOGY & ADDITIONAL TESTS: Reviewed.

## 2022-02-01 NOTE — DISCHARGE NOTE NURSING/CASE MANAGEMENT/SOCIAL WORK - NSDCPEFALRISK_GEN_ALL_CORE
Trauma / Surgical Daily Progress Note    Date of Service  4/21/2019    Chief Complaint  79 y.o. male admitted 4/19/2019 with Cholecystitis, acute    Interval Events  SP ERCP to clear CBD. Doing better. RICH bilious however. Adv diet. Mobilize.    Review of Systems  Review of Systems   Gastrointestinal: Positive for abdominal pain.        Vital Signs for last 24 hours  Temp:  [36.3 °C (97.3 °F)-36.9 °C (98.4 °F)] 36.3 °C (97.3 °F)  Pulse:  [83-91] 91  Resp:  [16-24] 18  BP: (125-135)/(69-74) 125/69  SpO2:  [92 %-99 %] 92 %    Hemodynamic parameters for last 24 hours       Respiratory Data     Respiration: 18, Pulse Oximetry: 92 %, O2 Daily Delivery Respiratory : Silicone Nasal Cannula        RUL Breath Sounds: Diminished, RML Breath Sounds: Diminished, RLL Breath Sounds: Diminished, MOHINDER Breath Sounds: Diminished, LLL Breath Sounds: Diminished    Physical Exam  Physical Exam   Constitutional: He appears well-developed and well-nourished.   HENT:   Head: Normocephalic.   Eyes: Scleral icterus is present.   Neck: Neck supple.   Cardiovascular: Normal rate.    Pulmonary/Chest: Effort normal.   Abdominal: Soft. He exhibits no distension. There is tenderness.   Dressing dry  RICH bilious   Genitourinary:   Genitourinary Comments: Vela to gravity   Musculoskeletal: Normal range of motion.   Neurological: He is alert.   Skin: Skin is warm.       Laboratory  Recent Results (from the past 24 hour(s))   CBC with Differential    Collection Time: 04/21/19  4:49 AM   Result Value Ref Range    WBC 14.6 (H) 4.8 - 10.8 K/uL    RBC 2.70 (L) 4.70 - 6.10 M/uL    Hemoglobin 8.7 (L) 14.0 - 18.0 g/dL    Hematocrit 26.7 (L) 42.0 - 52.0 %    MCV 98.9 (H) 81.4 - 97.8 fL    MCH 32.2 27.0 - 33.0 pg    MCHC 32.6 (L) 33.7 - 35.3 g/dL    RDW 51.2 (H) 35.9 - 50.0 fL    Platelet Count 137 (L) 164 - 446 K/uL    MPV 10.1 9.0 - 12.9 fL    Neutrophils-Polys 87.60 (H) 44.00 - 72.00 %    Lymphocytes 4.10 (L) 22.00 - 41.00 %    Monocytes 7.40 0.00 - 13.40 %     Eosinophils 0.00 0.00 - 6.90 %    Basophils 0.10 0.00 - 1.80 %    Immature Granulocytes 0.80 0.00 - 0.90 %    Nucleated RBC 0.00 /100 WBC    Neutrophils (Absolute) 12.81 (H) 1.82 - 7.42 K/uL    Lymphs (Absolute) 0.60 (L) 1.00 - 4.80 K/uL    Monos (Absolute) 1.08 (H) 0.00 - 0.85 K/uL    Eos (Absolute) 0.00 0.00 - 0.51 K/uL    Baso (Absolute) 0.02 0.00 - 0.12 K/uL    Immature Granulocytes (abs) 0.12 (H) 0.00 - 0.11 K/uL    NRBC (Absolute) 0.00 K/uL   Comp Metabolic Panel (CMP)    Collection Time: 04/21/19  4:49 AM   Result Value Ref Range    Sodium 135 135 - 145 mmol/L    Potassium 3.7 3.6 - 5.5 mmol/L    Chloride 106 96 - 112 mmol/L    Co2 24 20 - 33 mmol/L    Anion Gap 5.0 0.0 - 11.9    Glucose 165 (H) 65 - 99 mg/dL    Bun 15 8 - 22 mg/dL    Creatinine 0.80 0.50 - 1.40 mg/dL    Calcium 7.4 (L) 8.4 - 10.2 mg/dL    AST(SGOT) 37 12 - 45 U/L    ALT(SGPT) 71 (H) 2 - 50 U/L    Alkaline Phosphatase 97 30 - 99 U/L    Total Bilirubin 2.4 (H) 0.1 - 1.5 mg/dL    Albumin 2.6 (L) 3.2 - 4.9 g/dL    Total Protein 5.2 (L) 6.0 - 8.2 g/dL    Globulin 2.6 1.9 - 3.5 g/dL    A-G Ratio 1.0 g/dL   ESTIMATED GFR    Collection Time: 04/21/19  4:49 AM   Result Value Ref Range    GFR If African American >60 >60 mL/min/1.73 m 2    GFR If Non African American >60 >60 mL/min/1.73 m 2       Fluids    Intake/Output Summary (Last 24 hours) at 04/21/19 1351  Last data filed at 04/21/19 1300   Gross per 24 hour   Intake          3321.67 ml   Output             2860 ml   Net           461.67 ml       Core Measures & Quality Metrics  Labs reviewed, Medications reviewed and Radiology images reviewed  Vela catheter: Critically Ill - Requiring Accurate Measurement of Urinary Output      DVT Prophylaxis: Enoxaparin (Lovenox)  DVT prophylaxis - mechanical: SCDs  Ulcer prophylaxis: Yes  Antibiotics: Treating active infection/contamination beyond 24 hours perioperative coverage  Assessed for rehab: Patient returned to prior level of function,  rehabilitation not indicated at this time    MI Score  ETOH Screening    Assessment/Plan  * Acute cholecystitis- (present on admission)   Assessment & Plan    Severe cholecystitis  4/19 - Lap sheryl converted to open  4/20 - ERCP.   LFTs down         Discussed patient condition with Family, RN and Patient.  CRITICAL CARE TIME EXCLUDING PROCEDURES: 20    minutes   For information on Fall & Injury Prevention, visit: [email or web address] OR  [email or web address] OR  https://www.cdc.gov/steadi/patient.html

## 2022-02-01 NOTE — DISCHARGE NOTE PROVIDER - NSDCFUSCHEDAPPT_GEN_ALL_CORE_FT
MIGUELITO ARIZA ; 04/13/2022 ; [facility] [address]  MIGUELITO ARIZA ; 04/20/2022 ; [facility] [address] MIGUELITO ARIZA ; 02/09/2022 ; [facility] [address]  MIGUELITO ARIZA ; 04/13/2022 ; [facility] [address]  MIGUELITO ARIZA ; 04/20/2022 ; [facility] [address]

## 2022-02-01 NOTE — DISCHARGE NOTE PROVIDER - NSDCFUADDAPPT_GEN_ALL_CORE_FT
1. You have a follow up appointment scheduled with the physician that performed your ablation. Please attend this appt on 2/9/22 at 9:15AM.

## 2022-02-01 NOTE — DISCHARGE NOTE PROVIDER - HOSPITAL COURSE
#Discharge: do not delete    77 y/o M with chronic systolic heart failure (EF 53% 11/21) s/p [facility] ICD, CAD s/p MATTY to LAD, DANI on CPAP, Pre-DM, BPH, OA, HTN, recurrent UTI, atrial fibrillation on Eliquis s/p DCCV 12/2017 and 12/2018 and 5/2020, recurrent ventricular tachycardia s/p ablation 10/5/17 and 11/16/17, who was found to be in persistent afib with ventricular pacing presented for elective ablation c/b episode of VFib requiring ACLS protocol (episode lasting one minute) and shocked x5 now s/p AFib ablation as well as PVC ablation being monitored in the CCU for development of arrythmia.     #Depression   - takes escitalopram 10mg at home   - c/w home med     #S/p AFib ablation and PVC ablation c/b VFib   - complicated by 1 minute episode of Vfib requiring compressions and cardioversion   - TTE with EF of 50% and no significant changes from 11/21  - Amio 200mg (up from 100mg) per EP    #HFrEF (53% in 11/21)  - s/p [facility] DONALDO ICD  - c/w goal directed therapy:  - coreg  - hold lisinopril for now (restart upon DC)  - atorvastatin   - s/p 40mg lasix x1    #AFib s/p ablation   - c/e eliquis  - c/w amio     #CAD  - c/w statin   - c/w coreg     #HLD  - c/w atorvastatin     #DANI   - uses CPAP at home  - continue on DC     #BPH  - stable, does not use medications at home     #Pre-DM  - sugars are wnl thus far      New medications: Lasix 20mg x5 days, amio 200mg (up from 100mg)  Labs to be followed outpatient: CBC, CMP, TROP   Exam to be followed outpatient: Generalized cardiac exam

## 2022-02-01 NOTE — DISCHARGE NOTE NURSING/CASE MANAGEMENT/SOCIAL WORK - PATIENT PORTAL LINK FT
You can access the FollowMyHealth Patient Portal offered by [facility] by registering at the following website: [email or web address]. By joining [facility]’s FollowMyHealth portal, you will also be able to view your health information using other applications (apps) compatible with our system.

## 2022-02-01 NOTE — DISCHARGE NOTE PROVIDER - NSDCMRMEDTOKEN_GEN_ALL_CORE_FT
amiodarone 200 mg oral tablet: 1 tab(s) orally once a day  atorvastatin 20 mg oral tablet: 1 tab(s) orally once a day (at bedtime)   carvedilol 25 mg oral tablet: 0.5 tab(s) orally every 12 hours  colchicine 0.6 mg oral tablet: 1 tab(s) orally once a day  Eliquis 5 mg oral tablet: 1 tab(s) orally 2 times a day  Lasix 20 mg oral tablet: 1 tab(s) orally once a day   lisinopril 5 mg oral tablet: 1 tab(s) orally once a day

## 2022-02-01 NOTE — DISCHARGE NOTE PROVIDER - PROVIDER TOKENS
PROVIDER:[TOKEN:[[id number]],SCHEDULEDAPPT:[02/09/2022],SCHEDULEDAPPTTIME:[09:15 AM],ESTABLISHEDPATIENT:[T]]

## 2022-02-01 NOTE — DISCHARGE NOTE PROVIDER - CARE PROVIDER_API CALL
Pedro Neal)  Cardiac Electrophysiology  [address], [address] [place] [address]  Phone: [phone]  Fax: [phone]  Established Patient  Scheduled Appointment: 02/09/2022 09:15 AM

## 2022-02-04 DIAGNOSIS — I48.19 OTHER PERSISTENT ATRIAL FIBRILLATION: ICD-10-CM

## 2022-02-04 DIAGNOSIS — Z79.01 LONG TERM (CURRENT) USE OF ANTICOAGULANTS: ICD-10-CM

## 2022-02-04 DIAGNOSIS — N40.0 BENIGN PROSTATIC HYPERPLASIA WITHOUT LOWER URINARY TRACT SYMPTOMS: ICD-10-CM

## 2022-02-04 DIAGNOSIS — D64.9 ANEMIA, UNSPECIFIED: ICD-10-CM

## 2022-02-04 DIAGNOSIS — Z86.16 PERSONAL HISTORY OF COVID-19: ICD-10-CM

## 2022-02-04 DIAGNOSIS — Z95.5 PRESENCE OF CORONARY ANGIOPLASTY IMPLANT AND GRAFT: ICD-10-CM

## 2022-02-04 DIAGNOSIS — Z99.89 DEPENDENCE ON OTHER ENABLING MACHINES AND DEVICES: ICD-10-CM

## 2022-02-04 DIAGNOSIS — F32.A DEPRESSION, UNSPECIFIED: ICD-10-CM

## 2022-02-04 DIAGNOSIS — I11.0 HYPERTENSIVE HEART DISEASE WITH HEART FAILURE: ICD-10-CM

## 2022-02-04 DIAGNOSIS — I49.3 VENTRICULAR PREMATURE DEPOLARIZATION: ICD-10-CM

## 2022-02-04 DIAGNOSIS — I42.8 OTHER CARDIOMYOPATHIES: ICD-10-CM

## 2022-02-04 DIAGNOSIS — Z95.810 PRESENCE OF AUTOMATIC (IMPLANTABLE) CARDIAC DEFIBRILLATOR: ICD-10-CM

## 2022-02-04 DIAGNOSIS — I50.22 CHRONIC SYSTOLIC (CONGESTIVE) HEART FAILURE: ICD-10-CM

## 2022-02-04 DIAGNOSIS — R73.03 PREDIABETES: ICD-10-CM

## 2022-02-04 DIAGNOSIS — Z87.440 PERSONAL HISTORY OF URINARY (TRACT) INFECTIONS: ICD-10-CM

## 2022-02-04 DIAGNOSIS — F41.9 ANXIETY DISORDER, UNSPECIFIED: ICD-10-CM

## 2022-02-04 DIAGNOSIS — G47.33 OBSTRUCTIVE SLEEP APNEA (ADULT) (PEDIATRIC): ICD-10-CM

## 2022-02-04 DIAGNOSIS — M19.90 UNSPECIFIED OSTEOARTHRITIS, UNSPECIFIED SITE: ICD-10-CM

## 2022-02-04 DIAGNOSIS — I48.92 UNSPECIFIED ATRIAL FLUTTER: ICD-10-CM

## 2022-02-04 DIAGNOSIS — E66.9 OBESITY, UNSPECIFIED: ICD-10-CM

## 2022-02-04 DIAGNOSIS — I25.10 ATHEROSCLEROTIC HEART DISEASE OF NATIVE CORONARY ARTERY WITHOUT ANGINA PECTORIS: ICD-10-CM

## 2022-02-09 ENCOUNTER — APPOINTMENT (OUTPATIENT)
Dept: HEART AND VASCULAR | Facility: CLINIC | Age: 77
End: 2022-02-09
Payer: MEDICARE

## 2022-02-09 VITALS
BODY MASS INDEX: 35.92 KG/M2 | SYSTOLIC BLOOD PRESSURE: 133 MMHG | DIASTOLIC BLOOD PRESSURE: 70 MMHG | WEIGHT: 237 LBS | HEART RATE: 76 BPM | HEIGHT: 68 IN

## 2022-02-09 PROCEDURE — 99214 OFFICE O/P EST MOD 30 MIN: CPT | Mod: 25

## 2022-02-09 PROCEDURE — 93283 PRGRMG EVAL IMPLANTABLE DFB: CPT

## 2022-02-14 NOTE — PHYSICAL EXAM
[General Appearance - Well Developed] : well developed [Normal Appearance] : normal appearance [Well Groomed] : well groomed [General Appearance - Well Nourished] : well nourished [No Deformities] : no deformities [General Appearance - In No Acute Distress] : no acute distress [Edema] : no peripheral edema present [] : no respiratory distress [Respiration, Rhythm And Depth] : normal respiratory rhythm and effort [Exaggerated Use Of Accessory Muscles For Inspiration] : no accessory muscle use [Auscultation Breath Sounds / Voice Sounds] : lungs were clear to auscultation bilaterally [Clean] : clean [Dry] : dry [Well-Healed] : well-healed [FreeTextEntry1] : regular [Palpable Crepitus] : no palpable crepitus [Bleeding] : no active bleeding [Foul Odor] : no foul smell [Purulent Drainage] : no purulent drainage [Serosanguineous Drainage] : no serosanquineous drainage [Serous Drainage] : no serous drainage [Erythema] : not erythematous [Warm] : not warm [Tender] : not tender [Indurated] : not indurated [Fluctuant] : not fluctuant

## 2022-02-14 NOTE — PROCEDURE
[No] : not [NSR] : normal sinus rhythm [Pacemaker] : pacemaker [DDDR] : DDDR [Threshold Testing Performed] : Threshold testing was performed [Lead Imp:  ___ohms] : lead impedance was [unfilled] ohms [Sensing Amplitude ___mv] : sensing amplitude was [unfilled] mv [___V @] : [unfilled] V [___ ms] : [unfilled] ms [None] : none [de-identified] : fortify assura [de-identified] : St Donaldo [de-identified] : [id number] [de-identified] : 7/2014 [de-identified] : 70/90 [de-identified] : 1.8 years [de-identified] : shock impedance 47\par no VT/NSVT\par back in afib \par AP 55%\par  42%

## 2022-02-14 NOTE — HISTORY OF PRESENT ILLNESS
[Palpitations] : no palpitations [SOB] : no dyspnea [Syncope] : no syncope [Chest Pain] : no chest pain or discomfort [ICD Shocks] : no recent ICD shocks [Shoulder Pain] : no shoulder pain [Pain at Site] : no pain at device site [Erythema at Site] : no erythema at device site [Swelling at Site] : no swelling at device site [FreeTextEntry1] : Mr. Sauceda is a 77 y/o M with chronic systolic heart failure with EF 30-35% s/p ST Donaldo ICD, CAD s/p MATTY to LAD, DANI on CPAP, Pre-DM, BPH, OA, HTN, recurrent UTI, atrial fibrillation on Eliquis s/p DCCV 12/2017 and 12/2018 and 5/2020, recurrent ventricular tachycardia s/p ablation 10/5/17 and 11/16/17, who was in persistent afib s/p ablation of afib and symptomatic PVC 1/2021, who now presents for follow up.\par \par His history is as follows by his report and patient discharge information from multiple hospitalizations. He underwent surgery for bladder stones 7/2014; post op course significant for NSVT. Work-up thereafter included a stress test followed by an EP study at [facility]. He ultimately had an ICD placed 7/2014. Reports he was admitted with a CHF exacerbation 7/2015. He underwent cardiac catheterization at [facility] 6/2016 s/p MATTY to LAD. Reports he had VT and had ICD shock, followed by VT ablation 9/2016 at [facility]. Amiodarone was started post-procedure for arrhythmia suppression. He had palpitations and was admitted to [facility] 11/2016 with heart rate up to 170 bpm. He was given IV Amiodarone followed by a cardioversion. Amiodarone was stopped due to concern for long term side effects and Sotalol was started. VT treated with ATP 1/2017; Sotalol was discontinued and Amiodarone with load restarted. He was told he would likely need another ablation. Case was discussed with Dr. Diaz at [facility] and the decision was made to D/C ASA, continue Plavix and Eliquis until one year post stent. \par \par He presented to Dr. Arceo’ office 1-2-17 complaining of dizziness x a few days. He had been off amiodarone since the beginning of September. Device interrogation revealed recurrent VT terminated by ATP and he was admitted to the hospital. He was admitted to the CCU and started on amiodarone. He underwent an EP study and VT ablation 10/5/17 and was restarted on sotalol and toprol. He had PVCs and NSVT (different then what was ablated). He had orthostatic hypotension and was hydrated and his medications were adjusted. QTc remained stable on sotalol. \par \par He was doing well until early November 2017 when he had near syncope. He was admitted to an OSH with recurrent VT and placed on amiodarone with breakthrough episodes. All of the episodes were terminated with ATP; until 11/9 when he got an appropriate ICD shock. He was transferred to [facility]. Amiodarone was switched to Quinidine and he had less VT; however during exercise stress test he had sustained VT terminated by ATP with resultant lightheadedness. He underwent a successful epicardial VT ablation on 11/16. \par \par In late November he was found to be in atrial fibrillation and was recommended to start Tikosyn; but secondary to price went on Sotalol. He had a cardioversion 12/2018 \par \par 12 /2019 he had "skipped beats" and remote transmission showed ventricular bigeminy. We stopped his He was then admitted at an OSH with PVCs and switched to amiodarone.  He has finished the loading dose and is now on 200mg daily.  He was found to be back in afib 5/2020 with some SOB and underwent a cardioversion. \par \par On his last visit he was found to be in afib and given that his afib causes RV pacing and traditionally caused heart failure the plan was made to proceed with an ablation.  S/p ablation of PVC/afib (as his main complaint was PVC).  Prior to the ablation he went into Vfib s/p shock.\par \par He states he felt well for a few days post ablation but now has fatigue.  Stable anxiety.  He was prescribed an anti depressant but has not started it.    No chest pain, syncope, near syncope, SOB, orthopnea, PND . He states his "PVCs have improved".  Remote monitoring showed he was back in afib.    No device related complaints. He is compliant with Eliquis and on amiodarone. \par \par

## 2022-02-14 NOTE — REVIEW OF SYSTEMS
[Feeling Fatigued] : feeling fatigued [Anxiety] : anxiety [Under Stress] : under stress [Negative] : Heme/Lymph [Depression] : depression [Fever] : no fever [Chills] : no chills [SOB] : no shortness of breath [Chest Discomfort] : no chest discomfort [Palpitations] : no palpitations [Syncope] : no syncope [Cough] : no cough [Rash] : no rash [Dizziness] : no dizziness

## 2022-02-14 NOTE — DISCUSSION/SUMMARY
[AICD Function Normal] : normal AICD function [FreeTextEntry1] : Mr. Sauceda is a 75 y/o M with chronic systolic heart failure with EF 30-35% s/p ST Donaldo ICD, CAD s/p MATTY to LAD, DANI on CPAP, Pre-DM, BPH, OA, HTN, recurrent UTI, atrial fibrillation on Eliquis s/p DCCV 12/2017 and 12/2018 and 5/2020, recurrent ventricular tachycardia s/p ablation 10/5/17 and 11/16/17, who was in persistent afib s/p ablation of afib and symptomatic PVC 1/2021, who now presents for follow up.  He is back in afib.  We discussed that he is still in the blanking period and what this is.  I would like to get him back in NSR and have asked him to increase his amiodarone until early next week and then proceed with a cardioversion.  We discussed the procedures in detail including risks, benefits and alternatives..  All questions answered.  He was given pre procedure instructions.   He knows to call with any questions or concerns.

## 2022-02-15 ENCOUNTER — OUTPATIENT (OUTPATIENT)
Dept: OUTPATIENT SERVICES | Facility: HOSPITAL | Age: 77
LOS: 1 days | Discharge: HOME | End: 2022-02-15
Payer: MEDICARE

## 2022-02-15 DIAGNOSIS — Z90.49 ACQUIRED ABSENCE OF OTHER SPECIFIED PARTS OF DIGESTIVE TRACT: Chronic | ICD-10-CM

## 2022-02-15 DIAGNOSIS — Z98.890 OTHER SPECIFIED POSTPROCEDURAL STATES: Chronic | ICD-10-CM

## 2022-02-15 DIAGNOSIS — Z95.810 PRESENCE OF AUTOMATIC (IMPLANTABLE) CARDIAC DEFIBRILLATOR: Chronic | ICD-10-CM

## 2022-02-15 LAB
ISTAT INR: 1.4 — HIGH (ref 0.88–1.16)
ISTAT PT: 16.3 SEC — HIGH (ref 10–12.9)
ISTAT VENOUS BE: 4 MMOL/L — HIGH (ref -2–3)
ISTAT VENOUS GLUCOSE: 108 MG/DL — HIGH (ref 70–99)
ISTAT VENOUS HCO3: 29 MMOL/L — HIGH (ref 23–28)
ISTAT VENOUS HEMATOCRIT: 40 % — SIGNIFICANT CHANGE UP (ref 39–50)
ISTAT VENOUS HEMOGLOBIN: 13.6 GM/DL — SIGNIFICANT CHANGE UP (ref 13–17)
ISTAT VENOUS IONIZED CALCIUM: 1.19 MMOL/L — SIGNIFICANT CHANGE UP (ref 1.12–1.3)
ISTAT VENOUS PCO2: 47 MMHG — SIGNIFICANT CHANGE UP (ref 41–51)
ISTAT VENOUS PH: 7.4 — SIGNIFICANT CHANGE UP (ref 7.31–7.41)
ISTAT VENOUS PO2: <66 MMHG — LOW (ref 35–40)
ISTAT VENOUS POTASSIUM: 4.3 MMOL/L — SIGNIFICANT CHANGE UP (ref 3.5–5.3)
ISTAT VENOUS SODIUM: 138 MMOL/L — SIGNIFICANT CHANGE UP (ref 135–145)
ISTAT VENOUS TCO2: 31 MMOL/L — SIGNIFICANT CHANGE UP (ref 22–31)
POCT ISTAT CREATININE: 1.2 MG/DL — SIGNIFICANT CHANGE UP (ref 0.5–1.3)

## 2022-02-15 PROCEDURE — 85014 HEMATOCRIT: CPT

## 2022-02-15 PROCEDURE — 82947 ASSAY GLUCOSE BLOOD QUANT: CPT

## 2022-02-15 PROCEDURE — 84295 ASSAY OF SERUM SODIUM: CPT

## 2022-02-15 PROCEDURE — 92960 CARDIOVERSION ELECTRIC EXT: CPT

## 2022-02-15 PROCEDURE — 85610 PROTHROMBIN TIME: CPT

## 2022-02-15 PROCEDURE — 82803 BLOOD GASES ANY COMBINATION: CPT

## 2022-02-15 PROCEDURE — 82565 ASSAY OF CREATININE: CPT

## 2022-02-15 PROCEDURE — 82330 ASSAY OF CALCIUM: CPT

## 2022-02-15 PROCEDURE — 84132 ASSAY OF SERUM POTASSIUM: CPT

## 2022-03-02 ENCOUNTER — APPOINTMENT (OUTPATIENT)
Dept: PULMONOLOGY | Facility: CLINIC | Age: 77
End: 2022-03-02
Payer: MEDICARE

## 2022-03-02 VITALS
HEIGHT: 68 IN | TEMPERATURE: 97.2 F | HEART RATE: 85 BPM | BODY MASS INDEX: 35.92 KG/M2 | SYSTOLIC BLOOD PRESSURE: 146 MMHG | WEIGHT: 237 LBS | OXYGEN SATURATION: 97 % | DIASTOLIC BLOOD PRESSURE: 90 MMHG | RESPIRATION RATE: 12 BRPM

## 2022-03-02 PROCEDURE — 99213 OFFICE O/P EST LOW 20 MIN: CPT

## 2022-03-02 NOTE — HISTORY OF PRESENT ILLNESS
[FreeTextEntry1] : 2/12/18:  72-year-old male treated for sleep apnea. He has a history of ischemic cardiomyopathy and  both atrial and ventricular arrhythmias. He has an implanted defibrillator.\par \par Had overnight polysomnography May 2017 showing very severe obstructive sleep apnea with Apnea Hypopnea Index 70s.  Split night, started on CPAP with resolution.  On autoPAP now since about 6/1.  Sleep much better, excessive daytime somnolence mugh better.  Now sleeps 1-2 am until 10-11 am with one wake.  >7h each night, had been 2-3 h most nights. \par \par Last seen 6 months ago, compliance 100%, sleeps well w CPAP, bed 1-2 am, 2 brief wakes, up at 10-11 am. No c/o excessive daytime somnolence.  His machine shows Apnea Hypopnea Index <5 on rx.\par \par Past 6 months has had more problems w arrhythmia, VT and atrial fibrillation, has AICD\par \par 2/11/19:  Over past year no problems using CPAP; his phone leonel shows 100% use, >4h, Apnea Hypopnea Index 5-8, P 90 = 9.8. Masks, supplies OK, replaced appropriately.  No snore or excessive daytime somnolence \par \par 2/10/20: OK on CPAP, usual bedtime 2-3 am, up 10-11 am.  Feels tired, not sleepy, admits bored, not much to do. his durable medical equipment provider is [employer]- no recent download available\par \par 3/2/22: Since last seen more problems with arrhythmias, atrial fibrillation, s/p DDCV and ablation. Excellent CPAP compliance- downloaded today, 100% use, Apnea Hypopnea Index on rx = 4.3, average 7h 11 min.  durable medical equipment provider is [employer], getting supplies OK

## 2022-03-02 NOTE — PHYSICAL EXAM
[General Appearance - In No Acute Distress] : no acute distress [Normal Oropharynx] : normal oropharynx [Neck Appearance] : the appearance of the neck was normal [Apical Impulse] : the apical impulse was normal [] : no respiratory distress [Chest Palpation] : palpation of the chest revealed no abnormalities [Involuntary Movements] : no involuntary movements were seen [No Focal Deficits] : no focal deficits

## 2022-03-02 NOTE — ASSESSMENT
[FreeTextEntry1] : 75 y/o M with long hx arrhythmias, severe obstructive sleep apnea with Apnea Hypopnea Index 70s.  Doing very well with CPAP, excellent compliance and efficacy.  Benefiting from usage.\par \par Given long term advice about CPAP use.  Call durable medical equipment provider if any equipment problems.  Replace interface as per schedule, generally at least every 3 months.  Stressed importance of CPAP compliance.  Return to see me in about 6 months if doing well. The patient is aware  that in addition to worsening sleep leading to daytime sleepiness, sleep apnea, at least when severe, may be associated with worsening hypertension and diabetes, and may be a risk factor for cardiovascular disease and stroke. \par

## 2022-03-09 ENCOUNTER — RX RENEWAL (OUTPATIENT)
Age: 77
End: 2022-03-09

## 2022-03-09 ENCOUNTER — APPOINTMENT (OUTPATIENT)
Dept: HEART AND VASCULAR | Facility: CLINIC | Age: 77
End: 2022-03-09
Payer: MEDICARE

## 2022-03-09 VITALS
HEIGHT: 68 IN | BODY MASS INDEX: 35.92 KG/M2 | HEART RATE: 80 BPM | WEIGHT: 237 LBS | SYSTOLIC BLOOD PRESSURE: 144 MMHG | DIASTOLIC BLOOD PRESSURE: 81 MMHG

## 2022-03-09 PROCEDURE — 93283 PRGRMG EVAL IMPLANTABLE DFB: CPT

## 2022-03-09 PROCEDURE — 99213 OFFICE O/P EST LOW 20 MIN: CPT | Mod: 25

## 2022-03-15 NOTE — PROCEDURE
[No] : not [NSR] : normal sinus rhythm [DDDR] : DDDR [Threshold Testing Performed] : Threshold testing was performed [Lead Imp:  ___ohms] : lead impedance was [unfilled] ohms [Sensing Amplitude ___mv] : sensing amplitude was [unfilled] mv [___V @] : [unfilled] V [None] : none [ICD] : Implantable cardioverter-defibrillator [___ ms] : [unfilled] ms [de-identified] : [employer]onaldo [de-identified] : fortify assura [de-identified] : [id number] [de-identified] : 7/2014 [de-identified] : 70/90 [de-identified] : shock impedance 47\par no VT/NSVT\par Apace, v sensed\par AP 55%\par  42% [de-identified] : 1.5 years

## 2022-03-15 NOTE — DISCUSSION/SUMMARY
[AICD Function Normal] : normal AICD function [FreeTextEntry1] : Mr. Sauceda is a 75 y/o M with chronic systolic heart failure with EF 30-35% s/p ST Donaldo ICD, CAD s/p MATTY to LAD, DANI on CPAP, Pre-DM, BPH, OA, HTN, recurrent UTI, atrial fibrillation on Eliquis s/p DCCV 12/2017 and 12/2018 and 5/2020, recurrent ventricular tachycardia s/p ablation 10/5/17 and 11/16/17, who was in persistent afib s/p ablation of afib and symptomatic PVC 1/2021, with ERAF s/p DCCV who presents for follow-uo . He was back in afib post ablation and underwent DCCV on 2/15/22 and has remained in sinus rhythm since. He will continue amiodarone 200 mg daily and apixaban 5mg bid.  All questions answered.   He knows to call with any questions or concerns.

## 2022-03-15 NOTE — HISTORY OF PRESENT ILLNESS
[Palpitations] : no palpitations [SOB] : no dyspnea [Syncope] : no syncope [Chest Pain] : no chest pain or discomfort [ICD Shocks] : no recent ICD shocks [Shoulder Pain] : no shoulder pain [Pain at Site] : no pain at device site [Erythema at Site] : no erythema at device site [Swelling at Site] : no swelling at device site [FreeTextEntry1] : Mr. Sauceda is a 77 y/o M with chronic systolic heart failure with EF 30-35% s/p ST Donaldo ICD, CAD s/p MATTY to LAD, DANI on CPAP, Pre-DM, BPH, OA, HTN, atrial fibrillation on Eliquis s/p DCCV 12/2017, 12/2018, 5/2020 and 2/15/2022,  recurrent ventricular tachycardia s/p ablation 10/5/17 and 11/16/17, who was in persistent afib s/p ablation of afib and symptomatic PVC 1/2021, who now presents for follow up.\par \par His history is as follows by his report and patient discharge information from multiple hospitalizations. He underwent surgery for bladder stones 7/2014; post op course significant for NSVT. Work-up thereafter included a stress test followed by an EP study at [facility]. He ultimately had an ICD placed 7/2014. Reports he was admitted with a CHF exacerbation 7/2015. He underwent cardiac catheterization at [facility] 6/2016 s/p MATTY to LAD. Reports he had VT and had ICD shock, followed by VT ablation 9/2016 at [facility]. Amiodarone was started post-procedure for arrhythmia suppression. He had palpitations and was admitted to [facility] 11/2016 with heart rate up to 170 bpm. He was given IV Amiodarone followed by a cardioversion. Amiodarone was stopped due to concern for long term side effects and Sotalol was started. VT treated with ATP 1/2017; Sotalol was discontinued and Amiodarone with load restarted. He was told he would likely need another ablation. Case was discussed with Dr. Diaz at [place] and the decision was made to D/C ASA, continue Plavix and Eliquis until one year post stent. \par \par He presented to Dr. Arceo’ office 1-2-17 complaining of dizziness x a few days. He had been off amiodarone since the beginning of September. Device interrogation revealed recurrent VT terminated by ATP and he was admitted to the hospital. He was admitted to the CCU and started on amiodarone. He underwent an EP study and VT ablation 10/5/17 and was restarted on sotalol and toprol. He had PVCs and NSVT (different then what was ablated). He had orthostatic hypotension and was hydrated and his medications were adjusted. QTc remained stable on sotalol. \par \par He was doing well until early November 2017 when he had near syncope. He was admitted to an OSH with recurrent VT and placed on amiodarone with breakthrough episodes. All of the episodes were terminated with ATP; until 11/9 when he got an appropriate ICD shock. He was transferred to [facility]. Amiodarone was switched to Quinidine and he had less VT; however during exercise stress test he had sustained VT terminated by ATP with resultant lightheadedness. He underwent a successful epicardial VT ablation on 11/16. \par \par In late November he was found to be in atrial fibrillation and was recommended to start Tikosyn; but secondary to price went on Sotalol. He had a cardioversion 12/2018 \par \par 12 /2019 he had "skipped beats" and remote transmission showed ventricular bigeminy. We stopped his He was then admitted at an OSH with PVCs and switched to amiodarone.  He has finished the loading dose and is now on 200mg daily.  He was found to be back in afib 5/2020 with some SOB and underwent a cardioversion. \par \par On his last visit he was found to be in afib and given that his afib causes RV pacing and traditionally caused heart failure the plan was made to proceed with an ablation.  S/p ablation of PVC/afib (as his main complaint was PVC).  Prior to the ablation he went into Vfib s/p shock.\par \par Today in clinic the patient states that he is doing well. He underwent DCCV on 2/15/22 and remains in sinus rhythm. He is overall doing well and reports no lightheadedness, pre-syncope or syncope. He occasionally feels his heart skip a beat and feels that he has pvcs, this fairly infrequent . His blood pressure today was 144/81 and his pulse was 80 and regular. .\par \par \par

## 2022-03-15 NOTE — REVIEW OF SYSTEMS
[Negative] : Heme/Lymph [Palpitations] : palpitations [Fever] : no fever [Chills] : no chills [Feeling Fatigued] : not feeling fatigued [SOB] : no shortness of breath [Chest Discomfort] : no chest discomfort [Syncope] : no syncope [Cough] : no cough [Rash] : no rash [Dizziness] : no dizziness [Depression] : no depression [Anxiety] : no anxiety [Under Stress] : not under stress

## 2022-03-15 NOTE — PHYSICAL EXAM
[General Appearance - Well Developed] : well developed [Normal Appearance] : normal appearance [Well Groomed] : well groomed [General Appearance - Well Nourished] : well nourished [No Deformities] : no deformities [General Appearance - In No Acute Distress] : no acute distress [Edema] : no peripheral edema present [] : no respiratory distress [Respiration, Rhythm And Depth] : normal respiratory rhythm and effort [Exaggerated Use Of Accessory Muscles For Inspiration] : no accessory muscle use [Auscultation Breath Sounds / Voice Sounds] : lungs were clear to auscultation bilaterally [Clean] : clean [Dry] : dry [Well-Healed] : well-healed [FreeTextEntry1] : regular [Palpable Crepitus] : no palpable crepitus [Bleeding] : no active bleeding [Foul Odor] : no foul smell [Purulent Drainage] : no purulent drainage [Serous Drainage] : no serous drainage [Serosanguineous Drainage] : no serosanquineous drainage [Erythema] : not erythematous [Warm] : not warm [Tender] : not tender [Indurated] : not indurated [Fluctuant] : not fluctuant

## 2022-03-22 ENCOUNTER — NON-APPOINTMENT (OUTPATIENT)
Age: 77
End: 2022-03-22

## 2022-04-20 ENCOUNTER — APPOINTMENT (OUTPATIENT)
Dept: HEART AND VASCULAR | Facility: CLINIC | Age: 77
End: 2022-04-20
Payer: MEDICARE

## 2022-04-20 VITALS
DIASTOLIC BLOOD PRESSURE: 90 MMHG | HEART RATE: 71 BPM | SYSTOLIC BLOOD PRESSURE: 143 MMHG | OXYGEN SATURATION: 97 % | WEIGHT: 240 LBS | BODY MASS INDEX: 36.37 KG/M2 | HEIGHT: 68 IN

## 2022-04-20 PROCEDURE — 99214 OFFICE O/P EST MOD 30 MIN: CPT

## 2022-04-20 NOTE — REVIEW OF SYSTEMS
[Feeling Fatigued] : feeling fatigued [Weight Loss (___ Lbs)] : [unfilled] ~Ulb weight loss [Dyspnea on exertion] : dyspnea during exertion [Dizziness] : dizziness [Numbness (Hypoesthesia)] : numbness [Weakness] : weakness [Depression] : depression [Negative] : Heme/Lymph [Palpitations] : no palpitations [Joint Pain] : joint pain

## 2022-04-20 NOTE — REASON FOR VISIT
[Other: ____] : [unfilled] [FreeTextEntry1] : Diagnostic Tests:\par -----------------------------\par ECG:\par 01/26/22: underlying atrial fibrillation, V-paced. \par 10/13/21: undetermined rhythm (possible sinus rhythm with low amplitude P-waves vs. accelerated junctional rhythm), old septal and inferior MI. \par 04/14/21: undetermined rhythm (possible sinus rhythm with low amplitude P-waves), old septal and inferior MI. \par 12/04/19: sinus arrest, demand pacing, frequent PVCs. \par 10/02/19: normal sinus rhythm, demand pacing, PVCs. \par -----------------------------\par Echo:\par 11/17/21: EF 45-50%, grade II diastolic dysfunction, borderline dilated RV, dilated LA/RA, mild MR, PASP 32 mmHg, ICD leads.  \par 05/05/21: EF 47%, global hypokinesis, mild LVH, grade I diastolic dysfunction, mildly dilated LA, aortic sclerosis, mild AI, mild MR, ICD. \par 05/03/19: EF 40-45%, mild global hypokinesis, PASP 53 mmHg. \par -----------------------------\par Sleep studies: \par 05/1719: severe sleep apnea. \par -----------------------------\par Stress:\par 05/05/21: regadenoson MPI: EF 46%, small apical MI, dilated LV, apical hypokinesis.

## 2022-04-20 NOTE — HISTORY OF PRESENT ILLNESS
[FreeTextEntry1] : Mr. Sauceda presents for follow up and management of CAD s/p PCI LAD, chronic systolic heart failure secondary to ischemic cardiomyopathy, recurrent VT s/p ICD and VT ablation, paroxysmal atrial fibrillation, varicose veins s/p ablation, DVT, DANI, pre-DM2, and HTN.  He has a complex cardiovascular history and was previously followed by several other cardiologists, most recently Janay Lundberg MD. He is followed by a heart rhythm specialist, Pedro Neal MD.  He was first noted to have NSVT and systolic heart failure postoperatively in 2014 when he was admitted for a urologic procedure to address bladder stones. He had a stress test at that time followed by an EP study at [facility] and underwent implantation of an ICD in July, 2014.  In June, 2016 he underwent invasive coronary angiography at [facility] and had PCI of his LAD.   Around that time, he had an appropriate ICD therapy for VT and went on to have a VT ablation at [facility]. He was placed on amiodarone for suppressive therapy.  In 2017, he had an episode of recurrent VT treated with ATP. In 2018, he once again had recurrent VT which was terminated with ATP and he underwent another VT ablation on 10/5/17 and was restarted on sotalol and Toprol. He was noted to have frequent PVCS and runs of VT on 11/6/2017, and he underwent a third VT ablation which was epicardial. Shortly after, this ablation he developed atrial fibrillation requiring DCCV. In 2019, he had ventricular bigeminy and his sotalol was switched to amiodarone again. In May, 2020, he required another DCCV.  On 05/05/21 he had a regadenoson MPI which revealed an EF of 46%, small apical MI, dilated LV, and apical hypokinesis.  He was evaluated in the ED at [facility] on 08/30/21 with complaints of feeling poorly.  He had a normal work up and was sent home.  On 11/17/21 he had an echocardiogram which revealed an of EF 45-50%, grade II diastolic dysfunction, borderline dilated RV, dilated LA/RA, mild MR, PASP 32 mmHg, and ICD leads.    At present, he has complaints of generalized fatigue and bilateral knee pain but denies palpitations.  \par \par \par

## 2022-04-20 NOTE — ASSESSMENT
[FreeTextEntry1] : 1. CAD: s/p PCI LAD 2016: CCS 0: s/p 05/05/21: Regadenoson MPI: EF 46%, small apical MI, dilated LV, apical hypokinesis. s/p 05/05/21: Regadenoson MPI: EF 46%, small apical MI, dilated LV, apical hypokinesis. \par       - continue aggressive medical management\par       - continue off antiplatelet therapy given need for systemic anticoagulation\par   \par 2. Chronic systolic heart failure secondary to ICM: NYHA II s/p echo: EF 45-50%, grade II diastolic dysfunction, borderline dilated RV, dilated LA/RA, mild MR, PASP 32 mmHg, ICD leads.   \par       - continue carvedilol 12.5mg po bid (not tolerant of 25mg po bid in past secondary to hypotension)\par       - continue lisinopril 5mg po qd \par       - will follow with serial echocardiograms \par       - will consider switching lisinopril to Entresto in the future if SBP allows (he wishes to defer at this time) \par \par 3. Ventricular tachycardia: s/p VT ablation x 3 (2016, 2017, 2017): has symptomatic PVCs\par       - follow up with heart rhythm specialist, Pedro Neal MD\par       - continue amiodarone 100mg po qd (check periodic LFTs, TFTs, PFTs)\par \par 4. s/p ICD implantation: secondary prevention of VT, St. Donaldo, Fortify Neema HARPER [id number] ICD (implanted 07/13/14, Nely Ordoñez MD)\par      - follow up with device clinic at [facility]\par \par 5. Atrial fibrillation: persistent: [id number] Score 4\par      - continue amiodarone 100 mg daily (check periodic LFTs, TFTs, PFTs)\par      - continue carvedilol 12.5 mg bid\par      - will monitor PFTs routinely with Dr. Estevez \par      - continue Eliquis 5mg po bid \par      - discussed with patient avoidance of ASA and NSAIDS as well as need for bleeding surveillance. \par \par  6. Dyslipidemia: on low dose statin (? not tolerant of higher dose in the past), LDL 48 (10/14/21)\par      - continue atorvastatin 20mg po qd \par      - discussed therapeutic lifestyle changes to promote improved lipid metabolism \par \par 7. HTN: BP not at ACC/AHA 2017 guideline target: off medications this am (he takes am meds at noon) \par      - continue carvedilol 12.5mg po bid\par      - continue 5mg po qd \par      - will uptitrate HF regimen as SBP allows\par \par 8. Obstructive sleep apnea: severe\par     - continue CPAP (well treated) \par     - follow up with sleep medicine specialist, Richard Plunkett MD\par \par 9. Depression:\par     - will initiate escitalopram 10mg po qd (he has not initiated it)  \par

## 2022-04-28 ENCOUNTER — APPOINTMENT (OUTPATIENT)
Dept: HEART AND VASCULAR | Facility: CLINIC | Age: 77
End: 2022-04-28
Payer: MEDICARE

## 2022-04-28 ENCOUNTER — NON-APPOINTMENT (OUTPATIENT)
Age: 77
End: 2022-04-28

## 2022-04-28 PROCEDURE — 93296 REM INTERROG EVL PM/IDS: CPT

## 2022-04-28 PROCEDURE — 93295 DEV INTERROG REMOTE 1/2/MLT: CPT

## 2022-07-13 ENCOUNTER — APPOINTMENT (OUTPATIENT)
Dept: HEART AND VASCULAR | Facility: CLINIC | Age: 77
End: 2022-07-13

## 2022-07-13 VITALS
HEIGHT: 68 IN | HEART RATE: 68 BPM | DIASTOLIC BLOOD PRESSURE: 73 MMHG | BODY MASS INDEX: 36.37 KG/M2 | WEIGHT: 240 LBS | OXYGEN SATURATION: 96 % | SYSTOLIC BLOOD PRESSURE: 122 MMHG | TEMPERATURE: 97.6 F

## 2022-07-13 PROCEDURE — 93283 PRGRMG EVAL IMPLANTABLE DFB: CPT

## 2022-07-13 PROCEDURE — 99213 OFFICE O/P EST LOW 20 MIN: CPT | Mod: 25

## 2022-07-15 NOTE — PROCEDURE
[No] : not [NSR] : normal sinus rhythm [ICD] : Implantable cardioverter-defibrillator [DDDR] : DDDR [Threshold Testing Performed] : Threshold testing was performed [Lead Imp:  ___ohms] : lead impedance was [unfilled] ohms [Sensing Amplitude ___mv] : sensing amplitude was [unfilled] mv [___ ms] : [unfilled] ms [None] : none [___V @] : [unfilled] V [de-identified] : [employer] Donaldo [de-identified] : fortify assura [de-identified] : [id number] [de-identified] : 7/2014 [de-identified] : 70/90 [de-identified] : 1.8 years [de-identified] : shock impedance 47\par no VT/NSVT or afib / arrhythmias\par AP 98%\par  1%

## 2022-07-15 NOTE — HISTORY OF PRESENT ILLNESS
[Palpitations] : no palpitations [SOB] : no dyspnea [Syncope] : no syncope [Chest Pain] : no chest pain or discomfort [ICD Shocks] : no recent ICD shocks [Shoulder Pain] : no shoulder pain [Pain at Site] : no pain at device site [Erythema at Site] : no erythema at device site [Swelling at Site] : no swelling at device site [FreeTextEntry1] : Mr. Sauceda is a 75 y/o M with chronic systolic heart failure with EF 30-35% s/p ST Donaldo ICD, CAD s/p MATTY to LAD, DANI on CPAP, Pre-DM, BPH, OA, HTN, atrial fibrillation on Eliquis s/p DCCV 12/2017, 12/2018, 5/2020 and 2/15/2022,  recurrent ventricular tachycardia s/p ablation 10/5/17 and 11/16/17, who was in persistent afib s/p ablation of afib and symptomatic PVC 1/2021, who now presents for follow up.\par \par His history is as follows by his report and patient discharge information from multiple hospitalizations. He underwent surgery for bladder stones 7/2014; post op course significant for NSVT. Work-up thereafter included a stress test followed by an EP study at [facility]. He ultimately had an ICD placed 7/2014. Reports he was admitted with a CHF exacerbation 7/2015. He underwent cardiac catheterization at [facility] 6/2016 s/p MATTY to LAD. Reports he had VT and had ICD shock, followed by VT ablation 9/2016 at [facility]. Amiodarone was started post-procedure for arrhythmia suppression. He had palpitations and was admitted to [facility] 11/2016 with heart rate up to 170 bpm. He was given IV Amiodarone followed by a cardioversion. Amiodarone was stopped due to concern for long term side effects and Sotalol was started. VT treated with ATP 1/2017; Sotalol was discontinued and Amiodarone with load restarted. He was told he would likely need another ablation. Case was discussed with Dr. Diaz at [place] and the decision was made to D/C ASA, continue Plavix and Eliquis until one year post stent. \par \par He presented to Dr. Arceo’ office 1-2-17 complaining of dizziness x a few days. He had been off amiodarone since the beginning of September. Device interrogation revealed recurrent VT terminated by ATP and he was admitted to the hospital. He was admitted to the CCU and started on amiodarone. He underwent an EP study and VT ablation 10/5/17 and was restarted on sotalol and toprol. He had PVCs and NSVT (different then what was ablated). He had orthostatic hypotension and was hydrated and his medications were adjusted. QTc remained stable on sotalol. \par \par He was doing well until early November 2017 when he had near syncope. He was admitted to an OSH with recurrent VT and placed on amiodarone with breakthrough episodes. All of the episodes were terminated with ATP; until 11/9 when he got an appropriate ICD shock. He was transferred to [facility]. Amiodarone was switched to Quinidine and he had less VT; however during exercise stress test he had sustained VT terminated by ATP with resultant lightheadedness. He underwent a successful epicardial VT ablation on 11/16. \par \par In late November he was found to be in atrial fibrillation and was recommended to start Tikosyn; but secondary to price went on Sotalol. He had a cardioversion 12/2018 \par \par 12 /2019 he had "skipped beats" and remote transmission showed ventricular bigeminy. We stopped his He was then admitted at an OSH with PVCs and switched to amiodarone.  He has finished the loading dose and is now on 200mg daily.  He was found to be back in afib 5/2020 with some SOB and underwent a cardioversion. \par \par On his last visit he was found to be in afib and given that his afib causes RV pacing and traditionally caused heart failure the plan was made to proceed with an ablation.  S/p ablation of PVC/afib (as his main complaint was PVC).  Prior to the ablation he went into Vfib s/p shock.\par \par Today in clinic the patient states that he is doing well and has no complaints.  . He underwent DCCV on 2/15/22 and remains in sinus rhythm. No syncope, near syncope, chest pain or palpitations.  No device related issues.  \par \par

## 2022-07-15 NOTE — REVIEW OF SYSTEMS
[Negative] : Heme/Lymph [Fever] : no fever [Weight Gain (___ Lbs)] : no recent weight gain [Chills] : no chills [Feeling Fatigued] : not feeling fatigued [Weight Loss (___ Lbs)] : no recent weight loss [SOB] : no shortness of breath [Chest Discomfort] : no chest discomfort [Palpitations] : no palpitations [Syncope] : no syncope [Cough] : no cough [Rash] : no rash [Dizziness] : no dizziness [Depression] : no depression [Under Stress] : not under stress

## 2022-07-15 NOTE — PHYSICAL EXAM
[General Appearance - Well Developed] : well developed [Normal Appearance] : normal appearance [Well Groomed] : well groomed [General Appearance - Well Nourished] : well nourished [No Deformities] : no deformities [General Appearance - In No Acute Distress] : no acute distress [Edema] : no peripheral edema present [] : no respiratory distress [Respiration, Rhythm And Depth] : normal respiratory rhythm and effort [Exaggerated Use Of Accessory Muscles For Inspiration] : no accessory muscle use [Auscultation Breath Sounds / Voice Sounds] : lungs were clear to auscultation bilaterally [Clean] : clean [Dry] : dry [Well-Healed] : well-healed [Heart Rate And Rhythm] : heart rate and rhythm were normal [Heart Sounds] : normal S1 and S2 [Palpable Crepitus] : no palpable crepitus [Bleeding] : no active bleeding [Foul Odor] : no foul smell [Purulent Drainage] : no purulent drainage [Serosanguineous Drainage] : no serosanquineous drainage [Serous Drainage] : no serous drainage [Erythema] : not erythematous [Warm] : not warm [Tender] : not tender [Indurated] : not indurated [Fluctuant] : not fluctuant

## 2022-07-15 NOTE — DISCUSSION/SUMMARY
[AICD Function Normal] : normal AICD function [FreeTextEntry1] : Mr. Sauceda is a 75 y/o M with chronic systolic heart failure with EF 30-35% s/p ST Donaldo ICD, CAD s/p MATTY to LAD, DANI on CPAP, Pre-DM, BPH, OA, HTN, recurrent UTI, atrial fibrillation on Eliquis s/p DCCV 12/2017 and 12/2018 and 5/2020, recurrent ventricular tachycardia s/p ablation 10/5/17 and 11/16/17, who was in persistent afib s/p ablation of afib and symptomatic PVC 1/2021, with ERAF s/p DCCV who presents for follow-uo . He was back in afib post ablation and underwent DCCV on 2/15/22 and has remained in sinus rhythm since. He will continue amiodarone 200 mg daily and apixaban 5mg bid. No recurrent arrhythmias ans he has no complaints.  Device interrogation reveals normal function and all measured data is within normal limits.  FOllow up in 4 months or sooner if needed.He knows to call with any questions or concerns.

## 2022-07-18 ENCOUNTER — RX RENEWAL (OUTPATIENT)
Age: 77
End: 2022-07-18

## 2022-07-19 ENCOUNTER — RX RENEWAL (OUTPATIENT)
Age: 77
End: 2022-07-19

## 2022-08-03 ENCOUNTER — APPOINTMENT (OUTPATIENT)
Dept: HEART AND VASCULAR | Facility: CLINIC | Age: 77
End: 2022-08-03

## 2022-08-03 VITALS
WEIGHT: 251 LBS | DIASTOLIC BLOOD PRESSURE: 75 MMHG | HEART RATE: 65 BPM | BODY MASS INDEX: 38.04 KG/M2 | HEIGHT: 68 IN | SYSTOLIC BLOOD PRESSURE: 129 MMHG

## 2022-08-03 PROCEDURE — 99214 OFFICE O/P EST MOD 30 MIN: CPT

## 2022-08-04 LAB
ALBUMIN SERPL ELPH-MCNC: 4.1 G/DL
ALP BLD-CCNC: 97 U/L
ALT SERPL-CCNC: 16 U/L
ANION GAP SERPL CALC-SCNC: 11 MMOL/L
APTT BLD: 33.1 SEC
AST SERPL-CCNC: 21 U/L
BASOPHILS # BLD AUTO: 0.03 K/UL
BASOPHILS NFR BLD AUTO: 0.4 %
BILIRUB SERPL-MCNC: 0.5 MG/DL
BUN SERPL-MCNC: 12 MG/DL
CALCIUM SERPL-MCNC: 8.9 MG/DL
CHLORIDE SERPL-SCNC: 106 MMOL/L
CHOLEST SERPL-MCNC: 125 MG/DL
CO2 SERPL-SCNC: 26 MMOL/L
CREAT SERPL-MCNC: 0.88 MG/DL
EGFR: 90 ML/MIN/1.73M2
EOSINOPHIL # BLD AUTO: 0.09 K/UL
EOSINOPHIL NFR BLD AUTO: 1.1 %
ESTIMATED AVERAGE GLUCOSE: 126 MG/DL
GLUCOSE SERPL-MCNC: 76 MG/DL
HBA1C MFR BLD HPLC: 6 %
HCT VFR BLD CALC: 38.3 %
HDLC SERPL-MCNC: 41 MG/DL
HGB BLD-MCNC: 11.8 G/DL
IMM GRANULOCYTES NFR BLD AUTO: 0.2 %
INR PPP: 1.21 RATIO
LDLC SERPL CALC-MCNC: 54 MG/DL
LDLC SERPL DIRECT ASSAY-MCNC: 57 MG/DL
LYMPHOCYTES # BLD AUTO: 1.25 K/UL
LYMPHOCYTES NFR BLD AUTO: 14.9 %
MAN DIFF?: NORMAL
MCHC RBC-ENTMCNC: 29.1 PG
MCHC RBC-ENTMCNC: 30.8 GM/DL
MCV RBC AUTO: 94.6 FL
MONOCYTES # BLD AUTO: 0.79 K/UL
MONOCYTES NFR BLD AUTO: 9.4 %
NEUTROPHILS # BLD AUTO: 6.21 K/UL
NEUTROPHILS NFR BLD AUTO: 74 %
NONHDLC SERPL-MCNC: 84 MG/DL
NT-PROBNP SERPL-MCNC: 348 PG/ML
PLATELET # BLD AUTO: 201 K/UL
POTASSIUM SERPL-SCNC: 4.4 MMOL/L
PROT SERPL-MCNC: 6.3 G/DL
PSA FREE FLD-MCNC: 32 %
PSA FREE SERPL-MCNC: 0.57 NG/ML
PSA SERPL-MCNC: 1.75 NG/ML
PT BLD: 14.2 SEC
RBC # BLD: 4.05 M/UL
RBC # FLD: 13.9 %
SODIUM SERPL-SCNC: 142 MMOL/L
TRIGL SERPL-MCNC: 152 MG/DL
TSH SERPL-ACNC: 2.08 UIU/ML
WBC # FLD AUTO: 8.39 K/UL

## 2022-08-24 ENCOUNTER — NON-APPOINTMENT (OUTPATIENT)
Age: 77
End: 2022-08-24

## 2022-08-24 ENCOUNTER — APPOINTMENT (OUTPATIENT)
Dept: HEART AND VASCULAR | Facility: CLINIC | Age: 77
End: 2022-08-24

## 2022-08-24 PROCEDURE — 93295 DEV INTERROG REMOTE 1/2/MLT: CPT

## 2022-08-24 PROCEDURE — 93296 REM INTERROG EVL PM/IDS: CPT

## 2022-08-26 ENCOUNTER — NON-APPOINTMENT (OUTPATIENT)
Age: 77
End: 2022-08-26

## 2022-08-26 NOTE — ASSESSMENT
[FreeTextEntry1] : 1. CAD: s/p PCI LAD 2016: CCS 0: s/p 05/05/21: Regadenoson MPI: EF 46%, small apical MI, dilated LV, apical hypokinesis. s/p 05/05/21: Regadenoson MPI: EF 46%, small apical MI, dilated LV, apical hypokinesis. \par       - continue aggressive medical management\par       - continue off antiplatelet therapy given need for systemic anticoagulation\par   \par 2. Chronic systolic heart failure secondary to ICM: NYHA II s/p echo: 11/17/21: EF 45-50%, grade II diastolic dysfunction, borderline dilated RV, dilated LA/RA, mild MR, PASP 32 mmHg, ICD leads: \par       - continue carvedilol 12.5mg po bid (not tolerant of 25mg po bid in past secondary to hypotension)\par       - continue lisinopril 5mg po qd \par       - will follow with serial echocardiograms (ordered today) \par       - will consider switching lisinopril to Entresto in the future if SBP allows (he wishes to defer at this time) \par \par 3. Ventricular tachycardia: s/p VT ablation x 3 (2016, 2017, 2017): has symptomatic PVCs\par       - follow up with heart rhythm specialist, Pedro Neal MD\par       - continue amiodarone 100mg po qd (check periodic LFTs, TFTs, PFTs)\par \par 4. s/p ICD implantation: secondary prevention of VT, [employer], Fortify Assura DR [id number] ICD (implanted 07/13/14, Nely Ordoñez MD)\par      - follow up with device clinic at [facility]\par \par 5. Atrial fibrillation: persistent: [id number] Score 4\par      - continue amiodarone 100 mg daily (check periodic LFTs, TFTs, PFTs)\par      - continue carvedilol 12.5 mg bid\par      - will monitor PFTs routinely with Dr. Estevez \par      - continue Eliquis 5mg po bid \par      - discussed with patient avoidance of ASA and NSAIDS as well as need for bleeding surveillance. \par \par  6. Dyslipidemia: on low dose statin (? not tolerant of higher dose in the past), LDL 48 (10/14/21)\par      - continue atorvastatin 20mg po qd \par      - discussed therapeutic lifestyle changes to promote improved lipid metabolism \par      - check lab work today \par \par 7. HTN: BP at ACC/AHA 2017 guideline target: off medications this am (he takes am meds at noon) \par      - continue carvedilol 12.5mg po bid\par      - continue 5mg po qd \par \par 8. Obstructive sleep apnea: severe\par     - continue CPAP (well treated) \par     - follow up with sleep medicine specialist, Richard Plunkett MD\par \par 9. Depression:\par     - continue escitalopram 10mg po qd\par \par 10. Preoperative for prostate surgery with Dr. Gene Walker ([phone] fax):\par    - Mr. Sauceda has a complex cardiac history as detailed above but is stable an currently optimized for surgery\par    - he should hold Eliquis for 2 days prior and resume 1 day post surgery provided any bleeding has subsided\par    - his ICD does not require reprogramming in the perioperative time period given that the surgical field is remote from the device

## 2022-08-26 NOTE — HISTORY OF PRESENT ILLNESS
[FreeTextEntry1] : Mr. Sauceda presents for follow up and management of CAD s/p PCI LAD, chronic systolic heart failure secondary to ischemic cardiomyopathy, recurrent VT s/p ICD and VT ablation, paroxysmal atrial fibrillation, varicose veins s/p ablation, DVT, DANI, pre-DM2, and HTN.  He has a complex cardiovascular history and was previously followed by several other cardiologists, most recently Janay Lundberg MD. He is followed by a heart rhythm specialist, Pedro Neal MD.  He was first noted to have NSVT and systolic heart failure postoperatively in 2014 when he was admitted for a urologic procedure to address bladder stones. He had a stress test at that time followed by an EP study at [facility] and underwent implantation of an ICD in July, 2014.  In June, 2016 he underwent invasive coronary angiography at [facility] and had PCI of his LAD.   Around that time, he had an appropriate ICD therapy for VT and went on to have a VT ablation at [facility]. He was placed on amiodarone for suppressive therapy.  In 2017, he had an episode of recurrent VT treated with ATP. In 2018, he once again had recurrent VT which was terminated with ATP and he underwent another VT ablation on 10/5/17 and was restarted on sotalol and Toprol. He was noted to have frequent PVCs and runs of VT on 11/6/2017, and he underwent a third VT ablation which was epicardial. Shortly after, this ablation he developed atrial fibrillation requiring DCCV. In 2019, he had ventricular bigeminy and his sotalol was switched to amiodarone again. In May, 2020, he required another DCCV.  On 05/05/21 he had a regadenoson MPI which revealed an EF of 46%, small apical MI, dilated LV, and apical hypokinesis.  He was evaluated in the ED at [facility] on 08/30/21 with complaints of feeling poorly.  He had a normal work up and was sent home.  On 11/17/21 he had an echocardiogram which revealed an of EF 45-50%, grade II diastolic dysfunction, borderline dilated RV, dilated LA/RA, mild MR, PASP 32 mmHg, and ICD leads.    At present, he has complaints of generalized fatigue and bilateral knee pain but denies palpitations.  He is preoperative for prostate surgery with Dr. Gene Walker ([phone] fax). \par \par

## 2022-08-26 NOTE — REASON FOR VISIT
[Other: ____] : [unfilled] [FreeTextEntry1] : Diagnostic Tests:\par -----------------------------\par ECG:\par 01/26/22: underlying atrial fibrillation, V-paced. \par 10/13/21: undetermined rhythm (possible sinus rhythm with low amplitude P-waves vs. accelerated junctional rhythm), old septal and inferior MI. \par 04/14/21: undetermined rhythm (possible sinus rhythm with low amplitude P-waves), old septal and inferior MI. \par 12/04/19: sinus arrest, demand pacing, frequent PVCs. \par 10/02/19: normal sinus rhythm, demand pacing, PVCs. \par -----------------------------\par Echo:\par 11/17/21: EF 45-50%, grade II diastolic dysfunction, borderline dilated RV, dilated LA/RA, mild MR, PASP 32 mmHg, ICD leads.  \par 05/05/21: EF 47%, global hypokinesis, mild LVH, grade I diastolic dysfunction, mildly dilated LA, aortic sclerosis, mild AI, mild MR, ICD. \par 05/03/19: EF 40-45%, mild global hypokinesis, PASP 53 mmHg. \par -----------------------------\par Sleep studies: \par 05/1719: severe sleep apnea. \par -----------------------------\par Stress:\par 05/05/21: regadenoson MPI: EF 46%, small apical MI, dilated LV, apical hypokinesis. \par -----------------------------\par EP procedures:\par 02/15/22: DCCV from AF to sinus rhythm. \par 05/2020: DCCV from AF to sinus rhythm. \par 12/2018: DCCV from AF to sinus rhythm. \par 12/2017: DCCV from AF to sinus rhythm. \par 11/16/17: VT ablation. \par 10/05/17: VT ablation. \par 2016: VT ablation. \par 07/2014: ICD implantation ([employer]onaldo)/

## 2022-08-26 NOTE — REVIEW OF SYSTEMS
[Feeling Fatigued] : feeling fatigued [Weight Loss (___ Lbs)] : [unfilled] ~Ulb weight loss [Dyspnea on exertion] : dyspnea during exertion [Joint Pain] : joint pain [Dizziness] : dizziness [Numbness (Hypoesthesia)] : numbness [Weakness] : weakness [Depression] : depression [Negative] : Heme/Lymph [Palpitations] : no palpitations

## 2022-09-05 ENCOUNTER — FORM ENCOUNTER (OUTPATIENT)
Age: 77
End: 2022-09-05

## 2022-09-06 ENCOUNTER — OUTPATIENT (OUTPATIENT)
Dept: OUTPATIENT SERVICES | Facility: HOSPITAL | Age: 77
LOS: 1 days | End: 2022-09-06

## 2022-09-06 ENCOUNTER — APPOINTMENT (OUTPATIENT)
Dept: HEART AND VASCULAR | Facility: CLINIC | Age: 77
End: 2022-09-06

## 2022-09-06 VITALS
SYSTOLIC BLOOD PRESSURE: 149 MMHG | DIASTOLIC BLOOD PRESSURE: 83 MMHG | BODY MASS INDEX: 37.28 KG/M2 | WEIGHT: 246 LBS | HEART RATE: 70 BPM | HEIGHT: 68 IN | TEMPERATURE: 97.8 F

## 2022-09-06 DIAGNOSIS — Z95.810 PRESENCE OF AUTOMATIC (IMPLANTABLE) CARDIAC DEFIBRILLATOR: Chronic | ICD-10-CM

## 2022-09-06 DIAGNOSIS — Z90.49 ACQUIRED ABSENCE OF OTHER SPECIFIED PARTS OF DIGESTIVE TRACT: Chronic | ICD-10-CM

## 2022-09-06 DIAGNOSIS — Z98.890 OTHER SPECIFIED POSTPROCEDURAL STATES: Chronic | ICD-10-CM

## 2022-09-06 DIAGNOSIS — I25.10 ATHEROSCLEROTIC HEART DISEASE OF NATIVE CORONARY ARTERY WITHOUT ANGINA PECTORIS: ICD-10-CM

## 2022-09-06 PROCEDURE — 93306 TTE W/DOPPLER COMPLETE: CPT | Mod: 26

## 2022-09-06 PROCEDURE — ZZZZZ: CPT

## 2022-09-06 PROCEDURE — 93283 PRGRMG EVAL IMPLANTABLE DFB: CPT

## 2022-09-06 NOTE — DISCUSSION/SUMMARY
[AICD Function Normal] : normal AICD function [FreeTextEntry1] : Mr. Sauceda is a 76 y/o M with chronic systolic heart failure with EF 30-35% s/p ST Donaldo ICD, CAD s/p MATTY to LAD, DANI on CPAP, Pre-DM, BPH, OA, HTN, recurrent UTI, atrial fibrillation on Eliquis s/p DCCV 12/2017 and 12/2018 and 5/2020, recurrent ventricular tachycardia s/p ablation 10/5/17 and 11/16/17, who was in persistent afib s/p ablation of afib and symptomatic PVC 1/2021, with ERAF s/p DCCV who presents for follow-up .\par Device interrogation reveals normal function and all measured data is within normal limits.  WE discussed at time of generator change he may benefit from a BiV upgrade given that whenever he has afib he RV paces and goes into heart failure. \par \par  He is back in afib / today atach with RV pacing that he is symptomatic from (STEELE, fatigue).  He is already on amiodarone.  He is back on Eliquis.  I have offered him a repeat ablation given his symptoms (will clear appendage with ICE).\par \par The patent was counseled on the fact that there is no cure for atrial fibrillation and that for long term control of atrial fibrillation a combination of strategies if often used.  Regardless of treatment options and maintenance of sinus rhythm, anticoagulation is still recommended based on CHADSVASC score.  \par \par We discussed the procedure in detail including risks, benefits, and drawbacks of each option in detail.   We discussed the procedure in detail including risks, benefits, and alternatives.  I have quoted him a 1:700 chance of major complication related to the procedure.  Risks including, but not limited to; infection, anesthesia reaction, bleeding, pain, vascular injury, cardiac perforation, esophageal injury/fistula,  TE/CVA, phrenic nerve injury, arrhythmia recurrence, need for emergent surgery and death were discussed.  We also discussed that having recurrent arrhythmia for the first 90 days post ablation is not predictive of long-term success of the ablation.  \par \par Mr. Sauceda appeared to understand the whole discussion and verbalized that all his questions were answered to his satisfaction.  He was given pre procedure instructions and knows to call with any questions or concerns.  \par \par During this visit we reviewed outside medical records including event monitor, echo, MRI, office notes and care plan was discussed with patient / family as well as referring doctor.\par \par

## 2022-09-06 NOTE — REVIEW OF SYSTEMS
[Fever] : no fever [Chills] : no chills [Feeling Fatigued] : feeling fatigued [SOB] : no shortness of breath [Dyspnea on exertion] : dyspnea during exertion [Chest Discomfort] : no chest discomfort [Palpitations] : no palpitations [Syncope] : no syncope [Cough] : no cough [Rash] : no rash [Dizziness] : no dizziness [Depression] : no depression [Under Stress] : not under stress [Negative] : Heme/Lymph

## 2022-09-06 NOTE — PHYSICAL EXAM
[General Appearance - Well Developed] : well developed [Normal Appearance] : normal appearance [Well Groomed] : well groomed [General Appearance - Well Nourished] : well nourished [No Deformities] : no deformities [General Appearance - In No Acute Distress] : no acute distress [5th Left ICS - MCL] : palpated at the 5th LICS in the midclavicular line [Rhythm Regular] : regular [] : no respiratory distress [Respiration, Rhythm And Depth] : normal respiratory rhythm and effort [Exaggerated Use Of Accessory Muscles For Inspiration] : no accessory muscle use [Auscultation Breath Sounds / Voice Sounds] : lungs were clear to auscultation bilaterally [Clean] : clean [Dry] : dry [Well-Healed] : well-healed [Palpable Crepitus] : no palpable crepitus [Bleeding] : no active bleeding [Foul Odor] : no foul smell [Purulent Drainage] : no purulent drainage [Serous Drainage] : no serous drainage [Erythema] : not erythematous [Warm] : not warm [Tender] : not tender [Indurated] : not indurated [Fluctuant] : not fluctuant

## 2022-09-06 NOTE — PROCEDURE
[No] : not [ICD] : Implantable cardioverter-defibrillator [DDDR] : DDDR [Threshold Testing Performed] : Threshold testing was performed [Lead Imp:  ___ohms] : lead impedance was [unfilled] ohms [Sensing Amplitude ___mv] : sensing amplitude was [unfilled] mv [___V @] : [unfilled] V [___ ms] : [unfilled] ms [None] : none [de-identified] : atrial tachycardia  [de-identified] : [employer] [de-identified] : fortify assura [de-identified] : [id number] [de-identified] : 7/2014 [de-identified] : 70/90 [de-identified] : 1.1 years [de-identified] : shock impedance 43\par no VT/NSVT - in afib and atach for the past 2 weeks or so\par AP <1%\par  98%

## 2022-09-06 NOTE — HISTORY OF PRESENT ILLNESS
[Palpitations] : no palpitations [SOB] : no dyspnea [Syncope] : no syncope [Chest Pain] : no chest pain or discomfort [ICD Shocks] : no recent ICD shocks [Shoulder Pain] : no shoulder pain [Pain at Site] : no pain at device site [Erythema at Site] : no erythema at device site [Swelling at Site] : no swelling at device site [FreeTextEntry1] : Mr. Sauceda is a 76 y/o M with chronic systolic heart failure with EF 30-35% s/p ST Donaldo ICD, CAD s/p MATTY to LAD, DANI on CPAP, Pre-DM, BPH, OA, HTN, atrial fibrillation on Eliquis s/p DCCV 12/2017, 12/2018, 5/2020 and 2/15/2022,  recurrent ventricular tachycardia s/p ablation 10/5/17 and 11/16/17, who was in persistent afib s/p ablation of afib and symptomatic PVC 1/2021, who was found to be back in afib / atach with fatigue and now presents for an acute visit.\par \par His history is as follows by his report and patient discharge information from multiple hospitalizations. He underwent surgery for bladder stones 7/2014; post op course significant for NSVT. Work-up thereafter included a stress test followed by an EP study at [facility]. He ultimately had an ICD placed 7/2014. Reports he was admitted with a CHF exacerbation 7/2015. He underwent cardiac catheterization at [facility] 6/2016 s/p MATTY to LAD. Reports he had VT and had ICD shock, followed by VT ablation 9/2016 at [facility]. Amiodarone was started post-procedure for arrhythmia suppression. He had palpitations and was admitted to [facility] 11/2016 with heart rate up to 170 bpm. He was given IV Amiodarone followed by a cardioversion. Amiodarone was stopped due to concern for long term side effects and Sotalol was started. VT treated with ATP 1/2017; Sotalol was discontinued and Amiodarone with load restarted. He was told he would likely need another ablation. Case was discussed with Dr. Diaz at [facility] and the decision was made to D/C ASA, continue Plavix and Eliquis until one year post stent. \par \par He presented to Dr. Arceo’ office 1-2-17 complaining of dizziness x a few days. He had been off amiodarone since the beginning of September. Device interrogation revealed recurrent VT terminated by ATP and he was admitted to the hospital. He was admitted to the CCU and started on amiodarone. He underwent an EP study and VT ablation 10/5/17 and was restarted on sotalol and toprol. He had PVCs and NSVT (different then what was ablated). He had orthostatic hypotension and was hydrated and his medications were adjusted. QTc remained stable on sotalol. \par \par He was doing well until early November 2017 when he had near syncope. He was admitted to an OSH with recurrent VT and placed on amiodarone with breakthrough episodes. All of the episodes were terminated with ATP; until 11/9 when he got an appropriate ICD shock. He was transferred to [facility]. Amiodarone was switched to Quinidine and he had less VT; however during exercise stress test he had sustained VT terminated by ATP with resultant lightheadedness. He underwent a successful epicardial VT ablation on 11/16. \par \par In late November he was found to be in atrial fibrillation and was recommended to start Tikosyn; but secondary to price went on Sotalol. He had a cardioversion 12/2018 \par \par 12 /2019 he had "skipped beats" and remote transmission showed ventricular bigeminy. We stopped his He was then admitted at an OSH with PVCs and switched to amiodarone.  He has finished the loading dose and is now on 200mg daily.  He was found to be back in afib 5/2020 with some SOB and underwent a cardioversion. \par \par 1/2022 he was found to be in afib and given that his afib causes RV pacing and traditionally caused heart failure the plan was made to proceed with an ablation.  S/p ablation of PVC/afib (as his main complaint was PVC).  Prior to the ablation he went into Vfib s/p shock. . He was found to be back in afib and underwent DCCV on 2/15/22 and remains in sinus rhythm until about 2 weeks ago.  RV pacing started with afib.  He remains on amiodarone.  He had a prostate procedure last week 9/1 and was off Eliquis for 5 days.  He complains of extreme fatigue and STEELE.  No syncope, near syncope, chest pain or palpitations.  No device related issues.  \par \par

## 2022-09-07 LAB — SARS-COV-2 N GENE NPH QL NAA+PROBE: NOT DETECTED

## 2022-09-08 ENCOUNTER — INPATIENT (INPATIENT)
Facility: HOSPITAL | Age: 77
LOS: 0 days | Discharge: HOME | DRG: 274 | End: 2022-09-09
Attending: INTERNAL MEDICINE | Admitting: INTERNAL MEDICINE
Payer: MEDICARE

## 2022-09-08 VITALS
DIASTOLIC BLOOD PRESSURE: 88 MMHG | HEART RATE: 60 BPM | TEMPERATURE: 98 F | SYSTOLIC BLOOD PRESSURE: 134 MMHG | OXYGEN SATURATION: 98 % | HEIGHT: 69 IN | WEIGHT: 268.08 LBS | RESPIRATION RATE: 18 BRPM

## 2022-09-08 DIAGNOSIS — I47.2 VENTRICULAR TACHYCARDIA: ICD-10-CM

## 2022-09-08 DIAGNOSIS — Z90.49 ACQUIRED ABSENCE OF OTHER SPECIFIED PARTS OF DIGESTIVE TRACT: Chronic | ICD-10-CM

## 2022-09-08 DIAGNOSIS — I48.91 UNSPECIFIED ATRIAL FIBRILLATION: ICD-10-CM

## 2022-09-08 DIAGNOSIS — Z98.890 OTHER SPECIFIED POSTPROCEDURAL STATES: Chronic | ICD-10-CM

## 2022-09-08 DIAGNOSIS — I25.5 ISCHEMIC CARDIOMYOPATHY: ICD-10-CM

## 2022-09-08 DIAGNOSIS — Z95.810 PRESENCE OF AUTOMATIC (IMPLANTABLE) CARDIAC DEFIBRILLATOR: Chronic | ICD-10-CM

## 2022-09-08 LAB
ALBUMIN SERPL ELPH-MCNC: 4.5 G/DL — SIGNIFICANT CHANGE UP (ref 3.3–5)
ALP SERPL-CCNC: 110 U/L — SIGNIFICANT CHANGE UP (ref 40–120)
ALT FLD-CCNC: 15 U/L — SIGNIFICANT CHANGE UP (ref 10–45)
ANION GAP SERPL CALC-SCNC: 14 MMOL/L — SIGNIFICANT CHANGE UP (ref 5–17)
APTT BLD: 30.1 SEC — SIGNIFICANT CHANGE UP (ref 27.5–35.5)
AST SERPL-CCNC: 20 U/L — SIGNIFICANT CHANGE UP (ref 10–40)
BILIRUB SERPL-MCNC: 0.9 MG/DL — SIGNIFICANT CHANGE UP (ref 0.2–1.2)
BLD GP AB SCN SERPL QL: NEGATIVE — SIGNIFICANT CHANGE UP
BUN SERPL-MCNC: 11 MG/DL — SIGNIFICANT CHANGE UP (ref 7–23)
CALCIUM SERPL-MCNC: 9.3 MG/DL — SIGNIFICANT CHANGE UP (ref 8.4–10.5)
CHLORIDE SERPL-SCNC: 100 MMOL/L — SIGNIFICANT CHANGE UP (ref 96–108)
CO2 SERPL-SCNC: 24 MMOL/L — SIGNIFICANT CHANGE UP (ref 22–31)
CREAT SERPL-MCNC: 0.95 MG/DL — SIGNIFICANT CHANGE UP (ref 0.5–1.3)
EGFR: 83 ML/MIN/1.73M2 — SIGNIFICANT CHANGE UP
GLUCOSE SERPL-MCNC: 111 MG/DL — HIGH (ref 70–99)
HCT VFR BLD CALC: 44.7 % — SIGNIFICANT CHANGE UP (ref 39–50)
HGB BLD-MCNC: 14.4 G/DL — SIGNIFICANT CHANGE UP (ref 13–17)
INR BLD: 1.27 — HIGH (ref 0.88–1.16)
ISTAT ACTK (ACTIVATED CLOTTING TIME KAOLIN): 202 SEC — HIGH (ref 74–137)
ISTAT ACTK (ACTIVATED CLOTTING TIME KAOLIN): 231 SEC — HIGH (ref 74–137)
ISTAT ACTK (ACTIVATED CLOTTING TIME KAOLIN): 277 SEC — HIGH (ref 74–137)
ISTAT ACTK (ACTIVATED CLOTTING TIME KAOLIN): 312 SEC — HIGH (ref 74–137)
ISTAT ACTK (ACTIVATED CLOTTING TIME KAOLIN): 312 SEC — HIGH (ref 74–137)
ISTAT ACTK (ACTIVATED CLOTTING TIME KAOLIN): 341 SEC — HIGH (ref 74–137)
ISTAT VENOUS BE: 3 MMOL/L — SIGNIFICANT CHANGE UP (ref -2–3)
ISTAT VENOUS GLUCOSE: 114 MG/DL — HIGH (ref 70–99)
ISTAT VENOUS HCO3: 29 MMOL/L — HIGH (ref 23–28)
ISTAT VENOUS HEMATOCRIT: 46 % — SIGNIFICANT CHANGE UP (ref 39–50)
ISTAT VENOUS HEMOGLOBIN: 15.6 GM/DL — SIGNIFICANT CHANGE UP (ref 13–17)
ISTAT VENOUS IONIZED CALCIUM: 1.16 MMOL/L — SIGNIFICANT CHANGE UP (ref 1.12–1.3)
ISTAT VENOUS PCO2: 50 MMHG — SIGNIFICANT CHANGE UP (ref 41–51)
ISTAT VENOUS PH: 7.37 — SIGNIFICANT CHANGE UP (ref 7.31–7.41)
ISTAT VENOUS PO2: <66 MMHG — LOW (ref 35–40)
ISTAT VENOUS POTASSIUM: 3.9 MMOL/L — SIGNIFICANT CHANGE UP (ref 3.5–5.3)
ISTAT VENOUS SO2: 34 % — SIGNIFICANT CHANGE UP
ISTAT VENOUS SODIUM: 139 MMOL/L — SIGNIFICANT CHANGE UP (ref 135–145)
ISTAT VENOUS TCO2: 31 MMOL/L — SIGNIFICANT CHANGE UP (ref 22–31)
MCHC RBC-ENTMCNC: 29.8 PG — SIGNIFICANT CHANGE UP (ref 27–34)
MCHC RBC-ENTMCNC: 32.2 GM/DL — SIGNIFICANT CHANGE UP (ref 32–36)
MCV RBC AUTO: 92.5 FL — SIGNIFICANT CHANGE UP (ref 80–100)
NRBC # BLD: 0 /100 WBCS — SIGNIFICANT CHANGE UP (ref 0–0)
PLATELET # BLD AUTO: 268 K/UL — SIGNIFICANT CHANGE UP (ref 150–400)
POTASSIUM SERPL-MCNC: 4.1 MMOL/L — SIGNIFICANT CHANGE UP (ref 3.5–5.3)
POTASSIUM SERPL-SCNC: 4.1 MMOL/L — SIGNIFICANT CHANGE UP (ref 3.5–5.3)
PROT SERPL-MCNC: 7.6 G/DL — SIGNIFICANT CHANGE UP (ref 6–8.3)
PROTHROM AB SERPL-ACNC: 15.2 SEC — HIGH (ref 10.5–13.4)
RBC # BLD: 4.83 M/UL — SIGNIFICANT CHANGE UP (ref 4.2–5.8)
RBC # FLD: 13.4 % — SIGNIFICANT CHANGE UP (ref 10.3–14.5)
RH IG SCN BLD-IMP: POSITIVE — SIGNIFICANT CHANGE UP
SODIUM SERPL-SCNC: 138 MMOL/L — SIGNIFICANT CHANGE UP (ref 135–145)
WBC # BLD: 11.36 K/UL — HIGH (ref 3.8–10.5)
WBC # FLD AUTO: 11.36 K/UL — HIGH (ref 3.8–10.5)

## 2022-09-08 PROCEDURE — 93657 TX L/R ATRIAL FIB ADDL: CPT

## 2022-09-08 PROCEDURE — 93655 ICAR CATH ABLTJ DSCRT ARRHYT: CPT

## 2022-09-08 PROCEDURE — 93656 COMPRE EP EVAL ABLTJ ATR FIB: CPT

## 2022-09-08 PROCEDURE — ZZZZZ: CPT

## 2022-09-08 RX ORDER — AMIODARONE HYDROCHLORIDE 400 MG/1
200 TABLET ORAL DAILY
Refills: 0 | Status: DISCONTINUED | OUTPATIENT
Start: 2022-09-08 | End: 2022-09-09

## 2022-09-08 RX ORDER — LISINOPRIL 2.5 MG/1
5 TABLET ORAL DAILY
Refills: 0 | Status: DISCONTINUED | OUTPATIENT
Start: 2022-09-08 | End: 2022-09-09

## 2022-09-08 RX ORDER — ATORVASTATIN CALCIUM 80 MG/1
20 TABLET, FILM COATED ORAL AT BEDTIME
Refills: 0 | Status: DISCONTINUED | OUTPATIENT
Start: 2022-09-08 | End: 2022-09-09

## 2022-09-08 RX ORDER — CARVEDILOL PHOSPHATE 80 MG/1
12.5 CAPSULE, EXTENDED RELEASE ORAL EVERY 12 HOURS
Refills: 0 | Status: DISCONTINUED | OUTPATIENT
Start: 2022-09-08 | End: 2022-09-09

## 2022-09-08 RX ORDER — APIXABAN 2.5 MG/1
5 TABLET, FILM COATED ORAL
Refills: 0 | Status: DISCONTINUED | OUTPATIENT
Start: 2022-09-08 | End: 2022-09-09

## 2022-09-08 RX ORDER — FUROSEMIDE 40 MG
20 TABLET ORAL
Refills: 0 | Status: DISCONTINUED | OUTPATIENT
Start: 2022-09-08 | End: 2022-09-09

## 2022-09-08 RX ADMIN — ATORVASTATIN CALCIUM 20 MILLIGRAM(S): 80 TABLET, FILM COATED ORAL at 21:30

## 2022-09-08 RX ADMIN — Medication 20 MILLIGRAM(S): at 18:45

## 2022-09-08 RX ADMIN — APIXABAN 5 MILLIGRAM(S): 2.5 TABLET, FILM COATED ORAL at 21:30

## 2022-09-08 RX ADMIN — LISINOPRIL 5 MILLIGRAM(S): 2.5 TABLET ORAL at 17:08

## 2022-09-08 RX ADMIN — AMIODARONE HYDROCHLORIDE 200 MILLIGRAM(S): 400 TABLET ORAL at 17:08

## 2022-09-08 RX ADMIN — CARVEDILOL PHOSPHATE 12.5 MILLIGRAM(S): 80 CAPSULE, EXTENDED RELEASE ORAL at 17:08

## 2022-09-08 NOTE — CHART NOTE - NSCHARTNOTEFT_GEN_A_CORE
BRIEF PROCEDURE NOTE - FULL NOTE TO FOLLOW    MIGUELITO ARIZA  [id number]    Pre-op Diagnosis: Atrial fibrillation    Post-op Diagnosis: atrial flutter, atrial tachycardia    Procedure: Atrial tachycardia ablation    Electrophysiologist: Pedro Neal MD    Assistant: Crispin Velasquez MD - Fellow    Anesthesia: General Anesthesia    Access: R and L Femoral veins    Description:  7Fr sheath LFV: decapolar catheter in CS  10Fr sheath LFV: ICE  8Fr sheath RFV: Transeptal, Vizigo sheath, Mapping/ablation    Findings:  Patient was in atrial tachycardia at start of the case.  ICE guided transeptal  Mapping/3D shell created of left atrium and PV.  Pulmonary veins were isolated from prior procedure.  Atrial tachycardia #1 was identified as a mitral annual flutter, treated with a lateral mitral isthmus line.  Shortly after, atrial tachycardia #2 initiated, which was identified as a focal atrial tachycardia in the coumadin ridge, treated with an ablation line that extended from the lateral mitral isthmus line through the ridge.  Areas of breakthrough noted on the posterior wall  were treated with focal ablative therapies.      CTI: ablation TV to IVC, bidirectional block demonstrated with differential pacing      Complications: none    EBL: 20cc    Disposition: to recovery, overnight stay    Plan:  -bedrest 6 hours  -resume all home medications  -will reassess for Jason Velasquez MD  EP Fellow

## 2022-09-08 NOTE — H&P ADULT - ASSESSMENT
Mr. Sauceda is a 78 y/o M with chronic systolic heart failure with EF 30-35% s/p ST Donaldo ICD, CAD s/p MATTY to LAD, DANI on CPAP, Pre-DM, BPH, OA, HTN, atrial fibrillation on Eliquis s/p DCCV 12/2017, 12/2018, 5/2020 and 2/15/2022, recurrent ventricular tachycardia s/p ablation 10/5/17 and 11/16/17, who was in persistent afib s/p ablation of afib and symptomatic PVC 1/2021, who was found to be back in afib / atach with fatigue and now presents for a re-do ablation of his AF/AT substrate.

## 2022-09-08 NOTE — H&P ADULT - PROBLEM SELECTOR PLAN 1
The PVI ablation procedure with transeptal puncture, and its associated risks, including but not limited to infection, vascular injury, cardiac perforation, phrenic nerve injury, thromboembolism and death were among those discussed. All questions answered and informed consent signed.   - Admit overnight for HD monitoring and medication titration.  - Resume Eliquis tonight.  - Monitor groins for bleeding and hematoma.

## 2022-09-08 NOTE — PATIENT PROFILE ADULT - FALL HARM RISK - HARM RISK INTERVENTIONS
Assistance with ambulation/Assistance OOB with selected safe patient handling equipment/Communicate Risk of Fall with Harm to all staff/Monitor gait and stability/Reinforce activity limits and safety measures with patient and family/Review medications for side effects contributing to fall risk/Sit up slowly, dangle for a short time, stand at bedside before walking/Tailored Fall Risk Interventions/Toileting schedule using arm’s reach rule for commode and bathroom/Use of alarms - bed, chair and/or voice tab/Visual Cue: Yellow wristband and red socks/Bed in lowest position, wheels locked, appropriate side rails in place/Call bell, personal items and telephone in reach/Instruct patient to call for assistance before getting out of bed or chair/Non-slip footwear when patient is out of bed/[place] to call system/Physically safe environment - no spills, clutter or unnecessary equipment/Purposeful Proactive Rounding/Room/bathroom lighting operational, light cord in reach

## 2022-09-08 NOTE — H&P ADULT - NSHPLABSRESULTS_GEN_ALL_CORE
14.4   11.36 )-----------( 268      ( 08 Sep 2022 07:55 )             44.7     09-08    138  |  100  |  11  ----------------------------<  111<H>  4.1   |  24  |  0.95    Ca    9.3      08 Sep 2022 07:55    TPro  7.6  /  Alb  4.5  /  TBili  0.9  /  DBili  x   /  AST  20  /  ALT  15  /  AlkPhos  110  09-08    PT/INR - ( 08 Sep 2022 07:55 )   PT: 15.2 sec;   INR: 1.27       PTT - ( 08 Sep 2022 07:55 )  PTT:30.1 sec

## 2022-09-09 ENCOUNTER — TRANSCRIPTION ENCOUNTER (OUTPATIENT)
Age: 77
End: 2022-09-09

## 2022-09-09 VITALS — TEMPERATURE: 98 F

## 2022-09-09 PROCEDURE — ZZZZZ: CPT

## 2022-09-09 RX ORDER — FUROSEMIDE 40 MG
1 TABLET ORAL
Qty: 10 | Refills: 0
Start: 2022-09-09 | End: 2022-09-18

## 2022-09-09 RX ADMIN — LISINOPRIL 5 MILLIGRAM(S): 2.5 TABLET ORAL at 06:33

## 2022-09-09 RX ADMIN — Medication 20 MILLIGRAM(S): at 06:33

## 2022-09-09 RX ADMIN — APIXABAN 5 MILLIGRAM(S): 2.5 TABLET, FILM COATED ORAL at 10:01

## 2022-09-09 RX ADMIN — CARVEDILOL PHOSPHATE 12.5 MILLIGRAM(S): 80 CAPSULE, EXTENDED RELEASE ORAL at 05:02

## 2022-09-09 RX ADMIN — AMIODARONE HYDROCHLORIDE 200 MILLIGRAM(S): 400 TABLET ORAL at 05:02

## 2022-09-09 NOTE — DISCHARGE NOTE NURSING/CASE MANAGEMENT/SOCIAL WORK - PATIENT PORTAL LINK FT
You can access the FollowMyHealth Patient Portal offered by [facility] by registering at the following website: [email or web address]. By joining [employer]’s FollowMyHealth portal, you will also be able to view your health information using other applications (apps) compatible with our system.

## 2022-09-09 NOTE — DISCHARGE NOTE PROVIDER - NSDCFUADDINST_GEN_ALL_CORE_FT
You had a Redo afib ablation on 9/8.  Continue current meds. Take Lasix 20 mg once daily for 10 days. A script was sent to your local pharmacy.   Follow up with Dr. Neal on 10/19/22 at 2:20 PM.   Wound care instruction:  Avoid strenuous activities such as lifting, running or pushing for 1 week in order to avoid bleeding complications in the groin.  If any questions about the groin, call us at [phone].  Ok to shower tonight.  Avoid bathing for 1 week.

## 2022-09-09 NOTE — DISCHARGE NOTE PROVIDER - NSDCMRMEDTOKEN_GEN_ALL_CORE_FT
amiodarone 200 mg oral tablet: 1 tab(s) orally once a day  atorvastatin 20 mg oral tablet: 1 tab(s) orally once a day  carvedilol 25 mg oral tablet: 0.5 tab(s) orally every 12 hours  Eliquis 5 mg oral tablet: 1 tab(s) orally 2 times a day  furosemide 20 mg oral tablet: 1 tab(s) orally once a day   ** new   lisinopril 5 mg oral tablet: 1 tab(s) orally once a day

## 2022-09-09 NOTE — DISCHARGE NOTE PROVIDER - NSDCFUSCHEDAPPT_GEN_ALL_CORE_FT
[facility]  [facility]ART[address] [address]  Scheduled Appointment: 11/23/2022    Ravi Hernandez  [facility]  [address]ART[address]ASC [address]  Scheduled Appointment: 12/07/2022

## 2022-09-09 NOTE — DISCHARGE NOTE PROVIDER - HOSPITAL COURSE
76 y/o M with chronic systolic heart failure with EF 30-35% s/p ST Donaldo ICD, CAD s/p MATTY to LAD, DANI on CPAP, Pre-DM, BPH, OA, HTN, atrial fibrillation on Eliquis s/p DCCV 12/2017, 12/2018, 5/2020 and 2/15/2022, recurrent ventricular tachycardia s/p ablation 10/5/17 and 11/16/17, who was in persistent afib s/p ablation of afib and symptomatic PVC 1/2021, who was found to be back in afib / atach with fatigue and now presents for a re-do ablation of his AF/AT substrate.     His history is as follows by his report and patient discharge information from multiple hospitalizations. He underwent surgery for bladder stones 7/2014; post op course significant for NSVT. Work-up thereafter included a stress test followed by an EP study at [facility]. He ultimately had an ICD placed 7/2014. Reports he was admitted with a CHF exacerbation 7/2015. He underwent cardiac catheterization at [facility] 6/2016 s/p MATTY to LAD. Reports he had VT and had ICD shock, followed by VT ablation 9/2016 at [facility]. Amiodarone was started post-procedure for arrhythmia suppression. He had palpitations and was admitted to [facility] 11/2016 with heart rate up to 170 bpm. He was given IV Amiodarone followed by a cardioversion. Amiodarone was stopped due to concern for long term side effects and Sotalol was started. VT treated with ATP 1/2017; Sotalol was discontinued and Amiodarone with load restarted. He was told he would likely need another ablation. Case was discussed with Dr. Diaz at [place] and the decision was made to D/C ASA, continue Plavix and Eliquis until one year post stent.      He presented to Dr. Arceo’ office 1-2-17 complaining of dizziness x a few days. He had been off amiodarone since the beginning of September. Device interrogation revealed recurrent VT terminated by ATP and he was admitted to the hospital. He was admitted to the CCU and started on amiodarone. He underwent an EP study and VT ablation 10/5/17 and was restarted on sotalol and toprol. He had PVCs and NSVT (different then what was ablated). He had orthostatic hypotension and was hydrated and his medications were adjusted. QTc remained stable on sotalol.      He was doing well until early November 2017 when he had near syncope. He was admitted to an OSH with recurrent VT and placed on amiodarone with breakthrough episodes. All of the episodes were terminated with ATP; until 11/9 when he got an appropriate ICD shock. He was transferred to [facility]. Amiodarone was switched to Quinidine and he had less VT; however during exercise stress test he had sustained VT terminated by ATP with resultant lightheadedness. He underwent a successful epicardial VT ablation on 11/16.      In late November he was found to be in atrial fibrillation and was recommended to start Tikosyn; but secondary to price went on Sotalol. He had a cardioversion 12/2018 12 /2019 he had "skipped beats" and remote transmission showed ventricular bigeminy. We stopped his He was then admitted at an OSH with PVCs and switched to amiodarone. He has finished the loading dose and is now on 200mg daily. He was found to be back in afib 5/2020 with some SOB and underwent a cardioversion.      1/2022 he was found to be in afib and given that his afib causes RV pacing and traditionally caused heart failure the plan was made to proceed with an ablation. S/p ablation of PVC/afib (as his main complaint was PVC). Prior to the ablation he went into Vfib s/p shock. . He was found to be back in afib and underwent DCCV on 2/15/22 and remains in sinus rhythm until about 2 weeks ago. RV pacing started with afib. He remains on amiodarone. He had a prostate procedure last week 9/1 and was off Eliquis for 5 days. He complains of extreme fatigue and STEELE. No syncope, near syncope, chest pain or palpitations. No device related issues. 76 y/o M with chronic systolic heart failure with EF 30-35% s/p ST Donaldo ICD, CAD s/p MATTY to LAD, DANI on CPAP, Pre-DM, BPH, OA, HTN, atrial fibrillation on Eliquis s/p DCCV 12/2017, 12/2018, 5/2020 and 2/15/2022, recurrent ventricular tachycardia s/p ablation 10/5/17 and 11/16/17, who was in persistent afib s/p ablation of afib and symptomatic PVC 1/2021, who was found to be back in afib / atach with fatigue and now presents for a re-do ablation of his AF/AT substrate.   s/p redo AFIB ablation on 9/8.  Eliquis was resumed.  He is stable overnight. VSS. NSR on tele. No complaint offered today.   Exam:  Gen: NAD  S1/S2 normal, RRR, ICD left side  CTA b/l. no w/r/r  +BS, soft. NT / ND  Right groin wound stable w/o bleeding or hematoma.   No LE edema bilateral LE.   He is able to go home today with additional Lasix 20 mg daily x 10 days.

## 2022-09-09 NOTE — DISCHARGE NOTE NURSING/CASE MANAGEMENT/SOCIAL WORK - NSDCPEFALRISK_GEN_ALL_CORE
For information on Fall & Injury Prevention, visit: [email or web address] OR  [email or web address] OR  https://www.cdc.gov/steadi/patient.html

## 2022-09-09 NOTE — DISCHARGE NOTE PROVIDER - CARE PROVIDER_API CALL
Pedro Neal)  Cardiac Electrophysiology  [address], [address] [place] [address]  Phone: [phone]  Fax: [phone]  Follow Up Time:

## 2022-09-14 DIAGNOSIS — I48.4 ATYPICAL ATRIAL FLUTTER: ICD-10-CM

## 2022-09-14 DIAGNOSIS — Z79.01 LONG TERM (CURRENT) USE OF ANTICOAGULANTS: ICD-10-CM

## 2022-09-14 DIAGNOSIS — I48.19 OTHER PERSISTENT ATRIAL FIBRILLATION: ICD-10-CM

## 2022-09-14 DIAGNOSIS — Z95.810 PRESENCE OF AUTOMATIC (IMPLANTABLE) CARDIAC DEFIBRILLATOR: ICD-10-CM

## 2022-09-14 DIAGNOSIS — G47.33 OBSTRUCTIVE SLEEP APNEA (ADULT) (PEDIATRIC): ICD-10-CM

## 2022-09-14 DIAGNOSIS — M19.90 UNSPECIFIED OSTEOARTHRITIS, UNSPECIFIED SITE: ICD-10-CM

## 2022-09-14 DIAGNOSIS — I25.10 ATHEROSCLEROTIC HEART DISEASE OF NATIVE CORONARY ARTERY WITHOUT ANGINA PECTORIS: ICD-10-CM

## 2022-09-14 DIAGNOSIS — I50.22 CHRONIC SYSTOLIC (CONGESTIVE) HEART FAILURE: ICD-10-CM

## 2022-09-14 DIAGNOSIS — I47.1 SUPRAVENTRICULAR TACHYCARDIA: ICD-10-CM

## 2022-09-14 DIAGNOSIS — I47.2 VENTRICULAR TACHYCARDIA: ICD-10-CM

## 2022-09-14 DIAGNOSIS — N40.0 BENIGN PROSTATIC HYPERPLASIA WITHOUT LOWER URINARY TRACT SYMPTOMS: ICD-10-CM

## 2022-09-14 DIAGNOSIS — I49.3 VENTRICULAR PREMATURE DEPOLARIZATION: ICD-10-CM

## 2022-09-14 DIAGNOSIS — R73.03 PREDIABETES: ICD-10-CM

## 2022-09-14 DIAGNOSIS — I25.5 ISCHEMIC CARDIOMYOPATHY: ICD-10-CM

## 2022-09-14 DIAGNOSIS — Z95.5 PRESENCE OF CORONARY ANGIOPLASTY IMPLANT AND GRAFT: ICD-10-CM

## 2022-09-14 DIAGNOSIS — I11.0 HYPERTENSIVE HEART DISEASE WITH HEART FAILURE: ICD-10-CM

## 2022-09-15 PROCEDURE — 82947 ASSAY GLUCOSE BLOOD QUANT: CPT

## 2022-09-15 PROCEDURE — 80053 COMPREHEN METABOLIC PANEL: CPT

## 2022-09-15 PROCEDURE — 82565 ASSAY OF CREATININE: CPT

## 2022-09-15 PROCEDURE — 93306 TTE W/DOPPLER COMPLETE: CPT

## 2022-09-15 PROCEDURE — 82803 BLOOD GASES ANY COMBINATION: CPT

## 2022-09-15 PROCEDURE — 85610 PROTHROMBIN TIME: CPT

## 2022-09-15 PROCEDURE — C1894: CPT

## 2022-09-15 PROCEDURE — 85014 HEMATOCRIT: CPT

## 2022-09-15 PROCEDURE — 85027 COMPLETE CBC AUTOMATED: CPT

## 2022-09-15 PROCEDURE — 84132 ASSAY OF SERUM POTASSIUM: CPT

## 2022-09-15 PROCEDURE — C1732: CPT

## 2022-09-15 PROCEDURE — C1759: CPT

## 2022-09-15 PROCEDURE — C1730: CPT

## 2022-09-15 PROCEDURE — 86900 BLOOD TYPING SEROLOGIC ABO: CPT

## 2022-09-15 PROCEDURE — 36415 COLL VENOUS BLD VENIPUNCTURE: CPT

## 2022-09-15 PROCEDURE — C1760: CPT

## 2022-09-15 PROCEDURE — 84295 ASSAY OF SERUM SODIUM: CPT

## 2022-09-15 PROCEDURE — 85730 THROMBOPLASTIN TIME PARTIAL: CPT

## 2022-09-15 PROCEDURE — 86901 BLOOD TYPING SEROLOGIC RH(D): CPT

## 2022-09-15 PROCEDURE — C1769: CPT

## 2022-09-15 PROCEDURE — C1766: CPT

## 2022-09-15 PROCEDURE — 82330 ASSAY OF CALCIUM: CPT

## 2022-09-15 PROCEDURE — 86850 RBC ANTIBODY SCREEN: CPT

## 2022-09-15 PROCEDURE — 85347 COAGULATION TIME ACTIVATED: CPT

## 2022-10-03 LAB
ISTAT INR: 1.3 — HIGH (ref 0.88–1.16)
ISTAT PT: 15.3 SEC — HIGH (ref 10–12.9)
POCT ISTAT CREATININE: 1 MG/DL — SIGNIFICANT CHANGE UP (ref 0.5–1.3)

## 2022-10-19 ENCOUNTER — APPOINTMENT (OUTPATIENT)
Dept: HEART AND VASCULAR | Facility: CLINIC | Age: 77
End: 2022-10-19

## 2022-10-19 VITALS
HEIGHT: 68 IN | WEIGHT: 246 LBS | SYSTOLIC BLOOD PRESSURE: 151 MMHG | HEART RATE: 70 BPM | TEMPERATURE: 96.6 F | BODY MASS INDEX: 37.28 KG/M2 | DIASTOLIC BLOOD PRESSURE: 78 MMHG

## 2022-10-19 PROCEDURE — 93283 PRGRMG EVAL IMPLANTABLE DFB: CPT

## 2022-10-25 NOTE — HISTORY OF PRESENT ILLNESS
[Palpitations] : no palpitations [SOB] : no dyspnea [Syncope] : no syncope [Chest Pain] : no chest pain or discomfort [ICD Shocks] : no recent ICD shocks [Shoulder Pain] : no shoulder pain [Pain at Site] : no pain at device site [Erythema at Site] : no erythema at device site [Swelling at Site] : no swelling at device site [FreeTextEntry1] : Mr. Sauceda is a 78 y/o M with chronic systolic heart failure with EF 30-35% s/p ST Donaldo ICD, CAD s/p MATTY to LAD, DANI on CPAP, Pre-DM, BPH, OA, HTN, atrial fibrillation on Eliquis s/p DCCV 12/2017, 12/2018, 5/2020 and 2/15/2022,  recurrent ventricular tachycardia s/p ablation 10/5/17 and 11/16/17, who was in persistent afib s/p ablation of afib and symptomatic PVC 1/2021, who was found to be back in atach s/p ablation of atach and atrial flutter (atypical), who presents for follow up.\par  \par \par His history is as follows by his report and patient discharge information from multiple hospitalizations. He underwent surgery for bladder stones 7/2014; post op course significant for NSVT. Work-up thereafter included a stress test followed by an EP study at [facility]. He ultimately had an ICD placed 7/2014. Reports he was admitted with a CHF exacerbation 7/2015. He underwent cardiac catheterization at [facility] 6/2016 s/p MATTY to LAD. Reports he had VT and had ICD shock, followed by VT ablation 9/2016 at [facility]. Amiodarone was started post-procedure for arrhythmia suppression. He had palpitations and was admitted to [facility] 11/2016 with heart rate up to 170 bpm. He was given IV Amiodarone followed by a cardioversion. Amiodarone was stopped due to concern for long term side effects and Sotalol was started. VT treated with ATP 1/2017; Sotalol was discontinued and Amiodarone with load restarted. He was told he would likely need another ablation. Case was discussed with Dr. Diaz at [place] and the decision was made to D/C ASA, continue Plavix and Eliquis until one year post stent. \par \par He presented to Dr. Arceo’ office 1-2-17 complaining of dizziness x a few days. He had been off amiodarone since the beginning of September. Device interrogation revealed recurrent VT terminated by ATP and he was admitted to the hospital. He was admitted to the CCU and started on amiodarone. He underwent an EP study and VT ablation 10/5/17 and was restarted on sotalol and toprol. He had PVCs and NSVT (different then what was ablated). He had orthostatic hypotension and was hydrated and his medications were adjusted. QTc remained stable on sotalol. \par \par He was doing well until early November 2017 when he had near syncope. He was admitted to an OSH with recurrent VT and placed on amiodarone with breakthrough episodes. All of the episodes were terminated with ATP; until 11/9 when he got an appropriate ICD shock. He was transferred to [facility]. Amiodarone was switched to Quinidine and he had less VT; however during exercise stress test he had sustained VT terminated by ATP with resultant lightheadedness. He underwent a successful epicardial VT ablation on 11/16. \par \par In late November he was found to be in atrial fibrillation and was recommended to start Tikosyn; but secondary to price went on Sotalol. He had a cardioversion 12/2018 \par \par 12 /2019 he had "skipped beats" and remote transmission showed ventricular bigeminy. We stopped his He was then admitted at an OSH with PVCs and switched to amiodarone.  He has finished the loading dose and is now on 200mg daily.  He was found to be back in afib 5/2020 with some SOB and underwent a cardioversion. \par \par 1/2022 he was found to be in afib and given that his afib causes RV pacing and traditionally caused heart failure the plan was made to proceed with an ablation.  S/p ablation of PVC/afib (as his main complaint was PVC).  Prior to the ablation he went into Vfib s/p shock. . He was found to be back in afib and underwent DCCV on 2/15/22 and remained in NSR until 8/2022 when he went back into afib with RV pacing.  He ultimately underwent an EP study with ablation of atrial tachycardia and atypical atrial flutter.\par \par He remains on ELiquis and Amiodarone.  Improvement in fatigue and STEELE.  No syncope, near syncope, chest pain or palpitations.  No device related issues.  \par \par

## 2022-10-25 NOTE — PHYSICAL EXAM
[General Appearance - Well Developed] : well developed [Normal Appearance] : normal appearance [Well Groomed] : well groomed [General Appearance - Well Nourished] : well nourished [No Deformities] : no deformities [General Appearance - In No Acute Distress] : no acute distress [] : no respiratory distress [Respiration, Rhythm And Depth] : normal respiratory rhythm and effort [Exaggerated Use Of Accessory Muscles For Inspiration] : no accessory muscle use [Auscultation Breath Sounds / Voice Sounds] : lungs were clear to auscultation bilaterally [Clean] : clean [Dry] : dry [Well-Healed] : well-healed [5th Left ICS - MCL] : palpated at the 5th LICS in the midclavicular line [Rhythm Regular] : regular [Palpable Crepitus] : no palpable crepitus [Bleeding] : no active bleeding [Foul Odor] : no foul smell [Purulent Drainage] : no purulent drainage [Serosanguineous Drainage] : no serosanquineous drainage [Serous Drainage] : no serous drainage [Erythema] : not erythematous [Warm] : not warm [Tender] : not tender [Indurated] : not indurated [Fluctuant] : not fluctuant [Normal Rate] : normal [Normal S1] : normal S1 [Normal S2] : normal S2

## 2022-10-25 NOTE — DISCUSSION/SUMMARY
[AICD Function Normal] : normal AICD function [FreeTextEntry1] : Mr. Sauceda is a 76 y/o M with chronic systolic heart failure with EF 30-35% s/p ST Donaldo ICD, CAD s/p MATTY to LAD, DANI on CPAP, Pre-DM, BPH, OA, HTN, atrial fibrillation on Eliquis s/p DCCV 12/2017, 12/2018, 5/2020 and 2/15/2022,  recurrent ventricular tachycardia s/p ablation 10/5/17 and 11/16/17, who was in persistent afib s/p ablation of afib and symptomatic PVC 1/2021, who was found to be back in atach s/p ablation of atach and atrial flutter (atypical), who presents for follow up. \par Device interrogation reveals normal function and all measured data is within normal limits.  WE discussed at time of generator change he may benefit from a BiV upgrade given that whenever he has afib he RV paces and goes into heart failure.  No further atrial arrhythmias and he is maintained on amiodarone and ELiquis.  He will follow up in 6 months or sooner if needed.  He knows to call with any questions or concerns.  \par \par  \par

## 2022-10-25 NOTE — REVIEW OF SYSTEMS
[Negative] : Heme/Lymph [Fever] : no fever [Chills] : no chills [Feeling Fatigued] : not feeling fatigued [SOB] : no shortness of breath [Dyspnea on exertion] : not dyspnea during exertion [Chest Discomfort] : no chest discomfort [Palpitations] : no palpitations [Syncope] : no syncope [Rash] : no rash [Cough] : no cough [Dizziness] : no dizziness [Anxiety] : anxiety [Under Stress] : not under stress

## 2022-10-25 NOTE — PROCEDURE
[No] : not [ICD] : Implantable cardioverter-defibrillator [DDDR] : DDDR [Threshold Testing Performed] : Threshold testing was performed [Lead Imp:  ___ohms] : lead impedance was [unfilled] ohms [Sensing Amplitude ___mv] : sensing amplitude was [unfilled] mv [___V @] : [unfilled] V [None] : none [___ ms] : [unfilled] ms [de-identified] : atrial tachycardia  [de-identified] : St Donaldo [de-identified] : fortify assura [de-identified] : [id number] [de-identified] : 7/2014 [de-identified] : 70/90 [de-identified] : 1.1 years [de-identified] : shock impedance 46\par no recurrent afib \par AP <1%\par  98%

## 2022-11-23 ENCOUNTER — NON-APPOINTMENT (OUTPATIENT)
Age: 77
End: 2022-11-23

## 2022-11-23 ENCOUNTER — APPOINTMENT (OUTPATIENT)
Dept: HEART AND VASCULAR | Facility: CLINIC | Age: 77
End: 2022-11-23

## 2022-11-23 PROCEDURE — 93295 DEV INTERROG REMOTE 1/2/MLT: CPT

## 2022-11-23 PROCEDURE — 93296 REM INTERROG EVL PM/IDS: CPT

## 2022-12-07 ENCOUNTER — APPOINTMENT (OUTPATIENT)
Dept: HEART AND VASCULAR | Facility: CLINIC | Age: 77
End: 2022-12-07

## 2022-12-07 ENCOUNTER — NON-APPOINTMENT (OUTPATIENT)
Age: 77
End: 2022-12-07

## 2022-12-07 VITALS
BODY MASS INDEX: 37.89 KG/M2 | DIASTOLIC BLOOD PRESSURE: 90 MMHG | HEART RATE: 70 BPM | TEMPERATURE: 98 F | HEIGHT: 68 IN | SYSTOLIC BLOOD PRESSURE: 140 MMHG | WEIGHT: 250 LBS | OXYGEN SATURATION: 97 %

## 2022-12-07 VITALS — DIASTOLIC BLOOD PRESSURE: 88 MMHG | SYSTOLIC BLOOD PRESSURE: 134 MMHG

## 2022-12-07 PROCEDURE — 93000 ELECTROCARDIOGRAM COMPLETE: CPT

## 2022-12-07 PROCEDURE — 99214 OFFICE O/P EST MOD 30 MIN: CPT | Mod: 25

## 2022-12-07 NOTE — ASSESSMENT
[FreeTextEntry1] : 1. CAD: s/p PCI LAD 2016: CCS 0: s/p 05/05/21: Regadenoson MPI: EF 46%, small apical MI, dilated LV, apical hypokinesis. s/p 05/05/21: Regadenoson MPI: EF 46%, small apical MI, dilated LV, apical hypokinesis. \par       - continue aggressive medical management\par       - continue off antiplatelet therapy given need for systemic anticoagulation\par   \par 2. Chronic systolic heart failure secondary to ICM: NYHA II s/p echo: 09/06/22: EF 35%, hypokinesis inferior wall, mid anterolateral wall, apical lateral wall, and apex, mildly dilated LV, grade II diastolic dysfunction, dilated RV, normal RV EF, device leads, dilated LA/RA, mild MR, mild TR, moderate PI, PASP 53 mmHg: \par       - continue carvedilol 12.5mg po bid (not tolerant of 25mg po bid in past secondary to hypotension)\par       - continue lisinopril 5mg po qd \par       - will follow with serial echocardiograms \par       - will consider switching lisinopril to Entresto in the future if SBP allows (he wishes to defer at this time) \par \par 3. Ventricular tachycardia: s/p VT ablation x 3 (2016, 2017, 2017): has symptomatic PVCs\par       - follow up with heart rhythm specialist, Pedro Neal MD\par       - continue amiodarone 100mg po qd (check periodic LFTs, TFTs, PFTs)\par \par 4. s/p ICD implantation: secondary prevention of VT, [employer]. Donaldo, Fortify Assura DR [id number] ICD (implanted 07/13/14, Nely Ordoñez MD)\par      - follow up with device clinic at [facility]\par      - he wishes to pursue a generator change to switch to an MRI compatible device so he can evaluate his lumbar spine\par \par 5. Atrial fibrillation: persistent: [id number] Score 4, s/p AF ablation 01/2022, 09/08/22: \par      - continue amiodarone 100 mg daily (check periodic LFTs, TFTs, PFTs)\par      - continue carvedilol 12.5 mg bid\par      - will monitor PFTs routinely with Dr. Estevez \par      - continue Eliquis 5mg po bid \par      - discussed with patient avoidance of ASA and NSAIDS as well as need for bleeding surveillance. \par \par  6. Dyslipidemia: on low dose statin (? not tolerant of higher dose in the past), LDL 48 (10/14/21)\par      - continue atorvastatin 20mg po qd \par      - discussed therapeutic lifestyle changes to promote improved lipid metabolism \par \par 7. HTN: BP at ACC/AHA 2017 guideline target: off medications this am (he takes am meds at noon) \par      - continue carvedilol 12.5mg po bid\par      - continue 5mg po qd \par \par 8. Obstructive sleep apnea: severe: + nasal bridge skin irrigation from current mask: \par     - continue CPAP\par     - follow up with sleep medicine specialist, Richard Plunkett MD\par     - will need mask fitting to improve nasal bridge skin irritation \par \par \par

## 2022-12-07 NOTE — HISTORY OF PRESENT ILLNESS
[FreeTextEntry1] : Mr. Sauceda presents for follow up and management of CAD s/p PCI LAD, chronic systolic heart failure secondary to ischemic cardiomyopathy, recurrent VT s/p ICD and VT ablation (2016, 2017 x 2), paroxysmal atrial fibrillation (s/p ablation 01/2022, 09/08/22), varicose veins s/p ablation, DVT, DANI, pre-DM2, and HTN.  He has a complex cardiovascular history and was previously followed by several other cardiologists, most recently Janay Lundberg MD. He is followed by a heart rhythm specialist, Pedro Neal MD.  He was first noted to have NSVT and systolic heart failure postoperatively in 2014 when he was admitted for a urologic procedure to address bladder stones. He had a stress test at that time followed by an EP study at [facility] and underwent implantation of an ICD in July, 2014.  In June, 2016 he underwent invasive coronary angiography at [facility] and had PCI of his LAD.   Around that time, he had an appropriate ICD therapy for VT and went on to have a VT ablation at [facility]. He was placed on amiodarone for suppressive therapy.  In 2017, he had an episode of recurrent VT treated with ATP. In 2018, he once again had recurrent VT which was terminated with ATP and he underwent another VT ablation on 10/5/17 and was restarted on sotalol and Toprol. He was noted to have frequent PVCs and runs of VT on 11/6/2017, and he underwent a third VT ablation which was epicardial. Shortly after, this ablation he developed atrial fibrillation requiring DCCV. In 2019, he had ventricular bigeminy and his sotalol was switched to amiodarone again. In May, 2020, he required another DCCV.  On 05/05/21 he had a regadenoson MPI which revealed an EF of 46%, small apical MI, dilated LV, and apical hypokinesis.  He was evaluated in the ED at [facility] on 08/30/21 with complaints of feeling poorly.  He had a normal work up and was sent home.  On 09/06/22, he had an echocardiogram which revealed an EF of 35%, hypokinesis inferior wall, mid anterolateral wall, apical lateral wall, and apex, mildly dilated LV, grade II diastolic dysfunction, dilated RV, normal RV EF, device leads, dilated LA/RA, mild MR, mild TR, moderate PI, and PASP 53 mmHg.  At present, he has complaints of right-sided low back pain which may be related to lumbar disc disease.  \par

## 2022-12-07 NOTE — REASON FOR VISIT
[FreeTextEntry1] : Diagnostic Tests:\par -----------------------------\par ECG:\par 12/07/22: sinus rhythm, first degree AV block, old anteroseptal MI. \par 01/26/22: underlying atrial fibrillation, V-paced. \par 10/13/21: undetermined rhythm (possible sinus rhythm with low amplitude P-waves vs. accelerated junctional rhythm), old septal and inferior MI. \par 04/14/21: undetermined rhythm (possible sinus rhythm with low amplitude P-waves), old septal and inferior MI. \par 12/04/19: sinus arrest, demand pacing, frequent PVCs. \par 10/02/19: normal sinus rhythm, demand pacing, PVCs. \par -----------------------------\par Echo:\par 09/06/22: EF 35%, hypokinesis inferior wall, mid anterolateral wall, apical lateral wall, and apex, mildly dilated LV, grade II diastolic dysfunction, dilated RV, normal RV EF, device leads, dilated LA/RA, mild MR, mild TR, moderate PI, PASP 53 mmHg. \par 11/17/21: EF 45-50%, grade II diastolic dysfunction, borderline dilated RV, dilated LA/RA, mild MR, PASP 32 mmHg, ICD leads.  \par 05/05/21: EF 47%, global hypokinesis, mild LVH, grade I diastolic dysfunction, mildly dilated LA, aortic sclerosis, mild AI, mild MR, ICD. \par 05/03/19: EF 40-45%, mild global hypokinesis, PASP 53 mmHg. \par -----------------------------\par Sleep studies: \par 05/1719: severe sleep apnea. \par -----------------------------\par Stress:\par 05/05/21: regadenoson MPI: EF 46%, small apical MI, dilated LV, apical hypokinesis. \par -----------------------------\par EP procedures:\par 09/08/22: repeat atrial fibrillation ablation. \par 02/15/22: DCCV from AF to sinus rhythm. \par 05/2020: DCCV from AF to sinus rhythm. \par 01/23/20: DCCV from AF to sinus rhythm. \par 12/2018: DCCV from AF to sinus rhythm. \par 12/2017: DCCV from AF to sinus rhythm. \par 11/16/17: VT ablation. \par 10/05/17: VT ablation. \par 2016: VT ablation. \par 07/2014: ICD implantation (St. Donaldo)/

## 2023-02-22 ENCOUNTER — NON-APPOINTMENT (OUTPATIENT)
Age: 78
End: 2023-02-22

## 2023-02-22 ENCOUNTER — APPOINTMENT (OUTPATIENT)
Dept: HEART AND VASCULAR | Facility: CLINIC | Age: 78
End: 2023-02-22
Payer: MEDICARE

## 2023-02-22 PROCEDURE — 93296 REM INTERROG EVL PM/IDS: CPT

## 2023-02-22 PROCEDURE — 93295 DEV INTERROG REMOTE 1/2/MLT: CPT

## 2023-03-06 ENCOUNTER — APPOINTMENT (OUTPATIENT)
Dept: PULMONOLOGY | Facility: CLINIC | Age: 78
End: 2023-03-06
Payer: MEDICARE

## 2023-03-06 VITALS
BODY MASS INDEX: 37.13 KG/M2 | TEMPERATURE: 97.5 F | HEART RATE: 76 BPM | WEIGHT: 245 LBS | HEIGHT: 68 IN | DIASTOLIC BLOOD PRESSURE: 87 MMHG | SYSTOLIC BLOOD PRESSURE: 149 MMHG | OXYGEN SATURATION: 98 %

## 2023-03-06 PROCEDURE — 99213 OFFICE O/P EST LOW 20 MIN: CPT

## 2023-03-07 NOTE — HISTORY OF PRESENT ILLNESS
[FreeTextEntry1] : 2/12/18:  72-year-old male treated for sleep apnea. He has a history of ischemic cardiomyopathy and  both atrial and ventricular arrhythmias. He has an implanted defibrillator.\par \par Had overnight polysomnography May 2017 showing very severe obstructive sleep apnea with Apnea Hypopnea Index 70s.  Split night, started on CPAP with resolution.  On autoPAP now since about 6/1.  Sleep much better, excessive daytime somnolence mugh better.  Now sleeps 1-2 am until 10-11 am with one wake.  >7h each night, had been 2-3 h most nights. \par \par Last seen 6 months ago, compliance 100%, sleeps well w CPAP, bed 1-2 am, 2 brief wakes, up at 10-11 am. No c/o excessive daytime somnolence.  His machine shows Apnea Hypopnea Index <5 on rx.\par \par Past 6 months has had more problems w arrhythmia, VT and atrial fibrillation, has AICD\par \par 2/11/19:  Over past year no problems using CPAP; his phone leonel shows 100% use, >4h, Apnea Hypopnea Index 5-8, P 90 = 9.8. Masks, supplies OK, replaced appropriately.  No snore or excessive daytime somnolence \par \par 2/10/20: OK on CPAP, usual bedtime 2-3 am, up 10-11 am.  Feels tired, not sleepy, admits bored, not much to do. his durable medical equipment provider is [employer]- no recent download available\par \par 3/2/22: Since last seen more problems with arrhythmias, atrial fibrillation, s/p DDCV and ablation. Excellent CPAP compliance- downloaded today, 100% use, Apnea Hypopnea Index on rx = 4.3, average 7h 11 min.  durable medical equipment provider is [employer], getting supplies OK\par \par 3/6/23: Generally doing well with CPAP, 100% use except for a few weeks ago when he had irritation on the skin of the bridge of his nose.  At that time he was sleeping on it sitting up because of back pain.  Skin is healed and he is now back to comfortably using CPAP.  Download today shows mild 7 hours use night average apnea-hypopnea index 4.9, some nights of excessive leak.  He feels he is generally doing well with this.  He does describe daytime sleepiness and fatigue.  Treated for congestive heart failure and has not implanted defibrillator, which he tells me he is about to to be replaced.\par \par

## 2023-03-07 NOTE — ASSESSMENT
[FreeTextEntry1] : Doing very well with CPAP, excellent compliance and efficacy generally, missed few nights because of irritation of skin, now OK.  Benefiting from usage.\par \par Given long term advice about CPAP use.  Call durable medical equipment provider if any equipment problems.  Replace interface as per schedule, generally at least every 3 months.  Stressed importance of CPAP compliance.  Return to see me in about 12 months if doing well. \par \par Supect much of fatigue is from chronic CHF\par \par Time spent today includes download and review of CPAP compliance and efficacy report

## 2023-03-07 NOTE — PHYSICAL EXAM
[General Appearance - Well Developed] : well developed [Normal Appearance] : normal appearance [Well Groomed] : well groomed [General Appearance - Well Nourished] : well nourished [No Deformities] : no deformities [General Appearance - In No Acute Distress] : no acute distress [Normal Oropharynx] : abnormal oropharynx [Low Lying Soft Palate] : low lying soft palate [IV] : IV [FreeTextEntry1] : Severely crowded oropharynx with bilaterally enlarged tonsils. [Heart Rate And Rhythm] : heart rate was normal and rhythm regular [Heart Sounds] : normal S1 and S2 [Heart Sounds Gallop] : no gallops [Murmurs] : no murmurs [Heart Sounds Pericardial Friction Rub] : no pericardial rub [] : no respiratory distress [Auscultation Breath Sounds / Voice Sounds] : lungs were clear to auscultation bilaterally [2+ Pitting] : 2+  pitting

## 2023-03-27 ENCOUNTER — RX RENEWAL (OUTPATIENT)
Age: 78
End: 2023-03-27

## 2023-04-12 ENCOUNTER — APPOINTMENT (OUTPATIENT)
Dept: HEART AND VASCULAR | Facility: CLINIC | Age: 78
End: 2023-04-12
Payer: MEDICARE

## 2023-04-12 ENCOUNTER — NON-APPOINTMENT (OUTPATIENT)
Age: 78
End: 2023-04-12

## 2023-04-12 VITALS
DIASTOLIC BLOOD PRESSURE: 80 MMHG | HEIGHT: 68 IN | WEIGHT: 249 LBS | OXYGEN SATURATION: 94 % | HEART RATE: 70 BPM | BODY MASS INDEX: 37.74 KG/M2 | SYSTOLIC BLOOD PRESSURE: 130 MMHG

## 2023-04-12 VITALS
HEART RATE: 70 BPM | DIASTOLIC BLOOD PRESSURE: 72 MMHG | BODY MASS INDEX: 37.74 KG/M2 | WEIGHT: 249 LBS | SYSTOLIC BLOOD PRESSURE: 134 MMHG | HEIGHT: 68 IN

## 2023-04-12 PROCEDURE — 99212 OFFICE O/P EST SF 10 MIN: CPT | Mod: 25

## 2023-04-12 PROCEDURE — 99214 OFFICE O/P EST MOD 30 MIN: CPT

## 2023-04-12 PROCEDURE — 93283 PRGRMG EVAL IMPLANTABLE DFB: CPT

## 2023-04-12 RX ORDER — METAXALONE 800 MG/1
800 TABLET ORAL
Qty: 90 | Refills: 0 | Status: DISCONTINUED | COMMUNITY
Start: 2022-12-05 | End: 2023-04-12

## 2023-04-12 RX ORDER — FUROSEMIDE 20 MG/1
20 TABLET ORAL DAILY
Qty: 90 | Refills: 3 | Status: DISCONTINUED | COMMUNITY
Start: 2022-02-09 | End: 2023-04-12

## 2023-04-12 NOTE — HISTORY OF PRESENT ILLNESS
[FreeTextEntry1] : Mr. Sauceda presents for follow up and management of CAD s/p PCI LAD, chronic systolic heart failure secondary to ischemic cardiomyopathy, recurrent VT s/p ICD and VT ablation (2016, 2017 x 2), paroxysmal atrial fibrillation (s/p ablation 01/2022, 09/08/22), varicose veins s/p ablation, DVT, DANI, pre-DM2, and HTN.  He has a complex cardiovascular history and was previously followed by several other cardiologists, most recently Janay Lundberg MD. He is followed by a heart rhythm specialist, Pedro Neal MD.  He was first noted to have NSVT and systolic heart failure postoperatively in 2014 when he was admitted for a urologic procedure to address bladder stones. He had a stress test at that time followed by an EP study at [facility] and underwent implantation of an ICD in July, 2014.  In June, 2016 he underwent invasive coronary angiography at [facility] and had PCI of his LAD.   Around that time, he had an appropriate ICD therapy for VT and went on to have a VT ablation at [facility]. He was placed on amiodarone for suppressive therapy.  In 2017, he had an episode of recurrent VT treated with ATP. In 2018, he once again had recurrent VT which was terminated with ATP and he underwent another VT ablation on 10/5/17 and was restarted on sotalol and Toprol. He was noted to have frequent PVCs and runs of VT on 11/6/2017, and he underwent a third VT ablation which was epicardial. Shortly after, this ablation he developed atrial fibrillation requiring DCCV. In 2019, he had ventricular bigeminy and his sotalol was switched to amiodarone again. In May, 2020, he required another DCCV.  On 05/05/21 he had a regadenoson MPI which revealed an EF of 46%, small apical MI, dilated LV, and apical hypokinesis.  He was evaluated in the ED at [facility] on 08/30/21 with complaints of feeling poorly.  He had a normal work up and was sent home.  On 09/06/22, he had an echocardiogram which revealed an EF of 35%, hypokinesis inferior wall, mid anterolateral wall, apical lateral wall, and apex, mildly dilated LV, grade II diastolic dysfunction, dilated RV, normal RV EF, device leads, dilated LA/RA, mild MR, mild TR, moderate PI, and PASP 53 mmHg.  He had complaints of dizziness and HTN post eating chinese food.  He called 911 and was taken to [facility] ED where he had a CT of the brain and was told he has a cerebral aneurysm.  He followed up with a neurosurgeon and is being managed conservatively.  He recently sold his home in [place], [place] and bought a new home in a [place] community.

## 2023-04-12 NOTE — ASSESSMENT
[FreeTextEntry1] : 1. CAD: s/p PCI LAD 2016: CCS 0: s/p 05/05/21: Regadenoson MPI: EF 46%, small apical MI, dilated LV, apical hypokinesis. s/p 05/05/21: Regadenoson MPI: EF 46%, small apical MI, dilated LV, apical hypokinesis. \par       - continue aggressive medical management\par       - continue off antiplatelet therapy given need for systemic anticoagulation\par   \par 2. Chronic systolic heart failure secondary to ICM: NYHA II s/p echo: 09/06/22: EF 35%, hypokinesis inferior wall, mid anterolateral wall, apical lateral wall, and apex, mildly dilated LV, grade II diastolic dysfunction, dilated RV, normal RV EF, device leads, dilated LA/RA, mild MR, mild TR, moderate PI, PASP 53 mmHg: \par       - continue carvedilol 12.5mg po bid (not tolerant of 25mg po bid in past secondary to hypotension)\par       - continue lisinopril 5mg po qd \par       - will follow with serial echocardiograms (ordered today for time of next visit) \par       - will consider switching lisinopril to Entresto in the future if SBP allows (he wishes to defer at this time) \par \par 3. Ventricular tachycardia: s/p VT ablation x 3 (2016, 2017, 2017): has symptomatic PVCs\par       - follow up with heart rhythm specialist, Pedro Neal MD\par       - continue amiodarone 100mg po qd (check periodic LFTs, TFTs, PFTs)\par \par 4. s/p ICD implantation: secondary prevention of VT, [employer], Fortify Assura DR [id number] ICD (implanted 07/13/14, Nely Ordoñez MD)\par      - follow up with device clinic at [facility]\par \par 5. Atrial fibrillation: persistent: [id number] Score 4, s/p AF ablation 01/2022, 09/08/22: \par      - continue amiodarone 100 mg daily (check periodic LFTs, TFTs, PFTs)\par      - continue carvedilol 12.5 mg bid\par      - continue Eliquis 5mg po bid \par      - discussed with patient avoidance of ASA and NSAIDS as well as need for bleeding surveillance\par \par  6. Dyslipidemia: on low dose statin (? not tolerant of higher dose in the past), LDL 66 (11/08/22): \par      - continue atorvastatin 20mg po qd \par      - discussed therapeutic lifestyle changes to promote improved lipid metabolism \par      - patient will provide copy of recent lab work from PMD's office for my review \par \par 7. HTN: BP at ACC/AHA 2017 guideline target: off medications this am (he takes am meds at noon): \par      - continue carvedilol 12.5mg po bid\par      - continue 5mg po qd \par \par 8. Obstructive sleep apnea: severe: + nasal bridge skin irrigation from current mask: \par     - continue CPAP\par     - follow up with sleep medicine specialist, Richard Plunkett MD\par \par 9. Cerebral aneurysm: details unknown: \par     - he will follow up with neurosurgeon at [facility], Jonah Meade MD (fax [phone])\par     - he will provide copies of his recent work up\par \par \par

## 2023-04-12 NOTE — REASON FOR VISIT
[Other: ____] : [unfilled] [FreeTextEntry1] : Diagnostic Tests:\par -----------------------------\par ECG:\par 12/07/22: sinus rhythm, first degree AV block, old anteroseptal MI. \par 01/26/22: underlying atrial fibrillation, V-paced. \par 10/13/21: undetermined rhythm (possible sinus rhythm with low amplitude P-waves vs. accelerated junctional rhythm), old septal and inferior MI. \par 04/14/21: undetermined rhythm (possible sinus rhythm with low amplitude P-waves), old septal and inferior MI. \par 12/04/19: sinus arrest, demand pacing, frequent PVCs. \par 10/02/19: normal sinus rhythm, demand pacing, PVCs. \par -----------------------------\par Echo:\par 09/06/22: EF 35%, hypokinesis inferior wall, mid anterolateral wall, apical lateral wall, and apex, mildly dilated LV, grade II diastolic dysfunction, dilated RV, normal RV EF, device leads, dilated LA/RA, mild MR, mild TR, moderate PI, PASP 53 mmHg. \par 11/17/21: EF 45-50%, grade II diastolic dysfunction, borderline dilated RV, dilated LA/RA, mild MR, PASP 32 mmHg, ICD leads.  \par 05/05/21: EF 47%, global hypokinesis, mild LVH, grade I diastolic dysfunction, mildly dilated LA, aortic sclerosis, mild AI, mild MR, ICD. \par 05/03/19: EF 40-45%, mild global hypokinesis, PASP 53 mmHg. \par -----------------------------\par Sleep studies: \par 05/1719: severe sleep apnea. \par -----------------------------\par Stress:\par 05/05/21: regadenoson MPI: EF 46%, small apical MI, dilated LV, apical hypokinesis. \par -----------------------------\par EP procedures:\par 09/08/22: repeat atrial fibrillation ablation. \par 02/15/22: DCCV from AF to sinus rhythm. \par 05/2020: DCCV from AF to sinus rhythm. \par 01/23/20: DCCV from AF to sinus rhythm. \par 12/2018: DCCV from AF to sinus rhythm. \par 12/2017: DCCV from AF to sinus rhythm. \par 11/16/17: VT ablation. \par 10/05/17: VT ablation. \par 2016: VT ablation. \par 07/2014: ICD implantation ([employer]onaldo)/

## 2023-04-20 NOTE — HISTORY OF PRESENT ILLNESS
[Palpitations] : no palpitations [SOB] : no dyspnea [Syncope] : no syncope [Chest Pain] : no chest pain or discomfort [ICD Shocks] : no recent ICD shocks [Shoulder Pain] : no shoulder pain [Pain at Site] : no pain at device site [Erythema at Site] : no erythema at device site [Swelling at Site] : no swelling at device site [FreeTextEntry1] : Mr. Sauceda is a 76 y/o M with chronic systolic heart failure with EF 30-35% s/p ST Donaldo ICD, CAD s/p MATTY to LAD, DANI on CPAP, Pre-DM, BPH, OA, HTN, atrial fibrillation on Eliquis s/p DCCV 12/2017, 12/2018, 5/2020 and 2/15/2022,  recurrent ventricular tachycardia s/p ablation 10/5/17 and 11/16/17, who was in persistent afib s/p ablation of afib and symptomatic PVC 1/2021, who was found to be back in atach s/p ablation of atach and atrial flutter (atypical), who presents for follow up.\par  \par \par His history is as follows by his report and patient discharge information from multiple hospitalizations. He underwent surgery for bladder stones 7/2014; post op course significant for NSVT. Work-up thereafter included a stress test followed by an EP study at [facility]. He ultimately had an ICD placed 7/2014. Reports he was admitted with a CHF exacerbation 7/2015. He underwent cardiac catheterization at [facility] 6/2016 s/p MATTY to LAD. Reports he had VT and had ICD shock, followed by VT ablation 9/2016 at [facility]. Amiodarone was started post-procedure for arrhythmia suppression. He had palpitations and was admitted to [facility] 11/2016 with heart rate up to 170 bpm. He was given IV Amiodarone followed by a cardioversion. Amiodarone was stopped due to concern for long term side effects and Sotalol was started. VT treated with ATP 1/2017; Sotalol was discontinued and Amiodarone with load restarted. He was told he would likely need another ablation. Case was discussed with Dr. Diaz at [facility] and the decision was made to D/C ASA, continue Plavix and Eliquis until one year post stent. \par \par He presented to Dr. Arceo’ office 1-2-17 complaining of dizziness x a few days. He had been off amiodarone since the beginning of September. Device interrogation revealed recurrent VT terminated by ATP and he was admitted to the hospital. He was admitted to the CCU and started on amiodarone. He underwent an EP study and VT ablation 10/5/17 and was restarted on sotalol and toprol. He had PVCs and NSVT (different then what was ablated). He had orthostatic hypotension and was hydrated and his medications were adjusted. QTc remained stable on sotalol. \par \par He was doing well until early November 2017 when he had near syncope. He was admitted to an OSH with recurrent VT and placed on amiodarone with breakthrough episodes. All of the episodes were terminated with ATP; until 11/9 when he got an appropriate ICD shock. He was transferred to [facility]. Amiodarone was switched to Quinidine and he had less VT; however during exercise stress test he had sustained VT terminated by ATP with resultant lightheadedness. He underwent a successful epicardial VT ablation on 11/16. \par \par In late November 2018 he was found to be in atrial fibrillation and was recommended to start Tikosyn; but secondary to price went on Sotalol. He had a cardioversion 12/2018 \par \par 12/2019 he had "skipped beats" and remote transmission showed ventricular bigeminy. We stopped his He was then admitted at an OSH with PVCs and switched to amiodarone.  He has finished the loading dose and is now on 200mg daily.  He was found to be back in afib 5/2020 with some SOB and underwent a cardioversion. \par \par 1/2022 he was found to be in afib and given that his afib causes RV pacing and traditionally caused heart failure the plan was made to proceed with an ablation.  S/p ablation of PVC/afib (as his main complaint was PVC).  Prior to the ablation he went into Vfib s/p shock. . He was found to be back in afib and underwent DCCV on 2/15/22 and remained in NSR until 8/2022 when he went back into afib with RV pacing.  He ultimately underwent an EP study with ablation of atrial tachycardia and atypical atrial flutter.\par \par He remains on ELiquis and Amiodarone.  Improvement in fatigue and STEELE.  No syncope, near syncope, chest pain or palpitations.  No device related complaints\par \par

## 2023-04-20 NOTE — PHYSICAL EXAM
[General Appearance - Well Developed] : well developed [Normal Appearance] : normal appearance [Well Groomed] : well groomed [General Appearance - Well Nourished] : well nourished [No Deformities] : no deformities [General Appearance - In No Acute Distress] : no acute distress [] : no respiratory distress [Respiration, Rhythm And Depth] : normal respiratory rhythm and effort [Exaggerated Use Of Accessory Muscles For Inspiration] : no accessory muscle use [Auscultation Breath Sounds / Voice Sounds] : lungs were clear to auscultation bilaterally [Clean] : clean [Dry] : dry [Well-Healed] : well-healed [5th Left ICS - MCL] : palpated at the 5th LICS in the midclavicular line [Normal Rate] : normal [Rhythm Regular] : regular [Normal S1] : normal S1 [Normal S2] : normal S2 [Palpable Crepitus] : no palpable crepitus [Bleeding] : no active bleeding [Foul Odor] : no foul smell [Purulent Drainage] : no purulent drainage [Serous Drainage] : no serous drainage [Erythema] : not erythematous [Warm] : not warm [Tender] : not tender [Indurated] : not indurated [Fluctuant] : not fluctuant

## 2023-04-20 NOTE — REVIEW OF SYSTEMS
[Anxiety] : anxiety [Negative] : Heme/Lymph [Feeling Fatigued] : feeling fatigued [Fever] : no fever [Weight Gain (___ Lbs)] : no recent weight gain [Chills] : no chills [Weight Loss (___ Lbs)] : no recent weight loss [SOB] : no shortness of breath [Dyspnea on exertion] : not dyspnea during exertion [Chest Discomfort] : no chest discomfort [Palpitations] : no palpitations [Syncope] : no syncope [Cough] : no cough [Rash] : no rash [Dizziness] : no dizziness [Under Stress] : not under stress

## 2023-04-20 NOTE — PROCEDURE
[No] : not [ICD] : Implantable cardioverter-defibrillator [DDDR] : DDDR [Threshold Testing Performed] : Threshold testing was performed [Lead Imp:  ___ohms] : lead impedance was [unfilled] ohms [Sensing Amplitude ___mv] : sensing amplitude was [unfilled] mv [___V @] : [unfilled] V [___ ms] : [unfilled] ms [None] : none [de-identified] : atrial tachycardia  [de-identified] : [employer] [de-identified] : fortify assura [de-identified] : [id number] [de-identified] : 7/2014 [de-identified] : 70/90 [de-identified] : 5 months [de-identified] : shock impedance 46\par no recurrent afib \par AP 92%\par  2.5%

## 2023-04-20 NOTE — ADDENDUM
[FreeTextEntry1] : I, Young Allan, am scribing for and the presence of Dr. Neal the following sections: HPI, PMH,Family/social history, ROS, Physical Exam, Assessment / Plan.\par  IPedro, personally performed the services described in the documentation, reviewed the documentation recorded by the scribe in my presence and it accurately and completely records my words and actions.\par

## 2023-04-20 NOTE — DISCUSSION/SUMMARY
[AICD Function Normal] : normal AICD function [FreeTextEntry1] : Mr. Sauceda is a 78 y/o M with chronic systolic heart failure with EF 30-35% s/p ST Donaldo ICD, CAD s/p MATTY to LAD, DANI on CPAP, Pre-DM, BPH, OA, HTN, atrial fibrillation on Eliquis s/p DCCV 12/2017, 12/2018, 5/2020 and 2/15/2022,  recurrent ventricular tachycardia s/p ablation 10/5/17 and 11/16/17, who was in persistent afib s/p ablation of afib and symptomatic PVC 1/2021, who was found to be back in atach s/p ablation of atach and atrial flutter (atypical), who presents for follow up. \par Device interrogation reveals normal function and all measured data is within normal limits.  WE discussed that he will need a generator change in the next few months.  No RV pacing in NSR so no current indication for a BIV upgrade.  No further atrial arrhythmias and he inquired about getting off amiodarone which we can trial at this time.  He will continue ELiquis.  He will follow up in 3 months or sooner if needed.  He knows to call with any questions or concerns.  \par \par  \par

## 2023-07-07 ENCOUNTER — NON-APPOINTMENT (OUTPATIENT)
Age: 78
End: 2023-07-07

## 2023-07-12 ENCOUNTER — NON-APPOINTMENT (OUTPATIENT)
Age: 78
End: 2023-07-12

## 2023-07-12 ENCOUNTER — APPOINTMENT (OUTPATIENT)
Dept: HEART AND VASCULAR | Facility: CLINIC | Age: 78
End: 2023-07-12
Payer: MEDICARE

## 2023-07-12 PROCEDURE — 93295 DEV INTERROG REMOTE 1/2/MLT: CPT

## 2023-07-12 PROCEDURE — 93296 REM INTERROG EVL PM/IDS: CPT

## 2023-07-26 ENCOUNTER — NON-APPOINTMENT (OUTPATIENT)
Age: 78
End: 2023-07-26

## 2023-07-26 ENCOUNTER — APPOINTMENT (OUTPATIENT)
Dept: HEART AND VASCULAR | Facility: CLINIC | Age: 78
End: 2023-07-26
Payer: MEDICARE

## 2023-07-26 VITALS
HEART RATE: 65 BPM | HEIGHT: 68 IN | DIASTOLIC BLOOD PRESSURE: 71 MMHG | WEIGHT: 245 LBS | BODY MASS INDEX: 37.13 KG/M2 | SYSTOLIC BLOOD PRESSURE: 122 MMHG | TEMPERATURE: 97.2 F

## 2023-07-26 PROCEDURE — 99213 OFFICE O/P EST LOW 20 MIN: CPT | Mod: 25

## 2023-07-26 PROCEDURE — 93283 PRGRMG EVAL IMPLANTABLE DFB: CPT

## 2023-07-26 RX ORDER — AMIODARONE HYDROCHLORIDE 200 MG/1
200 TABLET ORAL
Qty: 1 | Refills: 2 | Status: DISCONTINUED | COMMUNITY
Start: 2020-03-20 | End: 2023-07-26

## 2023-07-27 NOTE — PROGRESS NOTE ADULT - PROBLEM SELECTOR PLAN 2
Allergies reviewed.  H&P note has been confirmed for the patient. Procedural consent has been signed.  Staff has reviewed the patient's labs. - Patient with previous ECHO showing EF of 30-35% mild concentric left ventricular hypertrophy apical akinesis apical septal wall akinesis, apical inferior wall akinesis.   - repeat ECHO showed EF 35-40% with inferior and apical hypokinesis  - consider [facility] for ischemic w/u as cause of VTs  - c/w Lisinopril 2.5mg, Coreg 25 BID, ASA and Atorvastatin 40  - Lasix IVP PRN for fluid overload, assess fluid status, did not receive any today   - strict I&Os, fluid restriction, daily weights

## 2023-08-03 NOTE — PROCEDURE
[No] : not [ICD] : Implantable cardioverter-defibrillator [DDDR] : DDDR [Threshold Testing Performed] : Threshold testing was performed [None] : none [de-identified] : atrial tachycardia  [de-identified] : [employer] [de-identified] : fortify assura [de-identified] : [id number] [de-identified] : 7/2014 [de-identified] : 70/90 [de-identified] : 3 months [de-identified] : see paceart transmission from 7/26/23 no recurrent afib

## 2023-08-03 NOTE — HISTORY OF PRESENT ILLNESS
[Palpitations] : no palpitations [SOB] : no dyspnea [Syncope] : no syncope [Chest Pain] : no chest pain or discomfort [ICD Shocks] : no recent ICD shocks [Shoulder Pain] : no shoulder pain [Pain at Site] : no pain at device site [Erythema at Site] : no erythema at device site [Swelling at Site] : no swelling at device site [FreeTextEntry1] : Mr. Sauceda is a 76 y/o M with chronic systolic heart failure with EF 30-35% s/p [employer]onaldo ICD, CAD s/p MATTY to LAD, DANI on CPAP, Pre-DM, BPH, OA, HTN, atrial fibrillation on Eliquis s/p DCCV 12/2017, 12/2018, 5/2020 and 2/15/2022,  recurrent ventricular tachycardia s/p ablation 10/5/17 and 11/16/17, who was in persistent afib s/p ablation of afib and symptomatic PVC 1/2021, who was found to be back in atach s/p ablation of atach and atrial flutter (atypical) 9/2022, who presents for follow up.    His history is as follows by his report and patient discharge information from multiple hospitalizations. He underwent surgery for bladder stones 7/2014; post op course significant for NSVT. Work-up thereafter included a stress test followed by an EP study at [facility]. He ultimately had an ICD placed 7/2014. Reports he was admitted with a CHF exacerbation 7/2015. He underwent cardiac catheterization at [facility] 6/2016 s/p MATTY to LAD. Reports he had VT and had ICD shock, followed by VT ablation 9/2016 at [facility]. Amiodarone was started post-procedure for arrhythmia suppression. He had palpitations and was admitted to [facility] 11/2016 with heart rate up to 170 bpm. He was given IV Amiodarone followed by a cardioversion. Amiodarone was stopped due to concern for long term side effects and Sotalol was started. VT treated with ATP 1/2017; Sotalol was discontinued and Amiodarone with load restarted. He was told he would likely need another ablation. Case was discussed with Dr. Diaz at [place] and the decision was made to D/C ASA, continue Plavix and Eliquis until one year post stent.   He presented to Dr. Arceo' office 1-2-17 complaining of dizziness x a few days. He had been off amiodarone since the beginning of September. Device interrogation revealed recurrent VT terminated by ATP and he was admitted to the hospital. He was admitted to the CCU and started on amiodarone. He underwent an EP study and VT ablation 10/5/17 and was restarted on sotalol and toprol. He had PVCs and NSVT (different then what was ablated). He had orthostatic hypotension and was hydrated and his medications were adjusted. QTc remained stable on sotalol.   He was doing well until early November 2017 when he had near syncope. He was admitted to an OSH with recurrent VT and placed on amiodarone with breakthrough episodes. All of the episodes were terminated with ATP; until 11/9 when he got an appropriate ICD shock. He was transferred to [facility]. Amiodarone was switched to Quinidine and he had less VT; however during exercise stress test he had sustained VT terminated by ATP with resultant lightheadedness. He underwent a successful epicardial VT ablation on 11/16.   In late November 2018 he was found to be in atrial fibrillation and was recommended to start Tikosyn; but secondary to price went on Sotalol. He had a cardioversion 12/2018 12/2019 he had "skipped beats" and remote transmission showed ventricular bigeminy. We stopped his He was then admitted at an OSH with PVCs and switched to amiodarone.  He has finished the loading dose and is now on 200mg daily.  He was found to be back in afib 5/2020 with some SOB and underwent a cardioversion.   1/2022 he was found to be in afib and given that his afib causes RV pacing and traditionally caused heart failure the plan was made to proceed with an ablation.  S/p ablation of PVC/afib (as his main complaint was PVC).  Prior to the ablation he went into Vfib s/p shock. . He was found to be back in afib and underwent DCCV on 2/15/22 and remained in NSR until 8/2022 when he went back into afib with RV pacing.  He ultimately underwent an EP study with ablation of atrial tachycardia and atypical atrial flutter.  He remains on ELiquis and Amiodarone.  Improvement in fatigue and STEELE.  No syncope, near syncope, chest pain or palpitations.  No device related complaints.   He presents as he knows his device is approaching ERNESTINE

## 2023-08-03 NOTE — REVIEW OF SYSTEMS
[Feeling Fatigued] : feeling fatigued [Anxiety] : anxiety [Negative] : Heme/Lymph [Fever] : no fever [Weight Gain (___ Lbs)] : no recent weight gain [Chills] : no chills [Weight Loss (___ Lbs)] : no recent weight loss [SOB] : no shortness of breath [Dyspnea on exertion] : not dyspnea during exertion [Chest Discomfort] : no chest discomfort [Palpitations] : no palpitations [Orthopnea] : no orthopnea [Syncope] : no syncope [Cough] : no cough [Rash] : no rash [Dizziness] : no dizziness

## 2023-08-03 NOTE — DISCUSSION/SUMMARY
[AICD Function Normal] : normal AICD function [FreeTextEntry1] : Mr. Sauceda is a 78 y/o M with chronic systolic heart failure with EF 30-35% s/p ST Donaldo ICD, CAD s/p MATTY to LAD, DANI on CPAP, Pre-DM, BPH, OA, HTN, atrial fibrillation on Eliquis s/p DCCV 12/2017, 12/2018, 5/2020 and 2/15/2022,  recurrent ventricular tachycardia s/p ablation 10/5/17 and 11/16/17, who was in persistent afib s/p ablation of afib and symptomatic PVC 1/2021, who was found to be back in atach s/p ablation of atach and atrial flutter (atypical), who presents for follow up.  Device interrogation reveals normal function and all measured data is within normal limits.  Device at RRT and we discussed that he needs a generator change.  Given his history of afib and heart failure when he has RV pacing we will place a LV lead in case he has recurrent arrhythmia to avoid heart failure.   The rationale for device implantation were discussed with the patient.  We discussed the procedure in detail including risks, benefits, and alternatives.  We discussed procedural risks--including but not limited to bleeding/pocket hematoma, phlebitis/thrombophlebitis, lead dislodgment, PPM and/or lead infection, device malfunction, implantation site discomfort, pneumothorax, hemothorax, pericardial bleed/tamponade, anaphylaxis, air embolism, infection, skin erosion, lead fracture, lead dislodgement, pectoral muscle stimulation, intercostal or diaphragmatic pacing, vascular thrombosis, endocarditis or need for emergent surgery..  He will continue ELiquis.    He knows to call with any questions or concerns.

## 2023-08-03 NOTE — PHYSICAL EXAM
[General Appearance - Well Developed] : well developed [Normal Appearance] : normal appearance [Well Groomed] : well groomed [General Appearance - Well Nourished] : well nourished [No Deformities] : no deformities [General Appearance - In No Acute Distress] : no acute distress [] : no respiratory distress [Respiration, Rhythm And Depth] : normal respiratory rhythm and effort [Exaggerated Use Of Accessory Muscles For Inspiration] : no accessory muscle use [Auscultation Breath Sounds / Voice Sounds] : lungs were clear to auscultation bilaterally [Clean] : clean [Dry] : dry [Well-Healed] : well-healed [5th Left ICS - MCL] : palpated at the 5th LICS in the midclavicular line [Normal Rate] : normal [Rhythm Regular] : regular [Normal S1] : normal S1 [Normal S2] : normal S2 [No Pitting Edema] : no pitting edema present [Palpable Crepitus] : no palpable crepitus [Bleeding] : no active bleeding [Foul Odor] : no foul smell [Purulent Drainage] : no purulent drainage [Serous Drainage] : no serous drainage [Erythema] : not erythematous [Warm] : not warm [Tender] : not tender [Indurated] : not indurated [Fluctuant] : not fluctuant [Apical Thrill] : no thrill palpable at the apex

## 2023-08-23 ENCOUNTER — APPOINTMENT (OUTPATIENT)
Dept: HEART AND VASCULAR | Facility: CLINIC | Age: 78
End: 2023-08-23
Payer: MEDICARE

## 2023-08-23 ENCOUNTER — OUTPATIENT (OUTPATIENT)
Dept: OUTPATIENT SERVICES | Facility: HOSPITAL | Age: 78
LOS: 1 days | End: 2023-08-23
Payer: MEDICARE

## 2023-08-23 ENCOUNTER — NON-APPOINTMENT (OUTPATIENT)
Age: 78
End: 2023-08-23

## 2023-08-23 VITALS
HEART RATE: 70 BPM | BODY MASS INDEX: 36.68 KG/M2 | OXYGEN SATURATION: 97 % | WEIGHT: 242 LBS | HEIGHT: 68 IN | DIASTOLIC BLOOD PRESSURE: 75 MMHG | RESPIRATION RATE: 12 BRPM | SYSTOLIC BLOOD PRESSURE: 131 MMHG

## 2023-08-23 DIAGNOSIS — Z95.810 PRESENCE OF AUTOMATIC (IMPLANTABLE) CARDIAC DEFIBRILLATOR: Chronic | ICD-10-CM

## 2023-08-23 DIAGNOSIS — I25.10 ATHEROSCLEROTIC HEART DISEASE OF NATIVE CORONARY ARTERY WITHOUT ANGINA PECTORIS: ICD-10-CM

## 2023-08-23 DIAGNOSIS — Z98.890 OTHER SPECIFIED POSTPROCEDURAL STATES: Chronic | ICD-10-CM

## 2023-08-23 DIAGNOSIS — Z90.49 ACQUIRED ABSENCE OF OTHER SPECIFIED PARTS OF DIGESTIVE TRACT: Chronic | ICD-10-CM

## 2023-08-23 PROCEDURE — C8929: CPT

## 2023-08-23 PROCEDURE — 93000 ELECTROCARDIOGRAM COMPLETE: CPT

## 2023-08-23 PROCEDURE — 93306 TTE W/DOPPLER COMPLETE: CPT | Mod: 26

## 2023-08-23 PROCEDURE — 99215 OFFICE O/P EST HI 40 MIN: CPT

## 2023-08-23 NOTE — ASSESSMENT
[FreeTextEntry1] : ======================================================================================= 1. CAD: s/p PCI LAD 2016: CCS 0: s/p 05/05/21: Regadenoson MPI: EF 46%, small apical MI, dilated LV, apical hypokinesis. s/p 05/05/21: Regadenoson MPI: EF 46%, small apical MI, dilated LV, apical hypokinesis.        - continue aggressive medical management       - continue off antiplatelet therapy given need for systemic anticoagulation    2. Chronic systolic heart failure secondary to ICM: NYHA II s/p echo: 09/06/22: EF 35%, hypokinesis inferior wall, mid anterolateral wall, apical lateral wall, and apex, mildly dilated LV, grade II diastolic dysfunction, dilated RV, normal RV EF, device leads, dilated LA/RA, mild MR, mild TR, moderate PI, PASP 53 mmHg:        - continue carvedilol 12.5mg po bid (not tolerant of 25mg po bid in past secondary to hypotension)       - continue lisinopril 5mg po qd        - will follow with serial echocardiograms        - will consider switching lisinopril to Entresto in the future if SBP allows (he wishes to defer at this time)   3. Ventricular tachycardia: s/p VT ablation x 3 (2016, 2017, 2017): has symptomatic PVCs       - follow up with heart rhythm specialist, Pedro Neal MD  4. s/p ICD implantation: secondary prevention of VT, St. Donaldo, Fortify [address] [id number] ICD (implanted 07/13/14, Nely Ordoñez MD)      - follow up with device clinic at [facility]      - follow up with heart rhythm specialist, Pedro Neal MD      - will undergo an upgrade to a BiV-ICD at time of upcoming generator change on 09/05/23  5. Atrial fibrillation: persistent: [id number] Score 4, s/p AF ablation 01/2022, 09/08/22:       - continue carvedilol 12.5 mg bid      - continue systemic anticoagulation with Eliquis 5mg po bid       - discussed with patient avoidance of ASA and NSAIDS as well as need for bleeding surveillance   6. Dyslipidemia: on low dose statin (? not tolerant of higher dose in the past), LDL 62 (04/20/23):       - continue atorvastatin 20mg po qd       - discussed therapeutic lifestyle changes to promote improved lipid metabolism       - patient will provide copy of recent lab work from PMD's office for my review   7. HTN: BP at ACC/AHA 2017 guideline target: off medications this am (he takes am meds at noon):       - continue carvedilol 12.5mg po bid      - continue 5mg po qd   8. Obstructive sleep apnea: severe: + nasal bridge skin irrigation from current mask:      - continue CPAP     - follow up with sleep medicine specialist, Richard Plunkett MD  9. Cerebral aneurysm: details unknown:      - he will follow up with neurosurgeon at [facility], Jonah Meade MD (fax [phone])     - he will provide copies of his recent work up   The patient was seen and examined with a cardiology fellow as part of their longitudinal ambulatory cardiology training experience.  Permission was obtained at the time of the visit from the patient for the fellow's participation.  I spent > 45 minutes of total time on this visit, including time spent face-to-face and non-face-to-face.  During this time, I took a relevant history and examined the patient.  I reviewed relevant portions of the medical record and formulated a differential diagnosis and plan.  I explained the relevant cardiac diagnoses to the patient, as well as the work up and management plan.  I answered all questions related to the patient's cardiac conditions.

## 2023-08-23 NOTE — REASON FOR VISIT
[Other: ____] : [unfilled] [FreeTextEntry1] : ======================================================================================= Diagnostic Tests: ----------------------------- EC23: atrial fibrillation with SVR, old anteroseptal MI.  23: sinus rhythm, first degree AV block, old anteroseptal MI.  22: sinus rhythm, first degree AV block, old anteroseptal MI.  22: underlying atrial fibrillation, V-paced.  10/13/21: undetermined rhythm (possible sinus rhythm with low amplitude P-waves vs. accelerated junctional rhythm), old septal and inferior MI.  21: undetermined rhythm (possible sinus rhythm with low amplitude P-waves), old septal and inferior MI.  19: sinus arrest, demand pacing, frequent PVCs.  10/02/19: normal sinus rhythm, demand pacing, PVCs.  ----------------------------- Echo: 23: EF 35%, akinesis basal and mid inferior and inferolateral walls, mildly dilated LV, grade II diastolic dysfunction, mildly dilated RV, normal RV function, mildly dilated LA, mild AI, moderate PI, PASP 44 mmHg, device leads.  22: EF 35%, hypokinesis inferior wall, mid anterolateral wall, apical lateral wall, and apex, mildly dilated LV, grade II diastolic dysfunction, dilated RV, normal RV EF, device leads, dilated LA/RA, mild MR, mild TR, moderate PI, PASP 53 mmHg.  21: EF 45-50%, grade II diastolic dysfunction, borderline dilated RV, dilated LA/RA, mild MR, PASP 32 mmHg, ICD leads.   21: EF 47%, global hypokinesis, mild LVH, grade I diastolic dysfunction, mildly dilated LA, aortic sclerosis, mild AI, mild MR, ICD.  19: EF 40-45%, mild global hypokinesis, PASP 53 mmHg.  ----------------------------- Sleep studies:  1719: severe sleep apnea.  ----------------------------- Stress: 21: regadenoson MPI: EF 46%, small apical MI, dilated LV, apical hypokinesis.  ----------------------------- EP procedures: 22: repeat atrial fibrillation ablation.  02/15/22: DCCV from AF to sinus rhythm.  2020: DCCV from AF to sinus rhythm.  20: DCCV from AF to sinus rhythm.  2018: DCCV from AF to sinus rhythm.  2017: DCCV from AF to sinus rhythm.  17: VT ablation.  10/05/17: VT ablation.  : VT ablation.  2014: ICD implantation (St. Donaldo)/

## 2023-08-23 NOTE — HISTORY OF PRESENT ILLNESS
[FreeTextEntry1] : Mr. Sauceda presents for follow up and management of CAD s/p PCI LAD, chronic systolic heart failure secondary to ischemic cardiomyopathy, recurrent VT s/p ICD and VT ablation (2016, 2017 x 2), paroxysmal atrial fibrillation (s/p ablation 01/2022, 09/08/22), varicose veins s/p ablation, DVT, DANI, pre-DM2, and HTN.  He has a complex cardiovascular history and was previously followed by several other cardiologists, most recently Janay Lundberg MD. He is followed by a heart rhythm specialist, Pedro Neal MD.  He was first noted to have NSVT and systolic heart failure postoperatively in 2014 when he was admitted for a urologic procedure to address bladder stones. He had a stress test at that time followed by an EP study at [facility] and underwent implantation of an ICD in July, 2014.  In June, 2016 he underwent invasive coronary angiography at [facility] and had PCI of his LAD.   Around that time, he had an appropriate ICD therapy for VT and went on to have a VT ablation at [facility]. He was placed on amiodarone for suppressive therapy.  In 2017, he had an episode of recurrent VT treated with ATP. In 2018, he once again had recurrent VT which was terminated with ATP and he underwent another VT ablation on 10/5/17 and was restarted on sotalol and Toprol. He was noted to have frequent PVCs and runs of VT on 11/6/2017, and he underwent a third VT ablation which was epicardial. Shortly after, this ablation he developed atrial fibrillation requiring DCCV. In 2019, he had ventricular bigeminy and his sotalol was switched to amiodarone again. In May, 2020, he required another DCCV.  On 05/05/21 he had a regadenoson MPI which revealed an EF of 46%, small apical MI, dilated LV, and apical hypokinesis.  He was evaluated in the ED at [facility] on 08/30/21 with complaints of feeling poorly.  He had a normal work up and was sent home.  On 09/06/22, he had an echocardiogram which revealed an EF of 35%, hypokinesis inferior wall, mid anterolateral wall, apical lateral wall, and apex, mildly dilated LV, grade II diastolic dysfunction, dilated RV, normal RV EF, device leads, dilated LA/RA, mild MR, mild TR, moderate PI, and PASP 53 mmHg.  He had complaints of dizziness and HTN post eating chinese food.  He called 911 and was taken to [facility] ED where he had a CT of the brain and was told he has a cerebral aneurysm.  He followed up with a neurosurgeon and is being managed conservatively.  He sold his home in [place], [place] and bought a new home in a [place] community.   On 08/08/23 he had an episode of dizziness and hypotension after taking is morning medications, which improved with laying down for 30 minutes. Otherwise, he has no new cardiopulmonary complaints, denies chest pain, shortness of breath, PND and orthopnea.  Today, 08/23/23, he had an echocardiogram which revealed an EF of 35%, akinesis basal and mid inferior and inferolateral walls, mildly dilated LV, grade II diastolic dysfunction, mildly dilated RV, normal RV function, mildly dilated LA, mild AI, moderate PI, PASP 44 mmHg, and device leads.

## 2023-08-24 ENCOUNTER — NON-APPOINTMENT (OUTPATIENT)
Age: 78
End: 2023-08-24

## 2023-09-05 ENCOUNTER — INPATIENT (INPATIENT)
Facility: HOSPITAL | Age: 78
LOS: 0 days | Discharge: HOME | DRG: 227 | End: 2023-09-06
Attending: INTERNAL MEDICINE | Admitting: INTERNAL MEDICINE
Payer: COMMERCIAL

## 2023-09-05 VITALS — HEIGHT: 68 IN

## 2023-09-05 DIAGNOSIS — Z95.810 PRESENCE OF AUTOMATIC (IMPLANTABLE) CARDIAC DEFIBRILLATOR: Chronic | ICD-10-CM

## 2023-09-05 DIAGNOSIS — Z90.49 ACQUIRED ABSENCE OF OTHER SPECIFIED PARTS OF DIGESTIVE TRACT: Chronic | ICD-10-CM

## 2023-09-05 DIAGNOSIS — Z98.890 OTHER SPECIFIED POSTPROCEDURAL STATES: Chronic | ICD-10-CM

## 2023-09-05 LAB
ISTAT INR: 1.2 — HIGH (ref 0.88–1.16)
ISTAT PT: 13.9 SEC — HIGH (ref 10–12.9)
ISTAT VENOUS BE: 1 MMOL/L — SIGNIFICANT CHANGE UP (ref -2–3)
ISTAT VENOUS GLUCOSE: 95 MG/DL — SIGNIFICANT CHANGE UP (ref 70–99)
ISTAT VENOUS HCO3: 25 MMOL/L — SIGNIFICANT CHANGE UP (ref 23–28)
ISTAT VENOUS HEMATOCRIT: 39 % — SIGNIFICANT CHANGE UP (ref 39–50)
ISTAT VENOUS HEMOGLOBIN: 13.3 GM/DL — SIGNIFICANT CHANGE UP (ref 13–17)
ISTAT VENOUS IONIZED CALCIUM: 1.22 MMOL/L — SIGNIFICANT CHANGE UP (ref 1.12–1.3)
ISTAT VENOUS PCO2: 40 MMHG — LOW (ref 41–51)
ISTAT VENOUS PH: 7.41 — SIGNIFICANT CHANGE UP (ref 7.31–7.41)
ISTAT VENOUS PO2: <66 MMHG — HIGH (ref 35–40)
ISTAT VENOUS POTASSIUM: 4 MMOL/L — SIGNIFICANT CHANGE UP (ref 3.5–5.3)
ISTAT VENOUS SO2: 83 % — SIGNIFICANT CHANGE UP
ISTAT VENOUS SODIUM: 142 MMOL/L — SIGNIFICANT CHANGE UP (ref 135–145)
ISTAT VENOUS TCO2: 26 MMOL/L — SIGNIFICANT CHANGE UP (ref 22–31)

## 2023-09-05 PROCEDURE — 93010 ELECTROCARDIOGRAM REPORT: CPT

## 2023-09-05 PROCEDURE — 33249 INSJ/RPLCMT DEFIB W/LEAD(S): CPT

## 2023-09-05 PROCEDURE — 33225 L VENTRIC PACING LEAD ADD-ON: CPT

## 2023-09-05 RX ORDER — APIXABAN 2.5 MG/1
5 TABLET, FILM COATED ORAL
Refills: 0 | Status: DISCONTINUED | OUTPATIENT
Start: 2023-09-05 | End: 2023-09-06

## 2023-09-05 RX ORDER — CEFAZOLIN SODIUM 1 G
2000 VIAL (EA) INJECTION EVERY 8 HOURS
Refills: 0 | Status: COMPLETED | OUTPATIENT
Start: 2023-09-05 | End: 2023-09-05

## 2023-09-05 RX ORDER — ATORVASTATIN CALCIUM 80 MG/1
1 TABLET, FILM COATED ORAL
Qty: 0 | Refills: 0 | DISCHARGE

## 2023-09-05 RX ORDER — ATORVASTATIN CALCIUM 80 MG/1
20 TABLET, FILM COATED ORAL AT BEDTIME
Refills: 0 | Status: DISCONTINUED | OUTPATIENT
Start: 2023-09-05 | End: 2023-09-06

## 2023-09-05 RX ORDER — CARVEDILOL PHOSPHATE 80 MG/1
12.5 CAPSULE, EXTENDED RELEASE ORAL EVERY 12 HOURS
Refills: 0 | Status: DISCONTINUED | OUTPATIENT
Start: 2023-09-05 | End: 2023-09-06

## 2023-09-05 RX ORDER — CARVEDILOL PHOSPHATE 80 MG/1
1 CAPSULE, EXTENDED RELEASE ORAL
Refills: 0 | DISCHARGE

## 2023-09-05 RX ORDER — GLUCOSAMINE HCL/CHONDROITIN SU 500-400 MG
0 CAPSULE ORAL
Refills: 0 | DISCHARGE

## 2023-09-05 RX ORDER — ACETAMINOPHEN 500 MG
1000 TABLET ORAL ONCE
Refills: 0 | Status: DISCONTINUED | OUTPATIENT
Start: 2023-09-05 | End: 2023-09-06

## 2023-09-05 RX ORDER — FUROSEMIDE 40 MG
20 TABLET ORAL DAILY
Refills: 0 | Status: DISCONTINUED | OUTPATIENT
Start: 2023-09-05 | End: 2023-09-06

## 2023-09-05 RX ORDER — APIXABAN 2.5 MG/1
1 TABLET, FILM COATED ORAL
Qty: 0 | Refills: 0 | DISCHARGE

## 2023-09-05 RX ORDER — LISINOPRIL 2.5 MG/1
1 TABLET ORAL
Qty: 0 | Refills: 0 | DISCHARGE

## 2023-09-05 RX ORDER — AMIODARONE HYDROCHLORIDE 400 MG/1
1 TABLET ORAL
Qty: 0 | Refills: 0 | DISCHARGE

## 2023-09-05 RX ADMIN — Medication 100 MILLIGRAM(S): at 21:37

## 2023-09-05 RX ADMIN — ATORVASTATIN CALCIUM 20 MILLIGRAM(S): 80 TABLET, FILM COATED ORAL at 21:37

## 2023-09-05 RX ADMIN — APIXABAN 5 MILLIGRAM(S): 2.5 TABLET, FILM COATED ORAL at 18:12

## 2023-09-05 RX ADMIN — CARVEDILOL PHOSPHATE 12.5 MILLIGRAM(S): 80 CAPSULE, EXTENDED RELEASE ORAL at 18:13

## 2023-09-05 RX ADMIN — Medication 20 MILLIGRAM(S): at 18:12

## 2023-09-05 NOTE — PATIENT PROFILE ADULT - NSPROGENSOURCEINFO_GEN_A_NUR
Patient Education        Rash in Children: Care Instructions  Your Care Instructions  A rash is any irritation or inflammation of the skin. Rashes have many possible causes, including allergy, infection, illness, heat, and emotional stress. Follow-up care is a key part of your child's treatment and safety. Be sure to make and go to all appointments, and call your doctor if your child is having problems. It's also a good idea to know your child's test results and keep a list of the medicines your child takes. How can you care for your child at home? · Wash the area with water only. Soap can make dryness and itching worse. Pat dry. · Use cold, wet cloths to reduce itching. · Keep your child cool and out of the sun. · Leave the rash open to the air as much of the time as possible. · Ask your doctor if petroleum jelly (such as Vaseline) might help relieve the discomfort caused by a rash. A moisturizing lotion, such as Cetaphil, also may help. Calamine lotion may help for rashes caused by contact with something (such as a plant or soap) that irritated the skin. · If your doctor prescribed a cream, apply it to your child's skin as directed. If your doctor prescribed medicine, give it exactly as directed. Be safe with medicines. Call your doctor if you think your child is having a problem with his or her medicine. · Ask your doctor if you can give your child an over-the-counter antihistamine, such as Benadryl or Claritin. It might help to stop itching and discomfort. Read and follow all instructions on the label. When should you call for help? Call your doctor now or seek immediate medical care if:    · Your child has signs of infection, such as:  ? Increased pain, swelling, warmth, or redness around the rash. ? Red streaks leading from the rash. ? Pus draining from the rash.   ? A fever.     · Your child seems to be getting sicker.     · Your child has new blisters or bruises.    Watch closely for changes in your child's health, and be sure to contact your doctor if:    · Your child does not get better as expected. Where can you learn more? Go to http://joel-felipe.info/. Enter Q705 in the search box to learn more about \"Rash in Children: Care Instructions. \"  Current as of: April 1, 2019  Content Version: 12.2  © 8373-4934 [employer]. Care instructions adapted under license by [employer] (which disclaims liability or warranty for this information). If you have questions about a medical condition or this instruction, always ask your healthcare professional. [place] [address] any warranty or liability for your use of this information. Scabies: Care Instructions  Your Care Instructions  Scabies is a skin problem that can cause intense itching. It is caused by very tiny bugs called mites that dig just under the skin and lay eggs. An allergic reaction to the mites causes the itching. Scabies is usually spread by person-to-person contact. It is also possible, but not common, for scabies to spread through towels, clothes, and bedding. Everyone in your household should be treated. Scabies is treated with medicine. Itching may last for several weeks after treatment. Follow-up care is a key part of your treatment and safety. Be sure to make and go to all appointments, and call your doctor if you are having problems. It's also a good idea to know your test results and keep a list of the medicines you take. How can you care for yourself at home? · Use the lotion or cream your doctor recommends or prescribes. One treatment usually cures scabies. Do not use the cream again unless your doctor tells you to. · Wash all clothes, bedding, and towels that you used in the 3 days before you started treatment. Use hot water, and use the hot cycle in the dryer. Another option is to dry-clean these items. Or seal them in a plastic bag for 3 to 7 days.   · Take an oral antihistamine, such as loratadine (Claritin) or diphenhydramine (Benadryl), to help stop itching. You also can use a nonprescription anti-itch cream. Read and follow all instructions on the label. · Do not have physical contact with other people or let anyone use your personal items until you have finished treatment. Do not use other people's personal items until your treatment is done. Tell people with whom you have close contact that they will need treatment if they have symptoms. · Take an oatmeal bath to help relieve itching. Add a handful of oatmeal (ground to a powder) to your bath. Or you can try an oatmeal bath product, such as Aveeno. When should you call for help? Call your doctor now or seek immediate medical care if:    · You have signs of infection, such as:  ? Increased pain, swelling, warmth, or redness. ? Red streaks leading from the mite bites. ? Pus draining from a bite area. ? A fever.    Watch closely for changes in your health, and be sure to contact your doctor if:    · Anyone else in your family has itching.     · You do not get better within 2 weeks. Where can you learn more? Go to http://joel-felipe.info/. Enter T762 in the search box to learn more about \"Scabies: Care Instructions. \"  Current as of: April 1, 2019  Content Version: 12.2  © 5057-1207 [employer]. Care instructions adapted under license by [employer] (which disclaims liability or warranty for this information).  If you have questions about a medical condition or this instruction, always ask your healthcare professional. [address] any warranty or liability for your use of this information.       patient

## 2023-09-05 NOTE — CHART NOTE - NSCHARTNOTEFT_GEN_A_CORE
Patient is status post successful biventricular ICD upgrade - addition of CS lead, original RA and RV lead (2014) remain in place.   Continue apixaban.  No heparin products.   Continue Ancef for 2 additional doses q8h.   PA and lateral CXR.   Document EKG.   To be evaluated by EP in am, anticipate discharge.

## 2023-09-05 NOTE — H&P ADULT - ASSESSMENT
76 y/o M with chronic systolic heart failure with EF 30-35% s/p ST Donaldo ICD, CAD s/p MATTY to LAD, DANI on CPAP, Pre-DM, BPH, OA, HTN, atrial fibrillation on Eliquis s/p DCCV 12/2017, 12/2018, 5/2020 and 2/15/2022, recurrent ventricular tachycardia s/p ablation 10/5/17 and 11/16/17, who was in persistent afib s/p ablation of afib and symptomatic PVC 1/2021, who was found to be back in atach s/p ablation of atach and atrial flutter (atypical), who presents for  scheduled upgrade to BiV ICD.

## 2023-09-05 NOTE — PATIENT PROFILE ADULT - FALL HARM RISK - RISK INTERVENTIONS
Assistance OOB with selected safe patient handling equipment/Assistance with ambulation/Communicate Fall Risk and Risk Factors to all staff, patient, and family/Monitor gait and stability/Reinforce activity limits and safety measures with patient and family/Sit up slowly, dangle for a short time, stand at bedside before walking/Use of alarms - bed, chair and/or voice tab/Visual Cue: Yellow wristband/Bed in lowest position, wheels locked, appropriate side rails in place/Call bell, personal items and telephone in reach/Instruct patient to call for assistance before getting out of bed or chair/Non-slip footwear when patient is out of bed/[place] to call system/Physically safe environment - no spills, clutter or unnecessary equipment/Purposeful Proactive Rounding/Room/bathroom lighting operational, light cord in reach

## 2023-09-05 NOTE — H&P ADULT - HISTORY OF PRESENT ILLNESS
76 y/o M with chronic systolic heart failure with EF 30-35% s/p ST Donaldo ICD, CAD s/p MATTY to LAD, DANI on CPAP, Pre-DM, BPH, OA, HTN, atrial fibrillation on Eliquis s/p DCCV 12/2017, 12/2018, 5/2020 and 2/15/2022, recurrent ventricular tachycardia s/p ablation 10/5/17 and 11/16/17, who was in persistent afib s/p ablation of afib and symptomatic PVC 1/2021, who was found to be back in atach s/p ablation of atach and atrial flutter (atypical), who presents for  scheduled upgrade to BiV ICD.     Device interrogation reveals normal function and all measured data is within normal limits. Device at RRT and we discussed that he needs a generator change. Given his history of afib and heart failure when he has RV pacing we will place a LV lead in case he has recurrent arrhythmia to avoid heart failure.

## 2023-09-06 ENCOUNTER — TRANSCRIPTION ENCOUNTER (OUTPATIENT)
Age: 78
End: 2023-09-06

## 2023-09-06 VITALS
TEMPERATURE: 98 F | HEART RATE: 70 BPM | DIASTOLIC BLOOD PRESSURE: 78 MMHG | SYSTOLIC BLOOD PRESSURE: 137 MMHG | RESPIRATION RATE: 17 BRPM | OXYGEN SATURATION: 94 %

## 2023-09-06 PROCEDURE — C1887: CPT

## 2023-09-06 PROCEDURE — 84295 ASSAY OF SERUM SODIUM: CPT

## 2023-09-06 PROCEDURE — 71046 X-RAY EXAM CHEST 2 VIEWS: CPT | Mod: 26

## 2023-09-06 PROCEDURE — C1769: CPT

## 2023-09-06 PROCEDURE — C1882: CPT

## 2023-09-06 PROCEDURE — C1900: CPT

## 2023-09-06 PROCEDURE — 93005 ELECTROCARDIOGRAM TRACING: CPT

## 2023-09-06 PROCEDURE — 82947 ASSAY GLUCOSE BLOOD QUANT: CPT

## 2023-09-06 PROCEDURE — C1892: CPT

## 2023-09-06 PROCEDURE — C1893: CPT

## 2023-09-06 PROCEDURE — 85610 PROTHROMBIN TIME: CPT

## 2023-09-06 PROCEDURE — 84132 ASSAY OF SERUM POTASSIUM: CPT

## 2023-09-06 PROCEDURE — 33264 RMVL & RPLCMT DFB GEN MLT LD: CPT

## 2023-09-06 PROCEDURE — 82330 ASSAY OF CALCIUM: CPT

## 2023-09-06 PROCEDURE — C1889: CPT

## 2023-09-06 PROCEDURE — 33225 L VENTRIC PACING LEAD ADD-ON: CPT

## 2023-09-06 PROCEDURE — 82803 BLOOD GASES ANY COMBINATION: CPT

## 2023-09-06 PROCEDURE — 85014 HEMATOCRIT: CPT

## 2023-09-06 PROCEDURE — 71046 X-RAY EXAM CHEST 2 VIEWS: CPT

## 2023-09-06 PROCEDURE — C1730: CPT

## 2023-09-06 RX ADMIN — Medication 20 MILLIGRAM(S): at 06:11

## 2023-09-06 RX ADMIN — APIXABAN 5 MILLIGRAM(S): 2.5 TABLET, FILM COATED ORAL at 06:11

## 2023-09-06 RX ADMIN — CARVEDILOL PHOSPHATE 12.5 MILLIGRAM(S): 80 CAPSULE, EXTENDED RELEASE ORAL at 06:11

## 2023-09-06 NOTE — DISCHARGE NOTE PROVIDER - NSDCMRMEDTOKEN_GEN_ALL_CORE_FT
atorvastatin 20 mg oral tablet: 1 tab(s) orally once a day  carvedilol 12.5 mg oral tablet: 1 orally 2 times a day  Chondroitin-Glucosamine oral tablet: orally once a day  Eliquis 5 mg oral tablet: 1 tab(s) orally 2 times a day  furosemide 20 mg oral tablet: 1 tab(s) orally once a day   ** new

## 2023-09-06 NOTE — DISCHARGE NOTE PROVIDER - HOSPITAL COURSE
Chetan Sauceda is a 77 yo M with hx of chronic systolic heart failure EF 30-35% s/p [employer] Donaldo ICD, CAD s/p MATTY to LAD, DANI on CPAP, pre DM, BPH, OA, HTN, atrial fibrillation on Eliquis s/p DCCV on 12/2017, 1202018, 5/2020, 2/2022, recurrent ventricular tachycardia s/p ablation on 10/2017 and 11/2017, persistent Afib s/p ablation on Afib and symptomatic PVC 1/2021 then back in Atach s/p ablation of ATach and atrial flutter (atypical) presented on 9/5/23 for upgrade to BiV ICD.  Given history of Afib and heart failure when he has RV pacing, the LV lead is placed in case of recurrent arrythmia to avoid heart failure.  Now s/o successful biventricular ICD upgrade, addition of CS lead.    Appearance: Normal	  HEENT:   Normal oral mucosa, PERRL, EOMI	  Neck: Supple, + JVD/ - JVD; No Carotid Bruit and 2+ pulses B/L  Cardiovascular: Normal S1 S2, No JVD, No murmurs  Respiratory: Lungs clear to auscultation  Gastrointestinal:  Soft, Non-tender, + BS	  Skin: No rashes, No ecchymoses, No cyanosis  Extremities:   Neurologic: Non-focal  Psychiatry: A & O x 3, Mood & affect appropriate     Chetan Sauceda is a 77 yo M with hx of chronic systolic heart failure EF 30-35% s/p [employer] Donaldo ICD, CAD s/p MATTY to LAD, DANI on CPAP, pre DM, BPH, OA, HTN, atrial fibrillation on Eliquis s/p DCCV on 12/2017, 1202018, 5/2020, 2/2022, recurrent ventricular tachycardia s/p ablation on 10/2017 and 11/2017, persistent Afib s/p ablation on Afib and symptomatic PVC 1/2021 then back in Atach s/p ablation of ATach and atrial flutter (atypical) presented on 9/5/23 for upgrade to BiV ICD.  Given history of Afib and heart failure when he has RV pacing, the LV lead is placed in case of recurrent arrythmia to avoid heart failure.  Now s/o successful biventricular ICD upgrade, addition of CS lead.  Vital signs stable.   CXR without pneumothorax, proper positioning of leads.  Cleared for discharge home.      Appearance: Normal	  HEENT:   Normal oral mucosa  Neck: Supple  Cardiovascular: Normal S1 S2, No JVD, No murmurs  Respiratory: Lungs clear to auscultation  Gastrointestinal:  Soft, Non-tender, + BS	  Extremities: L chest incision well approximated, no drainage, erythema, or hematoma  Neurologic: Non-focal  Psychiatry: A & O x 3, Mood & affect appropriate

## 2023-09-06 NOTE — DISCHARGE NOTE PROVIDER - CARE PROVIDER_API CALL
Pedro Neal  Cardiac Electrophysiology  [address], [address] [place] [address]  Phone: [phone]  Fax: [phone]  Established Patient  Follow Up Time: 1 month   Pedro Neal  Cardiac Electrophysiology  [address], [address] [place] [address]  Phone: [phone]  Fax: [phone]  Established Patient  Scheduled Appointment: 10/11/2023 11:30 AM

## 2023-09-06 NOTE — DISCHARGE NOTE NURSING/CASE MANAGEMENT/SOCIAL WORK - PATIENT PORTAL LINK FT
You can access the FollowMyHealth Patient Portal offered by [facility] by registering at the following website: [email or web address]. By joining [facility]’s FollowMyHealth portal, you will also be able to view your health information using other applications (apps) compatible with our system.

## 2023-09-06 NOTE — DISCHARGE NOTE PROVIDER - NSDCCPCAREPLAN_GEN_ALL_CORE_FT
PRINCIPAL DISCHARGE DIAGNOSIS  Diagnosis: Chronic systolic HF (heart failure)  Assessment and Plan of Treatment:

## 2023-09-06 NOTE — DISCHARGE NOTE PROVIDER - NSDCFUADDINST_GEN_ALL_CORE_FT
You underwent Biventricular lead placement upgrade on 9/5/23  Please follow up with Dr. Neal on     Wound care instruction: Do not lift left arm above shoulder or lift heavy items with left arm for 1 month. You can shower 2 days later.  Keep incision site clean and dry. Do not remove the glue on the incision. Let it fall off on its own.   Call our doctor  if any questions about the wound -- such as bleeding, swelling, increasing pain or redness.  [phone]. You underwent Biventricular lead placement upgrade on 9/5/23  Please follow up with Dr. Neal in 1 month  Wound care instruction: Do not lift left arm above shoulder or lift heavy items with left arm for 1 month. You can shower 2 days later.  Keep incision site clean and dry. Do not remove the glue on the incision. Let it fall off on its own.   Call our doctor  if any questions about the wound -- such as bleeding, swelling, increasing pain or redness.  [phone].

## 2023-09-06 NOTE — DISCHARGE NOTE PROVIDER - NSDCFUSCHEDAPPT_GEN_ALL_CORE_FT
[facility] Physician [facility]  [address]  Scheduled Appointment: 10/11/2023     [facility]  [address]ASC [address]  Scheduled Appointment: 10/11/2023    Pedro Neal  [facility]  [address]ASC [address]  Scheduled Appointment: 10/11/2023

## 2023-09-06 NOTE — DISCHARGE NOTE PROVIDER - PROVIDER TOKENS
PROVIDER:[TOKEN:[[id number]],FOLLOWUP:[1 month],ESTABLISHEDPATIENT:[T]] PROVIDER:[TOKEN:[[id number]],SCHEDULEDAPPT:[10/11/2023],SCHEDULEDAPPTTIME:[11:30 AM],ESTABLISHEDPATIENT:[T]]

## 2023-09-06 NOTE — DISCHARGE NOTE NURSING/CASE MANAGEMENT/SOCIAL WORK - NSDCPEFALRISK_GEN_ALL_CORE
For information on Fall & Injury Prevention, visit: [email or web address] OR  [email or web address] OR  https://www.cdc.gov/steadi/patient.html

## 2023-09-06 NOTE — PROGRESS NOTE ADULT - SUBJECTIVE AND OBJECTIVE BOX
EPS Device interrogation    Indication: post biventricular ICD upgrade, addition of CS lead  Device model: 	St Donaldo 			Implanting Physician:  Pedro Neal   Functioning Mode:   DDDR 		    Underlying Rhythm:     Pacemaker dependency:      Battery status: >95%  Interrogating parameters:   				RA			RV			LV    Sense:                       3.5mV                                  10.5mV                                                                                 Threshold:                    0.75@ 0.5ms                 0.75 @ 0.5ms                           1.25V @0.5ms                                                   Pacing Impedance:         390 ohms                        400 ohms                                       580 ohms                                        Shock Impedance:                                                    42 ohms        Events/Alert:  none        For ICD only:  tachy therapy – unchanged        EPS Device interrogation    Indication: post biventricular ICD upgrade, addition of CS lead  Device model: 	St Donaldo 			Implanting Physician:  Pedro Neal   Functioning Mode:   DDDR 		    Underlying Rhythm: sinus bradycardia    Pacemaker dependency:  no    Battery status: >95%  Interrogating parameters:   				RA			RV			LV    Sense:                       3.5mV                                  10.5mV                                                                                 Threshold:                    0.75@ 0.5ms                 0.75 @ 0.5ms                           1.25V @0.5ms                                                   Pacing Impedance:         390 ohms                        400 ohms                                       580 ohms                                        Shock Impedance:                                                    42 ohms        Events/Alert:  none        For ICD only:  tachy therapy – unchanged

## 2023-09-08 ENCOUNTER — NON-APPOINTMENT (OUTPATIENT)
Age: 78
End: 2023-09-08

## 2023-09-13 DIAGNOSIS — G47.33 OBSTRUCTIVE SLEEP APNEA (ADULT) (PEDIATRIC): ICD-10-CM

## 2023-09-13 DIAGNOSIS — N40.0 BENIGN PROSTATIC HYPERPLASIA WITHOUT LOWER URINARY TRACT SYMPTOMS: ICD-10-CM

## 2023-09-13 DIAGNOSIS — I50.22 CHRONIC SYSTOLIC (CONGESTIVE) HEART FAILURE: ICD-10-CM

## 2023-09-13 DIAGNOSIS — Z99.89 DEPENDENCE ON OTHER ENABLING MACHINES AND DEVICES: ICD-10-CM

## 2023-09-13 DIAGNOSIS — I25.5 ISCHEMIC CARDIOMYOPATHY: ICD-10-CM

## 2023-09-13 DIAGNOSIS — Z45.09 ENCOUNTER FOR ADJUSTMENT AND MANAGEMENT OF OTHER CARDIAC DEVICE: ICD-10-CM

## 2023-09-13 DIAGNOSIS — I25.10 ATHEROSCLEROTIC HEART DISEASE OF NATIVE CORONARY ARTERY WITHOUT ANGINA PECTORIS: ICD-10-CM

## 2023-09-13 DIAGNOSIS — I48.91 UNSPECIFIED ATRIAL FIBRILLATION: ICD-10-CM

## 2023-09-13 DIAGNOSIS — Z95.5 PRESENCE OF CORONARY ANGIOPLASTY IMPLANT AND GRAFT: ICD-10-CM

## 2023-09-13 DIAGNOSIS — Z79.01 LONG TERM (CURRENT) USE OF ANTICOAGULANTS: ICD-10-CM

## 2023-09-13 DIAGNOSIS — Z90.49 ACQUIRED ABSENCE OF OTHER SPECIFIED PARTS OF DIGESTIVE TRACT: ICD-10-CM

## 2023-09-13 DIAGNOSIS — I11.0 HYPERTENSIVE HEART DISEASE WITH HEART FAILURE: ICD-10-CM

## 2023-09-13 DIAGNOSIS — R73.03 PREDIABETES: ICD-10-CM

## 2023-09-14 ENCOUNTER — TRANSCRIPTION ENCOUNTER (OUTPATIENT)
Age: 78
End: 2023-09-14

## 2023-10-11 ENCOUNTER — NON-APPOINTMENT (OUTPATIENT)
Age: 78
End: 2023-10-11

## 2023-10-11 ENCOUNTER — APPOINTMENT (OUTPATIENT)
Dept: HEART AND VASCULAR | Facility: CLINIC | Age: 78
End: 2023-10-11
Payer: MEDICARE

## 2023-10-11 PROCEDURE — 93284 PRGRMG EVAL IMPLANTABLE DFB: CPT

## 2023-11-08 ENCOUNTER — APPOINTMENT (OUTPATIENT)
Dept: HEART AND VASCULAR | Facility: CLINIC | Age: 78
End: 2023-11-08
Payer: MEDICARE

## 2023-11-08 ENCOUNTER — NON-APPOINTMENT (OUTPATIENT)
Age: 78
End: 2023-11-08

## 2023-11-08 VITALS
HEART RATE: 79 BPM | BODY MASS INDEX: 36.68 KG/M2 | DIASTOLIC BLOOD PRESSURE: 60 MMHG | HEIGHT: 68 IN | WEIGHT: 242 LBS | SYSTOLIC BLOOD PRESSURE: 107 MMHG

## 2023-11-08 PROCEDURE — 93284 PRGRMG EVAL IMPLANTABLE DFB: CPT

## 2023-11-08 PROCEDURE — 99214 OFFICE O/P EST MOD 30 MIN: CPT | Mod: 25

## 2024-01-02 ENCOUNTER — NON-APPOINTMENT (OUTPATIENT)
Age: 79
End: 2024-01-02

## 2024-02-14 ENCOUNTER — APPOINTMENT (OUTPATIENT)
Dept: HEART AND VASCULAR | Facility: CLINIC | Age: 79
End: 2024-02-14
Payer: MEDICARE

## 2024-02-14 ENCOUNTER — NON-APPOINTMENT (OUTPATIENT)
Age: 79
End: 2024-02-14

## 2024-02-14 VITALS — SYSTOLIC BLOOD PRESSURE: 119 MMHG | DIASTOLIC BLOOD PRESSURE: 59 MMHG | TEMPERATURE: 95.8 F

## 2024-02-14 PROCEDURE — 93284 PRGRMG EVAL IMPLANTABLE DFB: CPT

## 2024-02-14 PROCEDURE — 99214 OFFICE O/P EST MOD 30 MIN: CPT | Mod: 25

## 2024-02-20 ENCOUNTER — APPOINTMENT (OUTPATIENT)
Dept: HEART AND VASCULAR | Facility: CLINIC | Age: 79
End: 2024-02-20
Payer: MEDICARE

## 2024-02-20 PROCEDURE — 99443: CPT | Mod: 93

## 2024-02-23 NOTE — DISCUSSION/SUMMARY
[FreeTextEntry1] : Mr. Sauceda is a 77 y/o M with chronic systolic heart failure with EF 30-35% s/p ST Donaldo ICD, CAD s/p MATTY to LAD, DANI on CPAP, Pre-DM, BPH, OA, HTN, atrial fibrillation on Eliquis s/p DCCV 12/2017, 12/2018, 5/2020 and 2/15/2022, recurrent ventricular tachycardia s/p ablation 10/5/17 and 11/16/17, who was in persistent afib s/p ablation of afib and symptomatic PVC 1/2021, who was found to be back in atach s/p ablation of atach and atrial flutter (atypical) 9/2022, s/p generator change and upgrade to BiV pacemaker 9/2023, who had an ICD shock resulting in syncope and back surgery, who presents for follow up.  His blood pressures (systolic) have ranged from 116-136.  Will start low dose lisinopril.  He has inpatient follow up and remote monitoring working.  He knows to call with any questions or concerns.

## 2024-02-23 NOTE — REVIEW OF SYSTEMS
[Fever] : no fever [Chills] : no chills [Feeling Fatigued] : feeling fatigued [SOB] : no shortness of breath [Dyspnea on exertion] : dyspnea during exertion [Chest Discomfort] : no chest discomfort [Palpitations] : no palpitations [Syncope] : no syncope [Negative] : Heme/Lymph

## 2024-02-23 NOTE — HISTORY OF PRESENT ILLNESS
[FreeTextEntry1] : Mr. Sauceda is a 79 y/o M with chronic systolic heart failure with EF 30-35% s/p ST Donaldo ICD, CAD s/p MATTY to LAD, DANI on CPAP, Pre-DM, BPH, OA, HTN, atrial fibrillation on Eliquis s/p DCCV 12/2017, 12/2018, 5/2020 and 2/15/2022, recurrent ventricular tachycardia s/p ablation 10/5/17 and 11/16/17, who was in persistent afib s/p ablation of afib and symptomatic PVC 1/2021, who was found to be back in atach s/p ablation of atach and atrial flutter (atypical) 9/2022, s/p generator change and upgrade to BiV pacemaker 9/2023, who had an ICD shock resulting in syncope and back surgery, who presents for follow up  His history is as follows by his report and patient discharge information from multiple hospitalizations. He underwent surgery for bladder stones 7/2014; post op course significant for NSVT. Work-up thereafter included a stress test followed by an EP study at [facility]. He ultimately had an ICD placed 7/2014. Reports he was admitted with a CHF exacerbation 7/2015. He underwent cardiac catheterization at [facility] 6/2016 s/p MATTY to LAD. Reports he had VT and had ICD shock, followed by VT ablation 9/2016 at [facility]. Amiodarone was started post-procedure for arrhythmia suppression. He had palpitations and was admitted to [facility] 11/2016 with heart rate up to 170 bpm. He was given IV Amiodarone followed by a cardioversion. Amiodarone was stopped due to concern for long term side effects and Sotalol was started. VT treated with ATP 1/2017; Sotalol was discontinued and Amiodarone with load restarted. He was told he would likely need another ablation. Case was discussed with Dr. Diaz at [place] and the decision was made to D/C ASA, continue Plavix and Eliquis until one year post stent.  He presented to Dr. Arceo' office 1-2-17 complaining of dizziness x a few days. He had been off amiodarone since the beginning of September. Device interrogation revealed recurrent VT terminated by ATP and he was admitted to the hospital. He was admitted to the CCU and started on amiodarone. He underwent an EP study and VT ablation 10/5/17 and was restarted on sotalol and toprol. He had PVCs and NSVT (different then what was ablated). He had orthostatic hypotension and was hydrated and his medications were adjusted. QTc remained stable on sotalol.  He was doing well until early November 2017 when he had near syncope. He was admitted to an OSH with recurrent VT and placed on amiodarone with breakthrough episodes. All of the episodes were terminated with ATP; until 11/9 when he got an appropriate ICD shock. He was transferred to [facility]. Amiodarone was switched to Quinidine and he had less VT; however during exercise stress test he had sustained VT terminated by ATP with resultant lightheadedness. He underwent a successful epicardial VT ablation on 11/16.  In late November 2018 he was found to be in atrial fibrillation and was recommended to start Tikosyn; but secondary to price went on Sotalol. He had a cardioversion 12/2018 12/2019 he had "skipped beats" and remote transmission showed ventricular bigeminy. We stopped his He was then admitted at an OSH with PVCs and switched to amiodarone. He has finished the loading dose and is now on 200mg daily. He was found to be back in afib 5/2020 with some SOB and underwent a cardioversion.  1/2022 he was found to be in afib and given that his afib causes RV pacing and traditionally caused heart failure the plan was made to proceed with an ablation. S/p ablation of PVC/afib (as his main complaint was PVC). Prior to the ablation he went into Vfib s/p shock. . He was found to be back in afib and underwent DCCV on 2/15/22 and remained in NSR until 8/2022 when he went back into afib with RV pacing. He ultimately underwent an EP study with ablation of atrial tachycardia and atypical atrial flutter.  His Device was at RRT and given hisotry of recurrent atrial arrhythmias causing RV pacing / heart failure a LV lead was placed (but not set to pace unless RV pacing increases). He is on ELiquis and off Amiodarone.  Patient went to sit down because of dizziness, fell and broke his back. He was shocked while unconscious. Patient with episode of VT with 36J shock on 12/31/23. Review of EGM significant for likely device mediated arrhythmia 2/2 RV under sensing. Was admitted to [facility] for 24 days, went to rehab for 16 days. Patient reported he had COVID 12/22/24. Patient was started on mexilietine, also report he had issued with low BP. Send [employer] rep to rehab to make changes to device programming.  He had follow up last week and presents to discuss blood pressures to see if can restart ACE-I.  He notes fatigue and STEELE.  No chest pain, SOB at rest, syncope or ICD shocks

## 2024-02-27 NOTE — REVIEW OF SYSTEMS
[Feeling Fatigued] : feeling fatigued [Palpitations] : palpitations [Anxiety] : anxiety [Negative] : Psychiatric [Fever] : no fever [Weight Gain (___ Lbs)] : no recent weight gain [Chills] : no chills [Weight Loss (___ Lbs)] : no recent weight loss [SOB] : no shortness of breath [Dyspnea on exertion] : not dyspnea during exertion [Chest Discomfort] : no chest discomfort [Lower Ext Edema] : no extremity edema [Orthopnea] : no orthopnea [Syncope] : no syncope [Cough] : no cough [Dizziness] : no dizziness

## 2024-02-27 NOTE — HISTORY OF PRESENT ILLNESS
[Palpitations] : no palpitations [SOB] : no dyspnea [Syncope] : no syncope [Chest Pain] : no chest pain or discomfort [ICD Shocks] : no recent ICD shocks [Shoulder Pain] : no shoulder pain [Pain at Site] : no pain at device site [Erythema at Site] : no erythema at device site [Swelling at Site] : no swelling at device site [FreeTextEntry1] : Mr. Sauceda is a 77 y/o M with chronic systolic heart failure with EF 30-35% s/p ST Donaldo ICD, CAD s/p MATTY to LAD, DANI on CPAP, Pre-DM, BPH, OA, HTN, atrial fibrillation on Eliquis s/p DCCV 12/2017, 12/2018, 5/2020 and 2/15/2022,  recurrent ventricular tachycardia s/p ablation 10/5/17 and 11/16/17, who was in persistent afib s/p ablation of afib and symptomatic PVC 1/2021, who was found to be back in atach s/p ablation of atach and atrial flutter (atypical) 9/2022, s/p generator change and upgrade to BiV pacemaker 9/2023,  who presents for acute visit secondary to "extra beats".    His history is as follows by his report and patient discharge information from multiple hospitalizations. He underwent surgery for bladder stones 7/2014; post op course significant for NSVT. Work-up thereafter included a stress test followed by an EP study at [facility]. He ultimately had an ICD placed 7/2014. Reports he was admitted with a CHF exacerbation 7/2015. He underwent cardiac catheterization at [facility] 6/2016 s/p MATTY to LAD. Reports he had VT and had ICD shock, followed by VT ablation 9/2016 at [facility]. Amiodarone was started post-procedure for arrhythmia suppression. He had palpitations and was admitted to [facility] 11/2016 with heart rate up to 170 bpm. He was given IV Amiodarone followed by a cardioversion. Amiodarone was stopped due to concern for long term side effects and Sotalol was started. VT treated with ATP 1/2017; Sotalol was discontinued and Amiodarone with load restarted. He was told he would likely need another ablation. Case was discussed with Dr. Diaz at [address] and the decision was made to D/C ASA, continue Plavix and Eliquis until one year post stent.   He presented to Dr. Arceo' office 1-2-17 complaining of dizziness x a few days. He had been off amiodarone since the beginning of September. Device interrogation revealed recurrent VT terminated by ATP and he was admitted to the hospital. He was admitted to the CCU and started on amiodarone. He underwent an EP study and VT ablation 10/5/17 and was restarted on sotalol and toprol. He had PVCs and NSVT (different then what was ablated). He had orthostatic hypotension and was hydrated and his medications were adjusted. QTc remained stable on sotalol.   He was doing well until early November 2017 when he had near syncope. He was admitted to an OSH with recurrent VT and placed on amiodarone with breakthrough episodes. All of the episodes were terminated with ATP; until 11/9 when he got an appropriate ICD shock. He was transferred to [facility]. Amiodarone was switched to Quinidine and he had less VT; however during exercise stress test he had sustained VT terminated by ATP with resultant lightheadedness. He underwent a successful epicardial VT ablation on 11/16.   In late November 2018 he was found to be in atrial fibrillation and was recommended to start Tikosyn; but secondary to price went on Sotalol. He had a cardioversion 12/2018 12/2019 he had "skipped beats" and remote transmission showed ventricular bigeminy. We stopped his He was then admitted at an OSH with PVCs and switched to amiodarone.  He has finished the loading dose and is now on 200mg daily.  He was found to be back in afib 5/2020 with some SOB and underwent a cardioversion.   1/2022 he was found to be in afib and given that his afib causes RV pacing and traditionally caused heart failure the plan was made to proceed with an ablation.  S/p ablation of PVC/afib (as his main complaint was PVC).  Prior to the ablation he went into Vfib s/p shock. . He was found to be back in afib and underwent DCCV on 2/15/22 and remained in NSR until 8/2022 when he went back into afib with RV pacing.  He ultimately underwent an EP study with ablation of atrial tachycardia and atypical atrial flutter.  His Device was at RRT and given hisotry of recurrent atrial arrhythmias causing RV pacing / heart failure a  LV lead was placed (but not set to pace unless RV pacing increases).  He is on ELiquis and off Amiodarone.    Patient went to sit down because of dizziness, fell and broke his back. He was shocked while unconscious.  Patient with episode of VT with 36J shock on 12/31/23. Review of EGM significant for likely device mediated arrhythmia 2/2 RV undersensing. Was admitted to [facility] for 24 days, went to rehab for 16 days. Patient reported he had COVID 12/22/24. Patient was started on mexilietine, also report he had issued with low BP.  Send [employer] rep to rehab to make changes to device programming.   Improvement in fatigue and STEELE.  No syncope, near syncope, chest pain.  No device related complaints.   He called the office worried about frequent extra beats- he had PVCs and given symptoms was told he can restart amiodarone or come in for follow up.  He has not restarted amiodarone and presents for follow up.

## 2024-02-27 NOTE — DISCUSSION/SUMMARY
[AICD Function Normal] : normal AICD function [FreeTextEntry1] : Mr. Sauceda is a 77 y/o M with chronic systolic heart failure with EF 30-35% s/p ST Donaldo ICD, CAD s/p MATTY to LAD, DANI on CPAP, Pre-DM, BPH, OA, HTN, atrial fibrillation on Eliquis s/p DCCV 12/2017, 12/2018, 5/2020 and 2/15/2022,  recurrent ventricular tachycardia s/p ablation 10/5/17 and 11/16/17, who was in persistent afib s/p ablation of afib and symptomatic PVC 1/2021, who was found to be back in atach s/p ablation of atach and atrial flutter (atypical) 9/2022, s/p generator change and upgrade to BiV pacemaker 9/2023, s/p ICD shock to VT with vertebrae fracture who presents for followup.   #VT  Recurrent VT episode on 12/31/23 for which patient received shock Patient lost consciousness prior to shock and sustained vertebrae fracture requiriing surgical repair  Patient admitted to [facility] and cardiac rehab Dr. HARRIS recommended programming changes to prevent device pro arrythmia due to frequent PVCs causing retrograde AS and inappropriate  on t wave  #Programming changes Multiple episodes of PMT noted on device interrogation, PVARP extended to avoid further episodes of retrograde AS triggering  on t wave (device pro arrythgmia)  #Rhythm control Patient having frequent PVCs complicating device programming Patient is maintained on amiodarone I have recommended he stop mexiletine   #BP Up titration of medical therapy limited by BP He will have follow-up visit with COURTNEY Vidal next week to determine if lisinopril should be started  #PVC ECG to evaluate morphology  Follow up with Young next week virtually or sooner if needed.  He knows to call with any questions or concerns. He will be referred to pulmonologist.

## 2024-02-27 NOTE — PROCEDURE
[No] : not [NSR] : normal sinus rhythm [ICD] : Implantable cardioverter-defibrillator [DDDR] : DDDR [Threshold Testing Performed] : Threshold testing was performed [de-identified] : St Donaldo [de-identified] : [id number] [de-identified] : 2023 [de-identified] : 70/90 [de-identified] : 3 months [de-identified] : changed pacing to LV pacing [de-identified] : see paceart transmission from 2/14/2024

## 2024-02-27 NOTE — PHYSICAL EXAM
[General Appearance - Well Developed] : well developed [Normal Appearance] : normal appearance [Well Groomed] : well groomed [General Appearance - Well Nourished] : well nourished [No Deformities] : no deformities [General Appearance - In No Acute Distress] : no acute distress [] : no respiratory distress [Respiration, Rhythm And Depth] : normal respiratory rhythm and effort [Exaggerated Use Of Accessory Muscles For Inspiration] : no accessory muscle use [Auscultation Breath Sounds / Voice Sounds] : lungs were clear to auscultation bilaterally [Clean] : clean [Dry] : dry [5th Left ICS - MCL] : palpated at the 5th LICS in the midclavicular line [Normal Rate] : normal [Rhythm Regular] : regular [Normal S1] : normal S1 [Normal S2] : normal S2 [No Pitting Edema] : no pitting edema present [Well-Healed] : not well-healed [Palpable Crepitus] : no palpable crepitus [Bleeding] : no active bleeding [Foul Odor] : no foul smell [Purulent Drainage] : no purulent drainage [Serosanguineous Drainage] : no serosanquineous drainage [Serous Drainage] : no serous drainage [Erythema] : not erythematous [Warm] : not warm [Tender] : not tender [Indurated] : not indurated [Fluctuant] : not fluctuant [Apical Thrill] : no thrill palpable at the apex

## 2024-02-27 NOTE — ADDENDUM
[FreeTextEntry1] : I, Lindy Cervantes, am scribing for and the presence of Dr. Neal the following sections: HPI, PMH,Family/social history, ROS, Physical Exam, Assessment / Plan.  I, Pedro Neal, personally performed the services described in the documentation, reviewed the documentation recorded by the scribe in my presence and it accurately and completely records my words and actions.

## 2024-03-06 ENCOUNTER — APPOINTMENT (OUTPATIENT)
Dept: PULMONOLOGY | Facility: CLINIC | Age: 79
End: 2024-03-06

## 2024-05-01 ENCOUNTER — APPOINTMENT (OUTPATIENT)
Dept: HEART AND VASCULAR | Facility: CLINIC | Age: 79
End: 2024-05-01
Payer: MEDICARE

## 2024-05-01 ENCOUNTER — NON-APPOINTMENT (OUTPATIENT)
Age: 79
End: 2024-05-01

## 2024-05-01 VITALS — SYSTOLIC BLOOD PRESSURE: 128 MMHG | DIASTOLIC BLOOD PRESSURE: 70 MMHG

## 2024-05-01 VITALS
TEMPERATURE: 96 F | HEIGHT: 68 IN | WEIGHT: 227 LBS | OXYGEN SATURATION: 99 % | DIASTOLIC BLOOD PRESSURE: 75 MMHG | SYSTOLIC BLOOD PRESSURE: 138 MMHG | BODY MASS INDEX: 34.4 KG/M2 | HEART RATE: 70 BPM

## 2024-05-01 VITALS
BODY MASS INDEX: 40.16 KG/M2 | HEART RATE: 70 BPM | DIASTOLIC BLOOD PRESSURE: 93 MMHG | WEIGHT: 265 LBS | HEIGHT: 68 IN | OXYGEN SATURATION: 98 % | SYSTOLIC BLOOD PRESSURE: 162 MMHG

## 2024-05-01 DIAGNOSIS — I25.5 ISCHEMIC CARDIOMYOPATHY: ICD-10-CM

## 2024-05-01 DIAGNOSIS — E78.5 HYPERLIPIDEMIA, UNSPECIFIED: ICD-10-CM

## 2024-05-01 DIAGNOSIS — G47.33 OBSTRUCTIVE SLEEP APNEA (ADULT) (PEDIATRIC): ICD-10-CM

## 2024-05-01 DIAGNOSIS — I67.1 CEREBRAL ANEURYSM, NONRUPTURED: ICD-10-CM

## 2024-05-01 DIAGNOSIS — I47.20 VENTRICULAR TACHYCARDIA, UNSPECIFIED: ICD-10-CM

## 2024-05-01 DIAGNOSIS — I49.3 VENTRICULAR PREMATURE DEPOLARIZATION: ICD-10-CM

## 2024-05-01 DIAGNOSIS — I10 ESSENTIAL (PRIMARY) HYPERTENSION: ICD-10-CM

## 2024-05-01 DIAGNOSIS — I50.22 CHRONIC SYSTOLIC (CONGESTIVE) HEART FAILURE: ICD-10-CM

## 2024-05-01 DIAGNOSIS — I25.10 ATHEROSCLEROTIC HEART DISEASE OF NATIVE CORONARY ARTERY W/OUT ANGINA PECTORIS: ICD-10-CM

## 2024-05-01 DIAGNOSIS — I48.91 UNSPECIFIED ATRIAL FIBRILLATION: ICD-10-CM

## 2024-05-01 PROCEDURE — 99215 OFFICE O/P EST HI 40 MIN: CPT

## 2024-05-01 PROCEDURE — 93284 PRGRMG EVAL IMPLANTABLE DFB: CPT

## 2024-05-01 PROCEDURE — G2211 COMPLEX E/M VISIT ADD ON: CPT

## 2024-05-01 PROCEDURE — 99213 OFFICE O/P EST LOW 20 MIN: CPT | Mod: 25

## 2024-05-01 RX ORDER — LISINOPRIL 2.5 MG/1
2.5 TABLET ORAL DAILY
Qty: 90 | Refills: 3 | Status: ACTIVE | COMMUNITY
Start: 1900-01-01 | End: 1900-01-01

## 2024-05-01 RX ORDER — AMIODARONE HYDROCHLORIDE 200 MG/1
200 TABLET ORAL DAILY
Qty: 90 | Refills: 3 | Status: ACTIVE | COMMUNITY
Start: 1900-01-01 | End: 1900-01-01

## 2024-05-01 RX ORDER — ATORVASTATIN CALCIUM 20 MG/1
20 TABLET, FILM COATED ORAL
Qty: 90 | Refills: 3 | Status: ACTIVE | COMMUNITY
Start: 2018-12-26 | End: 1900-01-01

## 2024-05-01 RX ORDER — CARVEDILOL 3.12 MG/1
3.12 TABLET, FILM COATED ORAL
Qty: 90 | Refills: 3 | Status: ACTIVE | COMMUNITY
Start: 2020-03-09 | End: 1900-01-01

## 2024-05-01 RX ORDER — APIXABAN 5 MG/1
5 TABLET, FILM COATED ORAL
Qty: 180 | Refills: 3 | Status: ACTIVE | COMMUNITY
Start: 2019-01-22 | End: 1900-01-01

## 2024-05-01 NOTE — HISTORY OF PRESENT ILLNESS
[FreeTextEntry1] : Mr. Sauceda presents for follow up and management of CAD s/p PCI LAD, chronic systolic heart failure secondary to ischemic cardiomyopathy, recurrent VT s/p ICD, s/p upgrade to CRT-D (09/05/23), VT ablation (2016, 2017 x 2), paroxysmal atrial fibrillation (s/p ablation 01/2022, 09/08/22), varicose veins s/p ablation, DVT, DANI, pre-DM2, and HTN.  He has a complex cardiovascular history and was previously followed by several other cardiologists, most recently Janay Lundberg MD. He is followed by a heart rhythm specialist, Pedro Neal MD.  He was first noted to have NSVT and systolic heart failure postoperatively in 2014 when he was admitted for a urologic procedure to address bladder stones. He had a stress test at that time followed by an EP study at [facility] and underwent implantation of an ICD in July, 2014.  In June, 2016 he underwent invasive coronary angiography at [facility] and had PCI of his LAD.   Around that time, he had an appropriate ICD therapy for VT and went on to have a VT ablation at [facility]. He was placed on amiodarone for suppressive therapy.  In 2017, he had an episode of recurrent VT treated with ATP. In 2018, he once again had recurrent VT which was terminated with ATP and he underwent another VT ablation on 10/5/17 and was restarted on sotalol and Toprol. He was noted to have frequent PVCs and runs of VT on 11/6/2017, and he underwent a third VT ablation which was epicardial. Shortly after, this ablation he developed atrial fibrillation requiring DCCV. In 2019, he had ventricular bigeminy and his sotalol was switched to amiodarone again. In May, 2020, he required another DCCV.  On 05/05/21 he had a regadenoson MPI which revealed an EF of 46%, small apical MI, dilated LV, and apical hypokinesis.  He was evaluated in the ED at [facility] on 08/30/21 with complaints of feeling poorly.  He had a normal work up and was sent home.  On 09/06/22, he had an echocardiogram which revealed an EF of 35%, hypokinesis inferior wall, mid anterolateral wall, apical lateral wall, and apex, mildly dilated LV, grade II diastolic dysfunction, dilated RV, normal RV EF, device leads, dilated LA/RA, mild MR, mild TR, moderate PI, and PASP 53 mmHg.  He had complaints of dizziness and HTN post eating Chinese food.  He called 911 and was taken to [facility] ED where he had a CT of the brain and was told he has a cerebral aneurysm.  He followed up with a neurosurgeon and is being managed conservatively.  He sold his home in [place], [place] and bought a new home in a [place] community.   On 08/08/23 he had an episode of dizziness and hypotension after taking is morning medications, which improved with laying down for 30 minutes. Otherwise, he has no new cardiopulmonary complaints, denies chest pain, shortness of breath, PND and orthopnea.  On 08/23/23, he had an echocardiogram which revealed an EF of 35%, akinesis basal and mid inferior and inferolateral walls, mildly dilated LV, grade II diastolic dysfunction, mildly dilated RV, normal RV function, mildly dilated LA, mild AI, moderate PI, PASP 44 mmHg, and device leads.  On 09/05/23, he underwent an upgrade of his ICD to a CRT-D.  On 12/31/23, he had an episode of syncope secondary to VT for which he received an ICD defibrillation and sustained an L10 vertebral fracture. In the immediate postoperative period, he had an episode of VT or VF (while his device was still programmed to therapies off) and underwent ACLS during which he sustained several anterior rib fractures.  He required three pleurcentesis procedures for pleural effusion.  He was admitted to [facility] and underwent spinal surgery.  A review of the device EGM was significant for likely device mediated arrhythmia secondary to RV undersensing. His device was reprogrammed and he was started on mexilitene.  He was eventually switched to amiodarone.

## 2024-05-01 NOTE — REASON FOR VISIT
[FreeTextEntry1] : ======================================================================================= Diagnostic Tests: -------------------------------------------------------------- EC23: atrial fibrillation with SVR, old anteroseptal MI.  23: sinus rhythm, first degree AV block, old anteroseptal MI.  22: sinus rhythm, first degree AV block, old anteroseptal MI.  22: underlying atrial fibrillation, V-paced.  10/13/21: undetermined rhythm (possible sinus rhythm with low amplitude P-waves vs. accelerated junctional rhythm), old septal and inferior MI.  21: undetermined rhythm (possible sinus rhythm with low amplitude P-waves), old septal and inferior MI.  19: sinus arrest, demand pacing, frequent PVCs.  10/02/19: normal sinus rhythm, demand pacing, PVCs.  ----------------------------- Echo: 23: EF 35%, akinesis basal and mid inferior and inferolateral walls, mildly dilated LV, grade II diastolic dysfunction, mildly dilated RV, normal RV function, mildly dilated LA, mild AI, moderate PI, PASP 44 mmHg, device leads.  22: EF 35%, hypokinesis inferior wall, mid anterolateral wall, apical lateral wall, and apex, mildly dilated LV, grade II diastolic dysfunction, dilated RV, normal RV EF, device leads, dilated LA/RA, mild MR, mild TR, moderate PI, PASP 53 mmHg.  21: EF 45-50%, grade II diastolic dysfunction, borderline dilated RV, dilated LA/RA, mild MR, PASP 32 mmHg, ICD leads.   21: EF 47%, global hypokinesis, mild LVH, grade I diastolic dysfunction, mildly dilated LA, aortic sclerosis, mild AI, mild MR, ICD.  19: EF 40-45%, mild global hypokinesis, PASP 53 mmHg.  ----------------------------- Sleep studies:  1719: severe sleep apnea.  ----------------------------- Stress: 21: regadenoson MPI: EF 46%, small apical MI, dilated LV, apical hypokinesis.  ----------------------------- EP procedures: 23: upgrade to CRT-D ([employer], [employer]). 22: repeat atrial fibrillation ablation.  02/15/22: DCCV from AF to sinus rhythm.  2020: DCCV from AF to sinus rhythm.  20: DCCV from AF to sinus rhythm.  2018: DCCV from AF to sinus rhythm.  2017: DCCV from AF to sinus rhythm.  17: VT ablation.  10/05/17: VT ablation.  : VT ablation.  2014: ICD implantation ([employer]). -------------------------------------------------------------- CT: 24: chest: medium sized left pleural effusion, multiple pulmonary nodules,

## 2024-05-01 NOTE — ASSESSMENT
[FreeTextEntry1] : ======================================================================================= 1. CAD: s/p PCI LAD 2016: CCS 0: s/p 05/05/21: Regadenoson MPI: EF 46%, small apical MI, dilated LV, apical hypokinesis. s/p 05/05/21: Regadenoson MPI: EF 46%, small apical MI, dilated LV, apical hypokinesis.        - continue aggressive medical management       - continue off antiplatelet therapy given need for systemic anticoagulation    2. Chronic systolic heart failure secondary to ICM: NYHA II s/p echo: 09/06/22: EF 35%, hypokinesis inferior wall, mid anterolateral wall, apical lateral wall, and apex, mildly dilated LV, grade II diastolic dysfunction, dilated RV, normal RV EF, device leads, dilated LA/RA, mild MR, mild TR, moderate PI, PASP 53 mmHg:        - continue carvedilol 3.152 mg po bid (not tolerant of higher doses secondary to hypotension)       - continue lisinopril 2.5mg po qd        - will follow with serial echocardiograms (he will provide a copy of the inpatient echo from [facility])  3. Ventricular tachycardia: s/p VT ablation x 3 (2016, 2017, 2017): had syncope at VT 12/31/24:        - follow up with heart rhythm specialist, Pedro Neal MD       - continue amiodarone 200mg po qd   4. s/p ICD implantation: secondary prevention of VT, [employer], Fortify Assura DR [id number] ICD (implanted 07/13/14, Nely Ordoñez MD), s/p upgrade to CRT-D, [employer] ([place] HF CRT) 09/05/23:       - follow up with device clinic at [facility]      - follow up with heart rhythm specialist, Pedro Neal MD  5. Atrial fibrillation: persistent: [id number] Score 4, s/p AF ablation 01/2022, 09/08/22:       - continue carvedilol 3.125 mg bid      - continue amiodarone 200mg po qd       - continue systemic anticoagulation with Eliquis 5mg po bid       - discussed with patient avoidance of ASA and NSAIDS as well as need for bleeding surveillance   6. Dyslipidemia: on low dose statin (? not tolerant of higher dose in the past),  (09/13/23):       - continue atorvastatin 20mg po qd       - discussed therapeutic lifestyle changes to promote improved lipid metabolism   7. HTN: BP at ACC/AHA 2017 guideline target:       - continue carvedilol 3.125mg po bid      - continue lisinopril 2.5 mg po qd   8. Obstructive sleep apnea: severe: + nasal bridge skin irrigation from current mask:      - continue CPAP     - follow up with sleep medicine specialist, Richard Plunkett MD  9. Cerebral aneurysm: details unknown:      - he will follow up with neurosurgeon at [facility], Jonah Meade MD (fax [phone])     - he will provide copies of his recent work up   I spent > 45 minutes of total time on this visit, including time spent face-to-face and non-face-to-face.  During this time, I took a relevant history and examined the patient.  I reviewed relevant portions of the medical record and formulated a differential diagnosis and plan.  I explained the relevant cardiac diagnoses to the patient, as well as the work up and management plan.  I answered all questions related to the patient's cardiac conditions.

## 2024-05-06 ENCOUNTER — NON-APPOINTMENT (OUTPATIENT)
Age: 79
End: 2024-05-06

## 2024-05-06 PROBLEM — I48.91 ATRIAL FIBRILLATION: Status: ACTIVE | Noted: 2017-03-07

## 2024-05-06 PROBLEM — I49.3 FREQUENT PVCS: Status: ACTIVE | Noted: 2020-03-20

## 2024-05-06 PROBLEM — I47.20 VENTRICULAR TACHYCARDIA: Status: ACTIVE | Noted: 2017-03-07

## 2024-05-06 NOTE — REASON FOR VISIT
[Other: ____] : [unfilled] [FreeTextEntry1] : ======================================================================================= Diagnostic Tests: -------------------------------------------------------------- EC23: atrial fibrillation with SVR, old anteroseptal MI.  23: sinus rhythm, first degree AV block, old anteroseptal MI.  22: sinus rhythm, first degree AV block, old anteroseptal MI.  22: underlying atrial fibrillation, V-paced.  10/13/21: undetermined rhythm (possible sinus rhythm with low amplitude P-waves vs. accelerated junctional rhythm), old septal and inferior MI.  21: undetermined rhythm (possible sinus rhythm with low amplitude P-waves), old septal and inferior MI.  19: sinus arrest, demand pacing, frequent PVCs.  10/02/19: normal sinus rhythm, demand pacing, PVCs.  ----------------------------- Echo: 24: EF 47%, akinesis basal inferior wall, grade II diastolic dysfunction, moderately dilated RV, mildly decreased RV function, mild AI, moderate PI.  23: EF 35%, akinesis basal and mid inferior and inferolateral walls, mildly dilated LV, grade II diastolic dysfunction, mildly dilated RV, normal RV function, mildly dilated LA, mild AI, moderate PI, PASP 44 mmHg, device leads.  22: EF 35%, hypokinesis inferior wall, mid anterolateral wall, apical lateral wall, and apex, mildly dilated LV, grade II diastolic dysfunction, dilated RV, normal RV EF, device leads, dilated LA/RA, mild MR, mild TR, moderate PI, PASP 53 mmHg.  21: EF 45-50%, grade II diastolic dysfunction, borderline dilated RV, dilated LA/RA, mild MR, PASP 32 mmHg, ICD leads.   21: EF 47%, global hypokinesis, mild LVH, grade I diastolic dysfunction, mildly dilated LA, aortic sclerosis, mild AI, mild MR, ICD.  19: EF 40-45%, mild global hypokinesis, PASP 53 mmHg.  ----------------------------- Sleep studies:  1719: severe sleep apnea.  ----------------------------- Stress: 21: regadenoson MPI: EF 46%, small apical MI, dilated LV, apical hypokinesis.  ----------------------------- EP procedures: 23: upgrade to CRT-D ([employer], [facility]). 22: repeat atrial fibrillation ablation.  02/15/22: DCCV from AF to sinus rhythm.  2020: DCCV from AF to sinus rhythm.  20: DCCV from AF to sinus rhythm.  2018: DCCV from AF to sinus rhythm.  2017: DCCV from AF to sinus rhythm.  17: VT ablation.  10/05/17: VT ablation.  : VT ablation.  2014: ICD implantation ([employer] Donaldo). -------------------------------------------------------------- CT: 24: chest: medium sized left pleural effusion, multiple pulmonary nodules,

## 2024-05-06 NOTE — PHYSICAL EXAM
[General Appearance - Well Developed] : well developed [Normal Appearance] : normal appearance [Well Groomed] : well groomed [General Appearance - Well Nourished] : well nourished [No Deformities] : no deformities [General Appearance - In No Acute Distress] : no acute distress [Normal Conjunctiva] : the conjunctiva exhibited no abnormalities [Eyelids - No Xanthelasma] : the eyelids demonstrated no xanthelasmas [Normal Oral Mucosa] : normal oral mucosa [No Oral Pallor] : no oral pallor [No Oral Cyanosis] : no oral cyanosis [Normal Jugular Venous A Waves Present] : normal jugular venous A waves present [Normal Jugular Venous V Waves Present] : normal jugular venous V waves present [No Jugular Venous Root A Waves] : no jugular venous root A waves [Respiration, Rhythm And Depth] : normal respiratory rhythm and effort [Exaggerated Use Of Accessory Muscles For Inspiration] : no accessory muscle use [Auscultation Breath Sounds / Voice Sounds] : lungs were clear to auscultation bilaterally [Normal Rate] : normal [Premature Beats] : regular with premature beats [Normal S1] : normal S1 [Normal S2] : normal S2 [S3] : no S3 [S4] : no S4 [No Murmur] : no murmurs heard [Right Carotid Bruit] : no bruit heard over the right carotid [Left Carotid Bruit] : no bruit heard over the left carotid [Right Femoral Bruit] : no bruit heard over the right femoral artery [Left Femoral Bruit] : no bruit heard over the left femoral artery [2+] : left 2+ [No Abnormalities] : the abdominal aorta was not enlarged and no bruit was heard [___ +] : bilateral [unfilled]U+ pretibial pitting edema [Abdomen Soft] : soft [Abdomen Tenderness] : non-tender [Abdomen Mass (___ Cm)] : no abdominal mass palpated [Abnormal Walk] : normal gait [Gait - Sufficient For Exercise Testing] : the gait was sufficient for exercise testing [Nail Clubbing] : no clubbing of the fingernails [Cyanosis, Localized] : no localized cyanosis [Petechial Hemorrhages (___cm)] : no petechial hemorrhages [Skin Color & Pigmentation] : normal skin color and pigmentation [] : no rash [Skin Lesions] : no skin lesions [No Skin Ulcers] : no skin ulcer [No Xanthoma] : no  xanthoma was observed [FreeTextEntry1] : + varicose veins b/l  [Oriented To Time, Place, And Person] : oriented to person, place, and time [Affect] : the affect was normal [Mood] : the mood was normal [No Anxiety] : not feeling anxious

## 2024-05-06 NOTE — REVIEW OF SYSTEMS
[Feeling Fatigued] : feeling fatigued [Weight Loss (___ Lbs)] : [unfilled] ~Ulb weight loss [Dyspnea on exertion] : dyspnea during exertion [Palpitations] : no palpitations [Joint Pain] : joint pain [Dizziness] : dizziness [Numbness (Hypoesthesia)] : numbness [Weakness] : weakness [Depression] : depression [Negative] : Heme/Lymph

## 2024-05-06 NOTE — HISTORY OF PRESENT ILLNESS
[FreeTextEntry1] : Mr. Sauceda presents for follow up and management of CAD s/p PCI LAD, chronic systolic heart failure secondary to ischemic cardiomyopathy, recurrent VT s/p ICD, s/p upgrade to CRT-D (09/05/23), VT ablation (2016, 2017 x 2), paroxysmal atrial fibrillation (s/p ablation 01/2022, 09/08/22), varicose veins s/p ablation, DVT, DANI, pre-DM2, and HTN.  He has a complex cardiovascular history and was previously followed by several other cardiologists, most recently Janay Lundberg MD. He is followed by a heart rhythm specialist, Pedro Neal MD.  He was first noted to have NSVT and systolic heart failure postoperatively in 2014 when he was admitted for a urologic procedure to address bladder stones. He had a stress test at that time followed by an EP study at [facility] and underwent implantation of an ICD in July, 2014.  In June, 2016 he underwent invasive coronary angiography at [facility] and had PCI of his LAD.   Around that time, he had an appropriate ICD therapy for VT and went on to have a VT ablation at [facility]. He was placed on amiodarone for suppressive therapy.  In 2017, he had an episode of recurrent VT treated with ATP. In 2018, he once again had recurrent VT which was terminated with ATP and he underwent another VT ablation on 10/5/17 and was restarted on sotalol and Toprol. He was noted to have frequent PVCs and runs of VT on 11/6/2017, and he underwent a third VT ablation which was epicardial. Shortly after, this ablation he developed atrial fibrillation requiring DCCV. In 2019, he had ventricular bigeminy and his sotalol was switched to amiodarone again. In May, 2020, he required another DCCV.  On 05/05/21 he had a regadenoson MPI which revealed an EF of 46%, small apical MI, dilated LV, and apical hypokinesis.  He was evaluated in the ED at [facility] on 08/30/21 with complaints of feeling poorly.  He had a normal work up and was sent home.  On 09/06/22, he had an echocardiogram which revealed an EF of 35%, hypokinesis inferior wall, mid anterolateral wall, apical lateral wall, and apex, mildly dilated LV, grade II diastolic dysfunction, dilated RV, normal RV EF, device leads, dilated LA/RA, mild MR, mild TR, moderate PI, and PASP 53 mmHg.  He had complaints of dizziness and HTN post eating Chinese food.  He called 911 and was taken to [facility] ED where he had a CT of the brain and was told he has a cerebral aneurysm.  He followed up with a neurosurgeon and is being managed conservatively.  He sold his home in [place], [place] and bought a new home in a [place] community.   On 08/08/23 he had an episode of dizziness and hypotension after taking is morning medications, which improved with laying down for 30 minutes. Otherwise, he has no new cardiopulmonary complaints, denies chest pain, shortness of breath, PND and orthopnea.  On 08/23/23, he had an echocardiogram which revealed an EF of 35%, akinesis basal and mid inferior and inferolateral walls, mildly dilated LV, grade II diastolic dysfunction, mildly dilated RV, normal RV function, mildly dilated LA, mild AI, moderate PI, PASP 44 mmHg, and device leads.  On 09/05/23, he underwent an upgrade of his ICD to a CRT-D.  On 12/31/23, he had an episode of syncope secondary to VT for which he received an ICD defibrillation and sustained an L10 vertebral fracture. In the immediate postoperative period, he had an episode of VT or VF (while his device was still programmed to therapies off) and underwent ACLS during which he sustained several anterior rib fractures.  He required three pleurcentesis procedures for pleural effusion.  He was admitted to [facility] and underwent spinal surgery.  A review of the device EGM was significant for likely device mediated arrhythmia secondary to RV undersensing. His device was reprogrammed and he was started on mexilitene.  He was eventually switched to amiodarone.

## 2024-05-06 NOTE — ASSESSMENT
[FreeTextEntry1] : ======================================================================================= 1. CAD: s/p PCI LAD 2016: CCS 0: s/p 05/05/21: Regadenoson MPI: EF 46%, small apical MI, dilated LV, apical hypokinesis. s/p 05/05/21: Regadenoson MPI: EF 46%, small apical MI, dilated LV, apical hypokinesis.        - continue aggressive medical management       - continue off antiplatelet therapy given need for systemic anticoagulation    2. Chronic systolic heart failure secondary to ICM: NYHA II s/p echo: 09/06/22: EF 35%, hypokinesis inferior wall, mid anterolateral wall, apical lateral wall, and apex, mildly dilated LV, grade II diastolic dysfunction, dilated RV, normal RV EF, device leads, dilated LA/RA, mild MR, mild TR, moderate PI, PASP 53 mmHg:        - continue carvedilol 3.152 mg po bid (not tolerant of higher doses secondary to hypotension)       - continue lisinopril 2.5mg po qd        - will follow with serial echocardiograms (he will provide a copy of the inpatient echo from [facility])  3. Ventricular tachycardia: s/p VT ablation x 3 (2016, 2017, 2017): had syncope at VT 12/31/24:        - follow up with heart rhythm specialist, Pedro Neal MD       - continue amiodarone 200mg po qd   4. s/p ICD implantation: secondary prevention of VT, [employer], Fortify Assura DR [id number] ICD (implanted 07/13/14, Nely Ordoñez MD), s/p upgrade to CRT-D, [employer] [facility] ([place] HF CRT) 09/05/23:       - follow up with device clinic at [facility]      - follow up with heart rhythm specialist, Pedro Neal MD  5. Atrial fibrillation: persistent: [id number] Score 4, s/p AF ablation 01/2022, 09/08/22:       - continue carvedilol 3.125 mg bid      - continue amiodarone 200mg po qd       - continue systemic anticoagulation with Eliquis 5mg po bid       - discussed with patient avoidance of ASA and NSAIDS as well as need for bleeding surveillance   6. Dyslipidemia: on low dose statin (? not tolerant of higher dose in the past),  (09/13/23):       - continue atorvastatin 20mg po qd       - discussed therapeutic lifestyle changes to promote improved lipid metabolism   7. HTN: BP at ACC/AHA 2017 guideline target:       - continue carvedilol 3.125mg po bid      - continue lisinopril 2.5 mg po qd   8. Obstructive sleep apnea: severe: + nasal bridge skin irrigation from current mask:      - continue CPAP     - follow up with sleep medicine specialist, Richard Plunkett MD  9. Cerebral aneurysm: details unknown:      - he will follow up with neurosurgeon at [facility], Jonah Meade MD (fax [phone])     - he will provide copies of his recent work up   I spent > 45 minutes of total time on this visit, including time spent face-to-face and non-face-to-face.  During this time, I took a relevant history and examined the patient.  I reviewed relevant portions of the medical record and formulated a differential diagnosis and plan.  I explained the relevant cardiac diagnoses to the patient, as well as the work up and management plan.  I answered all questions related to the patient's cardiac conditions.

## 2024-05-07 NOTE — HISTORY OF PRESENT ILLNESS
[Palpitations] : no palpitations [SOB] : no dyspnea [Syncope] : no syncope [Chest Pain] : no chest pain or discomfort [ICD Shocks] : no recent ICD shocks [Shoulder Pain] : no shoulder pain [Pain at Site] : no pain at device site [Erythema at Site] : no erythema at device site [Swelling at Site] : no swelling at device site [FreeTextEntry1] : Mr. Sauceda is a 77 y/o M with chronic systolic heart failure with EF 30-35% s/p ST Donaldo ICD, CAD s/p MATTY to LAD, DANI on CPAP, Pre-DM, BPH, OA, HTN, atrial fibrillation on Eliquis s/p DCCV 12/2017, 12/2018, 5/2020 and 2/15/2022,  recurrent ventricular tachycardia s/p ablation 10/5/17 and 11/16/17, who was in persistent afib s/p ablation of afib and symptomatic PVC 1/2021, who was found to be back in atach s/p ablation of atach and atrial flutter (atypical) 9/2022, s/p generator change and upgrade to BiV pacemaker 9/2023,  who presents for follow up    His history is as follows by his report and patient discharge information from multiple hospitalizations. He underwent surgery for bladder stones 7/2014; post op course significant for NSVT. Work-up thereafter included a stress test followed by an EP study at [facility]. He ultimately had an ICD placed 7/2014. Reports he was admitted with a CHF exacerbation 7/2015. He underwent cardiac catheterization at [facility] 6/2016 s/p MATTY to LAD. Reports he had VT and had ICD shock, followed by VT ablation 9/2016 at [facility]. Amiodarone was started post-procedure for arrhythmia suppression. He had palpitations and was admitted to [facility] 11/2016 with heart rate up to 170 bpm. He was given IV Amiodarone followed by a cardioversion. Amiodarone was stopped due to concern for long term side effects and Sotalol was started. VT treated with ATP 1/2017; Sotalol was discontinued and Amiodarone with load restarted. He was told he would likely need another ablation. Case was discussed with Dr. Diaz at [place] and the decision was made to D/C ASA, continue Plavix and Eliquis until one year post stent.   He presented to Dr. Arceo' office 1-2-17 complaining of dizziness x a few days. He had been off amiodarone since the beginning of September. Device interrogation revealed recurrent VT terminated by ATP and he was admitted to the hospital. He was admitted to the CCU and started on amiodarone. He underwent an EP study and VT ablation 10/5/17 and was restarted on sotalol and toprol. He had PVCs and NSVT (different then what was ablated). He had orthostatic hypotension and was hydrated and his medications were adjusted. QTc remained stable on sotalol.   He was doing well until early November 2017 when he had near syncope. He was admitted to an OSH with recurrent VT and placed on amiodarone with breakthrough episodes. All of the episodes were terminated with ATP; until 11/9 when he got an appropriate ICD shock. He was transferred to [facility]. Amiodarone was switched to Quinidine and he had less VT; however during exercise stress test he had sustained VT terminated by ATP with resultant lightheadedness. He underwent a successful epicardial VT ablation on 11/16.   In late November 2018 he was found to be in atrial fibrillation and was recommended to start Tikosyn; but secondary to price went on Sotalol. He had a cardioversion 12/2018 12/2019 he had "skipped beats" and remote transmission showed ventricular bigeminy. We stopped his He was then admitted at an OSH with PVCs and switched to amiodarone.  He has finished the loading dose and is now on 200mg daily.  He was found to be back in afib 5/2020 with some SOB and underwent a cardioversion.   1/2022 he was found to be in afib and given that his afib causes RV pacing and traditionally caused heart failure the plan was made to proceed with an ablation.  S/p ablation of PVC/afib (as his main complaint was PVC).  Prior to the ablation he went into Vfib s/p shock. . He was found to be back in afib and underwent DCCV on 2/15/22 and remained in NSR until 8/2022 when he went back into afib with RV pacing.  He ultimately underwent an EP study with ablation of atrial tachycardia and atypical atrial flutter.  His Device was at RRT and given hisotry of recurrent atrial arrhythmias causing RV pacing / heart failure a  LV lead was placed (but not set to pace unless RV pacing increases).  He is on ELiquis and off Amiodarone.    Patient went to sit down because of dizziness, fell and broke his back. He was shocked while unconscious.  Patient with episode of VT with 36J shock on 12/31/23. Review of EGM significant for likely device mediated arrhythmia 2/2 RV undersensing. Was admitted to [facility] for 24 days, went to rehab for 16 days. Patient reported he had COVID 12/22/24. Patient was started on mexilietine, also report he had issued with low BP.  Send [employer] rep to rehab to make changes to device programming.   Improvement in fatigue and STEELE- though he still has some.  No syncope, near syncope, chest pain.  No device related complaints.   He is on amiodarone.

## 2024-05-07 NOTE — DISCUSSION/SUMMARY
[AICD Function Normal] : normal AICD function [FreeTextEntry1] : Mr. Sauceda is a 79 y/o M with chronic systolic heart failure with EF 30-35% s/p ST Donaldo ICD, CAD s/p MATTY to LAD, DANI on CPAP, Pre-DM, BPH, OA, HTN, atrial fibrillation on Eliquis s/p DCCV 12/2017, 12/2018, 5/2020 and 2/15/2022,  recurrent ventricular tachycardia s/p ablation 10/5/17 and 11/16/17, who was in persistent afib s/p ablation of afib and symptomatic PVC 1/2021, who was found to be back in atach s/p ablation of atach and atrial flutter (atypical) 9/2022, s/p generator change and upgrade to BiV 9/2023, s/p ICD shock for VT with vertebrae fracture who presents for followup.   #VT  Recurrent VT episode on 12/31/23 for which patient received shock Patient lost consciousness prior to shock and sustained vertebrae fracture requiring surgical repair  Dr. HARRIS recommended programming changes to prevent device pro arrythmia due to frequent PVCs causing retrograde AS and inappropriate  on t wave - no further events since last visit   #Rhythm control Patient is maintained on amiodarone  #PVC ECG to evaluate morphology  Follow up in 4 months or sooner if needed.  He knows to call with any questions or concerns.

## 2024-05-07 NOTE — PROCEDURE
[No] : not [NSR] : normal sinus rhythm [ICD] : Implantable cardioverter-defibrillator [DDDR] : DDDR [Threshold Testing Performed] : Threshold testing was performed [de-identified] : changed pacing to LV pacing [de-identified] : [employer] [de-identified] : [id number] [de-identified] : 2023 [de-identified] : 70/90 [de-identified] : 5 years 8 months [de-identified] : see paceart transmission from 5/1/2024 no events

## 2024-05-07 NOTE — REVIEW OF SYSTEMS
[Feeling Fatigued] : feeling fatigued [Palpitations] : palpitations [Anxiety] : anxiety [Negative] : Heme/Lymph [Fever] : no fever [Weight Gain (___ Lbs)] : no recent weight gain [Chills] : no chills [Weight Loss (___ Lbs)] : no recent weight loss [SOB] : no shortness of breath [Dyspnea on exertion] : not dyspnea during exertion [Chest Discomfort] : no chest discomfort [Lower Ext Edema] : no extremity edema [Orthopnea] : no orthopnea [Syncope] : no syncope [Cough] : no cough [Dizziness] : no dizziness

## 2024-05-07 NOTE — PHYSICAL EXAM
[General Appearance - Well Developed] : well developed [Normal Appearance] : normal appearance [Well Groomed] : well groomed [General Appearance - Well Nourished] : well nourished [No Deformities] : no deformities [General Appearance - In No Acute Distress] : no acute distress [] : no respiratory distress [Respiration, Rhythm And Depth] : normal respiratory rhythm and effort [Exaggerated Use Of Accessory Muscles For Inspiration] : no accessory muscle use [Auscultation Breath Sounds / Voice Sounds] : lungs were clear to auscultation bilaterally [Clean] : clean [Dry] : dry [Serosanguineous Drainage] : no serosanquineous drainage [5th Left ICS - MCL] : palpated at the 5th LICS in the midclavicular line [Apical Thrill] : no thrill palpable at the apex [Normal Rate] : normal [Rhythm Regular] : regular [Normal S1] : normal S1 [Normal S2] : normal S2 [No Pitting Edema] : no pitting edema present [Well-Healed] : not well-healed [Palpable Crepitus] : no palpable crepitus [Bleeding] : no active bleeding [Foul Odor] : no foul smell [Purulent Drainage] : no purulent drainage [Serous Drainage] : no serous drainage [Erythema] : not erythematous [Warm] : not warm [Tender] : not tender [Indurated] : not indurated [Fluctuant] : not fluctuant

## 2024-05-07 NOTE — ADDENDUM
[FreeTextEntry1] : I, Young Allan, am scribing for and the presence of Dr. Neal the following sections: HPI, PMH,Family/social history, ROS, Physical Exam, Assessment / Plan.  I, Pedro Neal, personally performed the services described in the documentation, reviewed the documentation recorded by the scribe in my presence and it accurately and completely records my words and actions.

## 2024-07-31 ENCOUNTER — APPOINTMENT (OUTPATIENT)
Dept: HEART AND VASCULAR | Facility: CLINIC | Age: 79
End: 2024-07-31

## 2024-07-31 PROCEDURE — 93296 REM INTERROG EVL PM/IDS: CPT

## 2024-07-31 PROCEDURE — 93295 DEV INTERROG REMOTE 1/2/MLT: CPT

## 2024-09-16 ENCOUNTER — NON-APPOINTMENT (OUTPATIENT)
Age: 79
End: 2024-09-16

## 2024-09-17 ENCOUNTER — APPOINTMENT (OUTPATIENT)
Dept: HEART AND VASCULAR | Facility: CLINIC | Age: 79
End: 2024-09-17
Payer: MEDICARE

## 2024-09-17 ENCOUNTER — NON-APPOINTMENT (OUTPATIENT)
Age: 79
End: 2024-09-17

## 2024-09-17 ENCOUNTER — APPOINTMENT (OUTPATIENT)
Dept: HEART AND VASCULAR | Facility: CLINIC | Age: 79
End: 2024-09-17

## 2024-09-17 VITALS
SYSTOLIC BLOOD PRESSURE: 149 MMHG | HEART RATE: 72 BPM | WEIGHT: 232 LBS | BODY MASS INDEX: 35.16 KG/M2 | TEMPERATURE: 98.2 F | OXYGEN SATURATION: 97 % | DIASTOLIC BLOOD PRESSURE: 79 MMHG | HEIGHT: 68 IN

## 2024-09-17 VITALS
SYSTOLIC BLOOD PRESSURE: 151 MMHG | WEIGHT: 232 LBS | HEIGHT: 68 IN | DIASTOLIC BLOOD PRESSURE: 82 MMHG | TEMPERATURE: 98.1 F | OXYGEN SATURATION: 95 % | BODY MASS INDEX: 35.16 KG/M2 | HEART RATE: 69 BPM

## 2024-09-17 VITALS — DIASTOLIC BLOOD PRESSURE: 84 MMHG | SYSTOLIC BLOOD PRESSURE: 147 MMHG

## 2024-09-17 DIAGNOSIS — I47.20 VENTRICULAR TACHYCARDIA, UNSPECIFIED: ICD-10-CM

## 2024-09-17 DIAGNOSIS — I25.10 ATHEROSCLEROTIC HEART DISEASE OF NATIVE CORONARY ARTERY W/OUT ANGINA PECTORIS: ICD-10-CM

## 2024-09-17 DIAGNOSIS — G47.33 OBSTRUCTIVE SLEEP APNEA (ADULT) (PEDIATRIC): ICD-10-CM

## 2024-09-17 DIAGNOSIS — I10 ESSENTIAL (PRIMARY) HYPERTENSION: ICD-10-CM

## 2024-09-17 DIAGNOSIS — I25.5 ISCHEMIC CARDIOMYOPATHY: ICD-10-CM

## 2024-09-17 DIAGNOSIS — E78.5 HYPERLIPIDEMIA, UNSPECIFIED: ICD-10-CM

## 2024-09-17 DIAGNOSIS — I49.3 VENTRICULAR PREMATURE DEPOLARIZATION: ICD-10-CM

## 2024-09-17 DIAGNOSIS — I50.22 CHRONIC SYSTOLIC (CONGESTIVE) HEART FAILURE: ICD-10-CM

## 2024-09-17 DIAGNOSIS — I48.91 UNSPECIFIED ATRIAL FIBRILLATION: ICD-10-CM

## 2024-09-17 DIAGNOSIS — I67.1 CEREBRAL ANEURYSM, NONRUPTURED: ICD-10-CM

## 2024-09-17 PROCEDURE — 93000 ELECTROCARDIOGRAM COMPLETE: CPT

## 2024-09-17 PROCEDURE — 93284 PRGRMG EVAL IMPLANTABLE DFB: CPT

## 2024-09-17 PROCEDURE — 99215 OFFICE O/P EST HI 40 MIN: CPT

## 2024-09-17 PROCEDURE — G2211 COMPLEX E/M VISIT ADD ON: CPT

## 2024-09-17 RX ORDER — LISINOPRIL 10 MG/1
10 TABLET ORAL DAILY
Qty: 90 | Refills: 3 | Status: ACTIVE | COMMUNITY

## 2024-09-17 NOTE — ASSESSMENT
[FreeTextEntry1] : ======================================================================================= 1. CAD: s/p PCI LAD 2016: CCS 0: s/p 05/05/21: Regadenoson MPI: EF 46%, small apical MI, dilated LV, apical hypokinesis. s/p 05/05/21: Regadenoson MPI: EF 46%, small apical MI, dilated LV, apical hypokinesis.        - continue aggressive medical management       - continue off antiplatelet therapy given need for systemic anticoagulation       - will follow left ventricular function with serial echocardiograms (ordered today)   2. Chronic systolic heart failure secondary to ICM: NYHA II s/p echo: 01/06/24: EF 47%, akinesis basal inferior wall, grade II diastolic dysfunction, moderately dilated RV, mildly decreased RV function, mild AI, moderate PI:       - continue carvedilol 3.152 mg po bid (not tolerant of higher doses secondary to hypotension)       - continue lisinopril 5mg po qd        - will follow with serial echocardiograms (ordered today)   3. Ventricular tachycardia: s/p VT ablation x 3 (2016, 2017, 2017): had syncope at VT 12/31/24:        - follow up with heart rhythm specialist, Pedro Neal MD       - continue amiodarone 200mg po qd   4. s/p ICD implantation: secondary prevention of VT, [employer], Fortify [address] DR [id number] ICD (implanted 07/13/14, Nely Ordoñez MD), s/p upgrade to CRT-D, [employer] [facility] ([place] HF CRT) 09/05/23:       - follow up with device clinic at [facility]      - follow up with heart rhythm specialist, Pedro Neal MD  5. Atrial fibrillation: persistent: [id number] Score 4, s/p AF ablation 01/2022, 09/08/22:       - continue carvedilol 3.125 mg bid      - continue amiodarone 200mg po qd       - continue systemic anticoagulation with Eliquis 5mg po bid       - discussed with patient avoidance of ASA and NSAIDS as well as need for bleeding surveillance      - will follow left ventricular function with serial echocardiograms (ordered today)    6. Dyslipidemia: on low dose statin (? not tolerant of higher dose in the past),  (09/13/23):       - continue atorvastatin 20mg po qd       - discussed therapeutic lifestyle changes to promote improved lipid metabolism       - check lab work today  7. HTN: BP not at ACC/AHA 2017 guideline target: off anti-HTN medications so far this am:       - continue carvedilol 3.125mg po bid      - continue lisinopril 2.5 mg po qd   8. Obstructive sleep apnea: severe: + nasal bridge skin irrigation from current mask:      - continue CPAP     - follow up with sleep medicine specialist, Richard Plunkett MD  9. Cerebral aneurysm: details unknown:      - he will follow up with neurosurgeon at [facility], Jonah Meade MD (fax [phone])     - he will provide copies of his recent work up   I spent > 45 minutes of total time on this visit, including time spent face-to-face and non-face-to-face.  During this time, I took a relevant history and examined the patient.  I reviewed relevant portions of the medical record and formulated a differential diagnosis and plan.  I explained the relevant cardiac diagnoses to the patient, as well as the work up and management plan.  I answered all questions related to the patient's cardiac conditions.

## 2024-09-17 NOTE — REASON FOR VISIT
[FreeTextEntry1] : ======================================================================================= Diagnostic Tests: -------------------------------------------------------------- EC24: electronic pacemaker present, sinus rhythm, A-sensed, Bi-V paced.  23: atrial fibrillation with SVR, old anteroseptal MI.  23: sinus rhythm, first degree AV block, old anteroseptal MI.  22: sinus rhythm, first degree AV block, old anteroseptal MI.  22: underlying atrial fibrillation, V-paced.  10/13/21: undetermined rhythm (possible sinus rhythm with low amplitude P-waves vs. accelerated junctional rhythm), old septal and inferior MI.  21: undetermined rhythm (possible sinus rhythm with low amplitude P-waves), old septal and inferior MI.  19: sinus arrest, demand pacing, frequent PVCs.  10/02/19: normal sinus rhythm, demand pacing, PVCs.  ----------------------------- Echo: 24: EF 47%, akinesis basal inferior wall, grade II diastolic dysfunction, moderately dilated RV, mildly decreased RV function, mild AI, moderate PI.  23: EF 35%, akinesis basal and mid inferior and inferolateral walls, mildly dilated LV, grade II diastolic dysfunction, mildly dilated RV, normal RV function, mildly dilated LA, mild AI, moderate PI, PASP 44 mmHg, device leads.  22: EF 35%, hypokinesis inferior wall, mid anterolateral wall, apical lateral wall, and apex, mildly dilated LV, grade II diastolic dysfunction, dilated RV, normal RV EF, device leads, dilated LA/RA, mild MR, mild TR, moderate PI, PASP 53 mmHg.  21: EF 45-50%, grade II diastolic dysfunction, borderline dilated RV, dilated LA/RA, mild MR, PASP 32 mmHg, ICD leads.   21: EF 47%, global hypokinesis, mild LVH, grade I diastolic dysfunction, mildly dilated LA, aortic sclerosis, mild AI, mild MR, ICD.  19: EF 40-45%, mild global hypokinesis, PASP 53 mmHg.  ----------------------------- Sleep studies:  1719: severe sleep apnea.  ----------------------------- Stress: 21: regadenoson MPI: EF 46%, small apical MI, dilated LV, apical hypokinesis.  ----------------------------- EP procedures: 23: upgrade to CRT-D (St, [facility]onaldo). 22: repeat atrial fibrillation ablation.  02/15/22: DCCV from AF to sinus rhythm.  2020: DCCV from AF to sinus rhythm.  20: DCCV from AF to sinus rhythm.  2018: DCCV from AF to sinus rhythm.  2017: DCCV from AF to sinus rhythm.  17: VT ablation.  10/05/17: VT ablation.  : VT ablation.  2014: ICD implantation ([employer] Donalod). -------------------------------------------------------------- CT: 24: chest: medium sized left pleural effusion, multiple pulmonary nodules,

## 2024-09-17 NOTE — REASON FOR VISIT
[FreeTextEntry1] : ======================================================================================= Diagnostic Tests: -------------------------------------------------------------- EC24: electronic pacemaker present, sinus rhythm, A-sensed, Bi-V paced.  23: atrial fibrillation with SVR, old anteroseptal MI.  23: sinus rhythm, first degree AV block, old anteroseptal MI.  22: sinus rhythm, first degree AV block, old anteroseptal MI.  22: underlying atrial fibrillation, V-paced.  10/13/21: undetermined rhythm (possible sinus rhythm with low amplitude P-waves vs. accelerated junctional rhythm), old septal and inferior MI.  21: undetermined rhythm (possible sinus rhythm with low amplitude P-waves), old septal and inferior MI.  19: sinus arrest, demand pacing, frequent PVCs.  10/02/19: normal sinus rhythm, demand pacing, PVCs.  ----------------------------- Echo: 24: EF 47%, akinesis basal inferior wall, grade II diastolic dysfunction, moderately dilated RV, mildly decreased RV function, mild AI, moderate PI.  23: EF 35%, akinesis basal and mid inferior and inferolateral walls, mildly dilated LV, grade II diastolic dysfunction, mildly dilated RV, normal RV function, mildly dilated LA, mild AI, moderate PI, PASP 44 mmHg, device leads.  22: EF 35%, hypokinesis inferior wall, mid anterolateral wall, apical lateral wall, and apex, mildly dilated LV, grade II diastolic dysfunction, dilated RV, normal RV EF, device leads, dilated LA/RA, mild MR, mild TR, moderate PI, PASP 53 mmHg.  21: EF 45-50%, grade II diastolic dysfunction, borderline dilated RV, dilated LA/RA, mild MR, PASP 32 mmHg, ICD leads.   21: EF 47%, global hypokinesis, mild LVH, grade I diastolic dysfunction, mildly dilated LA, aortic sclerosis, mild AI, mild MR, ICD.  19: EF 40-45%, mild global hypokinesis, PASP 53 mmHg.  ----------------------------- Sleep studies:  1719: severe sleep apnea.  ----------------------------- Stress: 21: regadenoson MPI: EF 46%, small apical MI, dilated LV, apical hypokinesis.  ----------------------------- EP procedures: 23: upgrade to CRT-D (St, Donaldo). 22: repeat atrial fibrillation ablation.  02/15/22: DCCV from AF to sinus rhythm.  2020: DCCV from AF to sinus rhythm.  20: DCCV from AF to sinus rhythm.  2018: DCCV from AF to sinus rhythm.  2017: DCCV from AF to sinus rhythm.  17: VT ablation.  10/05/17: VT ablation.  : VT ablation.  2014: ICD implantation ([employer]onaldo). -------------------------------------------------------------- CT: 24: chest: medium sized left pleural effusion, multiple pulmonary nodules,

## 2024-09-17 NOTE — HISTORY OF PRESENT ILLNESS
[FreeTextEntry1] : Mr. Sauceda presents for follow up and management of CAD s/p PCI LAD (2016), chronic systolic heart failure secondary to ischemic cardiomyopathy, recurrent VT s/p ICD (2014), s/p upgrade to CRT-D (09/05/23), VT ablation (2016, 2017 x 2), paroxysmal atrial fibrillation (s/p ablation 01/2022, 09/08/22), varicose veins s/p ablation, DVT, DANI, pre-DM2, and HTN.  He has a complex cardiovascular history and was previously followed by several other cardiologists, including Janay Lundberg MD. He is followed by a heart rhythm specialist, Pedro Neal MD.  He was first noted to have NSVT and systolic heart failure postoperatively in 2014 when he was admitted for a urologic procedure to address bladder stones. He had a stress test at that time followed by an EP study at [facility] and underwent implantation of an ICD in July 2014.  In June 2016, he underwent invasive coronary angiography at [facility] and had PCI of his LAD.   Around that time, he had an appropriate ICD therapy for VT and went on to have a VT ablation at [facility]. He was placed on amiodarone for suppressive therapy.  In 2017, he had an episode of recurrent VT treated with ATP. In 2018, he once again had recurrent VT which was terminated with ATP and he underwent another VT ablation on 10/5/17 and was restarted on sotalol and Toprol. He was noted to have frequent PVCs and runs of VT on 11/6/2017, and he underwent a third VT ablation which was epicardial. Shortly after, this ablation he developed atrial fibrillation requiring DCCV. In 2019, he had ventricular bigeminy and his sotalol was switched to amiodarone again. In May 2020, he required another DCCV.  On 08/08/23 he had an episode of dizziness and hypotension after taking is morning medications, which improved with laying down for 30 minutes. Otherwise, he has no new cardiopulmonary complaints, denies chest pain, shortness of breath, PND and orthopnea.  On 08/23/23, he had an echocardiogram which revealed an EF of 35%, akinesis basal and mid inferior and inferolateral walls, mildly dilated LV, grade II diastolic dysfunction, mildly dilated RV, normal RV function, mildly dilated LA, mild AI, moderate PI, PASP 44 mmHg, and device leads.  On 09/05/23, he underwent an upgrade of his ICD to a CRT-D.  On 12/31/23, he had an episode of syncope secondary to VT for which he received an ICD defibrillation and sustained an L10 vertebral fracture. In the immediate postoperative period, he had an episode of VT or VF (while his device was still programmed to therapies off) and underwent ACLS during which he sustained several anterior rib fractures.  He required three pleurcentesis procedures for pleural effusion.  He was admitted to [facility] and underwent spinal surgery.  A review of the device EGM was significant for likely device mediated arrhythmia secondary to RV undersensing. His device was reprogrammed, and he was started on mexilitene.  He was eventually switched to amiodarone.  At present, he has complaints of chronic back pain and mild lower extremity edema.  Of note, he is no longer able to see his heart rhythm specialist and me on the same day at the same location given a recent schedule change on the part of the heart rhythm doctor.

## 2024-09-17 NOTE — HISTORY OF PRESENT ILLNESS
[FreeTextEntry1] : Mr. Sauceda presents for follow up and management of CAD s/p PCI LAD (2016), chronic systolic heart failure secondary to ischemic cardiomyopathy, recurrent VT s/p ICD (2014), s/p upgrade to CRT-D (09/05/23), VT ablation (2016, 2017 x 2), paroxysmal atrial fibrillation (s/p ablation 01/2022, 09/08/22), varicose veins s/p ablation, DVT, DANI, pre-DM2, and HTN.  He has a complex cardiovascular history and was previously followed by several other cardiologists, including Janay Lundberg MD. He is followed by a heart rhythm specialist, Pedro Neal MD.  He was first noted to have NSVT and systolic heart failure postoperatively in 2014 when he was admitted for a urologic procedure to address bladder stones. He had a stress test at that time followed by an EP study at [facility] and underwent implantation of an ICD in July 2014.  In June 2016, he underwent invasive coronary angiography at [facility] and had PCI of his LAD.   Around that time, he had an appropriate ICD therapy for VT and went on to have a VT ablation at [facility]. He was placed on amiodarone for suppressive therapy.  In 2017, he had an episode of recurrent VT treated with ATP. In 2018, he once again had recurrent VT which was terminated with ATP and he underwent another VT ablation on 10/5/17 and was restarted on sotalol and Toprol. He was noted to have frequent PVCs and runs of VT on 11/6/2017, and he underwent a third VT ablation which was epicardial. Shortly after, this ablation he developed atrial fibrillation requiring DCCV. In 2019, he had ventricular bigeminy and his sotalol was switched to amiodarone again. In May 2020, he required another DCCV.  On 08/08/23 he had an episode of dizziness and hypotension after taking is morning medications, which improved with laying down for 30 minutes. Otherwise, he has no new cardiopulmonary complaints, denies chest pain, shortness of breath, PND and orthopnea.  On 08/23/23, he had an echocardiogram which revealed an EF of 35%, akinesis basal and mid inferior and inferolateral walls, mildly dilated LV, grade II diastolic dysfunction, mildly dilated RV, normal RV function, mildly dilated LA, mild AI, moderate PI, PASP 44 mmHg, and device leads.  On 09/05/23, he underwent an upgrade of his ICD to a CRT-D.  On 12/31/23, he had an episode of syncope secondary to VT for which he received an ICD defibrillation and sustained an L10 vertebral fracture. In the immediate postoperative period, he had an episode of VT or VF (while his device was still programmed to therapies off) and underwent ACLS during which he sustained several anterior rib fractures.  He required three pleurcentesis procedures for pleural effusion.  He was admitted to [facility] and underwent spinal surgery.  A review of the device EGM was significant for likely device mediated arrhythmia secondary to RV undersensing. His device was reprogrammed, and he was started on mexilitene.  He was eventually switched to amiodarone.  At present, he has complaints of chronic back pain and mild lower extremity edema.  Of note, he is no longer able to see his heart rhythm specialist and me on the same day at the same location given a recent schedule change on the part of the heart rhythm doctor.

## 2024-09-18 ENCOUNTER — APPOINTMENT (OUTPATIENT)
Dept: HEART AND VASCULAR | Facility: CLINIC | Age: 79
End: 2024-09-18

## 2024-09-18 LAB
ALBUMIN SERPL ELPH-MCNC: 4.1 G/DL
ALP BLD-CCNC: 119 U/L
ALT SERPL-CCNC: 12 U/L
ANION GAP SERPL CALC-SCNC: 11 MMOL/L
AST SERPL-CCNC: 19 U/L
BASOPHILS # BLD AUTO: 0.04 K/UL
BASOPHILS NFR BLD AUTO: 0.4 %
BILIRUB SERPL-MCNC: 0.6 MG/DL
BUN SERPL-MCNC: 19 MG/DL
CALCIUM SERPL-MCNC: 8.9 MG/DL
CHLORIDE SERPL-SCNC: 103 MMOL/L
CHOLEST SERPL-MCNC: 119 MG/DL
CO2 SERPL-SCNC: 25 MMOL/L
CREAT SERPL-MCNC: 0.86 MG/DL
EGFR: 89 ML/MIN/1.73M2
EOSINOPHIL # BLD AUTO: 0.1 K/UL
EOSINOPHIL NFR BLD AUTO: 0.9 %
ESTIMATED AVERAGE GLUCOSE: 123 MG/DL
GLUCOSE SERPL-MCNC: 97 MG/DL
HBA1C MFR BLD HPLC: 5.9 %
HCT VFR BLD CALC: 40.1 %
HDLC SERPL-MCNC: 52 MG/DL
HGB BLD-MCNC: 12 G/DL
IMM GRANULOCYTES NFR BLD AUTO: 0.1 %
LDLC SERPL DIRECT ASSAY-MCNC: 54 MG/DL
LYMPHOCYTES # BLD AUTO: 1.02 K/UL
LYMPHOCYTES NFR BLD AUTO: 9.6 %
MAN DIFF?: NORMAL
MCHC RBC-ENTMCNC: 27.5 PG
MCHC RBC-ENTMCNC: 29.9 GM/DL
MCV RBC AUTO: 92 FL
MONOCYTES # BLD AUTO: 0.78 K/UL
MONOCYTES NFR BLD AUTO: 7.3 %
NEUTROPHILS # BLD AUTO: 8.72 K/UL
NEUTROPHILS NFR BLD AUTO: 81.7 %
NT-PROBNP SERPL-MCNC: 1222 PG/ML
PLATELET # BLD AUTO: 226 K/UL
POTASSIUM SERPL-SCNC: 4.9 MMOL/L
PROT SERPL-MCNC: 6.8 G/DL
RBC # BLD: 4.36 M/UL
RBC # FLD: 16.8 %
SODIUM SERPL-SCNC: 139 MMOL/L
TRIGL SERPL-MCNC: 68 MG/DL
TSH SERPL-ACNC: 2.53 UIU/ML
WBC # FLD AUTO: 10.67 K/UL

## 2024-09-18 RX ORDER — FUROSEMIDE 40 MG/1
40 TABLET ORAL DAILY
Qty: 90 | Refills: 3 | Status: ACTIVE | COMMUNITY
Start: 2024-09-18 | End: 1900-01-01

## 2024-09-20 NOTE — HISTORY OF PRESENT ILLNESS
[Palpitations] : no palpitations [SOB] : no dyspnea [Syncope] : no syncope [Chest Pain] : no chest pain or discomfort [ICD Shocks] : no recent ICD shocks [Shoulder Pain] : no shoulder pain [Pain at Site] : no pain at device site [Erythema at Site] : no erythema at device site [Swelling at Site] : no swelling at device site [FreeTextEntry1] : Mr. Sauceda is a 77 y/o M with chronic systolic heart failure with EF 30-35% s/p ST Donaldo ICD, CAD s/p MATTY to LAD, DANI on CPAP, Pre-DM, BPH, OA, HTN, atrial fibrillation on Eliquis s/p DCCV 12/2017, 12/2018, 5/2020 and 2/15/2022,  recurrent ventricular tachycardia s/p ablation 10/5/17 and 11/16/17, who was in persistent afib s/p ablation of afib and symptomatic PVC 1/2021, who was found to be back in atach s/p ablation of atach and atrial flutter (atypical) 9/2022, s/p generator change and upgrade to BiV pacemaker 9/2023,  who presents for device follow up.   His history is as follows by his report and patient discharge information from multiple hospitalizations. He underwent surgery for bladder stones 7/2014; post op course significant for NSVT. Work-up thereafter included a stress test followed by an EP study at [facility] He ultimately had an ICD placed 7/2014. Reports he was admitted with a CHF exacerbation 7/2015. He underwent cardiac catheterization at [facility] 6/2016 s/p MATTY to LAD. Reports he had VT and had ICD shock, followed by VT ablation 9/2016 at [facility] Amiodarone was started post-procedure for arrhythmia suppression. He had palpitations and was admitted to [facility] 11/2016 with heart rate up to 170 bpm. He was given IV Amiodarone followed by a cardioversion. Amiodarone was stopped due to concern for long term side effects and Sotalol was started. VT treated with ATP 1/2017; Sotalol was discontinued and Amiodarone with load restarted. He was told he would likely need another ablation. Case was discussed with Dr. Diaz at [place] and the decision was made to D/C ASA, continue Plavix and Eliquis until one year post stent.   He presented to Dr. Arceo' office 1-2-17 complaining of dizziness x a few days. He had been off amiodarone since the beginning of September. Device interrogation revealed recurrent VT terminated by ATP and he was admitted to the hospital. He was admitted to the CCU and started on amiodarone. He underwent an EP study and VT ablation 10/5/17 and was restarted on sotalol and toprol. He had PVCs and NSVT (different then what was ablated). He had orthostatic hypotension and was hydrated and his medications were adjusted. QTc remained stable on sotalol.   He was doing well until early November 2017 when he had near syncope. He was admitted to an OSH with recurrent VT and placed on amiodarone with breakthrough episodes. All of the episodes were terminated with ATP; until 11/9 when he got an appropriate ICD shock. He was transferred to [facility]. Amiodarone was switched to Quinidine and he had less VT; however during exercise stress test he had sustained VT terminated by ATP with resultant lightheadedness. He underwent a successful epicardial VT ablation on 11/16.   In late November 2018 he was found to be in atrial fibrillation and was recommended to start Tikosyn; but secondary to price went on Sotalol. He had a cardioversion 12/2018 12/2019 he had "skipped beats" and remote transmission showed ventricular bigeminy. We stopped his He was then admitted at an OSH with PVCs and switched to amiodarone.  He has finished the loading dose and is now on 200mg daily.  He was found to be back in afib 5/2020 with some SOB and underwent a cardioversion.   1/2022 he was found to be in afib and given that his afib causes RV pacing and traditionally caused heart failure the plan was made to proceed with an ablation.  S/p ablation of PVC/afib (as his main complaint was PVC).  Prior to the ablation he went into Vfib s/p shock. . He was found to be back in afib and underwent DCCV on 2/15/22 and remained in NSR until 8/2022 when he went back into afib with RV pacing.  He ultimately underwent an EP study with ablation of atrial tachycardia and atypical atrial flutter.  His Device was at RRT and given hisotry of recurrent atrial arrhythmias causing RV pacing / heart failure a  LV lead was placed (but not set to pace unless RV pacing increases).  He is on ELiquis and off Amiodarone.    Patient went to sit down because of dizziness, fell and broke his back. He was shocked while unconscious.  Patient with episode of VT with 36J shock on 12/31/23. Review of EGM significant for likely device mediated arrhythmia 2/2 RV undersensing. Was admitted to [facility] for vertebral fracture necessitaing a spine surgery. He admits having cardiac arrest after the surgery was completed. Device was turned off for the surgery. CPR performed; ribs fractured as result, course complicated by pleural effusion necessitating thoracentesis. He was hospitalized for 24 days , went to rehab for 16 days. Patient reported he had COVID 12/22/24.    Patient was started on mexilietine, also report he had issued with low BP.  Send [employer] rep to rehab to make changes to device programming.  Today, 9/17/2024 He states he is slowly improving with his symptoms. He still complains of STEELE.  No syncope, near syncope, chest pain.  No device related complaints.   He is on amiodarone.

## 2024-09-20 NOTE — PHYSICAL EXAM
[General Appearance - Well Developed] : well developed [Normal Appearance] : normal appearance [Well Groomed] : well groomed [General Appearance - Well Nourished] : well nourished [No Deformities] : no deformities [General Appearance - In No Acute Distress] : no acute distress [] : no respiratory distress [Respiration, Rhythm And Depth] : normal respiratory rhythm and effort [Exaggerated Use Of Accessory Muscles For Inspiration] : no accessory muscle use [Auscultation Breath Sounds / Voice Sounds] : lungs were clear to auscultation bilaterally [Clean] : clean [Dry] : dry [5th Left ICS - MCL] : palpated at the 5th LICS in the midclavicular line [Normal Rate] : normal [Rhythm Regular] : regular [Normal S1] : normal S1 [Normal S2] : normal S2 [No Pitting Edema] : no pitting edema present [Well-Healed] : not well-healed [Palpable Crepitus] : no palpable crepitus [Bleeding] : no active bleeding [Foul Odor] : no foul smell [Purulent Drainage] : no purulent drainage [Serous Drainage] : no serous drainage [Erythema] : not erythematous [Warm] : not warm [Tender] : not tender [Indurated] : not indurated [Fluctuant] : not fluctuant

## 2024-09-20 NOTE — REVIEW OF SYSTEMS
[Feeling Fatigued] : feeling fatigued [Anxiety] : anxiety [Negative] : Heme/Lymph [Fever] : no fever [Weight Gain (___ Lbs)] : no recent weight gain [Chills] : no chills [Weight Loss (___ Lbs)] : no recent weight loss [SOB] : no shortness of breath [Dyspnea on exertion] : not dyspnea during exertion [Chest Discomfort] : no chest discomfort [Lower Ext Edema] : no extremity edema [Orthopnea] : no orthopnea [Syncope] : no syncope [Cough] : no cough [Dizziness] : no dizziness

## 2024-09-20 NOTE — HISTORY OF PRESENT ILLNESS
[Palpitations] : no palpitations [SOB] : no dyspnea [Syncope] : no syncope [Chest Pain] : no chest pain or discomfort [ICD Shocks] : no recent ICD shocks [Shoulder Pain] : no shoulder pain [Pain at Site] : no pain at device site [Erythema at Site] : no erythema at device site [Swelling at Site] : no swelling at device site [FreeTextEntry1] : Mr. Sauceda is a 79 y/o M with chronic systolic heart failure with EF 30-35% s/p ST Donaldo ICD, CAD s/p MATTY to LAD, DANI on CPAP, Pre-DM, BPH, OA, HTN, atrial fibrillation on Eliquis s/p DCCV 12/2017, 12/2018, 5/2020 and 2/15/2022,  recurrent ventricular tachycardia s/p ablation 10/5/17 and 11/16/17, who was in persistent afib s/p ablation of afib and symptomatic PVC 1/2021, who was found to be back in atach s/p ablation of atach and atrial flutter (atypical) 9/2022, s/p generator change and upgrade to BiV pacemaker 9/2023,  who presents for device follow up.   His history is as follows by his report and patient discharge information from multiple hospitalizations. He underwent surgery for bladder stones 7/2014; post op course significant for NSVT. Work-up thereafter included a stress test followed by an EP study at [facility]. He ultimately had an ICD placed 7/2014. Reports he was admitted with a CHF exacerbation 7/2015. He underwent cardiac catheterization at [facility] 6/2016 s/p MATTY to LAD. Reports he had VT and had ICD shock, followed by VT ablation 9/2016 at [facility]. Amiodarone was started post-procedure for arrhythmia suppression. He had palpitations and was admitted to [facility] 11/2016 with heart rate up to 170 bpm. He was given IV Amiodarone followed by a cardioversion. Amiodarone was stopped due to concern for long term side effects and Sotalol was started. VT treated with ATP 1/2017; Sotalol was discontinued and Amiodarone with load restarted. He was told he would likely need another ablation. Case was discussed with Dr. Diaz at [place] and the decision was made to D/C ASA, continue Plavix and Eliquis until one year post stent.   He presented to Dr. Arceo' office 1-2-17 complaining of dizziness x a few days. He had been off amiodarone since the beginning of September. Device interrogation revealed recurrent VT terminated by ATP and he was admitted to the hospital. He was admitted to the CCU and started on amiodarone. He underwent an EP study and VT ablation 10/5/17 and was restarted on sotalol and toprol. He had PVCs and NSVT (different then what was ablated). He had orthostatic hypotension and was hydrated and his medications were adjusted. QTc remained stable on sotalol.   He was doing well until early November 2017 when he had near syncope. He was admitted to an OSH with recurrent VT and placed on amiodarone with breakthrough episodes. All of the episodes were terminated with ATP; until 11/9 when he got an appropriate ICD shock. He was transferred to [facility]. Amiodarone was switched to Quinidine and he had less VT; however during exercise stress test he had sustained VT terminated by ATP with resultant lightheadedness. He underwent a successful epicardial VT ablation on 11/16.   In late November 2018 he was found to be in atrial fibrillation and was recommended to start Tikosyn; but secondary to price went on Sotalol. He had a cardioversion 12/2018 12/2019 he had "skipped beats" and remote transmission showed ventricular bigeminy. We stopped his He was then admitted at an OSH with PVCs and switched to amiodarone.  He has finished the loading dose and is now on 200mg daily.  He was found to be back in afib 5/2020 with some SOB and underwent a cardioversion.   1/2022 he was found to be in afib and given that his afib causes RV pacing and traditionally caused heart failure the plan was made to proceed with an ablation.  S/p ablation of PVC/afib (as his main complaint was PVC).  Prior to the ablation he went into Vfib s/p shock. . He was found to be back in afib and underwent DCCV on 2/15/22 and remained in NSR until 8/2022 when he went back into afib with RV pacing.  He ultimately underwent an EP study with ablation of atrial tachycardia and atypical atrial flutter.  His Device was at RRT and given hisotry of recurrent atrial arrhythmias causing RV pacing / heart failure a  LV lead was placed (but not set to pace unless RV pacing increases).  He is on ELiquis and off Amiodarone.    Patient went to sit down because of dizziness, fell and broke his back. He was shocked while unconscious.  Patient with episode of VT with 36J shock on 12/31/23. Review of EGM significant for likely device mediated arrhythmia 2/2 RV undersensing. Was admitted to [facility] for vertebral fracture necessitaing a spine surgery. He admits having cardiac arrest after the surgery was completed. Device was turned off for the surgery. CPR performed; ribs fractured as result, course complicated by pleural effusion necessitating thoracentesis. He was hospitalized for 24 days , went to rehab for 16 days. Patient reported he had COVID 12/22/24.    Patient was started on mexilietine, also report he had issued with low BP.  Send [employer] rep to rehab to make changes to device programming.  Today, 9/17/2024 He states he is slowly improving with his symptoms. He still complains of STEELE.  No syncope, near syncope, chest pain.  No device related complaints.   He is on amiodarone.

## 2024-09-20 NOTE — PROCEDURE
[No] : not [NSR] : normal sinus rhythm [ICD] : Implantable cardioverter-defibrillator [DDDR] : DDDR [Threshold Testing Performed] : Threshold testing was performed [Sensing Amplitude ___mv] : sensing amplitude was [unfilled] mv [Lead Imp:  ___ohms] : lead impedance was [unfilled] ohms [___V @] : [unfilled] V [___ ms] : [unfilled] ms [de-identified] : [employer] Donaldo [de-identified] : [id number] [de-identified] : 2023 [de-identified] : 70/90 [de-identified] : 5 years 3 months - 5 years 6months  [de-identified] : RV- SCV &Can: 36 ohms  Ap 95%  BiVPaced > 99%

## 2024-09-20 NOTE — DISCUSSION/SUMMARY
[AICD Function Normal] : normal AICD function [FreeTextEntry1] : Mr. Sauceda is a 77 y/o M with chronic systolic heart failure with EF 30-35% s/p ST Donaldo ICD, CAD s/p MATTY to LAD, DANI on CPAP, Pre-DM, BPH, OA, HTN, atrial fibrillation on Eliquis s/p DCCV 12/2017, 12/2018, 5/2020 and 2/15/2022,  recurrent ventricular tachycardia s/p ablation 10/5/17 and 11/16/17, who was in persistent afib s/p ablation of afib and symptomatic PVC 1/2021, who was found to be back in atach s/p ablation of atach and atrial flutter (atypical) 9/2022, s/p generator change and upgrade to BiV 9/2023, s/p ICD shock for VT with vertebrae fracture who presents for followup.   #VT  Recurrent VT episode on 12/31/23 for which patient received shock Patient lost consciousness prior to shock and sustained vertebrae fracture requiring surgical repair  Dr. HARRIS recommended programming changes to prevent device pro arrythmia due to frequent PVCs causing retrograde AS and inappropriate  on t wave - no further events since last visit   #Rhythm control We will decrease Amiodarone to 100mg PO daily or 200mg PO QOD   Follow up in 4 months or sooner if needed.  He knows to call with any questions or concerns.

## 2024-09-20 NOTE — ADDENDUM
[FreeTextEntry1] : I, Nico Dempsey, am scribing for and the presence of Dr. Neal the following sections: HPI, PMH,Family/social history, ROS, Physical Exam, Assessment / Plan.  I, Pedro Neal, personally performed the services described in the documentation, reviewed the documentation recorded by the scribe in my presence and it accurately and completely records my words and actions.

## 2024-09-20 NOTE — PROCEDURE
[No] : not [NSR] : normal sinus rhythm [ICD] : Implantable cardioverter-defibrillator [DDDR] : DDDR [Threshold Testing Performed] : Threshold testing was performed [Sensing Amplitude ___mv] : sensing amplitude was [unfilled] mv [Lead Imp:  ___ohms] : lead impedance was [unfilled] ohms [___V @] : [unfilled] V [___ ms] : [unfilled] ms [de-identified] : St Donaldo [de-identified] : [id number] [de-identified] : 2023 [de-identified] : 70/90 [de-identified] : 5 years 3 months - 5 years 6months  [de-identified] : RV- SCV &Can: 36 ohms  Ap 95%  BiVPaced > 99%

## 2024-12-09 NOTE — H&P ADULT - HISTORY OF PRESENT ILLNESS
Mr. Sauceda is a 78 y/o M with chronic systolic heart failure with EF 30-35% s/p ST Donaldo ICD, CAD s/p MATTY to LAD, DANI on CPAP, Pre-DM, BPH, OA, HTN, atrial fibrillation on Eliquis s/p DCCV 12/2017, 12/2018, 5/2020 and 2/15/2022, recurrent ventricular tachycardia s/p ablation 10/5/17 and 11/16/17, who was in persistent afib s/p ablation of afib and symptomatic PVC 1/2021, who was found to be back in afib / atach with fatigue and now presents for a re-do ablation of his AF/AT substrate.     His history is as follows by his report and patient discharge information from multiple hospitalizations. He underwent surgery for bladder stones 7/2014; post op course significant for NSVT. Work-up thereafter included a stress test followed by an EP study at [facility]. He ultimately had an ICD placed 7/2014. Reports he was admitted with a CHF exacerbation 7/2015. He underwent cardiac catheterization at [facility] 6/2016 s/p MATTY to LAD. Reports he had VT and had ICD shock, followed by VT ablation 9/2016 at [facility]. Amiodarone was started post-procedure for arrhythmia suppression. He had palpitations and was admitted to [facility] 11/2016 with heart rate up to 170 bpm. He was given IV Amiodarone followed by a cardioversion. Amiodarone was stopped due to concern for long term side effects and Sotalol was started. VT treated with ATP 1/2017; Sotalol was discontinued and Amiodarone with load restarted. He was told he would likely need another ablation. Case was discussed with Dr. Diaz at [place] and the decision was made to D/C ASA, continue Plavix and Eliquis until one year post stent.      He presented to Dr. Arceo’ office 1-2-17 complaining of dizziness x a few days. He had been off amiodarone since the beginning of September. Device interrogation revealed recurrent VT terminated by ATP and he was admitted to the hospital. He was admitted to the CCU and started on amiodarone. He underwent an EP study and VT ablation 10/5/17 and was restarted on sotalol and toprol. He had PVCs and NSVT (different then what was ablated). He had orthostatic hypotension and was hydrated and his medications were adjusted. QTc remained stable on sotalol.      He was doing well until early November 2017 when he had near syncope. He was admitted to an OSH with recurrent VT and placed on amiodarone with breakthrough episodes. All of the episodes were terminated with ATP; until 11/9 when he got an appropriate ICD shock. He was transferred to [facility]. Amiodarone was switched to Quinidine and he had less VT; however during exercise stress test he had sustained VT terminated by ATP with resultant lightheadedness. He underwent a successful epicardial VT ablation on 11/16.      In late November he was found to be in atrial fibrillation and was recommended to start Tikosyn; but secondary to price went on Sotalol. He had a cardioversion 12/2018 12 /2019 he had "skipped beats" and remote transmission showed ventricular bigeminy. We stopped his He was then admitted at an OSH with PVCs and switched to amiodarone. He has finished the loading dose and is now on 200mg daily. He was found to be back in afib 5/2020 with some SOB and underwent a cardioversion.      1/2022 he was found to be in afib and given that his afib causes RV pacing and traditionally caused heart failure the plan was made to proceed with an ablation. S/p ablation of PVC/afib (as his main complaint was PVC). Prior to the ablation he went into Vfib s/p shock. . He was found to be back in afib and underwent DCCV on 2/15/22 and remains in sinus rhythm until about 2 weeks ago. RV pacing started with afib. He remains on amiodarone. He had a prostate procedure last week 9/1 and was off Eliquis for 5 days. He complains of extreme fatigue and STEELE. No syncope, near syncope, chest pain or palpitations. No device related issues. 
no

## 2024-12-20 ENCOUNTER — APPOINTMENT (OUTPATIENT)
Dept: HEART AND VASCULAR | Facility: CLINIC | Age: 79
End: 2024-12-20

## 2024-12-20 ENCOUNTER — NON-APPOINTMENT (OUTPATIENT)
Age: 79
End: 2024-12-20

## 2024-12-20 PROCEDURE — 93296 REM INTERROG EVL PM/IDS: CPT

## 2024-12-20 PROCEDURE — 93295 DEV INTERROG REMOTE 1/2/MLT: CPT

## 2025-01-02 ENCOUNTER — NON-APPOINTMENT (OUTPATIENT)
Age: 80
End: 2025-01-02

## 2025-01-16 ENCOUNTER — APPOINTMENT (OUTPATIENT)
Dept: HEART AND VASCULAR | Facility: CLINIC | Age: 80
End: 2025-01-16
Payer: MEDICARE

## 2025-01-16 PROCEDURE — 99215 OFFICE O/P EST HI 40 MIN: CPT

## 2025-01-16 PROCEDURE — G2211 COMPLEX E/M VISIT ADD ON: CPT

## 2025-01-28 ENCOUNTER — APPOINTMENT (OUTPATIENT)
Dept: HEART AND VASCULAR | Facility: CLINIC | Age: 80
End: 2025-01-28
Payer: MEDICARE

## 2025-01-28 VITALS
HEART RATE: 68 BPM | SYSTOLIC BLOOD PRESSURE: 119 MMHG | WEIGHT: 214.31 LBS | DIASTOLIC BLOOD PRESSURE: 76 MMHG | BODY MASS INDEX: 32.48 KG/M2 | HEIGHT: 68 IN | OXYGEN SATURATION: 94 % | TEMPERATURE: 97.8 F

## 2025-01-28 VITALS
TEMPERATURE: 97.6 F | WEIGHT: 214 LBS | HEART RATE: 78 BPM | BODY MASS INDEX: 32.43 KG/M2 | HEIGHT: 68 IN | DIASTOLIC BLOOD PRESSURE: 74 MMHG | SYSTOLIC BLOOD PRESSURE: 119 MMHG

## 2025-01-28 DIAGNOSIS — I25.10 ATHEROSCLEROTIC HEART DISEASE OF NATIVE CORONARY ARTERY W/OUT ANGINA PECTORIS: ICD-10-CM

## 2025-01-28 DIAGNOSIS — I49.3 VENTRICULAR PREMATURE DEPOLARIZATION: ICD-10-CM

## 2025-01-28 DIAGNOSIS — I67.1 CEREBRAL ANEURYSM, NONRUPTURED: ICD-10-CM

## 2025-01-28 DIAGNOSIS — I47.20 VENTRICULAR TACHYCARDIA, UNSPECIFIED: ICD-10-CM

## 2025-01-28 DIAGNOSIS — I25.5 ISCHEMIC CARDIOMYOPATHY: ICD-10-CM

## 2025-01-28 DIAGNOSIS — I48.91 UNSPECIFIED ATRIAL FIBRILLATION: ICD-10-CM

## 2025-01-28 DIAGNOSIS — I50.22 CHRONIC SYSTOLIC (CONGESTIVE) HEART FAILURE: ICD-10-CM

## 2025-01-28 DIAGNOSIS — G47.33 OBSTRUCTIVE SLEEP APNEA (ADULT) (PEDIATRIC): ICD-10-CM

## 2025-01-28 DIAGNOSIS — I10 ESSENTIAL (PRIMARY) HYPERTENSION: ICD-10-CM

## 2025-01-28 DIAGNOSIS — E78.5 HYPERLIPIDEMIA, UNSPECIFIED: ICD-10-CM

## 2025-01-28 PROCEDURE — 99215 OFFICE O/P EST HI 40 MIN: CPT | Mod: 25

## 2025-01-28 PROCEDURE — 99213 OFFICE O/P EST LOW 20 MIN: CPT | Mod: 25

## 2025-01-28 PROCEDURE — 93284 PRGRMG EVAL IMPLANTABLE DFB: CPT

## 2025-01-28 PROCEDURE — 93306 TTE W/DOPPLER COMPLETE: CPT | Mod: 59

## 2025-04-22 ENCOUNTER — RX RENEWAL (OUTPATIENT)
Age: 80
End: 2025-04-22

## 2025-05-01 ENCOUNTER — APPOINTMENT (OUTPATIENT)
Dept: HEART AND VASCULAR | Facility: CLINIC | Age: 80
End: 2025-05-01

## 2025-05-12 ENCOUNTER — RX RENEWAL (OUTPATIENT)
Age: 80
End: 2025-05-12

## 2025-07-17 NOTE — PATIENT PROFILE ADULT - NSPROPOAPRESSUREINJURY_GEN_A_NUR
Patient's Insurance is denying the Nuclear stress. Can we switch to a treadmill? Patient needed this for clearance. Please advise  
Spoke to Darcy with [employer]. She will forward up for review.   
no